# Patient Record
Sex: MALE | Race: WHITE | Employment: OTHER | URBAN - METROPOLITAN AREA
[De-identification: names, ages, dates, MRNs, and addresses within clinical notes are randomized per-mention and may not be internally consistent; named-entity substitution may affect disease eponyms.]

---

## 2019-04-15 ENCOUNTER — APPOINTMENT (OUTPATIENT)
Dept: CT IMAGING | Age: 69
End: 2019-04-15
Attending: PHYSICIAN ASSISTANT
Payer: MEDICARE

## 2019-04-15 ENCOUNTER — HOSPITAL ENCOUNTER (EMERGENCY)
Age: 69
Discharge: HOME | End: 2019-04-15
Attending: STUDENT IN AN ORGANIZED HEALTH CARE EDUCATION/TRAINING PROGRAM
Payer: MEDICARE

## 2019-04-15 VITALS
DIASTOLIC BLOOD PRESSURE: 74 MMHG | RESPIRATION RATE: 16 BRPM | HEART RATE: 85 BPM | OXYGEN SATURATION: 99 % | BODY MASS INDEX: 20.73 KG/M2 | HEIGHT: 69 IN | WEIGHT: 140 LBS | TEMPERATURE: 98.1 F | SYSTOLIC BLOOD PRESSURE: 126 MMHG

## 2019-04-15 DIAGNOSIS — E11.65 UNCONTROLLED TYPE 2 DIABETES MELLITUS WITH HYPERGLYCEMIA (HCC): ICD-10-CM

## 2019-04-15 DIAGNOSIS — R10.9 LEFT FLANK PAIN: Primary | ICD-10-CM

## 2019-04-15 LAB
ALBUMIN SERPL-MCNC: 4 G/DL (ref 3.2–4.6)
ALBUMIN/GLOB SERPL: 0.9 {RATIO}
ALP SERPL-CCNC: 182 U/L (ref 50–136)
ALT SERPL-CCNC: 38 U/L (ref 12–65)
ANION GAP SERPL CALC-SCNC: 5 MMOL/L
APPEARANCE UR: CLEAR
AST SERPL-CCNC: 15 U/L (ref 15–37)
BASOPHILS # BLD: 0 K/UL (ref 0–0.2)
BASOPHILS NFR BLD: 1 % (ref 0–2)
BILIRUB SERPL-MCNC: 0.6 MG/DL (ref 0.2–1.1)
BILIRUB UR QL: NEGATIVE
BUN SERPL-MCNC: 15 MG/DL (ref 8–23)
CALCIUM SERPL-MCNC: 10 MG/DL (ref 8.3–10.4)
CHLORIDE SERPL-SCNC: 98 MMOL/L (ref 98–107)
CO2 SERPL-SCNC: 28 MMOL/L (ref 21–32)
COLOR UR: YELLOW
CREAT SERPL-MCNC: 0.75 MG/DL (ref 0.8–1.5)
DIFFERENTIAL METHOD BLD: NORMAL
EOSINOPHIL # BLD: 0.1 K/UL (ref 0–0.8)
EOSINOPHIL NFR BLD: 1 % (ref 0.5–7.8)
ERYTHROCYTE [DISTWIDTH] IN BLOOD BY AUTOMATED COUNT: 12.8 % (ref 11.9–14.6)
GLOBULIN SER CALC-MCNC: 4.3 G/DL (ref 2.3–3.5)
GLUCOSE BLD STRIP.AUTO-MCNC: 241 MG/DL (ref 65–100)
GLUCOSE SERPL-MCNC: 464 MG/DL (ref 65–100)
GLUCOSE UR STRIP.AUTO-MCNC: >1000 MG/DL
HCT VFR BLD AUTO: 46.3 % (ref 41.1–50.3)
HGB BLD-MCNC: 16 G/DL (ref 13.6–17.2)
HGB UR QL STRIP: NEGATIVE
IMM GRANULOCYTES # BLD AUTO: 0.1 K/UL (ref 0–0.5)
IMM GRANULOCYTES NFR BLD AUTO: 1 % (ref 0–5)
KETONES UR QL STRIP.AUTO: NEGATIVE MG/DL
LACTATE BLD-SCNC: 1.53 MMOL/L (ref 0.5–1.9)
LEUKOCYTE ESTERASE UR QL STRIP.AUTO: NEGATIVE
LIPASE SERPL-CCNC: 318 U/L (ref 73–393)
LYMPHOCYTES # BLD: 1.7 K/UL (ref 0.5–4.6)
LYMPHOCYTES NFR BLD: 29 % (ref 13–44)
MCH RBC QN AUTO: 31.6 PG (ref 26.1–32.9)
MCHC RBC AUTO-ENTMCNC: 34.6 G/DL (ref 31.4–35)
MCV RBC AUTO: 91.3 FL (ref 79.6–97.8)
MONOCYTES # BLD: 0.5 K/UL (ref 0.1–1.3)
MONOCYTES NFR BLD: 9 % (ref 4–12)
NEUTS SEG # BLD: 3.5 K/UL (ref 1.7–8.2)
NEUTS SEG NFR BLD: 60 % (ref 43–78)
NITRITE UR QL STRIP.AUTO: NEGATIVE
NRBC # BLD: 0 K/UL (ref 0–0.2)
PH UR STRIP: 5 [PH] (ref 5–9)
PLATELET # BLD AUTO: 273 K/UL (ref 150–450)
PMV BLD AUTO: 9.7 FL (ref 9.4–12.3)
POTASSIUM SERPL-SCNC: 3.9 MMOL/L (ref 3.5–5.1)
PROT SERPL-MCNC: 8.3 G/DL
PROT UR STRIP-MCNC: NEGATIVE MG/DL
RBC # BLD AUTO: 5.07 M/UL (ref 4.23–5.6)
SODIUM SERPL-SCNC: 131 MMOL/L (ref 136–145)
SP GR UR REFRACTOMETRY: 1.04 (ref 1–1.02)
UROBILINOGEN UR QL STRIP.AUTO: 0.2 EU/DL (ref 0.2–1)
WBC # BLD AUTO: 5.8 K/UL (ref 4.3–11.1)

## 2019-04-15 PROCEDURE — 96375 TX/PRO/DX INJ NEW DRUG ADDON: CPT | Performed by: PHYSICIAN ASSISTANT

## 2019-04-15 PROCEDURE — 85025 COMPLETE CBC W/AUTO DIFF WBC: CPT

## 2019-04-15 PROCEDURE — 74011636637 HC RX REV CODE- 636/637: Performed by: PHYSICIAN ASSISTANT

## 2019-04-15 PROCEDURE — 74176 CT ABD & PELVIS W/O CONTRAST: CPT

## 2019-04-15 PROCEDURE — 74011250636 HC RX REV CODE- 250/636: Performed by: PHYSICIAN ASSISTANT

## 2019-04-15 PROCEDURE — 99284 EMERGENCY DEPT VISIT MOD MDM: CPT | Performed by: PHYSICIAN ASSISTANT

## 2019-04-15 PROCEDURE — 81003 URINALYSIS AUTO W/O SCOPE: CPT

## 2019-04-15 PROCEDURE — 74011000258 HC RX REV CODE- 258: Performed by: PHYSICIAN ASSISTANT

## 2019-04-15 PROCEDURE — 83605 ASSAY OF LACTIC ACID: CPT

## 2019-04-15 PROCEDURE — 96361 HYDRATE IV INFUSION ADD-ON: CPT | Performed by: PHYSICIAN ASSISTANT

## 2019-04-15 PROCEDURE — 80053 COMPREHEN METABOLIC PANEL: CPT

## 2019-04-15 PROCEDURE — 82962 GLUCOSE BLOOD TEST: CPT

## 2019-04-15 PROCEDURE — 83690 ASSAY OF LIPASE: CPT

## 2019-04-15 PROCEDURE — 96365 THER/PROPH/DIAG IV INF INIT: CPT | Performed by: PHYSICIAN ASSISTANT

## 2019-04-15 RX ORDER — METFORMIN HYDROCHLORIDE 500 MG/1
TABLET ORAL
Qty: 120 TAB | Refills: 0 | Status: SHIPPED | OUTPATIENT
Start: 2019-04-15 | End: 2020-03-19

## 2019-04-15 RX ORDER — KETOROLAC TROMETHAMINE 30 MG/ML
15 INJECTION, SOLUTION INTRAMUSCULAR; INTRAVENOUS
Status: COMPLETED | OUTPATIENT
Start: 2019-04-15 | End: 2019-04-15

## 2019-04-15 RX ORDER — CEPHALEXIN 500 MG/1
500 CAPSULE ORAL EVERY 6 HOURS
Qty: 28 CAP | Refills: 0 | Status: SHIPPED | OUTPATIENT
Start: 2019-04-15 | End: 2019-04-22

## 2019-04-15 RX ADMIN — INSULIN HUMAN 10 UNITS: 100 INJECTION, SOLUTION PARENTERAL at 15:13

## 2019-04-15 RX ADMIN — SODIUM CHLORIDE 1000 ML: 900 INJECTION, SOLUTION INTRAVENOUS at 15:14

## 2019-04-15 RX ADMIN — CEFTRIAXONE 1 G: 1 INJECTION, POWDER, FOR SOLUTION INTRAMUSCULAR; INTRAVENOUS at 15:14

## 2019-04-15 RX ADMIN — KETOROLAC TROMETHAMINE 15 MG: 30 INJECTION, SOLUTION INTRAMUSCULAR at 13:57

## 2019-04-15 RX ADMIN — SODIUM CHLORIDE 1000 ML: 900 INJECTION, SOLUTION INTRAVENOUS at 13:57

## 2019-04-15 NOTE — PROGRESS NOTES
Referral to Diabetes Self Management program at [facility] faxed at request of Lady Judy Edmondson. Patient notified and advised to call them if he not heard anything by the end of the week.

## 2019-04-15 NOTE — ED TRIAGE NOTES
Pt c/o lower back pain for 3 weeks or so. States it started out as an ache that tylenol usually makes feel better and now there are sharp shooting pains.

## 2019-04-15 NOTE — ED PROVIDER NOTES
Patient presents to the ER complaining of left flank pain for the last 3 weeks. He does not recall straining or injuring back, pain went away with Tylenol for 2 weeks, then returned. Patient has now switched to Aleve, and this med also not working any longer. Pain is worse when he lays on his left side at night, not particularly aggravated with movement, food, urination or defecation. Pain occasionally radiates into L upper quadrant. Patient denies hematuria, nausea, vomiting or unattended weight loss. Patient shares he was laid off 1 year ago and lost his insurance. He was taking metformin for diabetes, but has been on no medication for the last year. Lost 40 pounds over the last year of eating better. History of kidney stones 7-8 years ago, patient states this does not feel the same. Denies CP, SOB, EDGE. States he's being seen today because his friend had a similar pain that he put off investigating, then friend was diagnosed with a renal cancer and a \"tumor that wrapped around his spine\". Past Medical History:  
Diagnosis Date  Diabetes ([address] boderline  Diabetes type 2, uncontrolled ([address]  Other ill-defined conditions(799.89)   
 cardiomyopathy  Other ill-defined conditions(799.89)   
 hypercholerosteremia Past Surgical History:  
Procedure Laterality Date  HX ORTHOPAEDIC    
 rt knee  HX OTHER SURGICAL    
 hernia,  
 
   
Family History:  
Problem Relation Age of Onset  Heart Disease Father  Heart Disease Paternal Grandfather Social History Socioeconomic History  Marital status:  Spouse name: Not on file  Number of children: Not on file  Years of education: Not on file  Highest education level: Not on file Occupational History  Not on file Social Needs  Financial resource strain: Not on file  Food insecurity:  
  Worry: Not on file Inability: Not on file  Transportation needs:  
  Medical: Not on file Non-medical: Not on file Tobacco Use  Smoking status: Former Smoker Packs/day: 1.00 Years: 20.00 Pack years: 20.00 Last attempt to quit: 10/20/1997 Years since quittin.4  Smokeless tobacco: Never Used Substance and Sexual Activity  Alcohol use: Yes Alcohol/week: 0.0 oz  
  Comment: occasional  
 Drug use: No  
 Sexual activity: Never Lifestyle  Physical activity:  
  Days per week: Not on file Minutes per session: Not on file  Stress: Not on file Relationships  Social connections:  
  Talks on phone: Not on file Gets together: Not on file Attends [employer] service: Not on file Active member of club or organization: Not on file Attends meetings of clubs or organizations: Not on file Relationship status: Not on file  Intimate partner violence:  
  Fear of current or ex partner: Not on file Emotionally abused: Not on file Physically abused: Not on file Forced sexual activity: Not on file Other Topics Concern  Not on file Social History Narrative  Not on file ALLERGIES: Patient has no known allergies. Review of Systems Constitutional: Negative for activity change, appetite change, chills, diaphoresis, fatigue, fever and unexpected weight change. Respiratory: Negative for cough and choking. Cardiovascular: Negative for chest pain and palpitations. Gastrointestinal: Negative for abdominal pain. Psychiatric/Behavioral: Negative for confusion, decreased concentration and dysphoric mood. Vitals:  
 04/15/19 1210 BP: 120/76 Pulse: 85 Resp: 16 Temp: 98.4 °F (36.9 °C) SpO2: 100% Weight: 63.5 kg (140 lb) Height: 5' 9\" (1.753 m) Physical Exam  
Constitutional: He appears well-developed and well-nourished. Thin, healthy-appearing white male in no acute distress. Ambulating independently HENT:  
Head: Normocephalic. Eyes: Pupils are equal, round, and reactive to light. Conjunctivae and EOM are normal.  
Neck: Normal range of motion. Pulmonary/Chest: Effort normal and breath sounds normal.  
Abdominal: Soft. Bowel sounds are normal.  
Musculoskeletal: Normal range of motion. He exhibits tenderness. He exhibits no edema. No bony midline tenderness. Positive CVA tenderness on the left side. No rash, ecchymosis or evidence of trauma. Abdomen nontender. Nursing note and vitals reviewed. MDM Number of Diagnoses or Management Options Left flank pain: new and requires workup Uncontrolled type 2 diabetes mellitus with hyperglycemia [facility]): new and requires workup Diagnosis management comments: Concern for kidney stone vs pyelo vs AKD. DKA. Patient has intended weight loss; cancer would be higher on the differential if unintended. Amount and/or Complexity of Data Reviewed Clinical lab tests: ordered and reviewed Risk of Complications, Morbidity, and/or Mortality Presenting problems: moderate Diagnostic procedures: moderate Management options: moderate General comments: Patient with blood sugar of 460 in the ER. Given 2 bags of fluid, 10 IV of insulin and Rocephin. Will be DC'd with Keflex and metformin in the dose that he was taking last year. Discussed case with Socorro Bronson, who set him up with other PCPs that will take his Medicare insurance of Dr. Timur Billings will no longer see him. Also sent referral for diabetes self-management education classes. Patient voices understanding. Return precautions discussed. Patient Progress Patient progress: stable Procedures

## 2019-04-15 NOTE — PROGRESS NOTES
Visited at request of Sigifredo CALDWELL to assist with follow up and PCP. Demographics on face sheet verified. Patient states does not have a PCP. Discussed the importance of getting established with a PCP and provided patient with a list of PCPs. Encouraged patient to let me know if he would like me to make the appointment or is in need of any assistance.

## 2019-04-15 NOTE — ED NOTES
I have reviewed discharge instructions with the patient. The patient verbalized understanding. Patient left ED via Discharge Method: ambulatory to Home with self. Opportunity for questions and clarification provided. Patient given 1 scripts. To continue your aftercare when you leave the hospital, you may receive an automated call from our care team to check in on how you are doing. This is a free service and part of our promise to provide the best care and service to meet your aftercare needs.  If you have questions, or wish to unsubscribe from this service please call [phone]. Thank you for Choosing our [address] [place] [address] Emergency Department.

## 2019-04-15 NOTE — DISCHARGE INSTRUCTIONS
Take Keflex as directed for treatment of your pyelonephritis. Take metformin as directed. You will be contacted by [facility],  who will acute up with self-management classes for your diabetes. Follow-up with Dr. Alondra Rothman as soon as you are able. You need to restart your medication and better control your diabetes. Your blood sugar was over 460 in the ER today. Drink plenty of fluids. Return to the ER if worse or new symptoms.

## 2020-01-03 ENCOUNTER — APPOINTMENT (OUTPATIENT)
Dept: CT IMAGING | Age: 70
DRG: 065 | End: 2020-01-03
Attending: EMERGENCY MEDICINE
Payer: MEDICARE

## 2020-01-03 ENCOUNTER — HOSPITAL ENCOUNTER (INPATIENT)
Age: 70
LOS: 3 days | Discharge: HOME HEALTH | DRG: 065 | End: 2020-01-06
Attending: EMERGENCY MEDICINE | Admitting: INTERNAL MEDICINE
Payer: MEDICARE

## 2020-01-03 ENCOUNTER — APPOINTMENT (OUTPATIENT)
Dept: MRI IMAGING | Age: 70
DRG: 065 | End: 2020-01-03
Attending: INTERNAL MEDICINE
Payer: MEDICARE

## 2020-01-03 ENCOUNTER — APPOINTMENT (OUTPATIENT)
Dept: GENERAL RADIOLOGY | Age: 70
DRG: 065 | End: 2020-01-03
Attending: INTERNAL MEDICINE
Payer: MEDICARE

## 2020-01-03 DIAGNOSIS — J81.1 CHRONIC PULMONARY EDEMA: ICD-10-CM

## 2020-01-03 DIAGNOSIS — I63.9 CEREBROVASCULAR ACCIDENT (CVA), UNSPECIFIED MECHANISM (HCC): ICD-10-CM

## 2020-01-03 DIAGNOSIS — R79.89 ELEVATED BRAIN NATRIURETIC PEPTIDE (BNP) LEVEL: ICD-10-CM

## 2020-01-03 DIAGNOSIS — H53.462 LEFT HOMONYMOUS HEMIANOPSIA DUE TO RECENT CEREBRAL INFARCTION: Primary | ICD-10-CM

## 2020-01-03 DIAGNOSIS — I69.398 LEFT HOMONYMOUS HEMIANOPSIA DUE TO RECENT CEREBRAL INFARCTION: Primary | ICD-10-CM

## 2020-01-03 PROBLEM — R77.8 TROPONIN I ABOVE REFERENCE RANGE: Status: ACTIVE | Noted: 2020-01-03

## 2020-01-03 LAB
ANION GAP SERPL CALC-SCNC: 6 MMOL/L (ref 7–16)
APTT PPP: 29 SEC (ref 24.7–39.8)
ATRIAL RATE: 87 BPM
ATRIAL RATE: 90 BPM
BASOPHILS # BLD: 0 K/UL (ref 0–0.2)
BASOPHILS NFR BLD: 1 % (ref 0–2)
BNP SERPL-MCNC: 2897 PG/ML (ref 5–125)
BUN SERPL-MCNC: 14 MG/DL (ref 8–23)
CALCIUM SERPL-MCNC: 9 MG/DL (ref 8.3–10.4)
CALCULATED P AXIS, ECG09: 64 DEGREES
CALCULATED P AXIS, ECG09: 69 DEGREES
CALCULATED R AXIS, ECG10: 91 DEGREES
CALCULATED R AXIS, ECG10: 93 DEGREES
CALCULATED T AXIS, ECG11: -108 DEGREES
CALCULATED T AXIS, ECG11: -84 DEGREES
CHLORIDE SERPL-SCNC: 106 MMOL/L (ref 98–107)
CO2 SERPL-SCNC: 29 MMOL/L (ref 21–32)
CREAT SERPL-MCNC: 0.67 MG/DL (ref 0.8–1.5)
DIAGNOSIS, 93000: NORMAL
DIAGNOSIS, 93000: NORMAL
DIFFERENTIAL METHOD BLD: ABNORMAL
EOSINOPHIL # BLD: 0.1 K/UL (ref 0–0.8)
EOSINOPHIL NFR BLD: 1 % (ref 0.5–7.8)
ERYTHROCYTE [DISTWIDTH] IN BLOOD BY AUTOMATED COUNT: 14.2 % (ref 11.9–14.6)
GLUCOSE BLD STRIP.AUTO-MCNC: 145 MG/DL (ref 65–100)
GLUCOSE BLD STRIP.AUTO-MCNC: 151 MG/DL (ref 65–100)
GLUCOSE SERPL-MCNC: 122 MG/DL (ref 65–100)
HCT VFR BLD AUTO: 34 % (ref 41.1–50.3)
HGB BLD-MCNC: 11 G/DL (ref 13.6–17.2)
IMM GRANULOCYTES # BLD AUTO: 0.2 K/UL (ref 0–0.5)
IMM GRANULOCYTES NFR BLD AUTO: 3 % (ref 0–5)
INR PPP: 1.1
LYMPHOCYTES # BLD: 1.2 K/UL (ref 0.5–4.6)
LYMPHOCYTES NFR BLD: 21 % (ref 13–44)
MCH RBC QN AUTO: 31.2 PG (ref 26.1–32.9)
MCHC RBC AUTO-ENTMCNC: 32.4 G/DL (ref 31.4–35)
MCV RBC AUTO: 96.3 FL (ref 79.6–97.8)
MONOCYTES # BLD: 0.5 K/UL (ref 0.1–1.3)
MONOCYTES NFR BLD: 9 % (ref 4–12)
NEUTS SEG # BLD: 3.7 K/UL (ref 1.7–8.2)
NEUTS SEG NFR BLD: 66 % (ref 43–78)
NRBC # BLD: 0 K/UL (ref 0–0.2)
P-R INTERVAL, ECG05: 246 MS
P-R INTERVAL, ECG05: 270 MS
PLATELET # BLD AUTO: 268 K/UL (ref 150–450)
PMV BLD AUTO: 8.7 FL (ref 9.4–12.3)
POTASSIUM SERPL-SCNC: 3.8 MMOL/L (ref 3.5–5.1)
PROTHROMBIN TIME: 14.1 SEC (ref 11.7–14.5)
Q-T INTERVAL, ECG07: 394 MS
Q-T INTERVAL, ECG07: 394 MS
QRS DURATION, ECG06: 70 MS
QRS DURATION, ECG06: 72 MS
QTC CALCULATION (BEZET), ECG08: 474 MS
QTC CALCULATION (BEZET), ECG08: 481 MS
RBC # BLD AUTO: 3.53 M/UL (ref 4.23–5.6)
SODIUM SERPL-SCNC: 141 MMOL/L (ref 136–145)
TROPONIN I SERPL-MCNC: 0.74 NG/ML (ref 0.02–0.05)
TROPONIN I SERPL-MCNC: 0.79 NG/ML (ref 0.02–0.05)
TROPONIN I SERPL-MCNC: 0.82 NG/ML (ref 0.02–0.05)
VENTRICULAR RATE, ECG03: 87 BPM
VENTRICULAR RATE, ECG03: 90 BPM
WBC # BLD AUTO: 5.6 K/UL (ref 4.3–11.1)

## 2020-01-03 PROCEDURE — 74011250636 HC RX REV CODE- 250/636: Performed by: INTERNAL MEDICINE

## 2020-01-03 PROCEDURE — 86580 TB INTRADERMAL TEST: CPT | Performed by: INTERNAL MEDICINE

## 2020-01-03 PROCEDURE — 93005 ELECTROCARDIOGRAM TRACING: CPT | Performed by: EMERGENCY MEDICINE

## 2020-01-03 PROCEDURE — 99285 EMERGENCY DEPT VISIT HI MDM: CPT | Performed by: EMERGENCY MEDICINE

## 2020-01-03 PROCEDURE — 70551 MRI BRAIN STEM W/O DYE: CPT

## 2020-01-03 PROCEDURE — 80048 BASIC METABOLIC PNL TOTAL CA: CPT

## 2020-01-03 PROCEDURE — 74011636320 HC RX REV CODE- 636/320: Performed by: EMERGENCY MEDICINE

## 2020-01-03 PROCEDURE — 82962 GLUCOSE BLOOD TEST: CPT

## 2020-01-03 PROCEDURE — 85610 PROTHROMBIN TIME: CPT

## 2020-01-03 PROCEDURE — 74011250637 HC RX REV CODE- 250/637: Performed by: INTERNAL MEDICINE

## 2020-01-03 PROCEDURE — 36415 COLL VENOUS BLD VENIPUNCTURE: CPT

## 2020-01-03 PROCEDURE — 84484 ASSAY OF TROPONIN QUANT: CPT

## 2020-01-03 PROCEDURE — 70450 CT HEAD/BRAIN W/O DYE: CPT

## 2020-01-03 PROCEDURE — 65270000029 HC RM PRIVATE

## 2020-01-03 PROCEDURE — 74011250637 HC RX REV CODE- 250/637: Performed by: EMERGENCY MEDICINE

## 2020-01-03 PROCEDURE — 0042T CT PERF W CBF: CPT

## 2020-01-03 PROCEDURE — 92610 EVALUATE SWALLOWING FUNCTION: CPT

## 2020-01-03 PROCEDURE — 83880 ASSAY OF NATRIURETIC PEPTIDE: CPT

## 2020-01-03 PROCEDURE — 71045 X-RAY EXAM CHEST 1 VIEW: CPT

## 2020-01-03 PROCEDURE — 85730 THROMBOPLASTIN TIME PARTIAL: CPT

## 2020-01-03 PROCEDURE — 74011000302 HC RX REV CODE- 302: Performed by: INTERNAL MEDICINE

## 2020-01-03 PROCEDURE — 74011636637 HC RX REV CODE- 636/637: Performed by: INTERNAL MEDICINE

## 2020-01-03 PROCEDURE — 77030040361 HC SLV COMPR DVT MDII -B

## 2020-01-03 PROCEDURE — 70496 CT ANGIOGRAPHY HEAD: CPT

## 2020-01-03 PROCEDURE — 85025 COMPLETE CBC W/AUTO DIFF WBC: CPT

## 2020-01-03 PROCEDURE — 74011000258 HC RX REV CODE- 258: Performed by: EMERGENCY MEDICINE

## 2020-01-03 RX ORDER — ATORVASTATIN CALCIUM 40 MG/1
80 TABLET, FILM COATED ORAL
Status: DISCONTINUED | OUTPATIENT
Start: 2020-01-03 | End: 2020-01-06 | Stop reason: HOSPADM

## 2020-01-03 RX ORDER — ACETAMINOPHEN 325 MG/1
650 TABLET ORAL
Status: DISCONTINUED | OUTPATIENT
Start: 2020-01-03 | End: 2020-01-06 | Stop reason: HOSPADM

## 2020-01-03 RX ORDER — HYDROCODONE BITARTRATE AND ACETAMINOPHEN 7.5; 325 MG/1; MG/1
1 TABLET ORAL
COMMUNITY
End: 2020-01-06

## 2020-01-03 RX ORDER — HEPARIN SODIUM 5000 [USP'U]/ML
5000 INJECTION, SOLUTION INTRAVENOUS; SUBCUTANEOUS EVERY 12 HOURS
Status: DISCONTINUED | OUTPATIENT
Start: 2020-01-03 | End: 2020-01-06 | Stop reason: HOSPADM

## 2020-01-03 RX ORDER — INSULIN LISPRO 100 [IU]/ML
INJECTION, SOLUTION INTRAVENOUS; SUBCUTANEOUS
Status: DISCONTINUED | OUTPATIENT
Start: 2020-01-03 | End: 2020-01-06 | Stop reason: HOSPADM

## 2020-01-03 RX ORDER — LABETALOL HYDROCHLORIDE 5 MG/ML
5 INJECTION, SOLUTION INTRAVENOUS
Status: DISCONTINUED | OUTPATIENT
Start: 2020-01-03 | End: 2020-01-06 | Stop reason: HOSPADM

## 2020-01-03 RX ORDER — ASPIRIN 81 MG/1
81 TABLET ORAL DAILY
Status: DISCONTINUED | OUTPATIENT
Start: 2020-01-04 | End: 2020-01-06 | Stop reason: HOSPADM

## 2020-01-03 RX ORDER — SODIUM CHLORIDE 0.9 % (FLUSH) 0.9 %
5-40 SYRINGE (ML) INJECTION EVERY 8 HOURS
Status: DISCONTINUED | OUTPATIENT
Start: 2020-01-03 | End: 2020-01-06 | Stop reason: HOSPADM

## 2020-01-03 RX ORDER — SODIUM CHLORIDE 0.9 % (FLUSH) 0.9 %
5-40 SYRINGE (ML) INJECTION AS NEEDED
Status: DISCONTINUED | OUTPATIENT
Start: 2020-01-03 | End: 2020-01-06 | Stop reason: HOSPADM

## 2020-01-03 RX ORDER — SODIUM CHLORIDE 0.9 % (FLUSH) 0.9 %
10 SYRINGE (ML) INJECTION
Status: COMPLETED | OUTPATIENT
Start: 2020-01-03 | End: 2020-01-03

## 2020-01-03 RX ORDER — ONDANSETRON 2 MG/ML
4 INJECTION INTRAMUSCULAR; INTRAVENOUS
Status: DISCONTINUED | OUTPATIENT
Start: 2020-01-03 | End: 2020-01-06 | Stop reason: HOSPADM

## 2020-01-03 RX ORDER — GUAIFENESIN 100 MG/5ML
324 LIQUID (ML) ORAL
Status: COMPLETED | OUTPATIENT
Start: 2020-01-03 | End: 2020-01-03

## 2020-01-03 RX ORDER — AMOXICILLIN 250 MG
1 CAPSULE ORAL DAILY
Status: DISCONTINUED | OUTPATIENT
Start: 2020-01-04 | End: 2020-01-06 | Stop reason: HOSPADM

## 2020-01-03 RX ADMIN — ACETAMINOPHEN 650 MG: 325 TABLET, FILM COATED ORAL at 22:59

## 2020-01-03 RX ADMIN — SODIUM CHLORIDE 100 ML: 900 INJECTION, SOLUTION INTRAVENOUS at 11:12

## 2020-01-03 RX ADMIN — Medication 10 ML: at 16:53

## 2020-01-03 RX ADMIN — TUBERCULIN PURIFIED PROTEIN DERIVATIVE 5 UNITS: 5 INJECTION INTRADERMAL at 16:55

## 2020-01-03 RX ADMIN — HEPARIN SODIUM 5000 UNITS: 5000 INJECTION INTRAVENOUS; SUBCUTANEOUS at 16:53

## 2020-01-03 RX ADMIN — INSULIN LISPRO 2 UNITS: 100 INJECTION, SOLUTION INTRAVENOUS; SUBCUTANEOUS at 22:55

## 2020-01-03 RX ADMIN — Medication 10 ML: at 11:12

## 2020-01-03 RX ADMIN — ASPIRIN 81 MG 324 MG: 81 TABLET ORAL at 10:27

## 2020-01-03 RX ADMIN — ATORVASTATIN CALCIUM 80 MG: 40 TABLET, FILM COATED ORAL at 22:55

## 2020-01-03 RX ADMIN — IOPAMIDOL 100 ML: 755 INJECTION, SOLUTION INTRAVENOUS at 11:12

## 2020-01-03 RX ADMIN — Medication 10 ML: at 22:00

## 2020-01-03 NOTE — H&P
History and Physical    Patient: Dilip Covarrubias MRN: [id number]  SSN: [id number]    YOB: 1950  Age: 81 y.o. Sex: male      Subjective: \"I have blurry vision\"       Dilip Covarrubias is a 81 y.o. male who has a past medical history of type 2 diabetes on metformin, and Januvia; prior TIA more than 10 years ago with no deficits, who came after noticing blurry vision more pronounced over his left eye, with inability to perceive peripheral vision. The patient stated went to bed around 9:00 last night and his vision was normal.  He has been complaining of dizziness and lightheadedness since December 31. The patient denies falls, head trauma or any other neurologic abnormalities. He denied chest pain, shortness of breath, palpitations. Upon arrival to the ER he was able to speak in full sentences, and he seemed in no distress. Laboratories included a normal CBC, unremarkable chemistry; troponin of 0.82. EKG showed normal sinus rhythm with normal QTC and some unspecific ST depressions. Elevated pro-bnp. CXR: pulmonary edema. Brain CT scan revealed abnormal edema in the right PCA territory suggesting a potentially recent infarction. No signs of acute bleeding, hydrocephalus, or herniation appreciated. ER MD consulted to Dr. Sonja Vu burning and intervention neurology and he underwent perfusion CT scan and CT angiogram of head and neck. He had subtle narrowing in the distal branches of the right PCA near the known right occipital lobe infarct. A perfusion CT scan showed a small core infarct with surrounding penumbra within the right occipitoparietal region. No intervention reach afterwords, with no intervention planned. Patient was given 324 mg of aspirin in the ER and hospitalist was contacted for admission in view of CVA.     Past medical history as above  Social history former smoker  Family history his father had a history of heart disease    Past Medical History:   Diagnosis Date    [facility]A [facility]cerebral vascular [facility]) [facility] 1/3/2020    Diabetes ([facility]     boderline    Diabetes type 2, uncontrolled ([facility]     Other ill-defined conditions(799.89)     cardiomyopathy    Other ill-defined conditions(799.89)     hypercholerosteremia     Past Surgical History:   Procedure Laterality Date    HX ORTHOPAEDIC      rt knee    HX OTHER SURGICAL      hernia,      Family History   Problem Relation Age of Onset    Heart Disease Father     Heart Disease Paternal Grandfather      Social History     Tobacco Use    Smoking status: Former Smoker     Packs/day: 1.00     Years: 20.00     Pack years: 20.00     Last attempt to quit: 10/20/1997     Years since quittin.2    Smokeless tobacco: Never Used   Substance Use Topics    Alcohol use: Yes     Alcohol/week: 0.0 standard drinks     Comment: occasional      Prior to Admission medications    Medication Sig Start Date End Date Taking? Authorizing Provider   HYDROcodone-acetaminophen (NORCO) 7.5-325 mg per tablet Take 1 Tab by mouth every six (6) hours as needed for Pain. Yes Provider, Historical   metFORMIN (GLUCOPHAGE) 500 mg tablet Take 2 tablets by mouth every morning then Take 2 tablets by mouth every night 4/15/19   JAVI Grissom        No Known Allergies    Review of Systems:  A comprehensive review of systems was negative except for that written in the History of Present Illness. Objective:     Vitals:    20 1130 20 1200 20 1331 20 1426   BP: 108/65 104/70 95/55 97/61   Pulse: 94 93 92 97   Resp: 26  18 18   Temp:   97.6 °F (36.4 °C) 96.6 °F (35.9 °C)   SpO2: 95% 92% 93% 92%   Weight:       Height:            Physical Exam:  GENERAL: alert, cooperative, no distress, appears stated age  EYE: negative  LYMPHATIC: Cervical, supraclavicular, and axillary nodes normal.   THROAT & NECK: normal and no erythema or exudates noted.    LUNG: mild rales, no wheezing, on room air  HEART: regular rate and rhythm, S1, S2 normal, no murmur, click, rub or gallop  ABDOMEN: soft, non-tender. Bowel sounds normal. No masses,  no organomegaly  EXTREMITIES:  extremities normal, atraumatic, no cyanosis or edema  SKIN: Normal.  NEUROLOGIC: oriented x 3, no EOM palsies, left homonomous hemianopsia present. Strength is preserved in all 4 limbs. He had a decreased sensation over anterior thighs bilaterally. PSYCHIATRIC: non focal    Assessment:     Active Hospital Problems    Diagnosis Date Noted    [facility]A [facility]cere[facility] vascular [facility]) [facility] 01/03/2020    Troponin I above reference range 01/03/2020    Elevated brain natriuretic peptide (BNP) level 01/03/2020    Pulmonary edema 01/03/2020    Diabetes type 2, uncontrolled ([facility] 03/01/2016     Mr. Cy Smith is a 72-year-old male with a past medical history of diabetes and TIA, presented with blurry vision over his left eye since 5:00 this morning. He has been diagnosed with a presumptive CVA over the right PCA territory, with clinical findings of left homonymous hemianopsia. The patient will be admitted under telemetry monitoring. Aspirin and statins have been initiated and neurology will be contacted in the morning. He has also unknown thrombotic troponin elevation possibly related to demand in view of CVA. He denies acute chest pain. His length of stay is established to be more than 2 midnights. .    Plan:     -Acute CVA, over the right PCA territory with left homonymous hemianopsia:  Admit as an inpatient  Remote telemetry  Neurochecks serially  Permissive hypertension  Continue aspirin and statins  Diabetes control. Goals while inpatient glucose between 140180s  Brain MRI  Echocardiogram  PT and OT. Speech therapy   Place PPD  Consult neurology   Physiatry medicine consult  Check hemoglobin A1c and lipids    -Non-thrombotic troponin elevation:  The patient has no active chest pain.   Keep under remote telemetry  Monitor troponins every 6 hours  Check echo    -Elevated proBNP with signs of pulmonary edema and cardiomegaly on chest x-ray:  Clinically he does not seem volume overloaded. She does have mild rales. The patient is on room air  No need for Lasix at this moment.   Await echocardiogram    -Type 2 diabetes:  Start Humalog insulin sliding scale  Check hemoglobin A1c    DVT prophylaxis: Heparin subcutaneous    CODE STATUS full code    Estimated length of stay is more than 2 midnights  Risk high  Estimated discharge planning Home versus short-term rehab  Goals of care discussed with the patient and his brother-in-law    Signed By: Rajan Sahni MD     January 3, 2020

## 2020-01-03 NOTE — PROGRESS NOTES
Called floor and requested RN completion of MRI consent so that ordered study can be done as soon as possible.

## 2020-01-03 NOTE — PROGRESS NOTES
SPEECH LANGUAGE PATHOLOGY: DYSPHAGIA- Initial Assessment    NAME/AGE/GENDER: Mj Gomez is a 81 y.o. male  DATE: 1/3/2020  PRIMARY DIAGNOSIS: CVA (cerebral vascular accident) ([facility] [I63.9]      ICD-10: Treatment Diagnosis: R13.12 Dysphagia, Oropharyngeal Phase    INTERDISCIPLINARY COLLABORATION: Registered Nurse  PRECAUTIONS/ALLERGIES: Patient has no known allergies. SUBJECTIVE   Patient alert upright in bed for assessment. Denies any difficulty swallow since since esophageal dilation 7/2019. Brother in-law at bedside. History of Present Injury/Illness: Mr. Bernadette Sawyer  has a past medical history of CVA (cerebral vascular accident) ([address] (1/3/2020), Diabetes ([address] Diabetes type 2, uncontrolled ([address] Other ill-defined conditions(799.89), and Other ill-defined conditions(799.89). He also has no past medical history of Arthritis, Asthma, Autoimmune disease ([address] CAD (coronary artery disease), Cancer ([address] Chronic kidney disease, COPD, Dementia, Gastrointestinal disorder, Heart failure ([address] Hypertension, Infectious disease, Liver disease, Psychiatric disorder, PUD (peptic ulcer disease), Seizures ([address] Sleep disorder, or Thromboembolus ([address] . He also  has a past surgical history that includes hx orthopaedic and hx other surgical.      Problem List:  (Impairments causing functional limitations):  1. Oropharyngeal dysphagia- No symptoms identified     Previous Dysphagia: YES Per chart review patient with history of esophageal dysphagia. Seen by GI with [facility]. EGD with dilation completed 7/2019.     Diet Prior to Evaluation: Regular/thin       Orientation:   Person  Place  Time  Situation    Pain: Pain Scale 1: Numeric (0 - 10)  Pain Intensity 1: 0       Cognitive-Linguistic Screen:   Speech Production:   o WNL   Expressive Language:  o WNL  o No deficits with word finding in coversation   Receptive Language:  o WNL  o Follows 2 step commands   Cognition:   o WNL  o Appears intact. o Patient may benefit from cognitive linguistic assessment if clinically indicated. MRI pending. OBJECTIVE   Oral Motor:   · Labial: No impairment  · Dentition: Intact and Natural  · Oral Hygiene: Adequate  · Lingual: No impairment     Swallow assessment:   Patient presented with thin by cup/straw/serial sips, mechanical soft, and solid consistency items from lunch tray. Patient exhibited no overt s/sx airway compromise with solid or liquids. Oral prep time and oral clearance WNL with all consistencies. Laryngeal elevation present upon palpation. Patient denies globus sensation. ASSESSMENT   Patient presents with normal oropharyngeal swallow function. Recommend continue prescribed diet: regular consistency/thin liquids. Medications whole with liquid wash. No dysphagia goal identified at this time as oropharyngeal swallow function judged to be WNL. Patient may benefit from cognitive linguistic assessment if clinically indicated. MRI pending. Tool Used: Dysphagia Outcome and Severity Scale (SOFIYA)    Score Comments   Normal Diet  [x] 7 With no strategies or extra time needed   Functional Swallow  [] 6 May have mild oral or pharyngeal delay   Mild Dysphagia  [] 5 Which may require one diet consistency restricted    Mild-Moderate Dysphagia  [] 4 With 1-2 diet consistencies restricted   Moderate Dysphagia  [] 3 With 2 or more diet consistencies restricted   Moderate-Severe Dysphagia  [] 2 With partial PO strategies (trials with ST only)   Severe Dysphagia  [] 1 With inability to tolerate any PO safely      Score:  Initial: 7 Most Recent:  (Date 01/03/20 )   Interpretation of Tool: The Dysphagia Outcome and Severity Scale (SOFIYA) is a simple, easy-to-use, 7-point scale developed to systematically rate the functional severity of dysphagia based on objective assessment and make recommendations for diet level, independence level, and type of nutrition.      Current Medications:   No current facility-administered medications on file prior to encounter. Current Outpatient Medications on File Prior to Encounter   Medication Sig Dispense Refill    metFORMIN (GLUCOPHAGE) 500 mg tablet Take 2 tablets by mouth every morning then Take 2 tablets by mouth every night 120 Tab 0         PLAN    FREQUENCY/DURATION: Speech therapy to follow up for assessment of cognitive-linguistic function.     - Recommendations for next treatment session: No additional speech therapy indicated at this time. REHABILITATION POTENTIAL FOR STATED GOALS: Excellent     COMPLIANCE WITH PROGRAM/EXERCISES: Will assess as treatment progresses    CONTINUATION OF SKILLED SERVICES/MEDICAL NECESSITY:   No dysphagia goals identified as swallow function is within normal limits. RECOMMENDATIONS   DIET:    PO:  Regular   Liquids:  regular thin    MEDICATIONS: With liquid     ASPIRATION PRECAUTIONS  · Slow rate of intake  · Small bites/sips  · Upright at 90 degrees during meal     COMPENSATORY STRATEGIES/MODIFICATIONS  · None     EDUCATION:  · Recommendations discussed with Family  · Patient     RECOMMENDATIONS for CONTINUED SPEECH THERAPY:   YES: Anticipate need for ongoing speech therapy during this hospitalization.        SAFETY:  After treatment position/precautions:  · Upright in bed  · Family at bedside  · Call light within reach    Total Treatment Duration:   Time In: 6431  Time Out: [address] [facility], [address] [facility]

## 2020-01-03 NOTE — PROGRESS NOTES
Location of Assessment: ER RM2    Socioevironmental:  SW met with patient who states that he lives with his wife Elder Infante (legal name Adelaida Chavis) [phone]. Patient has 16and 15year old daughters, the youngest of which is in remission for lymphoma. Patient states that he's retired. Patient currently on O2, states that he does not require it at his baseline. Ambulation/ADLs:  Patient states that he does not use assistive devices to ambulate, does not require ADL assistance, and admits to a fall on the 31st when he \"became dizzy and nauseous. \" Patient states that he's walked since coming into the ER and denies any feelings of weakness or unbalance but he's only moved from his bed to the wheelchair. Primary Care:  Patient sees Dr. Eden Bhatti for primary care and is current. Needs:   Patient anticipated to admit either to [facility] or [facility]. No needs voiced by patient and none identified by SW at this time. Case management will continue to follow until discharge to help accommodate any that should arise.      Haleigh Martínez, [address] [facility]    [address] [facility]  [email or web address]

## 2020-01-03 NOTE — ED PROVIDER NOTES
29-year-old gentleman felt fine when he went to bed. He woke with what he calls blurred and dark vision about 5 AM.  It has persisted. He denies any double vision. Denies any headache or vertigo. No focal numbness or weakness. No trouble with speaking or swallowing. Feels the vision is worse on the left side. History of diabetes for 10 years. Also history of TIA 10 years ago. Denies any history of stroke or heart issues. States wears corrective lenses but no other ophthalmologic problems including glaucoma or diabetic retinopathy or cataracts. The history is provided by the patient. Vision Change    This is a new problem. The current episode started 6 to 12 hours ago. The problem occurs constantly. The problem has not changed since onset. Both eyes are affected. The patient is experiencing no pain. Associated symptoms include blurred vision and decreased vision. Pertinent negatives include no numbness, no double vision, no foreign body sensation, no photophobia, no eye redness, no nausea, no vomiting, no weakness, no fever, no pain, no head injury and no dizziness. He has tried nothing for the symptoms.         Past Medical History:   Diagnosis Date    Diabetes ([address]     boderline    Diabetes type 2, uncontrolled ([address]     Other ill-defined conditions(799.89)     cardiomyopathy    Other ill-defined conditions(799.89)     hypercholerosteremia       Past Surgical History:   Procedure Laterality Date    HX ORTHOPAEDIC      rt knee    HX OTHER SURGICAL      hernia,         Family History:   Problem Relation Age of Onset    Heart Disease Father     Heart Disease Paternal Grandfather        Social History     Socioeconomic History    Marital status:      Spouse name: Not on file    Number of children: Not on file    Years of education: Not on file    Highest education level: Not on file   Occupational History    Not on file   Social Needs    Financial resource strain: Not on file   [employer] insecurity:     Worry: Not on file     Inability: Not on file    Transportation needs:     Medical: Not on file     Non-medical: Not on file   Tobacco Use    Smoking status: Former Smoker     Packs/day: 1.00     Years: 20.00     Pack years: 20.00     Last attempt to quit: 10/20/1997     Years since quittin.2    Smokeless tobacco: Never Used   Substance and Sexual Activity    Alcohol use: Yes     Alcohol/week: 0.0 standard drinks     Comment: occasional    Drug use: No    Sexual activity: Never   Lifestyle    Physical activity:     Days per week: Not on file     Minutes per session: Not on file    Stress: Not on file   Relationships    Social connections:     Talks on phone: Not on file     Gets together: Not on file     Attends [employer] service: Not on file     Active member of club or organization: Not on file     Attends meetings of clubs or organizations: Not on file     Relationship status: Not on file    Intimate partner violence:     Fear of current or ex partner: Not on file     Emotionally abused: Not on file     Physically abused: Not on file     Forced sexual activity: Not on file   Other Topics Concern    Not on file   Social History Narrative    Not on file         ALLERGIES: Patient has no known allergies. Review of Systems   Constitutional: Negative for chills and fever. Eyes: Positive for blurred vision and visual disturbance. Negative for double vision, photophobia, pain and redness. Respiratory: Negative for cough and shortness of breath. Cardiovascular: Negative for chest pain and palpitations. Gastrointestinal: Negative for abdominal pain, diarrhea, nausea and vomiting. Endocrine: Negative for polydipsia and polyuria. Musculoskeletal: Negative for arthralgias, back pain and neck pain. Skin: Negative for rash and wound. Neurological: Negative for dizziness, syncope, weakness, numbness and headaches. Hematological: Negative for adenopathy.        Vitals: 01/03/20 0836 01/03/20 0839   BP: (!) 83/47 99/65   Pulse: 96    Resp: 16    Temp: 97.6 °F (36.4 °C)    SpO2: 94%    Weight: 65.8 kg (145 lb)    Height: 5' 8\" (1.727 m)             Physical Exam  Vitals signs and nursing note reviewed. Constitutional:       General: He is not in acute distress. Appearance: He is well-developed. HENT:      Head: Normocephalic and atraumatic. Right Ear: External ear normal.      Left Ear: External ear normal.      Mouth/Throat:      Pharynx: No oropharyngeal exudate. Eyes:      Conjunctiva/sclera: Conjunctivae normal.      Pupils: Pupils are equal, round, and reactive to light. Neck:      Musculoskeletal: Normal range of motion and neck supple. Cardiovascular:      Rate and Rhythm: Normal rate and regular rhythm. Heart sounds: No murmur. Pulmonary:      Effort: No respiratory distress. Breath sounds: Normal breath sounds. Skin:     General: Skin is warm and dry. Neurological:      Mental Status: He is alert and oriented to person, place, and time. GCS: GCS eye subscore is 4. GCS verbal subscore is 5. GCS motor subscore is 6. Motor: No pronator drift. Coordination: Finger-Nose-Finger Test normal.      Gait: Gait normal.      Deep Tendon Reflexes:      Reflex Scores:       Bicep reflexes are 2+ on the right side and 2+ on the left side. Patellar reflexes are 2+ on the right side and 2+ on the left side. Comments: Nl speech  Normal gait. Patient appears to have left visual field cut with both eyes. (Homonymous hemianopsia. Psychiatric:         Speech: Speech normal.          MDM  Number of Diagnoses or Management Options  Diagnosis management comments: NIH 1-2 at most.  Not TPA candidate. Concern for CNS lesion. Head CT.        Amount and/or Complexity of Data Reviewed  Clinical lab tests: ordered and reviewed  Tests in the radiology section of CPT®: ordered and reviewed  Tests in the medicine section of CPT®: ordered and reviewed  Independent visualization of images, tracings, or specimens: yes    Risk of Complications, Morbidity, and/or Mortality  Presenting problems: moderate  Diagnostic procedures: minimal  Management options: low    Patient Progress  Patient progress: stable         Procedures      Results Include:    Recent Results (from the past 24 hour(s))   EKG, 12 LEAD, INITIAL    Collection Time: 01/03/20  9:11 AM   Result Value Ref Range    Ventricular Rate 90 BPM    Atrial Rate 90 BPM    P-R Interval 246 ms    QRS Duration 70 ms    Q-T Interval 394 ms    QTC Calculation (Bezet) 481 ms    Calculated P Axis 64 degrees    Calculated R Axis 93 degrees    Calculated T Axis -108 degrees    Diagnosis       Sinus rhythm with 1st degree A-V block  Rightward axis  Low voltage QRS  Nonspecific ST and T wave abnormality  Prolonged QT  Abnormal ECG  When compared with ECG of 27-AUG-2009 16:08,  Significant changes have occurred  Confirmed by JORDEN MEYERS (), [place] ([id number]) on 1/3/2020 10:25:56 AM     CBC WITH AUTOMATED DIFF    Collection Time: 01/03/20  9:17 AM   Result Value Ref Range    WBC 5.6 4.3 - 11.1 K/uL    RBC 3.53 (L) 4.23 - 5.6 M/uL    HGB 11.0 (L) 13.6 - 17.2 g/dL    HCT 34.0 (L) 41.1 - 50.3 %    MCV 96.3 79.6 - 97.8 FL    MCH 31.2 26.1 - 32.9 PG    MCHC 32.4 31.4 - 35.0 g/dL    RDW 14.2 11.9 - 14.6 %    PLATELET 719 124 - 215 K/uL    MPV 8.7 (L) 9.4 - 12.3 FL    ABSOLUTE NRBC 0.00 0.0 - 0.2 K/uL    DF AUTOMATED      NEUTROPHILS 66 43 - 78 %    LYMPHOCYTES 21 13 - 44 %    MONOCYTES 9 4.0 - 12.0 %    EOSINOPHILS 1 0.5 - 7.8 %    BASOPHILS 1 0.0 - 2.0 %    IMMATURE GRANULOCYTES 3 0.0 - 5.0 %    ABS. NEUTROPHILS 3.7 1.7 - 8.2 K/UL    ABS. LYMPHOCYTES 1.2 0.5 - 4.6 K/UL    ABS. MONOCYTES 0.5 0.1 - 1.3 K/UL    ABS. EOSINOPHILS 0.1 0.0 - 0.8 K/UL    ABS. BASOPHILS 0.0 0.0 - 0.2 K/UL    ABS. IMM.  GRANS. 0.2 0.0 - 0.5 K/UL   METABOLIC PANEL, BASIC    Collection Time: 01/03/20  9:17 AM   Result Value Ref Range    Sodium 141 136 - 145 mmol/L    Potassium 3.8 3.5 - 5.1 mmol/L    Chloride 106 98 - 107 mmol/L    CO2 29 21 - 32 mmol/L    Anion gap 6 (L) 7 - 16 mmol/L    Glucose 122 (H) 65 - 100 mg/dL    BUN 14 8 - 23 MG/DL    Creatinine 0.67 (L) 0.8 - 1.5 MG/DL    GFR est AA >60 >60 ml/min/1.73m2    GFR est non-AA >60 >60 ml/min/1.73m2    Calcium 9.0 8.3 - 10.4 MG/DL   TROPONIN I    Collection Time: 01/03/20  9:17 AM   Result Value Ref Range    Troponin-I, Qt. 0.82 (HH) 0.02 - 0.05 NG/ML   PROTHROMBIN TIME + INR    Collection Time: 01/03/20  9:17 AM   Result Value Ref Range    Prothrombin time 14.1 11.7 - 14.5 sec    INR 1.1     PTT    Collection Time: 01/03/20  9:17 AM   Result Value Ref Range    aPTT 29.0 24.7 - 39.8 SEC   EKG, 12 LEAD, INITIAL    Collection Time: 01/03/20  9:19 AM   Result Value Ref Range    Ventricular Rate 87 BPM    Atrial Rate 87 BPM    P-R Interval 270 ms    QRS Duration 72 ms    Q-T Interval 394 ms    QTC Calculation (Bezet) 474 ms    Calculated P Axis 69 degrees    Calculated R Axis 91 degrees    Calculated T Axis -84 degrees    Diagnosis       Sinus rhythm with 1st degree A-V block  Rightward axis  Low voltage QRS  Nonspecific ST and T wave abnormality  Prolonged QT  Abnormal ECG  When compared with ECG of 03-JAN-2020 09:11,  No significant change was found  Confirmed by JORDEN MEYERS (), [place] ([id number]) on 1/3/2020 10:26:06 AM       Ct Head Wo Cont    Result Date: 1/3/2020  CT HEAD WITHOUT CONTRAST, 1/3/2020 History: Blurred vision beginning this morning. Comparison: CT head without contrast 8/27/2009 Technique:   5 mm axial scans from the skull base to the vertex. All CT scans performed at this facility use one or all of the following: Automated exposure control, adjustment of the mA and/or kVp according to patient's size, iterative reconstruction. Findings:  No evidence of intracranial hemorrhage is seen. No abnormal extra-axial fluid collections are seen.   Mild cortical involutional changes are seen which are not clearly abnormal given the patient's age. No evidence for acute hydrocephalus is seen. No evidence of midline shift or herniation is seen. Abnormal edema is seen in the right parafalcine occipital and occipitoparietal lobes best appreciated on axial image 19 whose appearance is consistent with a potentially recent right PCA infarction. Evaluation with bone windows shows no acute osseous changes. The visualized sinuses, middle ears, and mastoid air cells are well aerated. IMPRESSION:  1. Abnormal edema in the right PCA territory suggesting a potentially recent infarction at this level. No acute findings are otherwise seen such as acute hemorrhage, hydrocephalus, or herniation     Patient has no chest pain. However troponin elevated. Slight ST depression on EKG as well. Patient states no cardiac history. Had negative nuclear medicine stress test about 10 years ago. There is concern for some silent ischemia and possibly embolic phenomenon. Will discuss with hospitalist.       10:50 AM  NIH 2. Discussed with neurology and interventional.  They would like CBF and CTA.

## 2020-01-03 NOTE — ED TRIAGE NOTES
Patient advises that he woke up this morning around 0500 this morning and advises vision to be blurry, left eye is worse than right. Patient advises that everything also seems darker. Patient advises no further complaints of pain, states he actually feels better than he normally does except for vision. Patient advises going to bed at 2100 without any complaints at that time.

## 2020-01-03 NOTE — PROGRESS NOTES
TRANSFER - IN REPORT:    Verbal report received from Franc RN (name) on [place]  being received from ED (unit) for routine progression of care      Report consisted of patients Situation, Background, Assessment and   Recommendations(SBAR). Information from the following report(s) SBAR, Kardex, ED Summary, Intake/Output, MAR and Recent Results was reviewed with the receiving nurse. Opportunity for questions and clarification was provided. Assessment completed upon patients arrival to unit and care assumed.

## 2020-01-03 NOTE — ED NOTES
TRANSFER - OUT REPORT:    Verbal report given to [address] on Marguerite Almeida  being transferred to  for routine progression of care       Report consisted of patients Situation, Background, Assessment and   Recommendations(SBAR). Information from the following report(s) SBAR and MAR was reveiwed with the receiving nurse. Opportunity for questions and clarification was provided.       Ischemic Stroke without Activase/TIA    BP Parameters: Less Than 220/120 for 24 hours, then consult MD for parameters    Controlled With: None    Dysphagia Screen Completed: Yes: Pass  Dysphagia Screening  Vocal Quality/Secretions: Normal  History of Dysphagia: No  O2 Saturation: Normal  Alertness: Normal  Pre-Swallow Assessment Score: 0  Purees: No difficulty noted  Water by Cup: No difficulty noted  Water by Straw: No difficulty noted     NIH Stroke Scale Complete: Yes: 2    Frequency of Vital Signs: Every 30 minutes for 6 hours    Frequency of Neuro Checks: Every hour for 16 hours

## 2020-01-03 NOTE — PROGRESS NOTES
Pt arrives to floor from ED in stable condition. Admission assessment complete. Pt alert and oriented x4. See flow sheets for full assessment. NIH assessment also complete, see flow sheets. Total NIH score 2. Pt denies pain and nausea. Pt and visitor oriented to room, nurse call lights, and dietary services. All needs met at this time. Safety measures in place, call light within reach, visitor at bedside. Will continue to monitor. Bilateral SCDs and telemetry in place per MD order.

## 2020-01-04 LAB
ANION GAP SERPL CALC-SCNC: 5 MMOL/L (ref 7–16)
BASOPHILS # BLD: 0 K/UL (ref 0–0.2)
BASOPHILS NFR BLD: 0 % (ref 0–2)
BUN SERPL-MCNC: 10 MG/DL (ref 8–23)
CALCIUM SERPL-MCNC: 8.6 MG/DL (ref 8.3–10.4)
CHLORIDE SERPL-SCNC: 106 MMOL/L (ref 98–107)
CHOLEST SERPL-MCNC: 155 MG/DL
CO2 SERPL-SCNC: 26 MMOL/L (ref 21–32)
CREAT SERPL-MCNC: 0.59 MG/DL (ref 0.8–1.5)
DIFFERENTIAL METHOD BLD: ABNORMAL
EOSINOPHIL # BLD: 0.1 K/UL (ref 0–0.8)
EOSINOPHIL NFR BLD: 1 % (ref 0.5–7.8)
ERYTHROCYTE [DISTWIDTH] IN BLOOD BY AUTOMATED COUNT: 14.2 % (ref 11.9–14.6)
EST. AVERAGE GLUCOSE BLD GHB EST-MCNC: 134 MG/DL
GLUCOSE BLD STRIP.AUTO-MCNC: 118 MG/DL (ref 65–100)
GLUCOSE BLD STRIP.AUTO-MCNC: 118 MG/DL (ref 65–100)
GLUCOSE BLD STRIP.AUTO-MCNC: 119 MG/DL (ref 65–100)
GLUCOSE BLD STRIP.AUTO-MCNC: 125 MG/DL (ref 65–100)
GLUCOSE SERPL-MCNC: 119 MG/DL (ref 65–100)
HBA1C MFR BLD: 6.3 %
HCT VFR BLD AUTO: 32.5 % (ref 41.1–50.3)
HDLC SERPL-MCNC: 26 MG/DL (ref 40–60)
HDLC SERPL: 6 {RATIO}
HGB BLD-MCNC: 10.6 G/DL (ref 13.6–17.2)
IMM GRANULOCYTES # BLD AUTO: 0.1 K/UL (ref 0–0.5)
IMM GRANULOCYTES NFR BLD AUTO: 2 % (ref 0–5)
LDLC SERPL CALC-MCNC: 100 MG/DL
LIPID PROFILE,FLP: ABNORMAL
LYMPHOCYTES # BLD: 1 K/UL (ref 0.5–4.6)
LYMPHOCYTES NFR BLD: 14 % (ref 13–44)
MCH RBC QN AUTO: 31.2 PG (ref 26.1–32.9)
MCHC RBC AUTO-ENTMCNC: 32.6 G/DL (ref 31.4–35)
MCV RBC AUTO: 95.6 FL (ref 79.6–97.8)
MM INDURATION POC: 0 MM (ref 0–5)
MONOCYTES # BLD: 0.5 K/UL (ref 0.1–1.3)
MONOCYTES NFR BLD: 7 % (ref 4–12)
NEUTS SEG # BLD: 5.4 K/UL (ref 1.7–8.2)
NEUTS SEG NFR BLD: 76 % (ref 43–78)
NRBC # BLD: 0 K/UL (ref 0–0.2)
PLATELET # BLD AUTO: 269 K/UL (ref 150–450)
PMV BLD AUTO: 9 FL (ref 9.4–12.3)
POTASSIUM SERPL-SCNC: 3.9 MMOL/L (ref 3.5–5.1)
PPD POC: NEGATIVE NEGATIVE
RBC # BLD AUTO: 3.4 M/UL (ref 4.23–5.6)
SODIUM SERPL-SCNC: 137 MMOL/L (ref 136–145)
TRIGL SERPL-MCNC: 145 MG/DL (ref 35–150)
TROPONIN I SERPL-MCNC: 0.66 NG/ML (ref 0.02–0.05)
TROPONIN I SERPL-MCNC: 0.66 NG/ML (ref 0.02–0.05)
VLDLC SERPL CALC-MCNC: 29 MG/DL (ref 6–23)
WBC # BLD AUTO: 7.2 K/UL (ref 4.3–11.1)

## 2020-01-04 PROCEDURE — 85025 COMPLETE CBC W/AUTO DIFF WBC: CPT

## 2020-01-04 PROCEDURE — 82962 GLUCOSE BLOOD TEST: CPT

## 2020-01-04 PROCEDURE — 97162 PT EVAL MOD COMPLEX 30 MIN: CPT

## 2020-01-04 PROCEDURE — 83036 HEMOGLOBIN GLYCOSYLATED A1C: CPT

## 2020-01-04 PROCEDURE — 74011250637 HC RX REV CODE- 250/637: Performed by: FAMILY MEDICINE

## 2020-01-04 PROCEDURE — 80061 LIPID PANEL: CPT

## 2020-01-04 PROCEDURE — 80048 BASIC METABOLIC PNL TOTAL CA: CPT

## 2020-01-04 PROCEDURE — 74011000250 HC RX REV CODE- 250: Performed by: INTERNAL MEDICINE

## 2020-01-04 PROCEDURE — 97110 THERAPEUTIC EXERCISES: CPT

## 2020-01-04 PROCEDURE — 65270000029 HC RM PRIVATE

## 2020-01-04 PROCEDURE — 74011250636 HC RX REV CODE- 250/636: Performed by: INTERNAL MEDICINE

## 2020-01-04 PROCEDURE — 84484 ASSAY OF TROPONIN QUANT: CPT

## 2020-01-04 PROCEDURE — C8929 TTE W OR WO FOL WCON,DOPPLER: HCPCS

## 2020-01-04 PROCEDURE — 36415 COLL VENOUS BLD VENIPUNCTURE: CPT

## 2020-01-04 PROCEDURE — 74011250637 HC RX REV CODE- 250/637: Performed by: INTERNAL MEDICINE

## 2020-01-04 RX ORDER — TEMAZEPAM 15 MG/1
15 CAPSULE ORAL
Status: DISCONTINUED | OUTPATIENT
Start: 2020-01-04 | End: 2020-01-06 | Stop reason: HOSPADM

## 2020-01-04 RX ORDER — CLOPIDOGREL BISULFATE 75 MG/1
75 TABLET ORAL DAILY
Status: DISCONTINUED | OUTPATIENT
Start: 2020-01-04 | End: 2020-01-06 | Stop reason: HOSPADM

## 2020-01-04 RX ORDER — LISINOPRIL 5 MG/1
5 TABLET ORAL DAILY
Status: DISCONTINUED | OUTPATIENT
Start: 2020-01-05 | End: 2020-01-06 | Stop reason: HOSPADM

## 2020-01-04 RX ORDER — CARVEDILOL 6.25 MG/1
3.12 TABLET ORAL 2 TIMES DAILY WITH MEALS
Status: DISCONTINUED | OUTPATIENT
Start: 2020-01-05 | End: 2020-01-06 | Stop reason: HOSPADM

## 2020-01-04 RX ADMIN — ATORVASTATIN CALCIUM 80 MG: 40 TABLET, FILM COATED ORAL at 22:15

## 2020-01-04 RX ADMIN — Medication 10 ML: at 17:16

## 2020-01-04 RX ADMIN — CLOPIDOGREL BISULFATE 75 MG: 75 TABLET, FILM COATED ORAL at 17:08

## 2020-01-04 RX ADMIN — TEMAZEPAM 15 MG: 15 CAPSULE ORAL at 22:57

## 2020-01-04 RX ADMIN — Medication 10 ML: at 06:00

## 2020-01-04 RX ADMIN — HEPARIN SODIUM 5000 UNITS: 5000 INJECTION INTRAVENOUS; SUBCUTANEOUS at 05:11

## 2020-01-04 RX ADMIN — HEPARIN SODIUM 5000 UNITS: 5000 INJECTION INTRAVENOUS; SUBCUTANEOUS at 17:09

## 2020-01-04 RX ADMIN — ACETAMINOPHEN 650 MG: 325 TABLET, FILM COATED ORAL at 12:56

## 2020-01-04 RX ADMIN — ACETAMINOPHEN 650 MG: 325 TABLET, FILM COATED ORAL at 05:11

## 2020-01-04 RX ADMIN — PERFLUTREN 1 ML: 6.52 INJECTION, SUSPENSION INTRAVENOUS at 14:00

## 2020-01-04 RX ADMIN — ASPIRIN 81 MG: 81 TABLET ORAL at 08:11

## 2020-01-04 RX ADMIN — Medication 10 ML: at 22:00

## 2020-01-04 RX ADMIN — SENNOSIDES AND DOCUSATE SODIUM 1 TABLET: 8.6; 5 TABLET ORAL at 08:11

## 2020-01-04 NOTE — PROGRESS NOTES
Nutrition  Reason for assessment: Referral received from nursing admission Malnutrition Screening Tool   Recently Lost Weight Without Trying: Yes  If Yes, How Much Weight Loss: >33 lbs  Eating Poorly Due to Decreased Appetite: Yes    Assessment:   Diet: DIET DIABETIC CONSISTENT CARB Regular  DIET NUTRITIONAL SUPPLEMENTS All Meals; Glucerna Shake    Food/Nutrition Patient History:   Pt is 70yo male admitted with CVA. He has history of DM type 2, and pulmonary edema. Pt was in room with sister present. Pt reports that over the last 1 and a bit he has lost about 40lbs. He states that it started around the time when he retired and just was dealing with all the changes. He says he has never been a big eater and felt he was eating about the same as before. He reports though over the last year he does have nausea and vomiting on and off, which does affect his appetite. He reports that the lowest weight he got to was 130 and he has now been gaining for a few months. Pt still wanting to gain more and asked what is best. Explained importance of supplements and protein. Pt willing to try Glucerna but unsure if he will like it. Labs:  Glucose 118-145mg/dL  Insulin sliding scale    Anthropometrics:  Height: 5' 8\" (172.7 cm), Weight: 65.8 kg (145 lb), Weight Source: Patient stated, Body mass index is 22.05 kg/m². BMI class of Normal weight. WT / BMI 1/3/2020 4/15/2019 12/18/2017   WEIGHT 145 lb 140 lb 172 lb   BODY MASS INDEX 22.05 kg/m2 20.67 kg/m2 25.4 kg/m2   Unable to verify weight sources. Pt did loss about 30 lbs in 2 years. And has gained 5lbs since April. No significant weight loss. Macronutrient needs: MSJ (weight gain) (using 65.8kg)  EER: 4679-9872 kcal/day (25-30 kcals/kg CBW )  EPR: 66-99 g/day (1-1.5 g/kg CBW)     Intake/Comparative Standards: No recorded intake at this time, unable to observe a meal. Based on pt report likely not meeting energy and calorie needs.     Nutrition Diagnosis:  Inadequate oral intake related to decreased ability to consume sufficient energy as evidenced by nausea, vomiting, change in appetite and weight loss 30lbs in 2 years. Nutrition Intervention:  Meals and Snacks: Continue with current diet. Commercial Beverages and Supplements: Continue with Glucerna TID  Discharge Nutrition Plan: Encouraged pt to continue supplement at least 1 a day post discharge for 30 days. Goal: - PO nutrition intake will meet >75% of patient estimated nutritional needs within the next 7 days.      Miguel Bazan, MS, RD, LD

## 2020-01-04 NOTE — CONSULTS
Consult    Patient: Julia Daniels MRN: [id number]  SSN: [id number]    YOB: 1950  Age: 81 y.o. Sex: male        Assessment:     Acute Ischemic stroke, RT PCA  Probably thrombotic secondary to intracranial atherosclerosis. Bilateral pleural effusions of undetermined etiology          Hospital Problems  Date Reviewed: 3/1/2016          Codes Class Noted POA    * (Principal) CVA (cerebral vascular accident) ([facility] ICD-10-CM: I63.9  ICD-9-CM: 434.91  1/3/2020 Unknown        Troponin I above reference range ICD-10-CM: R79.89  ICD-9-CM: 790.6  1/3/2020 Yes        Elevated brain natriuretic peptide (BNP) level ICD-10-CM: R79.89  ICD-9-CM: 790.99  1/3/2020 Yes        Pulmonary edema ICD-10-CM: J81.1  ICD-9-CM: 421  1/3/2020 Yes        Diabetes type 2, uncontrolled ([facility] ICD-10-CM: E11.65  ICD-9-CM: 250.02  3/1/2016 Yes              Plan:     · Start ASA 81 mg daily plus Plavix 75 mg/day for 21 days then aspirin 81 mg/day  · Initiate high intensity statin   · Neurochecks Q4H  · MRI of brain  · CTA of head and neck-done  · Bedside swallow test   · Labs: A1c, FLP, TSH, Cardiac enzymes, BMP, CBC  · Telemetry and echocardiogram with bubble study  · Consider 30-day cardiac event monitor versus loop recorder post discharge  · PT/OT/ST  · DVT prophylaxis   · BP management - normotensive, with long-term goal <140/90  · Smoking cessation if applicable   · Diabetes education if applicable   · Depression Screening prior to discharge    Further evaluation of bilateral pleural effusions per hospitalist team  Subjective: Julia Daniels is a 81 y.o. male who is being seen for evaluation of stroke. The patient states that he awoke yesterday and noticed that his vision was decreased to the left   He has a history of TIA 13 years ago. He has some intermittent chest tightness. The patient stays that he was not on ASA or a statin prior to this episode.      His blood sugar and blood pressure typically are well controlled per his history. Past Medical History:   Diagnosis Date    CVA (cerebral vascular accident) ([facility] 1/3/2020    Diabetes ([facility]     boderline    Diabetes type 2, uncontrolled ([facility]     Other ill-defined conditions(799.89)     cardiomyopathy    Other ill-defined conditions(799.89)     hypercholerosteremia     Past Surgical History:   Procedure Laterality Date    HX ORTHOPAEDIC      rt knee    HX OTHER SURGICAL      hernia,      Family History   Problem Relation Age of Onset    Heart Disease Father     Heart Disease Paternal Grandfather      Social History     Tobacco Use    Smoking status: Former Smoker     Packs/day: 1.00     Years: 20.00     Pack years: 20.00     Last attempt to quit: 10/20/1997     Years since quittin.2    Smokeless tobacco: Never Used   Substance Use Topics    Alcohol use: Yes     Alcohol/week: 0.0 standard drinks     Comment: occasional      No current facility-administered medications on file prior to encounter. Current Outpatient Medications on File Prior to Encounter   Medication Sig Dispense Refill    HYDROcodone-acetaminophen (NORCO) 7.5-325 mg per tablet Take 1 Tab by mouth every six (6) hours as needed for Pain.       metFORMIN (GLUCOPHAGE) 500 mg tablet Take 2 tablets by mouth every morning then Take 2 tablets by mouth every night 120 Tab 0       Current Facility-Administered Medications   Medication Dose Route Frequency Provider Last Rate Last Dose    acetaminophen (TYLENOL) tablet 650 mg  650 mg Oral Q6H PRN Krystal Sacks, MD   650 mg at 20 0511    ondansetron (ZOFRAN) injection 4 mg  4 mg IntraVENous Q6H PRN Krystal Sacks, MD        heparin (porcine) injection 5,000 Units  5,000 Units SubCUTAneous Q12H Krystal Sacks, MD   5,000 Units at 20 0755    tuberculin injection 5 Units  5 Units IntraDERMal ONCE Krystal Sacks, MD   5 Units at 20 1655    aspirin delayed-release tablet 81 mg  81 mg Oral DAILY Tina Retana Jd Alcaraz MD   81 mg at 01/04/20 6996    atorvastatin (LIPITOR) tablet 80 mg  80 mg Oral QHS Cecy Leigh MD   80 mg at 01/03/20 2255    senna-docusate (PERICOLACE) 8.6-50 mg per tablet 1 Tab  1 Tab Oral DAILY Cecy Leigh MD   1 Tab at 01/04/20 7382    sodium chloride (NS) flush 5-40 mL  5-40 mL IntraVENous Q8H Cecy Leigh MD   10 mL at 01/04/20 0600    sodium chloride (NS) flush 5-40 mL  5-40 mL IntraVENous PRN Cecy Leigh MD        labetalol (NORMODYNE;TRANDATE) injection 5 mg  5 mg IntraVENous Q10MIN PRN Cecy Leigh MD        insulin lispro (HUMALOG) injection   SubCUTAneous AC&HS Cecy Leigh MD   Stopped at 01/04/20 0730      Lab Results   Component Value Date/Time    Hemoglobin A1c 6.3 01/04/2020 06:10 AM       No Known Allergies    Review of Systems:    12 point review of systems is otherwise negative  Objective:     Vitals:    01/03/20 2300 01/04/20 0305 01/04/20 0715 01/04/20 0826   BP: 108/60 99/65 90/55 91/64   Pulse: 95 90 85 89   Resp: 20 18 18 18   Temp: 98.3 °F (36.8 °C) 98.1 °F (36.7 °C) 97.4 °F (36.3 °C) 96.4 °F (35.8 °C)   SpO2: 94% 92% 91% 92%   Weight:       Height:            Physical Exam:      General: well nourished, appears stated age    Eyes: no proptosis or exophthalmos; conjunctivae clear, sclerae non-icteric    Chest: clear to auscultation    Cardiac: normal S1 S2; no murmurs gallop or rubs    Neurological:    MSE: alert, oriented times 3; fluent speech; no paraphasic errors; follows commands without difficulty    CN 2: Right homonymous hemianopia is got bilateral pleural effusions l; no afferent pupillary defect; VA not checked; fundoscopic exam ... CN 3,4,6: Pupils symmetrical in size, reactive to light directly and consensually; no ptosis; full versions and ductions  CN 5: facial sensation intact to light touch and pin prick. Corneal reflex . ..   CN 7: Symmetrical facial tone and movements  CN 8 responds to spoken voice  CN 9,10; palate symmetrical gag intact  CN 11: head turn and shoulder shrug intact  CN 12: tongue midline without atrophy or fasiculations    Motor:  Power 5/5 UE and LE proximal to distal  Fine motor movements symmetrical  Tone normal  Atrophy: absent  Gait: symmetrical arm swing, rises on heels and toes    Cerebellar:  Finger to nose; heel to shin intact  Tandem intact    Sensory  Romberg negative  Intact to primary modalities in all 4 extremities    Reflexes    Symmetrical and normally active at 2+ in UE and LE  Plantar response flexor bilaterally     Recent Results (from the past 24 hour(s))   TROPONIN I    Collection Time: 01/03/20 12:42 PM   Result Value Ref Range    Troponin-I, Qt. 0.79 (HH) 0.02 - 0.05 NG/ML   GLUCOSE, POC    Collection Time: 01/03/20  4:20 PM   Result Value Ref Range    Glucose (POC) 145 (H) 65 - 100 mg/dL   TROPONIN I    Collection Time: 01/03/20  7:47 PM   Result Value Ref Range    Troponin-I, Qt. 0.74 (HH) 0.02 - 0.05 NG/ML   GLUCOSE, POC    Collection Time: 01/03/20  9:34 PM   Result Value Ref Range    Glucose (POC) 151 (H) 65 - 100 mg/dL   TROPONIN I    Collection Time: 01/04/20 12:30 AM   Result Value Ref Range    Troponin-I, Qt. 0.66 (HH) 0.02 - 0.05 NG/ML   TROPONIN I    Collection Time: 01/04/20  6:10 AM   Result Value Ref Range    Troponin-I, Qt. 0.66 (HH) 0.02 - 6.68 NG/ML   METABOLIC PANEL, BASIC    Collection Time: 01/04/20  6:10 AM   Result Value Ref Range    Sodium 137 136 - 145 mmol/L    Potassium 3.9 3.5 - 5.1 mmol/L    Chloride 106 98 - 107 mmol/L    CO2 26 21 - 32 mmol/L    Anion gap 5 (L) 7 - 16 mmol/L    Glucose 119 (H) 65 - 100 mg/dL    BUN 10 8 - 23 MG/DL    Creatinine 0.59 (L) 0.8 - 1.5 MG/DL    GFR est AA >60 >60 ml/min/1.73m2    GFR est non-AA >60 >60 ml/min/1.73m2    Calcium 8.6 8.3 - 10.4 MG/DL   CBC WITH AUTOMATED DIFF    Collection Time: 01/04/20  6:10 AM   Result Value Ref Range    WBC 7.2 4.3 - 11.1 K/uL    RBC 3.40 (L) 4.23 - 5.6 M/uL    HGB 10.6 (L) 13.6 - 17.2 g/dL    HCT 32.5 (L) 41.1 - 50.3 %    MCV 95.6 79.6 - 97.8 FL    MCH 31.2 26.1 - 32.9 PG    MCHC 32.6 31.4 - 35.0 g/dL    RDW 14.2 11.9 - 14.6 %    PLATELET 930 695 - 200 K/uL    MPV 9.0 (L) 9.4 - 12.3 FL    ABSOLUTE NRBC 0.00 0.0 - 0.2 K/uL    DF AUTOMATED      NEUTROPHILS 76 43 - 78 %    LYMPHOCYTES 14 13 - 44 %    MONOCYTES 7 4.0 - 12.0 %    EOSINOPHILS 1 0.5 - 7.8 %    BASOPHILS 0 0.0 - 2.0 %    IMMATURE GRANULOCYTES 2 0.0 - 5.0 %    ABS. NEUTROPHILS 5.4 1.7 - 8.2 K/UL    ABS. LYMPHOCYTES 1.0 0.5 - 4.6 K/UL    ABS. MONOCYTES 0.5 0.1 - 1.3 K/UL    ABS. EOSINOPHILS 0.1 0.0 - 0.8 K/UL    ABS. BASOPHILS 0.0 0.0 - 0.2 K/UL    ABS. IMM. GRANS. 0.1 0.0 - 0.5 K/UL   LIPID PANEL    Collection Time: 01/04/20  6:10 AM   Result Value Ref Range    LIPID PROFILE          Cholesterol, total 155 MG/DL    Triglyceride 145 35 - 150 MG/DL    HDL Cholesterol 26 (L) 40 - 60 MG/DL    LDL, calculated 100 (H) <100 MG/DL    VLDL, calculated 29 (H) 6.0 - 23.0 MG/DL    CHOL/HDL Ratio 6.0 <200     HEMOGLOBIN A1C WITH EAG    Collection Time: 01/04/20  6:10 AM   Result Value Ref Range    Hemoglobin A1c 6.3 %    Est. average glucose 134 mg/dL   GLUCOSE, POC    Collection Time: 01/04/20  7:49 AM   Result Value Ref Range    Glucose (POC) 118 (H) 65 - 100 mg/dL       Lab Results   Component Value Date/Time    Cholesterol, total 155 01/04/2020 06:10 AM    HDL Cholesterol 26 (L) 01/04/2020 06:10 AM    LDL, calculated 100 (H) 01/04/2020 06:10 AM    VLDL, calculated 29 (H) 01/04/2020 06:10 AM    Triglyceride 145 01/04/2020 06:10 AM    CHOL/HDL Ratio 6.0 01/04/2020 06:10 AM        Lab Results   Component Value Date/Time    Hemoglobin A1c 6.3 01/04/2020 06:10 AM        CT Results (most recent):  Results from Hospital Encounter encounter on 01/03/20   CTA HEAD NECK W WO CONT    Narrative Title:  CT arteriogram of the neck and head. Indication: Cerebrovascular accident (CVA). .    Technique: Axial images of the neck and head were obtained after the uneventful   administration of intravenous iodinated contrast media. Contrast was used to  best identify the arterial structures. Images were reviewed on a separate, free  standing, three-dimensional workstation as per the referring physicians request.       All stenosis percentages derived by comparing the narrowest segment with the  distal Internal Carotid Artery luminal diameter, as described in the [place]  American Symptomatic Carotid Endarterectomy Trial (NASCET) criteria. All CT scans at this facility are performed using dose reduction/dose modulation  techniques, as appropriate the performed exam, including the following:   Automated Exposure Control; Adjustment of the mA and/or kV according to patient  size (this includes techniques or standardized protocols for targeted exams  where dose is matched to indication/reason for exam); and Use of Iterative  Reconstruction Technique. Comparison: None. Findings:     Lungs: Moderate to large bilateral pleural effusions. . Interstitial pulmonary  edema. Bones:  No osseous destruction. .    Paranasal sinuses:  Paranasal sinuses are clear. .    Brain:  No acute intracranial abnormality. No mass lesion or hydrocephalus. .    Soft tissues:  Within normal limits. .      Dural venous sinuses:  Patent. Aortic arch:  Mild aneurysmal enlargement of the ascending aorta 4.1 cm. .    Right brachiocephalic artery:  No significant stenosis or occlusion. .  . Right subclavian artery:  No significant stenosis or occlusion. .  . Left subclavian artery:  No significant stenosis or occlusion. .  . Right common carotid artery:  No significant stenosis or occlusion. .  . Right external carotid artery:  No significant stenosis or occlusion. .  . Right internal carotid artery:  No significant stenosis or occlusion. .  . Left common carotid artery: No significant stenosis or occlusion. .  . Left external carotid artery:  No significant stenosis or occlusion. .  .     Left internal carotid artery:  No significant stenosis or occlusion. .      Right vertebral artery:  No significant stenosis or occlusion. .. Right vertebral  artery terminates in a right PICA with suggestion of mild/moderate stenosis of  the V4 segment. Left vertebral artery:  No significant stenosis or occlusion. . Vertebral artery  is dominant. Basilar artery:  No significant stenosis, occlusion, or aneurysm. .      Right middle cerebral artery:  No significant stenosis, occlusion, or aneurysm. Right anterior cerebral artery:  No significant stenosis, occlusion, or  aneurysm. Tony Nakayama Anterior communicating artery: No significant stenosis, occlusion, or aneurysm. Tony Nakayama Left middle cerebral artery:  No significant stenosis, occlusion, or aneurysm. Tony Nakayama Left anterior cerebral artery:  No significant stenosis, occlusion, or  aneurysm. .      Right posterior communicating artery: No significant stenosis, occlusion, or  aneurysm. [facility] Left [facility] communicating artery:  No significant stenosis, occlusion, or  aneurysm. .      Right posterior cerebral artery:  No significant stenosis, occlusion, or  aneurysm. . The proximal and mid right PCA are patent. There is subtle narrowing  in the distal branches near the known right occipital lobe infarct. Left posterior cerebral artery:  No significant stenosis, occlusion, or  aneurysm. . Fetal type left PCA. Impression Impression:  1. The proximal and mid right PCA are patent. There is subtle narrowing in the  distal branches near the known right occipital lobe infarct. 2.  Right vertebral artery terminates in a right PICA with suggestion of  mild/moderate stenosis of the V4 segment. Left vertebral artery is dominant. 3.  Pulmonary edema with moderate bilateral pleural effusions. Consider  dedicated chest radiographs. 4.  Mild aneurysmal enlargement of the ascending aorta 4.1 cm.        Results for orders placed or performed during the hospital encounter of 01/03/20   EKG, 12 LEAD, INITIAL Result Value Ref Range    Ventricular Rate 90 BPM    Atrial Rate 90 BPM    P-R Interval 246 ms    QRS Duration 70 ms    Q-T Interval 394 ms    QTC Calculation (Bezet) 481 ms    Calculated P Axis 64 degrees    Calculated R Axis 93 degrees    Calculated T Axis -108 degrees    Diagnosis       Sinus rhythm with 1st degree A-V block  Rightward axis  Low voltage QRS  Nonspecific ST and T wave abnormality  Prolonged QT  Abnormal ECG  When compared with ECG of 27-AUG-2009 16:08,  Significant changes have occurred  Confirmed by JORDEN MEYERS (), Ingris Grace ([id number]) on 1/3/2020 10:25:56 AM     Results for orders placed or performed in visit on 02/16/16   AMB POC EKG ROUTINE W/ 12 LEADS, INTER & REP    Impression    nsr nad        MRI Results (most recent):  Results from Hospital Encounter encounter on 01/03/20   MRI BRAIN WO CONT    Narrative History: Blurry vision, particularly involving the left thigh. Dizziness. EXAM: MRI brain without contrast    TECHNIQUE: Multiplanar multisequence imaging is performed    No comparison    FINDINGS: There is an acute-early subacute infarct involving the right occipital  lobe with restricted diffusion and abnormality on the ADC map. There are  punctate foci of T2 signal prolongation in the corona radiata and centrum  semiovale. Gradient echo sequence imaging demonstrates no blooming artifact to  suggest retained intracranial blood products. There is no mass or mass effect. There is no midline shift. There is no extra-axial fluid collection. Normal flow  voids present within the major intracranial vessels. There is mild because of  thickening of the left maxillary sinus. Impression IMPRESSION:  1. Right occipital infarct, acute-subacute. 2. Mucosal thickening of the left maxillary sinus. 3. Chronic appearing white matter change in the supratentorial white matter. US Results (most recent):      Most recent CTA  Results from [place] encounter on 01/03/20   CTA HEAD NECK W WO CONT    Narrative Title:  CT arteriogram of the neck and head. Indication: Cerebrovascular accident (CVA). .    Technique: Axial images of the neck and head were obtained after the uneventful   administration of intravenous iodinated contrast media. Contrast was used to  best identify the arterial structures. Images were reviewed on a separate, free  standing, three-dimensional workstation as per the referring physicians request.       All stenosis percentages derived by comparing the narrowest segment with the  distal Internal Carotid Artery luminal diameter, as described in the [place]  American Symptomatic Carotid Endarterectomy Trial (NASCET) criteria. All CT scans at this facility are performed using dose reduction/dose modulation  techniques, as appropriate the performed exam, including the following:   Automated Exposure Control; Adjustment of the mA and/or kV according to patient  size (this includes techniques or standardized protocols for targeted exams  where dose is matched to indication/reason for exam); and Use of Iterative  Reconstruction Technique. Comparison: None. Findings:     Lungs: Moderate to large bilateral pleural effusions. . Interstitial pulmonary  edema. Bones:  No osseous destruction. .    Paranasal sinuses:  Paranasal sinuses are clear. .    Brain:  No acute intracranial abnormality. No mass lesion or hydrocephalus. .    Soft tissues:  Within normal limits. .      Dural venous sinuses:  Patent. Aortic arch:  Mild aneurysmal enlargement of the ascending aorta 4.1 cm. .    Right brachiocephalic artery:  No significant stenosis or occlusion. .  . Right subclavian artery:  No significant stenosis or occlusion. .  . Left subclavian artery:  No significant stenosis or occlusion. .  . Right common carotid artery:  No significant stenosis or occlusion. .  . Right external carotid artery:  No significant stenosis or occlusion. .  .     Right internal carotid artery:  No significant stenosis or occlusion. .  . Left common carotid artery: No significant stenosis or occlusion. .  . Left external carotid artery:  No significant stenosis or occlusion. .  . Left internal carotid artery:  No significant stenosis or occlusion. .      Right vertebral artery:  No significant stenosis or occlusion. .. Right vertebral  artery terminates in a right PICA with suggestion of mild/moderate stenosis of  the V4 segment. Left vertebral artery:  No significant stenosis or occlusion. . Vertebral artery  is dominant. Basilar artery:  No significant stenosis, occlusion, or aneurysm. .      Right middle cerebral artery:  No significant stenosis, occlusion, or aneurysm. Right anterior cerebral artery:  No significant stenosis, occlusion, or  aneurysm. Nehemiah Keel Anterior communicating artery: No significant stenosis, occlusion, or aneurysm. Nehemiah Keel Left middle cerebral artery:  No significant stenosis, occlusion, or aneurysm. Nehemiah Keel Left anterior cerebral artery:  No significant stenosis, occlusion, or  aneurysm. .      Right posterior communicating artery: No significant stenosis, occlusion, or  aneurysm. Nehemiah Keel Left posterior communicating artery:  No significant stenosis, occlusion, or  aneurysm. .      Right posterior cerebral artery:  No significant stenosis, occlusion, or  aneurysm. . The proximal and mid right PCA are patent. There is subtle narrowing  in the distal branches near the known right occipital lobe infarct. Left posterior cerebral artery:  No significant stenosis, occlusion, or  aneurysm. . Fetal type left PCA. Impression Impression:  1. The proximal and mid right PCA are patent. There is subtle narrowing in the  distal branches near the known right occipital lobe infarct. 2.  Right vertebral artery terminates in a right PICA with suggestion of  mild/moderate stenosis of the V4 segment. Left vertebral artery is dominant. 3.  Pulmonary edema with moderate bilateral pleural effusions. Consider  dedicated chest radiographs. 4.  Mild aneurysmal enlargement of the ascending aorta 4.1 cm. Most recent MRI  Results from [place] encounter on 01/03/20   MRI BRAIN WO CONT    Narrative History: Blurry vision, particularly involving the left thigh. Dizziness. EXAM: MRI brain without contrast    TECHNIQUE: Multiplanar multisequence imaging is performed    No comparison    FINDINGS: There is an acute-early subacute infarct involving the right occipital  lobe with restricted diffusion and abnormality on the ADC map. There are  punctate foci of T2 signal prolongation in the corona radiata and centrum  semiovale. Gradient echo sequence imaging demonstrates no blooming artifact to  suggest retained intracranial blood products. There is no mass or mass effect. There is no midline shift. There is no extra-axial fluid collection. Normal flow  voids present within the major intracranial vessels. There is mild because of  thickening of the left maxillary sinus. Impression IMPRESSION:  1. Right occipital infarct, acute-subacute. 2. Mucosal thickening of the left maxillary sinus. 3. Chronic appearing white matter change in the supratentorial white matter.            Signed By: Leroy Minaya MD     January 4, 2020

## 2020-01-04 NOTE — PROGRESS NOTES
SW reviewed patient's chart and conducted a baseline assessment. Discharge plan at this time is as follows:     Care Management Interventions  PCP Verified by CM:  Yes  Transition of Care Consult (CM Consult): Discharge Planning  Physical Therapy Consult: Yes  Occupational Therapy Consult: Yes  Speech Therapy Consult: Yes  Current Support Network: Lives with Spouse  Confirm Follow Up Transport: Self  Discharge Location  Discharge Placement: Unable to determine at this time    Francisco Ernandez, [address] Work   [facility]  [email or web address]

## 2020-01-04 NOTE — PROGRESS NOTES
Physical Medicine & Rehabilitation Note-consult    Patient: Hillary Negron MRN: [id number]  SSN: [id number]    YOB: 1950  Age: 81 y.o. Sex: male      Admit Date: 1/3/2020  Admitting Physician: Niharika Wright MD      Medical Decision Making/Plan/Recommend:   Case reviewed. This is a patient who is admitted for acute CVA. PT/OT and ST to assess and treat as appropriate. I will follow for possible [facility] admission. Thank you for the opportunity to participate in the care of this patient.        Signed By: Ignacio Baron MD     January 4, 2020

## 2020-01-04 NOTE — PROGRESS NOTES
Problem: Mobility Impaired (Adult and Pediatric)  Goal: *Acute Goals and Plan of Care (Insert Text)  Description  DISCHARGE GOALS :  (1.)Mr. Jayson Dee will move from supine to sit and sit to supine  with MODIFIED INDEPENDENCE. (2.)Mr. Jayson Dee will transfer from bed to chair and chair to bed with SUPERVISION using the least restrictive device . (3.)Mr. Jayson Dee will ambulate with SUPERVISION for 300 feet with the least restrictive device. 4). Pt performing HEP with written guidelines 4 x a day & do incentive spirometer hourly . ________________________________________________________________________________________________   Outcome: Progressing Towards Goal     PHYSICAL THERAPY: Initial Assessment and PM 1/4/2020  INPATIENT: PT Visit Days : 1  [place] / [place] / Product Type: Medicare /       NAME/AGE/GENDER: Gianna Baxter is a 81 y.o. male   PRIMARY DIAGNOSIS: CVA (cerebral vascular accident) ([facility] [I63.9] CVA (cerebral vascular accident) ([facility] CVA (cerebral vascular accident) ([facility]        ICD-10: Treatment Diagnosis:    Generalized Muscle Weakness (M62.81)  Other lack of cordination (R27.8)  Other abnormalities of gait and mobility (R26.89)   Precaution/Allergies:  Patient has no known allergies. ASSESSMENT:     Mr. Jayson Dee presents present with left visual field impairment that has resulted in a mildly staggered gait with intermittent sway. This pt is also very deconditioned with currently needing 02 support to keep sats >89%. This pt needs PT follow up to address gait deficits & to increase activity level with HEP. PT reviewed HEP & how to use incentive spirometer but will follow up with pt 1/4/20.       This section established at most recent assessment   PROBLEM LIST (Impairments causing functional limitations):  Decreased Strength  Decreased ADL/Functional Activities  Decreased Transfer Abilities  Decreased Ambulation Ability/Technique  Decreased Balance  Decreased Activity Tolerance  Increased Shortness of Breath  Decreased Flexibility/Joint Mobility  Decreased [place] with Home Exercise Program   INTERVENTIONS PLANNED: (Benefits and precautions of physical therapy have been discussed with the patient.)  Balance Exercise  Bed Mobility  Family Education  Gait Training  Home Exercise Program (HEP)  Neuromuscular Re-education/Strengthening  Therapeutic Activites  Therapeutic Exercise/Strengthening  Transfer Training     TREATMENT PLAN: Frequency/Duration: daily for duration of hospital stay  Rehabilitation Potential For Stated Goals: Good     REHAB RECOMMENDATIONS (at time of discharge pending progress):    Placement: It is my opinion, based on this patient's performance to date, that Mr. Orly Unger may benefit from participating in 1-2 additional therapy sessions in order to continue to assess for rehab potential and then make recommendation for disposition at discharge. Equipment: To be detrmined               HISTORY:   History of Present Injury/Illness (Reason for Referral): Julia Daniels is a 81 y.o. male who has a past medical history of type 2 diabetes on metformin, and Januvia; prior TIA more than 10 years ago with no deficits, who came after noticing blurry vision more pronounced over his left eye, with inability to perceive peripheral vision. The patient stated went to bed around 9:00 last night and his vision was normal.  He has been complaining of dizziness and lightheadedness since December 31. The patient denies falls, head trauma or any other neurologic abnormalities. He denied chest pain, shortness of breath, palpitations. Upon arrival to the ER he was able to speak in full sentences, and he seemed in no distress. Laboratories included a normal CBC, unremarkable chemistry; troponin of 0.82. EKG showed normal sinus rhythm with normal QTC and some unspecific ST depressions. Elevated pro-bnp. CXR: pulmonary edema.      Brain CT scan revealed abnormal edema in the right PCA territory suggesting a potentially recent infarction. No signs of acute bleeding, hydrocephalus, or herniation appreciated. ER MD consulted to Dr. Gabrielle Banks burning and intervention neurology and he underwent perfusion CT scan and CT angiogram of head and neck. He had subtle narrowing in the distal branches of the right PCA near the known right occipital lobe infarct. A perfusion CT scan showed a small core infarct with surrounding penumbra within the right occipitoparietal region. No intervention reach afterwords, with no intervention planned. Past Medical History/Comorbidities:   Mr. Rayne Jewell  has a past medical history of CVA (cerebral vascular accident) ([address] (1/3/2020), Diabetes ([address] Diabetes type 2, uncontrolled ([address] Other ill-defined conditions(799.89), and Other ill-defined conditions(799.89). He also has no past medical history of Arthritis, Asthma, Autoimmune disease ([address] CAD (coronary artery disease), Cancer ([address] Chronic kidney disease, COPD, Dementia, Gastrointestinal disorder, Heart failure ([address] Hypertension, Infectious disease, Liver disease, Psychiatric disorder, PUD (peptic ulcer disease), Seizures ([address] Sleep disorder, or Thromboembolus ([address] Mr. Rayne Jewell  has a past surgical history that includes hx orthopaedic and hx other surgical.  Social History/Living Environment:   Home Environment: Private residence  # Steps to Enter: 0  One/Two Story Residence: One story  Living Alone: Yes  Support Systems: Family member(s)  Patient Expects to be Discharged to[de-identified] Private residence  Current DME Used/Available at Home: None  Prior Level of Function/Work/Activity:  Pt was independent without an assistive device prior to this admission. Personal Factors:           Other factors that influence how disability is experienced by the patient:  current & PMH   Number of Personal Factors/Comorbidities that affect the Plan of Care: 3+: HIGH COMPLEXITY   EXAMINATION:   Most Recent Physical Functioning:   Gross Assessment:  AROM: Generally decreased, functional(both UE & LE's)  Strength: Generally decreased, functional  Coordination: Generally decreased, functional                    Balance:  Sitting: Intact; Without support  Standing: Impaired; With support(walker) Bed Mobility:  Supine to Sit: Stand-by assistance  Sit to Supine: Stand-by assistance  Scooting: Stand-by assistance     Transfers:  Sit to Stand: Stand-by assistance  Stand to Sit: Stand-by assistance  Bed to Chair: Stand-by assistance  Gait:     Speed/Clara: Fluctuations  Gait Abnormalities: (trunk sway & slightly staggered)  Distance (ft): 200 Feet (ft)  Ambulation - Level of Assistance: Stand-by assistance   Functional Mobility:         Gait/Ambulation:  sba        Transfers:  sba        Bed Mobility:  sba   Body Structures Involved:  Nerves  Muscles Body Functions Affected:  Neuromusculoskeletal  Movement Related Activities and Participation Affected:  General Tasks and Demands  Mobility   Number of elements that affect the Plan of Care: 4+: HIGH COMPLEXITY   CLINICAL PRESENTATION:   Presentation: Evolving clinical presentation with changing clinical characteristics: MODERATE COMPLEXITY   CLINICAL DECISION MAKIN[address] [place] [address]  How much difficulty does the patient currently have. .. Unable A Lot A Little None   1. Turning over in bed (including adjusting bedclothes, sheets and blankets)? [] 1   [] 2   [x] 3   [] 4   2. Sitting down on and standing up from a chair with arms ( e.g., wheelchair, bedside commode, etc.)   [] 1   [] 2   [x] 3   [] 4   3. Moving from lying on back to sitting on the side of the bed? [] 1   [] 2   [x] 3   [] 4   How much help from another person does the patient currently need. .. Total A Lot A Little None   4. Moving to and from a bed to a chair (including a wheelchair)? [] 1   [] 2   [x] 3   [] 4   5.   Need to walk in hospital room?   [] 1   [] 2   [x] 3   [] 4   6. Climbing 3-5 steps with a railing? [x] 1   [] 2   [] 3   [] 4   © 2007, Trustees of [address] [place] [address] [address] [address], under license to [employer]. All rights reserved      Score:  Initial: 16 Most Recent: X (Date: -- )    Interpretation of Tool:  Represents activities that are increasingly more difficult (i.e. Bed mobility, Transfers, Gait). Medical Necessity:     Patient is expected to demonstrate progress in   strength, balance, and coordination   to   decrease assistance required with bed mobility, transfers & gait   . Reason for Services/Other Comments:  Patient continues to require skilled intervention due to   Pt unsafe with functional mobility   . Use of outcome tool(s) and clinical judgement create a POC that gives a: Questionable prediction of patient's progress: MODERATE COMPLEXITY            TREATMENT:   (In addition to Assessment/Re-Assessment sessions the following treatments were rendered)   Pre-treatment Symptoms/Complaints:  persistent cough  Pain: Initial: numeric scale  Pain Intensity 1: 0  Pain Location 1: (NA)  Post Session:  0/10     Therapeutic Exercise: (14 Minutes):  Exercises : reviewed HEP to UE & LE's to be done 4 x a day with written guidelines provided also reviewed incentive spirometer to be done hourly to improve mobility and dynamic movement of arm - bilateral and leg - bilateral to improve functional endurance . Required minimal visual and verbal cues to promote proper body alignment and promote proper body mechanics. Assessment/ 13 min    Braces/Orthotics/Lines/Etc:   O2 Device: Room air  Treatment/Session Assessment:    Response to Treatment:  tolerated fair, easily SOB  Interdisciplinary Collaboration:   Registered Nurse  After treatment position/precautions:   Supine in bed  Bed/Chair-wheels locked  Bed in low position  Caregiver at bedside  Call light within reach  RN notified  Family at bedside   Compliance with Program/Exercises:  Will assess as treatment progresses  Recommendations/Intent for next treatment session: \"Next visit will focus on reduction in assistance provided\".   Total Treatment Duration:  PT Patient Time In/Time Out  Time In: 1187  Time Out: [address], PT

## 2020-01-04 NOTE — PROGRESS NOTES
Progress Note    Patient: Dilip Covarrubias MRN: [id number]  SSN: [id number]    YOB: 1950  Age: 81 y.o. Sex: male      Admit Date: 1/3/2020    LOS: 1 day     Subjective:   He was found in no distress. He has not had worsening of vision or new deficits. Tele neurology done. He was started on plavix on top of aspirin. Objective:     Vitals:    01/03/20 1331 01/03/20 1426 01/03/20 2300 01/04/20 0305   BP: 95/55 97/61 108/60 99/65   Pulse: 92 97 95 90   Resp: 18 18 20 18   Temp: 97.6 °F (36.4 °C) 96.6 °F (35.9 °C) 98.3 °F (36.8 °C) 98.1 °F (36.7 °C)   SpO2: 93% 92% 94% 92%   Weight:       Height:            Intake and Output:  Current Shift: No intake/output data recorded. Last three shifts: No intake/output data recorded. Physical Exam:     GENERAL: alert, cooperative, no distress, appears stated age  EYE: negative  LYMPHATIC: Cervical, supraclavicular, and axillary nodes normal.   THROAT & NECK: normal and no erythema or exudates noted. LUNG: mild rales, no wheezing, on room air  HEART: regular rate and rhythm, S1, S2 normal, no murmur, click, rub or gallop  ABDOMEN: soft, non-tender. Bowel sounds normal. No masses,  no organomegaly  EXTREMITIES:  extremities normal, atraumatic, no cyanosis or edema  SKIN: Normal.  NEUROLOGIC: oriented x 3, no EOM palsies, left homonomous hemianopsia present. Strength is preserved in all 4 limbs. He had a decreased sensation over anterior thighs bilaterally. PSYCHIATRIC: non focal    Lab/Data Review: All lab results for the last 24 hours reviewed.      Assessment:     Principal Problem:    CVA (cerebral vascular accident) ([facility] [address] (1/3/2020)    Active Problems:    Diabetes type 2, uncontrolled ([facility] (3/1/2016)      Troponin I above reference range (1/3/2020)      Elevated brain natriuretic peptide (BNP) level (1/3/2020)      Pulmonary edema (1/3/2020)      Plan:   -Acute CVA, over the right PCA territory with left homonymous hemianopsia:  Brain MRI revealed a right occipital CVA   Keep on telemetry  Neurochecks serially  Continue aspirin and start plavix. On statins   Diabetes control. Goals while inpatient glucose between 140180s  PT and OT. Speech therapy   neurology F/U  Physiatry medicine consulted     -Non-thrombotic troponin elevation: possible due to problem 1  Asymptomatic   Keep under remote telemetry  Monitor troponins every 6 hours     -Chronic systolic HF: no signs of exacerbation clinically   Elevated proBNP with signs of pulmonary edema and cardiomegaly on chest x-ray:  EF 30-35%.  Prior echo in 2009 EF was normal   No need for Lasix   start coreg, lisinopril if blood pressure allows   Cardiology consult tomorrow      -Type 2 diabetes:  Humalog insulin sliding scale  Check hemoglobin A1c     DVT prophylaxis: Heparin subcutaneous     CODE STATUS full code    Disposition: home versus STR.      Signed By: Albania Kitchen MD     January 4, 2020

## 2020-01-05 PROBLEM — I50.22 SYSTOLIC CHF, CHRONIC (HCC): Status: ACTIVE | Noted: 2020-01-05

## 2020-01-05 PROBLEM — I95.9 HYPOTENSION: Status: ACTIVE | Noted: 2020-01-05

## 2020-01-05 LAB
GLUCOSE BLD STRIP.AUTO-MCNC: 120 MG/DL (ref 65–100)
GLUCOSE BLD STRIP.AUTO-MCNC: 138 MG/DL (ref 65–100)
GLUCOSE BLD STRIP.AUTO-MCNC: 178 MG/DL (ref 65–100)
GLUCOSE BLD STRIP.AUTO-MCNC: 99 MG/DL (ref 65–100)
MM INDURATION POC: 0 MM (ref 0–5)
MM INDURATION POC: 0 MM (ref 0–5)
PPD POC: NEGATIVE NEGATIVE
PPD POC: NEGATIVE NEGATIVE

## 2020-01-05 PROCEDURE — 97166 OT EVAL MOD COMPLEX 45 MIN: CPT

## 2020-01-05 PROCEDURE — 97116 GAIT TRAINING THERAPY: CPT

## 2020-01-05 PROCEDURE — 97112 NEUROMUSCULAR REEDUCATION: CPT

## 2020-01-05 PROCEDURE — 65270000029 HC RM PRIVATE

## 2020-01-05 PROCEDURE — 74011250636 HC RX REV CODE- 250/636: Performed by: INTERNAL MEDICINE

## 2020-01-05 PROCEDURE — 97530 THERAPEUTIC ACTIVITIES: CPT

## 2020-01-05 PROCEDURE — 99222 1ST HOSP IP/OBS MODERATE 55: CPT | Performed by: PHYSICAL MEDICINE & REHABILITATION

## 2020-01-05 PROCEDURE — 82962 GLUCOSE BLOOD TEST: CPT

## 2020-01-05 PROCEDURE — 97165 OT EVAL LOW COMPLEX 30 MIN: CPT

## 2020-01-05 PROCEDURE — 74011636637 HC RX REV CODE- 636/637: Performed by: INTERNAL MEDICINE

## 2020-01-05 PROCEDURE — 74011250637 HC RX REV CODE- 250/637: Performed by: INTERNAL MEDICINE

## 2020-01-05 RX ORDER — MIDODRINE HYDROCHLORIDE 5 MG/1
2.5 TABLET ORAL ONCE
Status: COMPLETED | OUTPATIENT
Start: 2020-01-05 | End: 2020-01-05

## 2020-01-05 RX ORDER — MIDODRINE HYDROCHLORIDE 5 MG/1
2.5 TABLET ORAL 2 TIMES DAILY WITH MEALS
Status: DISCONTINUED | OUTPATIENT
Start: 2020-01-05 | End: 2020-01-05

## 2020-01-05 RX ADMIN — SENNOSIDES AND DOCUSATE SODIUM 1 TABLET: 8.6; 5 TABLET ORAL at 09:42

## 2020-01-05 RX ADMIN — Medication 10 ML: at 23:46

## 2020-01-05 RX ADMIN — Medication 10 ML: at 06:00

## 2020-01-05 RX ADMIN — ASPIRIN 81 MG: 81 TABLET ORAL at 09:43

## 2020-01-05 RX ADMIN — INSULIN LISPRO 2 UNITS: 100 INJECTION, SOLUTION INTRAVENOUS; SUBCUTANEOUS at 22:17

## 2020-01-05 RX ADMIN — HEPARIN SODIUM 5000 UNITS: 5000 INJECTION INTRAVENOUS; SUBCUTANEOUS at 05:15

## 2020-01-05 RX ADMIN — Medication 10 ML: at 17:33

## 2020-01-05 RX ADMIN — CLOPIDOGREL BISULFATE 75 MG: 75 TABLET, FILM COATED ORAL at 09:43

## 2020-01-05 RX ADMIN — HEPARIN SODIUM 5000 UNITS: 5000 INJECTION INTRAVENOUS; SUBCUTANEOUS at 17:31

## 2020-01-05 RX ADMIN — MIDODRINE HYDROCHLORIDE 2.5 MG: 5 TABLET ORAL at 17:30

## 2020-01-05 RX ADMIN — CARVEDILOL 3.12 MG: 6.25 TABLET, FILM COATED ORAL at 09:42

## 2020-01-05 RX ADMIN — ATORVASTATIN CALCIUM 80 MG: 40 TABLET, FILM COATED ORAL at 22:17

## 2020-01-05 RX ADMIN — ACETAMINOPHEN 650 MG: 325 TABLET, FILM COATED ORAL at 11:30

## 2020-01-05 NOTE — PROGRESS NOTES
Progress Note    Patient: Julia Daniels MRN: [id number]  SSN: [id number]    YOB: 1950  Age: 81 y.o. Sex: male      Admit Date: 1/3/2020    LOS: 2 days     Subjective:   He was found lying on his bed in no distress. His brother was present and all questions were answered. He denied acute complains. His visual deficit has not worsened. I explained ECHO results. Episodes of hypotension noted. He is asymptomatic and stated his BP is usually this low. Objective:     Vitals:    01/04/20 2320 01/05/20 0310 01/05/20 0837 01/05/20 1128   BP: (!) 89/61 99/65 (!) 89/60 (!) 84/55   Pulse: 97 80 88 85   Resp: 20 20 18 18   Temp: 96.5 °F (35.8 °C) 96.7 °F (35.9 °C) 98.4 °F (36.9 °C) 97.1 °F (36.2 °C)   SpO2: 90% 90% 92% 90%   Weight:       Height:            Intake and Output:  Current Shift: No intake/output data recorded. Last three shifts: 01/03 1901 - 01/05 0700  In: 200 [P.O.:200]  Out: -     Physical Exam:     GENERAL: alert, cooperative, no distress, appears stated age  EYE: negative  LYMPHATIC: Cervical, supraclavicular, and axillary nodes normal.   THROAT & NECK: normal and no erythema or exudates noted. LUNG: mild rales, no wheezing, on room air  HEART: regular rate and rhythm, S1, S2 normal, no murmur, click, rub or gallop  ABDOMEN: soft, non-tender. Bowel sounds normal. No masses,  no organomegaly  EXTREMITIES:  extremities normal, atraumatic, no cyanosis or edema  SKIN: Normal.  NEUROLOGIC: oriented x 3, no EOM palsies, left homonomous hemianopsia present. Strength is preserved in all 4 limbs. He had a decreased sensation over anterior thighs bilaterally. PSYCHIATRIC: non focal    Lab/Data Review: All lab results for the last 24 hours reviewed.      Assessment:     Patient Active Problem List    Diagnosis Date Noted    Systolic CHF, chronic ([facility] [address] 01/05/2020    Hypotension 01/05/2020    CVA (cerebral vascular accident) ([address] 01/03/2020    Troponin I above reference range 01/03/2020    Elevated brain natriuretic peptide (BNP) level 01/03/2020    Pulmonary edema 01/03/2020    Diabetes type 2, uncontrolled ([address] 03/01/2016     Plan:   -Acute CVA, over the right PCA territory with left homonymous hemianopsia:  Brain MRI revealed a right occipital CVA   Keep on telemetry  Neurochecks serially  Continue aspirin and plavix. Lipitor   Diabetes control. Goals while inpatient glucose between 140180s  PT and OT: suggested home health   Physiatry medicine suggested outpatient management  Neurology f/u      -Non-thrombotic troponin elevation: possible due to problem 1  -Chronic systolic HF: no signs of exacerbation clinically   Elevated proBNP with signs of pulmonary edema and cardiomegaly on chest x-ray:  EF 30-35%. Prior echo in 2009 EF was normal   No need for Lasix   Started on coreg and lisinopril, but held today in view of hypotension.  Will give 1 dose of midodrine   Cardiology consulted     -Type 2 diabetes:  Humalog insulin sliding scale     DVT prophylaxis: Heparin subcutaneous     CODE STATUS full code      Disposition: home once medically stable      Signed By: Christina Valdez MD     January 5, 2020

## 2020-01-05 NOTE — PROGRESS NOTES
Pt alert, oriented X4. Respirations even, unlabored, O2 sat 91% on O2 2L NC. HR regular. Abdomen distended. Bowel sounds active.  Denies any pain or needs

## 2020-01-05 NOTE — CONSULTS
Physical Medicine & Rehabilitation Note-consult    Patient: Dilip Covarrubias MRN: [id number]  SSN: [id number]    YOB: 1950  Age: 81 y.o. Sex: male      Admit Date: 1/3/2020  Admitting Physician: Leon Jennings MD    Medical Decision Making/Plan/Recommend: Functional deficit, mobility, gait impairment. Continue acute PT, OT. Continue gait training, transfer training, adaptive techniques. Functionally safe for home discharge with HH/ out pt therapy. Recommended patient not to drive. Patient needs out pt neuro- ophthalmology evaluation. Patient will require a  evaluation in time. Pt directed to follow up with me as out pt to arrange this. Thank you for the opportunity to participate in the care of this patient. Chief Complaint : Gait dysfunction secondary to below. Admit Diagnosis: CVA (cerebral vascular accident) ([address] [I63.9]  right occipital CVA  Left homonymous hemianopsia  Diabetes type 2, uncontrolled ([address] (3/1/2016)  Troponin I above reference range (1/3/2020)  Elevated brain natriuretic peptide (BNP) level (1/3/2020)  Pulmonary edema (1/3/2020)  Acute Rehab Dx: Left visual field deficit  Mobility and ambulation deficits  Self Care/ADL deficits    Medical Dx:  Past Medical History:   Diagnosis Date    CVA (cerebral vascular accident) ([address] 1/3/2020    Diabetes ([address]     boderline    Diabetes type 2, uncontrolled ([address]     Other ill-defined conditions(799.89)     cardiomyopathy    Other ill-defined conditions(799.89)     hypercholerosteremia     Subjective:     Date of Evaluation:  January 5, 2020    HPI: Dilip Covarrubias is a 81 y.o. male patient at [address] [place] [address] who was admitted on 1/3/2020  by Leon Jennings MD with below mentioned medical history ,is being seen for Physical Medicine and Rehabilitation consult. Dilip Covarrubias presented with acute onset inabliity to see objects to the left.  Patient had no noticeable weakness, numbness or speech difficulty. MRI showed acute right occipital infarct. Patient was admitted with diagnosis of acute CVA and started on medical management for acute stroke and secondary prevention. Patient also found to have pulmonary edema,   Acute PT, OT and ST evaluating and treating patient. Patient on exam shows left visual field deficit. He is able to negotiate mobility, transfers and ambulation at SBA level. Lavonne Villanueva is seen and examined today. Medical Records reviewed. At baseline, patient appears to lead a sedentary lifestyle. He is normally independent with all activities. Current Level of Function:   bed mobility - SBA, transfers - SBA, decreased balance , ambulation 200' with RW and SBA. Prior Level of Function/Work/Activity:  Pt was independent without an assistive device prior to this admission. Family History   Problem Relation Age of Onset    Heart Disease Father     Heart Disease Paternal Grandfather       Social History     Tobacco Use    Smoking status: Former Smoker     Packs/day: 1.00     Years: 20.00     Pack years: 20.00     Last attempt to quit: 10/20/1997     Years since quittin.2    Smokeless tobacco: Never Used   Substance Use Topics    Alcohol use: Yes     Alcohol/week: 0.0 standard drinks     Comment: occasional     Past Surgical History:   Procedure Laterality Date    HX ORTHOPAEDIC      rt knee    HX OTHER SURGICAL      hernia,      Prior to Admission medications    Medication Sig Start Date End Date Taking? Authorizing Provider   HYDROcodone-acetaminophen (NORCO) 7.5-325 mg per tablet Take 1 Tab by mouth every six (6) hours as needed for Pain.    Yes Provider, Historical   metFORMIN (GLUCOPHAGE) 500 mg tablet Take 2 tablets by mouth every morning then Take 2 tablets by mouth every night 4/15/19   JAVI Young     No Known Allergies     Review of Systems: Denies chest pain, shortness of breath, cough, headache, visual problems, abdominal pain, dysurea, calf pain. Pertinent positives are as noted in the medical records and unremarkable otherwise. Objective:     Vitals:  Blood pressure (!) 89/60, pulse 88, temperature 98.4 °F (36.9 °C), resp. rate 18, height 5' 8\" (1.727 m), weight 145 lb (65.8 kg), SpO2 92 %. Temp (24hrs), Av.8 °F (36.6 °C), Min:96.5 °F (35.8 °C), Max:98.6 °F (37 °C)    BMI (calculated): 22.1 (20 0836)   Intake and Output:   1901 -  0700  In: 200 [P.O.:200]  Out: -     Physical Exam:   General: Alert and age appropriately oriented. No acute cardio respiratory distress. HEENT: Normocephalic,no scleral icterus  Oral mucosa moist without cyanosis, No bruit, No JVD. Lungs: Clear to auscultation anteriorly   Heart: Regular rate and rhythm, S1, S2   No  murmurs, clicks, rub or gallops   Abdomen: Soft, non-tender, nondistended. Bowel sounds present. No organomegaly. Genitourinary: Benign . Neuromuscular:      EOMI  Left homonymous hemianopsia by confrontation  Follows simple commands consistently. Able to identify, recall repeat. Mood appropriate. Insight, judgement intact. LUE     Shoulder abduction  5 /5              Elbow flexion:   5/5               Wrist extension:   5/5              Finger flexion;   5/5  RUE    Shoulder abduction:  5/5                Elbow flexion:   5/5    Wrist extension: 5 /5   Finger flexion:  5 /5   ADMQ:   /5  LLE     Hip flexion:  5-/5              Knee extension:   5/5    Ankle dorsiflexion:5   /5   Ankle plantarflexion:  5 /5        RLE     Hip flexion:  5- /5   Knee extension:  5 /5    Ankle dorsiflexion:  5 /5   Ankle plantarflexion:  5 /5  Sensory - intact  No cerebellar signs. Skin/extremity: No rashes, no erythema. Calf non tender BLE.                                                                                         Labs/Studies:  Recent Results (from the past 72 hour(s))   EKG, 12 LEAD, INITIAL    Collection Time: 20  9:11 AM   Result Value Ref Range Ventricular Rate 90 BPM    Atrial Rate 90 BPM    P-R Interval 246 ms    QRS Duration 70 ms    Q-T Interval 394 ms    QTC Calculation (Bezet) 481 ms    Calculated P Axis 64 degrees    Calculated R Axis 93 degrees    Calculated T Axis -108 degrees    Diagnosis       Sinus rhythm with 1st degree A-V block  Rightward axis  Low voltage QRS  Nonspecific ST and T wave abnormality  Prolonged QT  Abnormal ECG  When compared with ECG of 27-AUG-2009 16:08,  Significant changes have occurred  Confirmed by JORDEN MEYERS (), [place] ([id number]) on 1/3/2020 10:25:56 AM     CBC WITH AUTOMATED DIFF    Collection Time: 01/03/20  9:17 AM   Result Value Ref Range    WBC 5.6 4.3 - 11.1 K/uL    RBC 3.53 (L) 4.23 - 5.6 M/uL    HGB 11.0 (L) 13.6 - 17.2 g/dL    HCT 34.0 (L) 41.1 - 50.3 %    MCV 96.3 79.6 - 97.8 FL    MCH 31.2 26.1 - 32.9 PG    MCHC 32.4 31.4 - 35.0 g/dL    RDW 14.2 11.9 - 14.6 %    PLATELET 260 517 - 253 K/uL    MPV 8.7 (L) 9.4 - 12.3 FL    ABSOLUTE NRBC 0.00 0.0 - 0.2 K/uL    DF AUTOMATED      NEUTROPHILS 66 43 - 78 %    LYMPHOCYTES 21 13 - 44 %    MONOCYTES 9 4.0 - 12.0 %    EOSINOPHILS 1 0.5 - 7.8 %    BASOPHILS 1 0.0 - 2.0 %    IMMATURE GRANULOCYTES 3 0.0 - 5.0 %    ABS. NEUTROPHILS 3.7 1.7 - 8.2 K/UL    ABS. LYMPHOCYTES 1.2 0.5 - 4.6 K/UL    ABS. MONOCYTES 0.5 0.1 - 1.3 K/UL    ABS. EOSINOPHILS 0.1 0.0 - 0.8 K/UL    ABS. BASOPHILS 0.0 0.0 - 0.2 K/UL    ABS. IMM.  GRANS. 0.2 0.0 - 0.5 K/UL   METABOLIC PANEL, BASIC    Collection Time: 01/03/20  9:17 AM   Result Value Ref Range    Sodium 141 136 - 145 mmol/L    Potassium 3.8 3.5 - 5.1 mmol/L    Chloride 106 98 - 107 mmol/L    CO2 29 21 - 32 mmol/L    Anion gap 6 (L) 7 - 16 mmol/L    Glucose 122 (H) 65 - 100 mg/dL    BUN 14 8 - 23 MG/DL    Creatinine 0.67 (L) 0.8 - 1.5 MG/DL    GFR est AA >60 >60 ml/min/1.73m2    GFR est non-AA >60 >60 ml/min/1.73m2    Calcium 9.0 8.3 - 10.4 MG/DL   TROPONIN I    Collection Time: 01/03/20  9:17 AM   Result Value Ref Range    Troponin-I, Qt. 0.82 (HH) 0.02 - 0.05 NG/ML   PROTHROMBIN TIME + INR    Collection Time: 01/03/20  9:17 AM   Result Value Ref Range    Prothrombin time 14.1 11.7 - 14.5 sec    INR 1.1     PTT    Collection Time: 01/03/20  9:17 AM   Result Value Ref Range    aPTT 29.0 24.7 - 39.8 SEC   NT-PRO BNP    Collection Time: 01/03/20  9:17 AM   Result Value Ref Range    NT pro-BNP 2,897 (H) 5 - 125 PG/ML   EKG, 12 LEAD, INITIAL    Collection Time: 01/03/20  9:19 AM   Result Value Ref Range    Ventricular Rate 87 BPM    Atrial Rate 87 BPM    P-R Interval 270 ms    QRS Duration 72 ms    Q-T Interval 394 ms    QTC Calculation (Bezet) 474 ms    Calculated P Axis 69 degrees    Calculated R Axis 91 degrees    Calculated T Axis -84 degrees    Diagnosis       Sinus rhythm with 1st degree A-V block  Rightward axis  Low voltage QRS  Nonspecific ST and T wave abnormality  Prolonged QT  Abnormal ECG  When compared with ECG of 03-JAN-2020 09:11,  No significant change was found  Confirmed by JORDEN MEYERS (), Mak Aragon ([id number]) on 1/3/2020 10:26:06 AM     TROPONIN I    Collection Time: 01/03/20 12:42 PM   Result Value Ref Range    Troponin-I, Qt. 0.79 (HH) 0.02 - 0.05 NG/ML   PLEASE READ & DOCUMENT PPD TEST IN 72 HRS    Collection Time: 01/03/20  1:35 PM   Result Value Ref Range    PPD Negative Negative    mm Induration 0 0 - 5 mm   GLUCOSE, POC    Collection Time: 01/03/20  4:20 PM   Result Value Ref Range    Glucose (POC) 145 (H) 65 - 100 mg/dL   TROPONIN I    Collection Time: 01/03/20  7:47 PM   Result Value Ref Range    Troponin-I, Qt. 0.74 (HH) 0.02 - 0.05 NG/ML   GLUCOSE, POC    Collection Time: 01/03/20  9:34 PM   Result Value Ref Range    Glucose (POC) 151 (H) 65 - 100 mg/dL   TROPONIN I    Collection Time: 01/04/20 12:30 AM   Result Value Ref Range    Troponin-I, Qt. 0.66 (HH) 0.02 - 0.05 NG/ML   TROPONIN I    Collection Time: 01/04/20  6:10 AM   Result Value Ref Range    Troponin-I, Qt. 0.66 (HH) 0.02 - 8.28 NG/ML   METABOLIC PANEL, BASIC    Collection Time: 01/04/20  6:10 AM   Result Value Ref Range    Sodium 137 136 - 145 mmol/L    Potassium 3.9 3.5 - 5.1 mmol/L    Chloride 106 98 - 107 mmol/L    CO2 26 21 - 32 mmol/L    Anion gap 5 (L) 7 - 16 mmol/L    Glucose 119 (H) 65 - 100 mg/dL    BUN 10 8 - 23 MG/DL    Creatinine 0.59 (L) 0.8 - 1.5 MG/DL    GFR est AA >60 >60 ml/min/1.73m2    GFR est non-AA >60 >60 ml/min/1.73m2    Calcium 8.6 8.3 - 10.4 MG/DL   CBC WITH AUTOMATED DIFF    Collection Time: 01/04/20  6:10 AM   Result Value Ref Range    WBC 7.2 4.3 - 11.1 K/uL    RBC 3.40 (L) 4.23 - 5.6 M/uL    HGB 10.6 (L) 13.6 - 17.2 g/dL    HCT 32.5 (L) 41.1 - 50.3 %    MCV 95.6 79.6 - 97.8 FL    MCH 31.2 26.1 - 32.9 PG    MCHC 32.6 31.4 - 35.0 g/dL    RDW 14.2 11.9 - 14.6 %    PLATELET 865 732 - 039 K/uL    MPV 9.0 (L) 9.4 - 12.3 FL    ABSOLUTE NRBC 0.00 0.0 - 0.2 K/uL    DF AUTOMATED      NEUTROPHILS 76 43 - 78 %    LYMPHOCYTES 14 13 - 44 %    MONOCYTES 7 4.0 - 12.0 %    EOSINOPHILS 1 0.5 - 7.8 %    BASOPHILS 0 0.0 - 2.0 %    IMMATURE GRANULOCYTES 2 0.0 - 5.0 %    ABS. NEUTROPHILS 5.4 1.7 - 8.2 K/UL    ABS. LYMPHOCYTES 1.0 0.5 - 4.6 K/UL    ABS. MONOCYTES 0.5 0.1 - 1.3 K/UL    ABS. EOSINOPHILS 0.1 0.0 - 0.8 K/UL    ABS. BASOPHILS 0.0 0.0 - 0.2 K/UL    ABS. IMM.  GRANS. 0.1 0.0 - 0.5 K/UL   LIPID PANEL    Collection Time: 01/04/20  6:10 AM   Result Value Ref Range    LIPID PROFILE          Cholesterol, total 155 MG/DL    Triglyceride 145 35 - 150 MG/DL    HDL Cholesterol 26 (L) 40 - 60 MG/DL    LDL, calculated 100 (H) <100 MG/DL    VLDL, calculated 29 (H) 6.0 - 23.0 MG/DL    CHOL/HDL Ratio 6.0 <200     HEMOGLOBIN A1C WITH EAG    Collection Time: 01/04/20  6:10 AM   Result Value Ref Range    Hemoglobin A1c 6.3 %    Est. average glucose 134 mg/dL   GLUCOSE, POC    Collection Time: 01/04/20  7:49 AM   Result Value Ref Range    Glucose (POC) 118 (H) 65 - 100 mg/dL   GLUCOSE, POC    Collection Time: 01/04/20 11:49 AM   Result Value Ref Range    Glucose (POC) 118 (H) 65 - 100 mg/dL   GLUCOSE, POC    Collection Time: 01/04/20  4:15 PM   Result Value Ref Range    Glucose (POC) 119 (H) 65 - 100 mg/dL   GLUCOSE, POC    Collection Time: 01/04/20  9:23 PM   Result Value Ref Range    Glucose (POC) 125 (H) 65 - 100 mg/dL   GLUCOSE, POC    Collection Time: 01/05/20  7:51 AM   Result Value Ref Range    Glucose (POC) 120 (H) 65 - 100 mg/dL       Functional Assessment:  Reviewed participation and progress in therapies  Gross Assessment  AROM: Generally decreased, functional(both UE & LE's) (01/04/20 1500)  Strength: Generally decreased, functional (01/04/20 1500)  Coordination: Generally decreased, functional (01/04/20 1500)   Bed Mobility  Supine to Sit: Stand-by assistance (01/04/20 1500)  Sit to Supine: Stand-by assistance (01/04/20 1500)  Scooting: Stand-by assistance (01/04/20 1500)   Balance  Sitting: Intact; Without support (01/04/20 1500)  Standing: Impaired; With support(walker) (01/04/20 1500)               Bed/Mat Mobility  Supine to Sit: Stand-by assistance (01/04/20 1500)  Sit to Supine: Stand-by assistance (01/04/20 1500)  Sit to Stand: Stand-by assistance (01/04/20 1500)  Stand to Sit: Stand-by assistance (01/04/20 1500)  Bed to Chair: Stand-by assistance (01/04/20 1500)  Scooting: Stand-by assistance (01/04/20 1500)     Ambulation:  Gait  Speed/Clara: Fluctuations (01/04/20 1500)  Gait Abnormalities: (trunk sway & slightly staggered) (01/04/20 1500)  Ambulation - Level of Assistance: Stand-by assistance (01/04/20 1500)  Distance (ft): 200 Feet (ft) (01/04/20 1500)    Impression/Plan:     Principal Problem:    CVA (cerebral vascular accident) ([facility] (1/3/2020)    Active Problems:    Diabetes type 2, uncontrolled ([facility] (3/1/2016)      Troponin I above reference range (1/3/2020)      Elevated brain natriuretic peptide (BNP) level (1/3/2020)      Pulmonary edema (1/3/2020)        Current Facility-Administered Medications   Medication Dose Route Frequency Provider Last Rate Last Dose    clopidogrel (PLAVIX) tablet 75 mg  75 mg Oral DAILY Joanne Fountain MD   75 mg at 01/05/20 1836    carvedilol (COREG) tablet 3.125 mg  3.125 mg Oral BID WITH MEALS Joanne Fountain MD   3.125 mg at 01/05/20 0069    lisinopril (PRINIVIL, ZESTRIL) tablet 5 mg  5 mg Oral DAILY Joanne Fountain MD   Stopped at 01/05/20 0900    temazepam (RESTORIL) capsule 15 mg  15 mg Oral QHS PRN Jose Lianez MD   15 mg at 01/04/20 2257    acetaminophen (TYLENOL) tablet 650 mg  650 mg Oral Q6H PRN Joanne Fountain MD   650 mg at 01/04/20 1256    ondansetron (ZOFRAN) injection 4 mg  4 mg IntraVENous Q6H PRN Joanne Fountain MD        heparin (porcine) injection 5,000 Units  5,000 Units SubCUTAneous Q12H Joanne Fountain MD   5,000 Units at 01/05/20 0515    aspirin delayed-release tablet 81 mg  81 mg Oral DAILY Joanne Fountain MD   81 mg at 01/05/20 6392    atorvastatin (LIPITOR) tablet 80 mg  80 mg Oral QHS Joanne Fountain MD   80 mg at 01/04/20 2215    senna-docusate (PERICOLACE) 8.6-50 mg per tablet 1 Tab  1 Tab Oral DAILY Joanne Fountain MD   1 Tab at 01/05/20 7367    sodium chloride (NS) flush 5-40 mL  5-40 mL IntraVENous Q8H Joanne Fountain MD   10 mL at 01/05/20 0600    sodium chloride (NS) flush 5-40 mL  5-40 mL IntraVENous PRN Joanne Fountain MD        labetalol (NORMODYNE;TRANDATE) injection 5 mg  5 mg IntraVENous Q10MIN PRN Joanne Fountain MD        insulin lispro (HUMALOG) injection   SubCUTAneous AC&HS Joanne Fountain MD   Stopped at 01/04/20 0730             Signed By: Kellie Charlton MD     January 5, 2020

## 2020-01-05 NOTE — PROGRESS NOTES
Shift assessment complete. Respirations present. Even and unlabored. No s/s of distress. Zero c/o pain at this time. Call light within reach. Encouraged patient to call for assistance. Patient verbalizes understanding. See Doc Flowsheet for assessment details. Patient resting in bed. Bed alarm in progress. 02/2l via nasal cannula due to Sat 88-89%. Left side vision blurred. NIH done. Very pleasant. Abdomen soft. Ambulates to bathroom at intervals.   Saline well intact via left antecubital.

## 2020-01-05 NOTE — PROGRESS NOTES
SW spoke with therapy who is recommending outpatient PT/OT. Orders provided to patient with directory of local providers.      Jolene Jones-Lens    [address] [facility]  [email or web address]

## 2020-01-05 NOTE — PROGRESS NOTES
Problem: Self Care Deficits Care Plan (Adult)  Goal: *Acute Goals and Plan of Care (Insert Text)  Description  Patient will complete bathing with independence to increase self care independence. Patient will complete simple meal preparation with independence to increase IADL independence. Patient will complete all functional transfers with independence using adaptive equipment as needed. Patient will incorporate visual scanning to the left during transfers and correctly identify trip hazards around room during ADL's. Timeframe: 7 visits        OCCUPATIONAL THERAPY: Initial Assessment and Daily Note 1/5/2020  INPATIENT: OT Visit Days: 1  [place] / [place] / Product Type: Medicare /      NAME/AGE/GENDER: Varsha Hdz is a 81 y.o. male   PRIMARY DIAGNOSIS:  CVA (cerebral vascular accident) ([facility] [address] [I63.9] CVA (cerebral vascular accident) ([address] CVA (cerebral vascular accident) ([facility] [address]        ICD-10: Treatment Diagnosis:    Other lack of cordination (R27.8)  Other abnormalities of gait and mobility (R26.89)   Precautions/Allergies:     Patient has no known allergies. ASSESSMENT:     Mr. Lety Menard presents with CVA and now Left hemianopsia and decreased balance. Reviewed safe scanning techniques using compensatory techniques for reading and visual scanning when up on feet. 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 Patient at supervision for ADL's due to vision change, normally independent. Patient with high stress level recently with going through a divorce from his wife and his 13year old daughter going through Lymphoma treatment. He also lives with his 16year old daughter. Recommend outpatient OT for vision compensatory techniques.      This section established at most recent assessment   PROBLEM LIST (Impairments causing functional limitations):  Decreased Balance  Decreased Pacing Skills  Vision    INTERVENTIONS PLANNED: (Benefits and precautions of occupational therapy have been discussed with the patient.)  Activities of daily living training  Neuromuscular re-eduation  Therapeutic activity  Therapeutic exercise     TREATMENT PLAN: Frequency/Duration: Follow patient 3x a week to address above goals. Rehabilitation Potential For Stated Goals: Excellent     REHAB RECOMMENDATIONS (at time of discharge pending progress):    Placement: It is my opinion, based on this patient's performance to date, that Mr. Cy Smith may benefit from [address] after discharge due to the functional deficits listed above that are likely to improve with skilled rehabilitation because because his/her necessity for specific compensatory visual therapeutic intervention. Equipment:   None at this time              OCCUPATIONAL PROFILE AND HISTORY:   History of Present Injury/Illness (Reason for Referral):  See H&P  Past Medical History/Comorbidities:   Mr. Cy Smith  has a past medical history of CVA (cerebral vascular accident) ([address] (1/3/2020), Diabetes ([address] Diabetes type 2, uncontrolled ([address] Other ill-defined conditions(799.89), and Other ill-defined conditions(799.89). He also has no past medical history of Arthritis, Asthma, Autoimmune disease ([address] CAD (coronary artery disease), Cancer ([address] Chronic kidney disease, COPD, Dementia, Gastrointestinal disorder, Heart failure ([address] Hypertension, Infectious disease, Liver disease, Psychiatric disorder, PUD (peptic ulcer disease), Seizures ([address] Sleep disorder, or Thromboembolus ([address]   Mr. Cy Smith  has a past surgical history that includes hx orthopaedic and hx other surgical.  Social History/Living Environment:   Home Environment: Private residence  # Steps to Enter: 0  One/Two Story Residence: One story  Living Alone: No  Support Systems: Child(millie), Family member(s)(13and 16year old daughters)  Patient Expects to be Discharged to[de-identified] Private residence  Current DME Used/Available at Home: None  Prior Level of Function/Work/Activity:  Independent prior. Number of Personal Factors/Comorbidities that affect the Plan of Care: Brief history (0):  LOW COMPLEXITY   ASSESSMENT OF OCCUPATIONAL PERFORMANCE[de-identified]   Activities of Daily Living:   Basic ADLs (From Assessment) Complex ADLs (From Assessment)   Feeding: Independent  Oral Facial Hygiene/Grooming: Independent  Bathing: Supervision  Upper Body Dressing: Supervision  Lower Body Dressing: Supervision  Toileting: Supervision     Grooming/Bathing/Dressing Activities of Daily Living                       Functional Transfers  Bathroom Mobility: Supervision/set up     Bed/Mat Mobility  Supine to Sit: Independent  Sit to Stand: Supervision  Stand to Sit: Supervision  Bed to Chair: Supervision  Scooting: Independent     Most Recent Physical Functioning:   Gross Assessment:                  Posture:     Balance:  Sitting: Intact  Standing: Impaired Bed Mobility:  Supine to Sit: Independent  Scooting: Independent  Wheelchair Mobility:     Transfers:  Sit to Stand: Supervision  Stand to Sit: Supervision  Bed to Chair: Supervision            Patient Vitals for the past 6 hrs:   BP BP Patient Position SpO2 Pulse   20 0837 (!) 89/60 At rest 92 % 88       Mental Status  Neurologic State: Alert  Orientation Level: Oriented X4  Cognition: Appropriate decision making  Perception: Appears intact  Perseveration: No perseveration noted  Safety/Judgement: Awareness of environment, Fall prevention                          Physical Skills Involved:  Balance  Vision Cognitive Skills Affected (resulting in the inability to perform in a timely and safe manner):  Perception Psychosocial Skills Affected:  Emotional Regulation   Number of elements that affect the Plan of Care: 1-3:  LOW COMPLEXITY   CLINICAL DECISION MAKIN [facility] [address] [id number] AM-PAC 6 Clicks   Daily Activity Inpatient Short Form  How much help from another person does the patient currently need. ..  Total A Lot A Little None 1.  Putting on and taking off regular lower body clothing? [] 1   [] 2   [] 3   [x] 4   2. Bathing (including washing, rinsing, drying)? [] 1   [] 2   [] 3   [x] 4   3. Toileting, which includes using toilet, bedpan or urinal?   [] 1   [] 2   [] 3   [x] 4   4. Putting on and taking off regular upper body clothing? [] 1   [] 2   [] 3   [x] 4   5. Taking care of personal grooming such as brushing teeth? [] 1   [] 2   [] 3   [x] 4   6. Eating meals? [] 1   [] 2   [] 3   [x] 4   © 2007, Trustees of [address] [place] [address] [address] [address], under license to [employer]. All rights reserved      Score:  Initial: 24 Most Recent: X (Date: -- )    Interpretation of Tool:  Represents activities that are increasingly more difficult (i.e. Bed mobility, Transfers, Gait). Medical Necessity:     Skilled intervention continues to be required due to Vision and balance deficits. Reason for Services/Other Comments:  Patient continues to require skilled intervention due to   Diagnosis above   . Use of outcome tool(s) and clinical judgement create a POC that gives a: MODERATE COMPLEXITY         TREATMENT:   (In addition to Assessment/Re-Assessment sessions the following treatments were rendered)     Pre-treatment Symptoms/Complaints:    Pain: Initial:   Pain Intensity 1: 0  Post Session:  0     Neuromuscular Re-education: ( 15):  Exercise/activities per grid below to improve balance and Vision . Required minimal visual and verbal cues to promote motor control of bilateral, upper extremity(s), lower extremity(s). Initial evaluation 10 minutes.      Braces/Orthotics/Lines/Etc:   O2 Device: Room air  Treatment/Session Assessment:    Response to Treatment:  Good  Interdisciplinary Collaboration:   Physical Therapist  Occupational Therapist  Registered Nurse    After treatment position/precautions:   Call light within reach  Family at bedside   Compliance with Program/Exercises: Compliant all of the time, Will assess as treatment progresses. Recommendations/Intent for next treatment session: \"Next visit will focus on advancements to more challenging activities and reduction in assistance provided\".   Total Treatment Duration:  OT Patient Time In/Time Out  Time In: 0920  Time Out: 115 [facility], OT

## 2020-01-05 NOTE — PROGRESS NOTES
Problem: Falls - Risk of  Goal: *Absence of Falls  Description  Document Allison Brar Fall Risk and appropriate interventions in the flowsheet.   Outcome: Progressing Towards Goal  Note: Fall Risk Interventions:  Mobility Interventions: Patient to call before getting OOB         Medication Interventions: Patient to call before getting OOB, Teach patient to arise slowly    Elimination Interventions: Call light in reach              Problem: TIA/CVA Stroke: Day 2 Until Discharge  Goal: Activity/Safety  Outcome: Progressing Towards Goal  Goal: Nutrition/Diet  Outcome: Progressing Towards Goal  Goal: Discharge Planning  Outcome: Progressing Towards Goal  Goal: Medications  Outcome: Progressing Towards Goal  Goal: Respiratory  Outcome: Progressing Towards Goal  Goal: Treatments/Interventions/Procedures  Outcome: Progressing Towards Goal  Goal: Psychosocial  Outcome: Progressing Towards Goal  Goal: *Verbalizes anxiety and depression are reduced or absent  Outcome: Progressing Towards Goal  Goal: *Absence of aspiration  Outcome: Progressing Towards Goal  Goal: *Absence of deep venous thrombosis signs and symptoms(Stroke Metric)  Outcome: Progressing Towards Goal  Goal: *Optimal pain control at patient's stated goal  Outcome: Progressing Towards Goal  Goal: *Tolerating diet  Outcome: Progressing Towards Goal  Goal: *Ability to perform ADLs and demonstrates progressive mobility and function  Outcome: Progressing Towards Goal  Goal: *Stroke education continued(Stroke Metric)  Outcome: Progressing Towards Goal     Problem: Ischemic Stroke: Discharge Outcomes  Goal: *Verbalizes anxiety and depression are reduced or absent  Outcome: Progressing Towards Goal  Goal: *Verbalize understanding of risk factor modification(Stroke Metric)  Outcome: Progressing Towards Goal  Goal: *Hemodynamically stable  Outcome: Progressing Towards Goal  Goal: *Absence of aspiration pneumonia  Outcome: Progressing Towards Goal  Goal: *Aware of needed dietary changes  Outcome: Progressing Towards Goal  Goal: *Verbalize understanding of prescribed medications including anti-coagulants, anti-lipid, and/or anti-platelets(Stroke Metric)  Outcome: Progressing Towards Goal  Goal: *Tolerating diet  Outcome: Progressing Towards Goal  Goal: *Aware of follow-up diagnostics related to anticoagulants  Outcome: Progressing Towards Goal  Goal: *Ability to perform ADLs and demonstrates progressive mobility and function  Outcome: Progressing Towards Goal  Goal: *Absence of DVT(Stroke Metric)  Outcome: Progressing Towards Goal  Goal: *Absence of aspiration  Outcome: Progressing Towards Goal  Goal: *Optimal pain control at patient's stated goal  Outcome: Progressing Towards Goal  Goal: *Home safety concerns addressed  Outcome: Progressing Towards Goal  Goal: *Describes available resources and support systems  Outcome: Progressing Towards Goal  Goal: *Verbalizes understanding of activation of EMS(911) for stroke symptoms(Stroke Metric)  Outcome: Progressing Towards Goal  Goal: *Understands and describes signs and symptoms to report to providers(Stroke Metric)  Outcome: Progressing Towards Goal

## 2020-01-05 NOTE — PROGRESS NOTES
Shift assessment complete. Respirations present. Even and unlabored. No s/s of distress. Zero c/o pain at this time. Call light within reach. Encouraged patient to call for assistance. Patient verbalizes understanding. See Doc Flowsheet for assessment details. Patient resting in bed. Patient ambulating in hallway. 02/2l via nasal cannula. Left sided vision still blurred . Abdomen soft. Using incentive spirometer at intervals.

## 2020-01-05 NOTE — PROGRESS NOTES
Problem: Mobility Impaired (Adult and Pediatric)  Goal: *Acute Goals and Plan of Care (Insert Text)  Description  DISCHARGE GOALS :  (1.)Mr. Vince Lane will move from supine to sit and sit to supine  with MODIFIED INDEPENDENCE. Met 1/5  (2.)Mr. Vince Lane will transfer from bed to chair and chair to bed with SUPERVISION using the least restrictive device . Met 1/5  (3.)Mr. Vince Lane will ambulate with SUPERVISION for 300 feet with the least restrictive device. Met 1/5  4). Pt performing HEP with written guidelines 4 x a day & do incentive spirometer hourly . ( instructions given, compliance questionable )     ________________________________________________________________________________________________   Outcome: Progressing Towards Goal     PHYSICAL THERAPY: Daily Note, Discharge and PM 1/5/2020  INPATIENT: PT Visit Days : 2  Payor: Izabella Maki / [place] / Product Type: Medicare /       NAME/AGE/GENDER: Sharda Reed is a 81 y.o. male   PRIMARY DIAGNOSIS: CVA (cerebral vascular accident) ([facility] [address] [I63.9] CVA (cerebral vascular accident) ([facility] [address] CVA (cerebral vascular accident) ([facility] [address]       ICD-10: Treatment Diagnosis:    · Generalized Muscle Weakness (M62.81)  · Other lack of cordination (R27.8)  · Other abnormalities of gait and mobility (R26.89)   Precaution/Allergies:  Patient has no known allergies. ASSESSMENT:     Mr. Vince Lane showed better gait quality with shoes on, functioning at a supervision level. Pt is a candidate for out pt PT, higher level balance training & conditioning. Will DC acute PT at this time. This section established at most recent assessment   PROBLEM LIST (Impairments causing functional limitations):  1. Decreased Strength  2. Decreased ADL/Functional Activities  3. Decreased Transfer Abilities  4. Decreased Ambulation Ability/Technique  5. Decreased Balance  6. Decreased Activity Tolerance  7. Increased Shortness of Breath  8.  Decreased Flexibility/Joint Mobility  9. Decreased [place] with Home Exercise Program   INTERVENTIONS PLANNED: (Benefits and precautions of physical therapy have been discussed with the patient.)  1. Balance Exercise  2. Bed Mobility  3. Family Education  4. Gait Training  5. Home Exercise Program (HEP)  6. Neuromuscular Re-education/Strengthening  7. Therapeutic Activites  8. Therapeutic Exercise/Strengthening  9. Transfer Training     TREATMENT PLAN: Frequency/Duration: daily for duration of hospital stay  Rehabilitation Potential For Stated Goals: Good     REHAB RECOMMENDATIONS (at time of discharge pending progress):    Placement: It is my opinion, based on this patient's performance to date, that Mr. Bernadette Sawyer may benefit from participating in 1-2 additional therapy sessions in order to continue to assess for rehab potential and then make recommendation for disposition at discharge. Equipment:    To be detrmined               HISTORY:   History of Present Injury/Illness (Reason for Referral): Mj Gomez is a 81 y.o. male who has a past medical history of type 2 diabetes on metformin, and Januvia; prior TIA more than 10 years ago with no deficits, who came after noticing blurry vision more pronounced over his left eye, with inability to perceive peripheral vision. The patient stated went to bed around 9:00 last night and his vision was normal.  He has been complaining of dizziness and lightheadedness since December 31. The patient denies falls, head trauma or any other neurologic abnormalities. He denied chest pain, shortness of breath, palpitations. Upon arrival to the ER he was able to speak in full sentences, and he seemed in no distress. Laboratories included a normal CBC, unremarkable chemistry; troponin of 0.82. EKG showed normal sinus rhythm with normal QTC and some unspecific ST depressions. Elevated pro-bnp. CXR: pulmonary edema.      Brain CT scan revealed abnormal edema in the right PCA territory suggesting a potentially recent infarction. No signs of acute bleeding, hydrocephalus, or herniation appreciated. ER MD consulted to Dr. Sebastian Foss burning and intervention neurology and he underwent perfusion CT scan and CT angiogram of head and neck. He had subtle narrowing in the distal branches of the right PCA near the known right occipital lobe infarct. A perfusion CT scan showed a small core infarct with surrounding penumbra within the right occipitoparietal region. No intervention reach afterwords, with no intervention planned. Past Medical History/Comorbidities:   Mr. Isaura Ge  has a past medical history of CVA (cerebral vascular accident) ([address] (1/3/2020), Diabetes ([address] Diabetes type 2, uncontrolled ([address] Other ill-defined conditions(799.89), and Other ill-defined conditions(799.89). He also has no past medical history of Arthritis, Asthma, Autoimmune disease ([address] CAD (coronary artery disease), Cancer ([address] Chronic kidney disease, COPD, Dementia, Gastrointestinal disorder, Heart failure ([address] Hypertension, Infectious disease, Liver disease, Psychiatric disorder, PUD (peptic ulcer disease), Seizures ([address] Sleep disorder, or Thromboembolus ([address] Mr. Isaura Ge  has a past surgical history that includes hx orthopaedic and hx other surgical.  Social History/Living Environment:   Home Environment: Private residence  # Steps to Enter: 0  One/Two Story Residence: One story  Living Alone: No  Support Systems: Child(millie), Family member(s)(13and 16year old daughters)  Patient Expects to be Discharged to[de-identified] Private residence  Current DME Used/Available at Home: None  Prior Level of Function/Work/Activity:  Pt was independent without an assistive device prior to this admission. Personal Factors:           Other factors that influence how disability is experienced by the patient:  current & PMH   Number of Personal Factors/Comorbidities that affect the Plan of Care: 3+: HIGH COMPLEXITY   EXAMINATION:   Most Recent Physical Functioning:   Gross Assessment:  AROM: Generally decreased, functional(both UE & LE's)  Strength: Generally decreased, functional  Coordination: Generally decreased, functional                    Balance:  Sitting: Intact; Without support  Standing: Intact; Without support Bed Mobility:  Supine to Sit: Independent  Sit to Supine: (NT)  Scooting: Supervision     Transfers:  Sit to Stand: Supervision  Stand to Sit: Supervision  Bed to Chair: Supervision  Duration: 26 Minutes(extra time to work through activity noted)  Gait:     Speed/Clara: Fluctuations  Gait Abnormalities: Decreased step clearance(slight sway)  Distance (ft): 200 Feet (ft)(100 ft)  Ambulation - Level of Assistance: Supervision   Functional Mobility:         Gait/Ambulation:  sup        Transfers:  sup        Bed Mobility:  sup   Body Structures Involved:  1. Nerves  2. Muscles Body Functions Affected:  1. Neuromusculoskeletal  2. Movement Related Activities and Participation Affected:  1. General Tasks and Demands  2. Mobility   Number of elements that affect the Plan of Care: 4+: HIGH COMPLEXITY   CLINICAL PRESENTATION:   Presentation: Evolving clinical presentation with changing clinical characteristics: MODERATE COMPLEXITY   CLINICAL DECISION MAKIN [facility] Inpatient Short Form  How much difficulty does the patient currently have. .. Unable A Lot A Little None   1. Turning over in bed (including adjusting bedclothes, sheets and blankets)? [] 1   [] 2   [x] 3   [] 4   2. Sitting down on and standing up from a chair with arms ( e.g., wheelchair, bedside commode, etc.)   [] 1   [] 2   [x] 3   [] 4   3. Moving from lying on back to sitting on the side of the bed? [] 1   [] 2   [x] 3   [] 4   How much help from another person does the patient currently need. .. Total A Lot A Little None   4. Moving to and from a bed to a chair (including a wheelchair)? [] 1   [] 2   [x] 3   [] 4   5.   Need to walk in hospital room? [] 1   [] 2   [x] 3   [] 4   6. Climbing 3-5 steps with a railing? [x] 1   [] 2   [] 3   [] 4   © 2007, Trustees of [address] [place] [address] [address] [address], under license to [employer]. All rights reserved      Score:  Initial: 16 Most Recent: X (Date: -- )    Interpretation of Tool:  Represents activities that are increasingly more difficult (i.e. Bed mobility, Transfers, Gait). Medical Necessity:     · Patient is expected to demonstrate progress in   · strength, balance, and coordination  ·  to   · decrease assistance required with bed mobility, transfers & gait   · .  Reason for Services/Other Comments:  · Patient continues to require skilled intervention due to   · Pt unsafe with functional mobility   · . Use of outcome tool(s) and clinical judgement create a POC that gives a: Questionable prediction of patient's progress: MODERATE COMPLEXITY            TREATMENT:   (In addition to Assessment/Re-Assessment sessions the following treatments were rendered)   Pre-treatment Symptoms/Complaints:  \" I want to go to rehab \"  Pain: Initial: numeric scale  Pain Intensity 1: 0  Pain Location 1: (NA)  Post Session:  0/10     Therapeutic Activity: (  26 Minutes(extra time to work through activity noted) ):  Therapeutic activities including bed mobility, transfers, standing balance activity, along with box stepping, side & back stepping progressive gait with focus on quality & stability to improve mobility, strength, balance, coordination and dynamic movement of leg - bilateral and core to improve functional stability.       Braces/Orthotics/Lines/Etc:   · O2 Device: Room air  Treatment/Session Assessment:    · Response to Treatment:  Less SOB today, 02 sats stable >90% on RA with activity  · Interdisciplinary Collaboration:   o Registered Nurse  o   · After treatment position/precautions:   o Supine in bed  o Bed/Chair-wheels locked  o Bed in low position  o Call light within reach  o Critical access hospital[facility] notified  o Family at bedside   · Compliance with Program/Exercises: Compliant most of the time  · Recommendations/ DC acute PT & refer to out pt PT for higher level balance training & conditioning.   Total Treatment Duration:  PT Patient Time In/Time Out  Time In: 1448  Time Out: [address] [facility],

## 2020-01-06 VITALS
HEART RATE: 96 BPM | DIASTOLIC BLOOD PRESSURE: 61 MMHG | WEIGHT: 149.7 LBS | OXYGEN SATURATION: 99 % | BODY MASS INDEX: 22.69 KG/M2 | RESPIRATION RATE: 18 BRPM | HEIGHT: 68 IN | SYSTOLIC BLOOD PRESSURE: 102 MMHG | TEMPERATURE: 98.8 F

## 2020-01-06 LAB
ANION GAP SERPL CALC-SCNC: 7 MMOL/L (ref 7–16)
BASOPHILS # BLD: 0 K/UL (ref 0–0.2)
BASOPHILS NFR BLD: 1 % (ref 0–2)
BUN SERPL-MCNC: 8 MG/DL (ref 8–23)
CALCIUM SERPL-MCNC: 9 MG/DL (ref 8.3–10.4)
CHLORIDE SERPL-SCNC: 106 MMOL/L (ref 98–107)
CO2 SERPL-SCNC: 26 MMOL/L (ref 21–32)
CREAT SERPL-MCNC: 0.67 MG/DL (ref 0.8–1.5)
DIFFERENTIAL METHOD BLD: ABNORMAL
EOSINOPHIL # BLD: 0.2 K/UL (ref 0–0.8)
EOSINOPHIL NFR BLD: 4 % (ref 0.5–7.8)
ERYTHROCYTE [DISTWIDTH] IN BLOOD BY AUTOMATED COUNT: 14.6 % (ref 11.9–14.6)
GLUCOSE BLD STRIP.AUTO-MCNC: 101 MG/DL (ref 65–100)
GLUCOSE BLD STRIP.AUTO-MCNC: 110 MG/DL (ref 65–100)
GLUCOSE BLD STRIP.AUTO-MCNC: 110 MG/DL (ref 65–100)
GLUCOSE SERPL-MCNC: 117 MG/DL (ref 65–100)
HCT VFR BLD AUTO: 35.8 % (ref 41.1–50.3)
HGB BLD-MCNC: 11.6 G/DL (ref 13.6–17.2)
IMM GRANULOCYTES # BLD AUTO: 0.1 K/UL (ref 0–0.5)
IMM GRANULOCYTES NFR BLD AUTO: 2 % (ref 0–5)
LYMPHOCYTES # BLD: 1.2 K/UL (ref 0.5–4.6)
LYMPHOCYTES NFR BLD: 21 % (ref 13–44)
MAGNESIUM SERPL-MCNC: 2.2 MG/DL (ref 1.8–2.4)
MCH RBC QN AUTO: 31.2 PG (ref 26.1–32.9)
MCHC RBC AUTO-ENTMCNC: 32.4 G/DL (ref 31.4–35)
MCV RBC AUTO: 96.2 FL (ref 79.6–97.8)
MONOCYTES # BLD: 0.7 K/UL (ref 0.1–1.3)
MONOCYTES NFR BLD: 13 % (ref 4–12)
NEUTS SEG # BLD: 3.4 K/UL (ref 1.7–8.2)
NEUTS SEG NFR BLD: 61 % (ref 43–78)
NRBC # BLD: 0 K/UL (ref 0–0.2)
PLATELET # BLD AUTO: 296 K/UL (ref 150–450)
PMV BLD AUTO: 8.8 FL (ref 9.4–12.3)
POTASSIUM SERPL-SCNC: 3.8 MMOL/L (ref 3.5–5.1)
RBC # BLD AUTO: 3.72 M/UL (ref 4.23–5.6)
SODIUM SERPL-SCNC: 139 MMOL/L (ref 136–145)
WBC # BLD AUTO: 5.5 K/UL (ref 4.3–11.1)

## 2020-01-06 PROCEDURE — 80048 BASIC METABOLIC PNL TOTAL CA: CPT

## 2020-01-06 PROCEDURE — 36415 COLL VENOUS BLD VENIPUNCTURE: CPT

## 2020-01-06 PROCEDURE — 92523 SPEECH SOUND LANG COMPREHEN: CPT

## 2020-01-06 PROCEDURE — 85025 COMPLETE CBC W/AUTO DIFF WBC: CPT

## 2020-01-06 PROCEDURE — 83735 ASSAY OF MAGNESIUM: CPT

## 2020-01-06 PROCEDURE — 74011250636 HC RX REV CODE- 250/636: Performed by: INTERNAL MEDICINE

## 2020-01-06 PROCEDURE — 82962 GLUCOSE BLOOD TEST: CPT

## 2020-01-06 PROCEDURE — 74011250637 HC RX REV CODE- 250/637: Performed by: INTERNAL MEDICINE

## 2020-01-06 RX ORDER — ASPIRIN 81 MG/1
81 TABLET ORAL DAILY
Qty: 30 TAB | Refills: 1 | Status: SHIPPED | OUTPATIENT
Start: 2020-01-07 | End: 2020-02-07

## 2020-01-06 RX ORDER — CLOPIDOGREL BISULFATE 75 MG/1
75 TABLET ORAL DAILY
Qty: 17 TAB | Refills: 0 | Status: SHIPPED | OUTPATIENT
Start: 2020-01-07 | End: 2020-01-24

## 2020-01-06 RX ORDER — ATORVASTATIN CALCIUM 80 MG/1
80 TABLET, FILM COATED ORAL
Qty: 30 TAB | Refills: 1 | Status: SHIPPED | OUTPATIENT
Start: 2020-01-06 | End: 2020-02-05

## 2020-01-06 RX ADMIN — ASPIRIN 81 MG: 81 TABLET ORAL at 08:15

## 2020-01-06 RX ADMIN — HEPARIN SODIUM 5000 UNITS: 5000 INJECTION INTRAVENOUS; SUBCUTANEOUS at 06:00

## 2020-01-06 RX ADMIN — CLOPIDOGREL BISULFATE 75 MG: 75 TABLET, FILM COATED ORAL at 08:15

## 2020-01-06 RX ADMIN — HEPARIN SODIUM 5000 UNITS: 5000 INJECTION INTRAVENOUS; SUBCUTANEOUS at 17:28

## 2020-01-06 RX ADMIN — ACETAMINOPHEN 650 MG: 325 TABLET, FILM COATED ORAL at 03:15

## 2020-01-06 RX ADMIN — SENNOSIDES AND DOCUSATE SODIUM 1 TABLET: 8.6; 5 TABLET ORAL at 08:15

## 2020-01-06 RX ADMIN — Medication 10 ML: at 06:00

## 2020-01-06 NOTE — PROGRESS NOTES
Problem: TIA/CVA Stroke: Day 2 Until Discharge  Goal: Off Pathway (Use only if patient is Off Pathway)  Outcome: Progressing Towards Goal  Goal: Activity/Safety  Outcome: Progressing Towards Goal  Goal: Diagnostic Test/Procedures  Outcome: Progressing Towards Goal  Goal: Nutrition/Diet  Outcome: Progressing Towards Goal  Goal: Medications  Outcome: Progressing Towards Goal  Goal: Respiratory  Outcome: Progressing Towards Goal  Goal: Treatments/Interventions/Procedures  Outcome: Progressing Towards Goal  Goal: Psychosocial  Outcome: Progressing Towards Goal  Goal: *Verbalizes anxiety and depression are reduced or absent  Outcome: Progressing Towards Goal  Goal: *Absence of aspiration  Outcome: Progressing Towards Goal  Goal: *Absence of deep venous thrombosis signs and symptoms(Stroke Metric)  Outcome: Progressing Towards Goal  Goal: *Optimal pain control at patient's stated goal  Outcome: Progressing Towards Goal  Goal: *Ability to perform ADLs and demonstrates progressive mobility and function  Outcome: Progressing Towards Goal  Goal: *Stroke education continued(Stroke Metric)  Outcome: Progressing Towards Goal

## 2020-01-06 NOTE — CONSULTS
[address] Cardiology Consultation        Date of  Admission: 1/3/2020  8:39 AM     Primary Care Physician: Dr. Porfirio Shannon  Primary Cardiologist: none  Referring Physician: Dr. Mark Quiñones Physician: Dr. Gayle Mcbride    CC/Reason for consult: new decreased EF    HPI:  Julia Daniels is a 81 y.o. WM with h/o DM II and TIA 2009 who presented to [address] [place] [address] ED on 1/3 with blurred vision, peripheral vision changes and dizziness/lightheadedness since 12/31. CT brain showed R PCA territory edema concerning for recent infarct. Pt was admitted by the hospitalist for CVA work up. Pt also had elevated troponin 0.82-0.79-0.74-0.66-0.66, CXR showing pulmonary edema and elevated Pro BNP of 2,897. Echo was done showing EF 30-35%, moderate diffuse hypokinesis with worsening hypokinesis in the inferolateral wall, grade 3 diastolic dysfunction, LA markedly dilated, Atrial septum: no right-to-left shunt, with provocative maneuvers to increase right atrial pressure, RA mildly to moderately dilated and moderate to severe MR with multiple regurgitant jets and mild TR. MRI showed right occipital infarct and Pt is on ASA and Plavix. [address] Cardiology was consulted for new low EF 30-35%. Pt reports no h/o CAD, MI or arrhythmia. He reports several episodes of pain across his shoulders and the top of his chest when he laid down at night but he thought this was related to back injury last year and known DDD. He denies CP with exertion, denies SOB, palpitations, LE swelling, orthopnea, PND or syncope. ECG showed NSR with 1st degree AVB and IL ST-T wave non specific changes. He has been started on Coreg and ACE-I, however BP has limited him taking them. He has h/o diabetes, smoking (quit 20 years ago) and dyslipidemia (has taken Lipitor on and off).  Pt reports he had the flu about 3-4 weeks ago.     Past Medical History:   Diagnosis Date    CVA (cerebral vascular accident) ([facility] 1/3/2020    Diabetes ([facility]     boderline    [facility], [facility]     Other ill-defined conditions(799.89)     cardiomyopathy    Other ill-defined conditions(799.89)     hypercholerosteremia    Systolic CHF, chronic ([facility] 2020      Past Surgical History:   Procedure Laterality Date    HX ORTHOPAEDIC      rt knee    HX OTHER SURGICAL      hernia,       No Known Allergies   Social History     Socioeconomic History    Marital status:      Spouse name: Not on file    Number of children: Not on file    Years of education: Not on file    Highest education level: Not on file   Occupational History    Not on file   Social Needs    Financial resource strain: Not on file    Food insecurity:     Worry: Not on file     Inability: Not on file    Transportation needs:     Medical: Not on file     Non-medical: Not on file   Tobacco Use    Smoking status: Former Smoker     Packs/day: 1.00     Years: 20.00     Pack years: 20.00     Last attempt to quit: 10/20/1997     Years since quittin.2    Smokeless tobacco: Never Used   Substance and Sexual Activity    Alcohol use:  Yes     Alcohol/week: 0.0 standard drinks     Comment: occasional    Drug use: No    Sexual activity: Never   Lifestyle    Physical activity:     Days per week: Not on file     Minutes per session: Not on file    Stress: Not on file   Relationships    Social connections:     Talks on phone: Not on file     Gets together: Not on file     Attends Druze service: Not on file     Active member of club or organization: Not on file     Attends meetings of clubs or organizations: Not on file     Relationship status: Not on file    Intimate partner violence:     Fear of current or ex partner: Not on file     Emotionally abused: Not on file     Physically abused: Not on file     Forced sexual activity: Not on file   Other Topics Concern    Not on file   Social History Narrative    Not on file     Family History   Problem Relation Age of Onset    Heart Disease Father     Heart Disease Paternal Grandfather         Current Facility-Administered Medications   Medication Dose Route Frequency    clopidogrel (PLAVIX) tablet 75 mg  75 mg Oral DAILY    [Held by provider] carvedilol (COREG) tablet 3.125 mg  3.125 mg Oral BID WITH MEALS    [Held by provider] lisinopril (PRINIVIL, ZESTRIL) tablet 5 mg  5 mg Oral DAILY    temazepam (RESTORIL) capsule 15 mg  15 mg Oral QHS PRN    acetaminophen (TYLENOL) tablet 650 mg  650 mg Oral Q6H PRN    ondansetron (ZOFRAN) injection 4 mg  4 mg IntraVENous Q6H PRN    heparin (porcine) injection 5,000 Units  5,000 Units SubCUTAneous Q12H    aspirin delayed-release tablet 81 mg  81 mg Oral DAILY    atorvastatin (LIPITOR) tablet 80 mg  80 mg Oral QHS    senna-docusate (PERICOLACE) 8.6-50 mg per tablet 1 Tab  1 Tab Oral DAILY    sodium chloride (NS) flush 5-40 mL  5-40 mL IntraVENous Q8H    sodium chloride (NS) flush 5-40 mL  5-40 mL IntraVENous PRN    labetalol (NORMODYNE;TRANDATE) injection 5 mg  5 mg IntraVENous Q10MIN PRN    insulin lispro (HUMALOG) injection   SubCUTAneous AC&HS       Review of symptoms:  General: no recent weight loss/gain, +weakness, +fatigue, no fever or chills   Skin: no rashes, lumps, or other skin changes   HEENT: no headache, +dizziness,+ lightheadedness, +vision changes, hearing changes, tinnitus, vertigo, sinus pressure/pain, bleeding gums, sore throat, or hoarseness   Neck: no swollen glands, goiter, pain or stiffness   Respiratory: no cough, sputum, hemoptysis, dyspnea, wheezing   Cardiovascular: no chest pain or discomfort, palpitations, dyspnea, orthopnea, paroxysmal nocturnal dyspnea, peripheral edema   Gastrointestinal: no trouble swallowing, heartburn, change of appetite, nausea, change in bowel habits, pain with defecation, rectal bleeding or black/tarry stools, hemorrhoids, constipation, diarrhea, abdominal pain, jaundice, liver or gallbladder problems   Urinary: no frequency, urgency , hematuria, burning/pain with urination, recent flank pain, polyuria, nocturia, or difficulty urinating   Peripheral Vascular: no claudication, leg cramps, prior DVTs, swelling of calves, legs, or feet, color change, or swelling with redness or tenderness   Musculoskeletal: no muscle or joint pain/stiffness, joint swelling, erythema of joints, or back pain   Psychiatric: no depression, mental disorders, or excessive stress   Neurological: +history of TAA, +dizziness, no sensory or motor loss, seizures, syncope, tremors, numbness, tingling, no changes in mood, attention, or speech, no changes in orientation, memory, insight, or judgment. no headache, vertigo. Hematologic: no anemia, easy bruising or bleeding   Endocrine: +diabetes, thyroid problems, heat or cold intolerance, excessive sweating, polyuria, polydipsia        Subjective:   Physical Exam    Visit Vitals  BP (!) 82/59 (BP 1 Location: Left arm, BP Patient Position: At rest)   Pulse 79   Temp 96.9 °F (36.1 °C)   Resp 16   Ht 5' 8\" (1.727 m)   Wt 67.9 kg (149 lb 11.2 oz)   SpO2 93%   BMI 22.43 kg/m²     General Appearance:  Well developed, well nourished, alert and oriented x 3, and individual in no acute distress. Ears/Nose/Mouth/Throat:   Hearing grossly normal.         Neck: Supple. Chest:   Lungs clear to auscultation bilaterally. Cardiovascular:  Regular rate and rhythm, S1, S2 normal, no murmur. Abdomen:   Soft, non-tender, bowel sounds are active. Extremities: No edema bilaterally. Skin: Warm and dry.              Labs:   Recent Results (from the past 24 hour(s))   GLUCOSE, POC    Collection Time: 01/05/20 11:43 AM   Result Value Ref Range    Glucose (POC) 99 65 - 100 mg/dL   GLUCOSE, POC    Collection Time: 01/05/20  4:36 PM   Result Value Ref Range    Glucose (POC) 138 (H) 65 - 100 mg/dL   PLEASE READ & DOCUMENT PPD TEST IN 48 HRS    Collection Time: 01/05/20  4:55 PM   Result Value Ref Range    PPD Negative Negative    mm Induration 0 0 - 5 mm   GLUCOSE, POC Collection Time: 01/05/20  9:04 PM   Result Value Ref Range    Glucose (POC) 178 (H) 65 - 137 mg/dL   METABOLIC PANEL, BASIC    Collection Time: 01/06/20  6:13 AM   Result Value Ref Range    Sodium 139 136 - 145 mmol/L    Potassium 3.8 3.5 - 5.1 mmol/L    Chloride 106 98 - 107 mmol/L    CO2 26 21 - 32 mmol/L    Anion gap 7 7 - 16 mmol/L    Glucose 117 (H) 65 - 100 mg/dL    BUN 8 8 - 23 MG/DL    Creatinine 0.67 (L) 0.8 - 1.5 MG/DL    GFR est AA >60 >60 ml/min/1.73m2    GFR est non-AA >60 >60 ml/min/1.73m2    Calcium 9.0 8.3 - 10.4 MG/DL   MAGNESIUM    Collection Time: 01/06/20  6:13 AM   Result Value Ref Range    Magnesium 2.2 1.8 - 2.4 mg/dL   CBC WITH AUTOMATED DIFF    Collection Time: 01/06/20  6:13 AM   Result Value Ref Range    WBC 5.5 4.3 - 11.1 K/uL    RBC 3.72 (L) 4.23 - 5.6 M/uL    HGB 11.6 (L) 13.6 - 17.2 g/dL    HCT 35.8 (L) 41.1 - 50.3 %    MCV 96.2 79.6 - 97.8 FL    MCH 31.2 26.1 - 32.9 PG    MCHC 32.4 31.4 - 35.0 g/dL    RDW 14.6 11.9 - 14.6 %    PLATELET 026 846 - 789 K/uL    MPV 8.8 (L) 9.4 - 12.3 FL    ABSOLUTE NRBC 0.00 0.0 - 0.2 K/uL    DF AUTOMATED      NEUTROPHILS 61 43 - 78 %    LYMPHOCYTES 21 13 - 44 %    MONOCYTES 13 (H) 4.0 - 12.0 %    EOSINOPHILS 4 0.5 - 7.8 %    BASOPHILS 1 0.0 - 2.0 %    IMMATURE GRANULOCYTES 2 0.0 - 5.0 %    ABS. NEUTROPHILS 3.4 1.7 - 8.2 K/UL    ABS. LYMPHOCYTES 1.2 0.5 - 4.6 K/UL    ABS. MONOCYTES 0.7 0.1 - 1.3 K/UL    ABS. EOSINOPHILS 0.2 0.0 - 0.8 K/UL    ABS. BASOPHILS 0.0 0.0 - 0.2 K/UL    ABS. IMM. GRANS. 0.1 0.0 - 0.5 K/UL   GLUCOSE, POC    Collection Time: 01/06/20  7:32 AM   Result Value Ref Range    Glucose (POC) 110 (H) 65 - 100 mg/dL       Pt has been seen and examined by Dr. Gardenia Leal. He agrees with the following assessment and plan. Assessment/Plan:       Diagnosis    New finding of decreased EF 30-35% w/o HF symptoms- diffuse hypokynesis with worsening HK of inferolateral wall- will need ischemic w/u likely as an out patient due to recent CVA.  Agree with ASA, Plavix, statin, low dose Coreg and ACE-I if BP can tolerate. Will need TC 7-14 appt scheduled on Thursday 1/16 at 12:15 PM and f/u echo in 3 months, OK to go home once life vest placed from cardiac standpoint    Hypotension- limiting HFrEF treatment: Coreg and ACE-I    CVA (cerebral vascular accident)- on ASA, Plavix per primary team and neurology    Troponin I elevated- in setting of CVA, no CP- uncertain significance, as above    Diabetes type 2, uncontrolled ([address]- meds per primary team       Thank you for consulting [facility] and allowing us to participate in the care of this patient. We will continue to follow along with you.     Kassidy Hopkins PA-C

## 2020-01-06 NOTE — PROGRESS NOTES
SPEECH LANGUAGE PATHOLOGY: SPEECH-LANGUAGE/COGNITION: Initial Assessment and Discharge    NAME/AGE/GENDER: Eboni Wright is a 81 y.o. male  DATE: 1/6/2020  PRIMARY DIAGNOSIS: CVA (cerebral vascular accident) ([facility] [address] [I63.9]      ICD-10: Treatment Diagnosis: R41.841 Cognitive-Communication Deficit    INTERDISCIPLINARY COLLABORATION: Registered Nurse  PRECAUTIONS/ALLERGIES: Patient has no known allergies. SUBJECTIVE   Patient alert seated at table for assessment. Denies any changes in cognitive linguistic function. Reports only changes in vision. Patient is currently retired. He is independent with all ADLs. Reports he know he will not be able to drive until vision improves     History of Present Injury/Illness: Mr. Alexandra Hartley  has a past medical history of CVA (cerebral vascular accident) ([facility] [address] (1/3/2020), Diabetes ([facility] [address] Diabetes type 2, uncontrolled ([address] Other ill-defined conditions(799.89), Other ill-defined [id number](878.51), and Systolic CHF, chronic ([facility] [address] (1/5/2020). He also has no past medical history of Arthritis, Asthma, Autoimmune disease ([address] CAD (coronary artery disease), Cancer ([address] Chronic kidney disease, COPD, Dementia, Gastrointestinal disorder, Hypertension, Infectious disease, Liver disease, Psychiatric disorder, PUD (peptic ulcer disease), Seizures ([address] Sleep disorder, or Thromboembolus ([address] . He also  has a past surgical history that includes hx orthopaedic and hx other surgical.     Previous Speech Therapy: NONE REPORTED    Problem List:  (Impairments causing functional limitations):  1. Cognitive linguistic impairment no deficits identified. Orientation:   Person  Place  Time  Situation     Pain: Pain Scale 1: Numeric (0 - 10)  Pain Intensity 1: 0         OBJECTIVE   Patient was administered the SLUMS and portions of the Cognistat. Patient yielded as core of 28 out of 30 on the SLUMS, which falls WNL.  Results are as follows:    Orientation: 3/3 accurate  Short term memory: recalled 3/5 unrelated objects 3 minutes post training  Mental manipulation: 3/3 accurate  Divergent naming: 3/3 accurate- named 16 items in concrete category in 1 minute  Divided Attention: 2/2 accurate  Clock drawing/executive function: 4/ accurate  Auditory comprehension: 2/2 accurate  Paragraph recall:  accurate    Reasoning for math calculations: / accurate  Verbal reasonin/ accurate       ASSESSMENT   Patient presents with normal cognitive linguistic function. Language, memory, attention, executive function skills, and reasoning intact. No cognitive linguistic goals identified at this time. No further speech therapy clinically indiciated at this time. Educated patient on recommendation for patient to notifiy PCP at dischrage if he notices any difficulty completing high level cognitive tasks upon discharge such as difficulty managing finances and medications. Tool Used: MODIFIED CINDI SCALE (mRS)   Score   No Symptoms  [] 0   No significant disability despite symptoms; able to carry out all usual duties and activities  [] 1   Slight disability; unable to carry out all previous activities but able to look after own affairs without assistance. [] 2   Moderate disability; requiring some help but able to walk without assistance  [] 3   Moderately severe disability; unable to walk without assistance and unable to attend to own bodily needs without assistance  [] 4   Severe disability; bedridden, incontinent, and requiring constant nursing care and attention  [] 5      Score:  Initial: 1    Interpretation of Tool: The Modified Cindi Scale is a 7-point scaled used to quantify level of disability as it relates to a patient's functional abilities. Current Medications:   No current facility-administered medications on file prior to encounter.       Current Outpatient Medications on File Prior to Encounter   Medication Sig Dispense Refill    HYDROcodone-acetaminophen (NORCO) 7.5-325 mg per tablet Take 1 Tab by mouth every six (6) hours as needed for Pain.  metFORMIN (GLUCOPHAGE) 500 mg tablet Take 2 tablets by mouth every morning then Take 2 tablets by mouth every night 120 Tab 0       PLAN    FREQUENCY/DURATION: No further speech therapy indicated at this time as cognitive linguistic function is WNL. - Recommendations for next treatment session: No additional speech therapy indicated at this time. REHABILITATION POTENTIAL FOR STATED GOALS: Excellent         RECOMMENDATIONS   STRATEGIES: N/A     EDUCATION:  · Recommendations discussed with Patient     RECOMMENDATIONS for CONTINUED SPEECH THERAPY: No further speech therapy indicated at this time. Compliance with Program/Exercises: Compliant with assessment. Continuation of Skilled Services/Medical Necessity:   No further speech therapy clinically indicated. SAFETY:  After treatment position/precautions:  · Up in chair   · Patient currently ambulates in room without assistance. Discharged from PT over weekend.     Total Treatment Duration:     Time In: 1238  Time Out: Shaquille Drummond 87, CCC-SLP

## 2020-01-06 NOTE — PROGRESS NOTES
Care Management Interventions  PCP Verified by CM: Yes  Transition of Care Consult (CM Consult): Home Health, Discharge [address] [address]: No  Reason Outside [place]: Out of service area  Physical Therapy Consult: Yes  Occupational Therapy Consult: Yes  Speech Therapy Consult: Yes  Current Support Network: Relative's Home  Confirm Follow Up Transport: Family  The Patient and/or Patient Representative was Provided with a Choice of Provider and Agrees with the Discharge Plan?: Yes  Freedom of Choice List was Provided with Basic Dialogue that Supports the Patient's Individualized Plan of Care/Goals, Treatment Preferences and Shares the Quality Data Associated with the Providers?: Yes  Discharge Location  Discharge Placement: Home with home health      Per MD pt stable for d/c. Pt will be going to his sister's home [facility]   [address]  [address] ). SW faxed clinical & orders to [facility], spoke with Lynette Mitchel ( [phone] ) & requested start of care for Wed.

## 2020-01-06 NOTE — DISCHARGE INSTRUCTIONS
DISCHARGE SUMMARY from Nurse    PATIENT INSTRUCTIONS:    After general anesthesia or intravenous sedation, for 24 hours or while taking prescription Narcotics:  · Limit your activities  · Do not drive and operate hazardous machinery  · Do not make important personal or business decisions  · Do  not drink alcoholic beverages  · If you have not urinated within 8 hours after discharge, please contact your surgeon on call. Report the following to your surgeon:  · Excessive pain, swelling, redness or odor of or around the surgical area  · Temperature over 100.5  · Nausea and vomiting lasting longer than 4 hours or if unable to take medications  · Any signs of decreased circulation or nerve impairment to extremity: change in color, persistent  numbness, tingling, coldness or increase pain  · Any questions    What to do at Home:  Recommended activity: Activity as tolerated, cardiac/diabetic diet, follow up with PCP in 1 week, follow up with cardiology and neurology as scheduled. If you experience any of the following symptoms: s/s of stroke or TIA call 911, chest pain or shortness of breath call 911, other concerns, please follow up with PCP, cardiology or neurology. *  Please give a list of your current medications to your Primary Care Provider. *  Please update this list whenever your medications are discontinued, doses are      changed, or new medications (including over-the-counter products) are added. *  Please carry medication information at all times in case of emergency situations. These are general instructions for a healthy lifestyle:    No smoking/ No tobacco products/ Avoid exposure to second hand smoke  Surgeon General's Warning:  Quitting smoking now greatly reduces serious risk to your health.     Obesity, smoking, and sedentary lifestyle greatly increases your risk for illness    A healthy diet, regular physical exercise & weight monitoring are important for maintaining a healthy lifestyle    You may be retaining fluid if you have a history of heart failure or if you experience any of the following symptoms:  Weight gain of 3 pounds or more overnight or 5 pounds in a week, increased swelling in our hands or feet or shortness of breath while lying flat in bed. Please call your doctor as soon as you notice any of these symptoms; do not wait until your next office visit. The discharge information has been reviewed with the patient. The patient verbalized understanding. Discharge medications reviewed with the patient and appropriate educational materials and side effects teaching were provided. ___________________________________________________________________________________________________________________________________     Stroke: Care Instructions  Your Care Instructions    You have had a stroke. This means that the blood flow to a part of your brain was blocked for some time, which damages the nerve cells in that part of the brain. The part of your body controlled by that part of your brain may not function properly now. The brain is an amazing organ that can heal itself to some degree. The stroke you had damaged part of your brain. But other parts of your brain may take over in some way for the damaged areas. You have already started this process. Your doctor will talk with you about what you can do to prevent another stroke. High blood pressure, high cholesterol, and diabetes are all risk factors for stroke. If you have any of these conditions, work with your doctor to make sure they are under control. Other risk factors for stroke include being overweight, smoking, and not getting regular exercise. Going home may be hard for you and your family. The more you can try to do for yourself, the better. Remember to take each day one at a time. Follow-up care is a key part of your treatment and safety.  Be sure to make and go to all appointments, and call your doctor if you are having problems. It's also a good idea to know your test results and keep a list of the medicines you take. How can you care for yourself at home?    · Enter a stroke rehabilitation (rehab) program, if your doctor recommends it. Physical, speech, and occupational therapies can help you manage bathing, dressing, eating, and other basics of daily living.     · Do not drive until your doctor says it is okay.     · It is normal to feel sad or depressed after a stroke. If these feelings last, talk to your doctor.     · If you are having problems with urine leakage, go to the bathroom at regular times, including when you first wake up and at bedtime. Also, limit fluids after dinner.     · If you are constipated, drink plenty of fluids, enough so that your urine is light yellow or clear like water. If you have kidney, heart, or liver disease and have to limit fluids, talk with your doctor before you increase the amount of fluids you drink. Set up a regular time for using the toilet. If you continue to have constipation, your doctor may suggest using a bulking agent, such as Metamucil, or a stool softener, laxative, or enema. Medicines    · Take your medicines exactly as prescribed. Call your doctor if you think you are having a problem with your medicine. You may be taking several medicines. ACE (angiotensin-converting enzyme) inhibitors, angiotensin II receptor blockers (ARBs), beta-blockers, diuretics (water pills), and calcium channel blockers control your blood pressure. Statins help lower cholesterol. Your doctor may also prescribe medicines for depression, pain, sleep problems, anxiety, or agitation.     · If your doctor has given you a blood thinner to prevent another stroke, be sure you get instructions about how to take your medicine safely.  Blood thinners can cause serious bleeding problems.     · Do not take any over-the-counter medicines or herbal products without talking to your doctor first.     · If you take birth control pills or hormone therapy, talk to your doctor about whether they are right for you.    For family members and caregivers    · Make the home safe. Set up a room so that your loved one does not have to climb stairs. Be sure the bathroom is on the same floor. Move throw rugs and furniture that could cause falls. Make sure that the lighting is good. Put grab bars and seats in tubs and showers.     · Find out what your loved one can do and what he or she needs help with. Try not to do things for your loved one that your loved one can do on his or her own. Help him or her learn and practice new skills.     · Visit and talk with your loved one often. Try doing activities together that you both enjoy, such as playing cards or board games. Keep in touch with your loved one's friends as much as you can. Encourage them to visit.     · Take care of yourself. Do not try to do everything yourself. Ask other family members to help. Eat well, get enough rest, and take time to do things that you enjoy. Keep up with your own doctor visits, and make sure to take your medicines regularly. Get out of the house as much as you can. Join a local support group. Find out if you qualify for home health care visits to help with rehab or for adult day care. When should you call for help? Call 911 anytime you think you may need emergency care. For example, call if:    · You have signs of another stroke. These may include:  ? Sudden numbness, tingling, weakness, or loss of movement in your face, arm, or leg, especially on only one side of your body. ? Sudden vision changes. ? Sudden trouble speaking. ? Sudden confusion or trouble understanding simple statements. ? Sudden problems with walking or balance. ? A sudden, severe headache that is different from past headaches.   Call 911 even if these symptoms go away in a few minutes.    Call your doctor now or seek immediate medical care if:    · You have new symptoms that may be related to your stroke, such as falls or trouble swallowing.    Watch closely for changes in your health, and be sure to contact your doctor if you have any problems. Where can you learn more? Go to http://zulma-lissette.info/. Enter F253 in the search box to learn more about \"Stroke: Care Instructions. \"  Current as of: September 26, 2018  Content Version: 12.2  © 7248-1959 [employer]. Care instructions adapted under license by [employer] (which disclaims liability or warranty for this information). If you have questions about a medical condition or this instruction, always ask your healthcare professional. [address] any warranty or liability for your use of this information. Heart Failure: Care Instructions  Your Care Instructions    Heart failure occurs when your heart does not pump as much blood as the body needs. Failure does not mean that the heart has stopped pumping but rather that it is not pumping as well as it should. Over time, this causes fluid buildup in your lungs and other parts of your body. Fluid buildup can cause shortness of breath, fatigue, swollen ankles, and other problems. By taking medicines regularly, reducing sodium (salt) in your diet, checking your weight every day, and making lifestyle changes, you can feel better and live longer. Follow-up care is a key part of your treatment and safety. Be sure to make and go to all appointments, and call your doctor if you are having problems. It's also a good idea to know your test results and keep a list of the medicines you take. How can you care for yourself at home? Medicines    · Be safe with medicines. Take your medicines exactly as prescribed.  Call your doctor if you think you are having a problem with your medicine.     · Do not take any vitamins, over-the-counter medicine, or herbal products without talking to your doctor first. Martha Dale not take ibuprofen (Advil or Motrin) and naproxen (Aleve) without talking to your doctor first. They could make your heart failure worse.     · You may take some of the following medicine. ? Angiotensin-converting enzyme inhibitors (ACEIs) or angiotensin II receptor blockers (ARBs) reduce the heart's workload, lower blood pressure, and reduce swelling. Taking an ACEI or ARB may lower your chance of needing to be hospitalized. ? Beta-blockers can slow heart rate, decrease blood pressure, and improve your condition. Taking a beta-blocker may lower your chance of needing to be hospitalized. ? Diuretics, also called water pills, reduce swelling.    You will get more details on the specific medicines your doctor prescribes. Diet    · Your doctor may suggest that you limit sodium. Your doctor can tell you how much sodium is right for you. An example is less than 3,000 mg a day. This includes all the salt you eat in cooking or in packaged foods. People get most of their sodium from processed foods. Fast food and restaurant meals also tend to be very high in sodium.     · Ask your doctor how much liquid you can drink each day. You may have to limit liquids.    Weight    · Weigh yourself without clothing at the same time each day. Record your weight. Call your doctor if you have a sudden weight gain, such as more than 2 to 3 pounds in a day or 5 pounds in a week. (Your doctor may suggest a different range of weight gain.) A sudden weight gain may mean that your heart failure is getting worse.    Activity level    · Start light exercise (if your doctor says it is okay). Even if you can only do a small amount, exercise will help you get stronger, have more energy, and manage your weight and your stress. Walking is an easy way to get exercise. Start out by walking a little more than you did before. Bit by bit, increase the amount you walk.     · When you exercise, watch for signs that your heart is working too hard.  You are pushing yourself too hard if you cannot talk while you are exercising. If you become short of breath or dizzy or have chest pain, stop, sit down, and rest.     · If you feel \"wiped out\" the day after you exercise, walk slower or for a shorter distance until you can work up to a better pace.     · Get enough rest at night. Sleeping with 1 or 2 pillows under your upper body and head may help you breathe easier.    Lifestyle changes    · Do not smoke. Smoking can make a heart condition worse. If you need help quitting, talk to your doctor about stop-smoking programs and medicines. These can increase your chances of quitting for good. Quitting smoking may be the most important step you can take to protect your heart.     · Limit alcohol to 2 drinks a day for men and 1 drink a day for women. Too much alcohol can cause health problems.     · Avoid getting sick from colds and the flu. Get a pneumococcal vaccine shot. If you have had one before, ask your doctor whether you need another dose. Get a flu shot each year. If you must be around people with colds or the flu, wash your hands often. When should you call for help? Call 911 if you have symptoms of sudden heart failure such as:    · You have severe trouble breathing.     · You cough up pink, foamy mucus.     · You have a new irregular or rapid heartbeat.    Call your doctor now or seek immediate medical care if:    · You have new or increased shortness of breath.     · You are dizzy or lightheaded, or you feel like you may faint.     · You have sudden weight gain, such as more than 2 to 3 pounds in a day or 5 pounds in a week. (Your doctor may suggest a different range of weight gain.)     · You have increased swelling in your legs, ankles, or feet.     · You are suddenly so tired or weak that you cannot do your usual activities.    Watch closely for changes in your health, and be sure to contact your doctor if you develop new symptoms. Where can you learn more?   Go to http://zulma-lissette.info/. Enter K872 in the search box to learn more about \"Heart Failure: Care Instructions. \"  Current as of: April 9, 2019  Content Version: 12.2  © 7402-1824 [employer]. Care instructions adapted under license by [employer] (which disclaims liability or warranty for this information). If you have questions about a medical condition or this instruction, always ask your healthcare professional. [place] [address] any warranty or liability for your use of this information.

## 2020-01-06 NOTE — PROGRESS NOTES
Problem: Falls - Risk of  Goal: *Absence of Falls  Description  Document Josh Barbosa Fall Risk and appropriate interventions in the flowsheet.   Outcome: Progressing Towards Goal  Note: Fall Risk Interventions:  Mobility Interventions: Patient to call before getting OOB         Medication Interventions: Patient to call before getting OOB    Elimination Interventions: Call light in reach

## 2020-01-06 NOTE — PROGRESS NOTES
OT Note:   Attempted to see patient for OT treatment. MD in room with patient. Will try again as schedule permits.    Dedra White OTR/L

## 2020-01-06 NOTE — DISCHARGE SUMMARY
Discharge Summary     Patient: Julia Daniels MRN: [id number]  SSN: [id number]    YOB: 1950  Age: 81 y.o. Sex: male       Admit Date: 1/3/2020    Discharge Date: 1/6/2020      Admission Diagnoses: CVA (cerebral vascular accident) [facility]) [I63.9]    Discharge Diagnoses:   Problem List as of 1/6/2020 Date Reviewed: 3/1/2016          Codes Class Noted - Resolved    Systolic CHF, chronic ([facility] ICD-10-CM: I50.22  ICD-9-CM: 428.22, 428.0  1/5/2020 - Present        Hypotension ICD-10-CM: I95.9  ICD-9-CM: 458.9  1/5/2020 - Present        * (Principal) CVA (cerebral vascular accident) ([facility] ICD-10-CM: I63.9  ICD-9-CM: 434.91  1/3/2020 - Present        Troponin I above reference range ICD-10-CM: R79.89  ICD-9-CM: 790.6  1/3/2020 - Present        Elevated brain natriuretic peptide (BNP) level ICD-10-CM: R79.89  ICD-9-CM: 790.99  1/3/2020 - Present        Pulmonary edema ICD-10-CM: J81.1  ICD-9-CM: 861  1/3/2020 - Present        Diabetes type 2, uncontrolled ([facility] ICD-10-CM: E11.65  ICD-9-CM: 250.02  3/1/2016 - Present               Discharge Condition: [place] Course:   Mr. Julia Daniels is a 81 y.o. male who has a past medical history of type 2 diabetes on metformin, and Januvia; prior TIA more than 10 years ago with no deficits, who came after noticing blurry vision more pronounced over his left eye, with inability to perceive peripheral vision, On physical exam homonomous hemianopsia was present. A Brain CT scan revealed abnormal edema in the right PCA territory suggesting a potentially recent infarction. No signs of acute bleeding, hydrocephalus, or herniation appreciated. ER MD consulted to Dr. Isabel Stanley and intervention neurology and he underwent perfusion CT scan and CT angiogram of head and neck. He had subtle narrowing in the distal branches of the right PCA near the known right occipital lobe infarct.   A perfusion CT scan showed a small core infarct with surrounding penumbra within the right occipitoparietal region. No intervention planned. A brain MRI showed a right occipital CVA, acute-subacute. Tele-neurology consulted, he was started on aspirin, plavix and statins. PT/OT consulted. Elevated troponin were present, but he had no chest pain and ekg with non specific st changes. An echo revealed a new EF 30-35%. The patient was initiated on coreg and lisinopril but his blood pressure did not allow to continue them. Cardiology evaluated him, A life vest ordered. Beta blocker and ACEI to be restarted once his blood pressure improves as outpatient. The patient is stable enough to be discharged and continue outpatient management.  has set Located within [facility]. Physical Exam:   GENERAL: alert, cooperative, no distress, appears stated age  EYE: negative  LYMPHATIC: Cervical, supraclavicular, and axillary nodes normal.   THROAT & NECK: normal and no erythema or exudates noted.   LUNG: mild rales, no wheezing, on room air  HEART: regular rate and rhythm, S1, S2 normal, no murmur, click, rub or gallop  ABDOMEN: soft, non-tender. Bowel sounds normal. No masses,  no organomegaly  EXTREMITIES:  extremities normal, atraumatic, no cyanosis or edema  SKIN: Normal.  NEUROLOGIC: oriented x 3, no EOM palsies, left homonomous hemianopsia present. Strength is preserved in all 4 limbs. He had a decreased sensation over anterior thighs bilaterally.   PSYCHIATRIC: non focal     Consults: Cardiology and Neurology    Significant Diagnostic Studies: see note     Disposition: home    Discharge Medications:   Current Discharge Medication List      START taking these medications    Details   aspirin delayed-release 81 mg tablet Take 1 Tab by mouth daily for 30 days. Qty: 30 Tab, Refills: 1      atorvastatin (LIPITOR) 80 mg tablet Take 1 Tab by mouth nightly for 30 days. Qty: 30 Tab, Refills: 1      clopidogrel (PLAVIX) 75 mg tab Take 1 Tab by mouth daily for 17 days.   Qty: 17 Tab, Refills: 0         CONTINUE these medications which have NOT CHANGED    Details   metFORMIN (GLUCOPHAGE) 500 mg tablet Take 2 tablets by mouth every morning then Take 2 tablets by mouth every night  Qty: 120 Tab, Refills: 0         STOP taking these medications       HYDROcodone-acetaminophen (NORCO) 7.5-325 mg per tablet Comments:   Reason for Stopping:               Activity: Activity as tolerated  Diet: Cardiac Diet  Wound Care: None needed    Follow-up Appointments   Procedures    FOLLOW UP VISIT Appointment in: One Week pcp     pcp     Standing Status:   Standing     Number of Occurrences:   1     Order Specific Question:   Appointment in     Answer: One Week    FOLLOW UP VISIT Appointment in: One Month Neurology     Neurology     Standing Status:   Standing     Number of Occurrences:   1     Standing Expiration Date:   1/7/2020     Order Specific Question:   Appointment in     Answer:   One Month    FOLLOW UP VISIT Appointment in: Other [facility] Card[facility] Thursday 1/16 at 12:15 PM     Cardiology Thursday 1/16 at 12:15 PM     Standing Status:   Standing     Number of Occurrences:   1     Standing Expiration Date:   1/7/2020     Order Specific Question:   Appointment in     Answer:    Other (Specify)       Signed By: Bess Lundberg MD     January 6, 2020

## 2020-01-06 NOTE — PROGRESS NOTES
Assessment complete via flow sheet. Pt A&Ox3, sitting up in recliner, waiting for breakfast.  Pt c/o of blurry vision with floaters, no other neuro deficits. Respirations even and unlabored. S1 S2 auscultated. Pt on room air. Pt reports pain level of 0 out of 10. Bowel sounds active, abdomen soft. Denies other needs. Bed in lowest position, call bell in reach. Instructed to call for assistance. Pt verbalized understanding. Plan of care reviewed with patient.

## 2020-01-06 NOTE — PROGRESS NOTES
SONYA spoke with Sushil Blake who hopes to have Life Vest at the hospital by 5:30 tonight. Pt & his sister aware of approx delivery time. SONYA provided Sushil Blake phone # to reach nurse in the event any issues arise. Per MD pt will need Life Vest for home. SONYA faxed all clinical & signed MD order to [facility]. SONYA spoke with pt's sister Bakari Lamb and provided update. Pt is going through a divorce so unlikely he will have transport for outpt therapy. SONYA provided choice list of HH. Will await decision on [facility] choice. SONYA spoke with Sushil Blake with [facility] who will be on the lookout for referral and process.

## 2020-02-07 PROBLEM — Z86.73 HISTORY OF STROKE: Status: ACTIVE | Noted: 2020-02-07

## 2020-02-24 ENCOUNTER — HOSPITAL ENCOUNTER (OUTPATIENT)
Dept: LAB | Age: 70
Discharge: HOME | End: 2020-02-24
Payer: MEDICARE

## 2020-02-24 DIAGNOSIS — Z86.73 HISTORY OF STROKE: ICD-10-CM

## 2020-02-24 DIAGNOSIS — I50.22 SYSTOLIC CHF, CHRONIC (HCC): ICD-10-CM

## 2020-02-24 LAB
ANION GAP SERPL CALC-SCNC: 7 MMOL/L (ref 7–16)
BASOPHILS # BLD: 0 K/UL (ref 0–0.2)
BASOPHILS NFR BLD: 0 % (ref 0–2)
BUN SERPL-MCNC: 20 MG/DL (ref 8–23)
CALCIUM SERPL-MCNC: 9.4 MG/DL (ref 8.3–10.4)
CHLORIDE SERPL-SCNC: 106 MMOL/L (ref 98–107)
CO2 SERPL-SCNC: 26 MMOL/L (ref 21–32)
CREAT SERPL-MCNC: 0.6 MG/DL (ref 0.8–1.5)
DIFFERENTIAL METHOD BLD: ABNORMAL
EOSINOPHIL # BLD: 0.1 K/UL (ref 0–0.8)
EOSINOPHIL NFR BLD: 2 % (ref 0.5–7.8)
ERYTHROCYTE [DISTWIDTH] IN BLOOD BY AUTOMATED COUNT: 13.4 % (ref 11.9–14.6)
GLUCOSE SERPL-MCNC: 141 MG/DL (ref 65–100)
HCT VFR BLD AUTO: 40 % (ref 41.1–50.3)
HGB BLD-MCNC: 13.3 G/DL (ref 13.6–17.2)
IMM GRANULOCYTES # BLD AUTO: 0 K/UL (ref 0–0.5)
IMM GRANULOCYTES NFR BLD AUTO: 1 % (ref 0–5)
INR PPP: 1.1
LYMPHOCYTES # BLD: 1.4 K/UL (ref 0.5–4.6)
LYMPHOCYTES NFR BLD: 24 % (ref 13–44)
MAGNESIUM SERPL-MCNC: 1.7 MG/DL (ref 1.8–2.4)
MCH RBC QN AUTO: 31.5 PG (ref 26.1–32.9)
MCHC RBC AUTO-ENTMCNC: 33.3 G/DL (ref 31.4–35)
MCV RBC AUTO: 94.8 FL (ref 79.6–97.8)
MONOCYTES # BLD: 0.8 K/UL (ref 0.1–1.3)
MONOCYTES NFR BLD: 13 % (ref 4–12)
NEUTS SEG # BLD: 3.5 K/UL (ref 1.7–8.2)
NEUTS SEG NFR BLD: 60 % (ref 43–78)
NRBC # BLD: 0 K/UL (ref 0–0.2)
PLATELET # BLD AUTO: 175 K/UL (ref 150–450)
PMV BLD AUTO: 9.6 FL (ref 9.4–12.3)
POTASSIUM SERPL-SCNC: 3.8 MMOL/L (ref 3.5–5.1)
PROTHROMBIN TIME: 14.4 SEC (ref 12–14.7)
RBC # BLD AUTO: 4.22 M/UL (ref 4.23–5.6)
SODIUM SERPL-SCNC: 139 MMOL/L (ref 136–145)
TSH SERPL DL<=0.005 MIU/L-ACNC: 2.25 UIU/ML (ref 0.36–3.74)
WBC # BLD AUTO: 5.8 K/UL (ref 4.3–11.1)

## 2020-02-24 PROCEDURE — 80048 BASIC METABOLIC PNL TOTAL CA: CPT

## 2020-02-24 PROCEDURE — 85025 COMPLETE CBC W/AUTO DIFF WBC: CPT

## 2020-02-24 PROCEDURE — 84443 ASSAY THYROID STIM HORMONE: CPT

## 2020-02-24 PROCEDURE — 36415 COLL VENOUS BLD VENIPUNCTURE: CPT

## 2020-02-24 PROCEDURE — 83735 ASSAY OF MAGNESIUM: CPT

## 2020-02-24 PROCEDURE — 85610 PROTHROMBIN TIME: CPT

## 2020-02-25 RX ORDER — TERAZOSIN 10 MG/1
10 CAPSULE ORAL
COMMUNITY
End: 2020-03-19

## 2020-02-25 RX ORDER — ATORVASTATIN CALCIUM 80 MG/1
80 TABLET, FILM COATED ORAL
Status: ON HOLD | COMMUNITY
End: 2020-03-18 | Stop reason: SDUPTHER

## 2020-02-25 RX ORDER — CLOPIDOGREL BISULFATE 75 MG/1
75 TABLET ORAL DAILY
COMMUNITY
End: 2020-03-20 | Stop reason: SDUPTHER

## 2020-02-25 RX ORDER — ASPIRIN 81 MG/1
81 TABLET ORAL DAILY
COMMUNITY

## 2020-02-25 NOTE — PROGRESS NOTES
Patient pre-assessment complete for [facility] with Dr Yin Saldana scheduled for 20 at 12:30pm, arrival time 10:30am. Patient verified using . Patient instructed to bring all home medications in labeled bottles on the day of procedure. NPO status reinforced. Patient informed to take a full dose aspirin 325mg  or 81 mg x 4 & plavix 75 mg  on the day of procedure. Patient instructed to HOLD metformin (last dose 20) & Philis Dux in am . Instructed they can take all other medications excluding vitamins & supplements. Patient verbalizes understanding of all instructions & denies any questions at this time.

## 2020-02-26 ENCOUNTER — APPOINTMENT (OUTPATIENT)
Dept: CARDIAC CATH/INVASIVE PROCEDURES | Age: 70
End: 2020-02-26
Payer: MEDICARE

## 2020-02-26 ENCOUNTER — HOSPITAL ENCOUNTER (OUTPATIENT)
Dept: CARDIAC CATH/INVASIVE PROCEDURES | Age: 70
Discharge: HOME | End: 2020-02-26
Attending: INTERNAL MEDICINE | Admitting: INTERNAL MEDICINE
Payer: MEDICARE

## 2020-02-26 VITALS
HEIGHT: 69 IN | RESPIRATION RATE: 16 BRPM | OXYGEN SATURATION: 95 % | DIASTOLIC BLOOD PRESSURE: 50 MMHG | BODY MASS INDEX: 21.48 KG/M2 | WEIGHT: 145 LBS | SYSTOLIC BLOOD PRESSURE: 87 MMHG | HEART RATE: 72 BPM

## 2020-02-26 LAB
ACT BLD: 224 SECS (ref 70–128)
ATRIAL RATE: 70 BPM
CALCULATED P AXIS, ECG09: 19 DEGREES
CALCULATED R AXIS, ECG10: 56 DEGREES
CALCULATED T AXIS, ECG11: 168 DEGREES
DIAGNOSIS, 93000: NORMAL
P-R INTERVAL, ECG05: 170 MS
Q-T INTERVAL, ECG07: 428 MS
QRS DURATION, ECG06: 96 MS
QTC CALCULATION (BEZET), ECG08: 462 MS
VENTRICULAR RATE, ECG03: 70 BPM

## 2020-02-26 PROCEDURE — 74011250636 HC RX REV CODE- 250/636: Performed by: INTERNAL MEDICINE

## 2020-02-26 PROCEDURE — C1894 INTRO/SHEATH, NON-LASER: HCPCS

## 2020-02-26 PROCEDURE — 74011636320 HC RX REV CODE- 636/320: Performed by: INTERNAL MEDICINE

## 2020-02-26 PROCEDURE — 77030015766

## 2020-02-26 PROCEDURE — 93571 IV DOP VEL&/PRESS C FLO 1ST: CPT

## 2020-02-26 PROCEDURE — 93312 ECHO TRANSESOPHAGEAL: CPT

## 2020-02-26 PROCEDURE — 77030016699 HC CATH ANGI DX INFN1 CARD -A

## 2020-02-26 PROCEDURE — 93005 ELECTROCARDIOGRAM TRACING: CPT | Performed by: INTERNAL MEDICINE

## 2020-02-26 PROCEDURE — 85347 COAGULATION TIME ACTIVATED: CPT

## 2020-02-26 PROCEDURE — 77030029997 HC DEV COM RDL R BND TELE -B

## 2020-02-26 PROCEDURE — C1769 GUIDE WIRE: HCPCS

## 2020-02-26 PROCEDURE — 93458 L HRT ARTERY/VENTRICLE ANGIO: CPT

## 2020-02-26 PROCEDURE — 74011000250 HC RX REV CODE- 250: Performed by: INTERNAL MEDICINE

## 2020-02-26 PROCEDURE — 99153 MOD SED SAME PHYS/QHP EA: CPT

## 2020-02-26 PROCEDURE — C1887 CATHETER, GUIDING: HCPCS

## 2020-02-26 PROCEDURE — 99152 MOD SED SAME PHYS/QHP 5/>YRS: CPT

## 2020-02-26 RX ORDER — SODIUM CHLORIDE 9 MG/ML
75 INJECTION, SOLUTION INTRAVENOUS CONTINUOUS
Status: DISCONTINUED | OUTPATIENT
Start: 2020-02-26 | End: 2020-02-26 | Stop reason: HOSPADM

## 2020-02-26 RX ORDER — HEPARIN SODIUM 10000 [USP'U]/ML
6000 INJECTION, SOLUTION INTRAVENOUS; SUBCUTANEOUS ONCE
Status: COMPLETED | OUTPATIENT
Start: 2020-02-26 | End: 2020-02-26

## 2020-02-26 RX ORDER — LIDOCAINE HYDROCHLORIDE 10 MG/ML
3-20 INJECTION, SOLUTION EPIDURAL; INFILTRATION; INTRACAUDAL; PERINEURAL ONCE
Status: COMPLETED | OUTPATIENT
Start: 2020-02-26 | End: 2020-02-26

## 2020-02-26 RX ORDER — GUAIFENESIN 100 MG/5ML
324 LIQUID (ML) ORAL
Status: DISCONTINUED | OUTPATIENT
Start: 2020-02-26 | End: 2020-02-26 | Stop reason: HOSPADM

## 2020-02-26 RX ORDER — FENTANYL CITRATE 50 UG/ML
25-100 INJECTION, SOLUTION INTRAMUSCULAR; INTRAVENOUS
Status: DISCONTINUED | OUTPATIENT
Start: 2020-02-26 | End: 2020-02-26 | Stop reason: HOSPADM

## 2020-02-26 RX ORDER — MIDAZOLAM HYDROCHLORIDE 1 MG/ML
.5-5 INJECTION, SOLUTION INTRAMUSCULAR; INTRAVENOUS
Status: DISCONTINUED | OUTPATIENT
Start: 2020-02-26 | End: 2020-02-26 | Stop reason: HOSPADM

## 2020-02-26 RX ORDER — HEPARIN SODIUM 200 [USP'U]/100ML
2 INJECTION, SOLUTION INTRAVENOUS CONTINUOUS
Status: DISCONTINUED | OUTPATIENT
Start: 2020-02-26 | End: 2020-02-26 | Stop reason: HOSPADM

## 2020-02-26 RX ADMIN — LIDOCAINE HYDROCHLORIDE 3 ML: 10 INJECTION, SOLUTION EPIDURAL; INFILTRATION; INTRACAUDAL; PERINEURAL at 13:03

## 2020-02-26 RX ADMIN — VERAPAMIL HYDROCHLORIDE 2 ML: 5 INJECTION INTRAVENOUS at 13:03

## 2020-02-26 RX ADMIN — MIDAZOLAM 2 MG: 1 INJECTION INTRAMUSCULAR; INTRAVENOUS at 12:14

## 2020-02-26 RX ADMIN — IOPAMIDOL 145 ML: 755 INJECTION, SOLUTION INTRAVENOUS at 13:26

## 2020-02-26 RX ADMIN — HEPARIN SODIUM 2 UNITS/HR: 200 INJECTION, SOLUTION INTRAVENOUS at 12:51

## 2020-02-26 RX ADMIN — MIDAZOLAM 2 MG: 1 INJECTION INTRAMUSCULAR; INTRAVENOUS at 12:12

## 2020-02-26 RX ADMIN — SODIUM CHLORIDE 75 ML/HR: 900 INJECTION, SOLUTION INTRAVENOUS at 11:15

## 2020-02-26 RX ADMIN — FENTANYL CITRATE 50 MCG: 50 INJECTION, SOLUTION INTRAMUSCULAR; INTRAVENOUS at 12:12

## 2020-02-26 RX ADMIN — HEPARIN SODIUM 6000 UNITS: 10000 INJECTION INTRAVENOUS; SUBCUTANEOUS at 13:14

## 2020-02-26 NOTE — PROCEDURES
Brief Cardiac Procedure Note    Patient: Melisa Judge MRN: [id number]  SSN: [id number]    YOB: 1950  Age: 78 y.o. Sex: male      Date of Procedure: 2020     Pre-procedure Diagnosis: Congestive Heart Failure    Post-procedure Diagnosis: Multi-vessel CAD. Procedure: Left Heart Catheterization    Brief Description of Procedure: See above    Performed By: Demetrius El MD     Assistants: None    Anesthesia: Moderate Sedation    Estimated Blood Loss: Less than 10 mL      Specimens: None    Implants: None    Findings: Multivessel CAD. IFR LAD 0.50    Complications: None    Recommendations: CTS evaluation for CABG evaluation.   Graft Targets:   LAD, OM, LPL and PDA    Signed By: Demetrius El MD     2020

## 2020-02-26 NOTE — DISCHARGE INSTRUCTIONS
HEART CATHETERIZATION/ANGIOGRAPHY DISCHARGE INSTRUCTIONS    1. Check puncture site frequently for swelling or bleeding. If there is any bleeding, lie down and apply pressure over the area with a clean towel or washcloth. Call 911. Notify your doctor for any redness, swelling, drainage, or oozing from the puncture site. Notify your doctor for any fever or chills. 2. If the extremity becomes cold, numb, or painful call [facility] at [phone].  3. Activity should be limited for the next 48 hours. Climb stairs as little as possible and avoid any stooping, bending, or strenuous activity for 48 hours. No heavy lifting (anything over 10 pounds) for 3 days. 4. You may resume your usual diet. Drink more fluids than usual.  5. Have a responsible person drive you home and stay with you for at least 24 hours after your heart catheterization/angiography. Do not drive for the next 24 hours. 6. You may remove bandage from your Right wrist in 24 hours. You may shower in 24 hours. No tub baths, hot tubs, or swimming for 1 week. Do not place any lotions, creams, powders, or ointments over puncture site for 1 week. You may place a clean band-aid over the puncture site each day for 5 days. Change daily. 7. Please continue your medications as prescribed by your physician. HOLD YOUR METFORMIN FOR 48 HOURS AFTER YOUR PROCEDURE. I have read the above instructions and have had the opportunity to ask questions.       Patient: ________________________   Date: 2/26/2020    Witness: _______________________   Date: 2/26/2020

## 2020-02-26 NOTE — PROCEDURES
Brief Cardiac Procedure Note    Patient: Bruce Caro MRN: [id number]  SSN: [id number]    YOB: 1950  Age: 78 y.o. Sex: male      Date of Procedure: 2/26/2020     Pre-procedure Diagnosis: Valvular Heart Disease    Post-procedure Diagnosis: Mild to moderate MR. Procedure: Transesophageal Echocardiogram    Brief Description of Procedure: As above    Performed By: Vernell Rebolledo MD     Assistants: None    Anesthesia: Moderate Sedation    Estimated Blood Loss: Less than 10 mL      Specimens: None    Implants: None    Findings: Mild/moderate MR. EF 35%. Small PFO    Complications: None    Recommendations: Continue medical therapy.     Signed By: Vernell Rebolledo MD     February 26, 2020

## 2020-02-26 NOTE — CONSULTS
Jason Anderson. MD Mary Adair. MD Brandyn Jeffers Rekha Cohen         2/26/2020 1950    REFERRING PHYSICIAN:  Dr. Pieter Matias:  The patient is a 78 y.o. male who was seen in the office by Dr. Maris Munoz after recent hospitalization. He presented to the ER in January with blurred vision, dizziness and lightheadedness. Head CT showed abnormal edema in the right PCA territory suggesting a potentially recent infarction at this level. Brain MRI showed an acute to subacute right occipital infarct and chronic appearing white matter change in the supratentorial white matter. Echo showed a reduced LV EF at 25-30% with moderate to severe MR. He underwent a nuclear stress test that showed a large area of rula-infarct ischemia in the inferolateral wall. He had noted some EDGE and has strong FH of CAD. He underwent SANDEEP and LHC today. SANDEEP showed mild to moderate MR with small PFO. LHC showed severe multivessel disease. He denies any recent chest pain or discomfort. He had a prior TIA in 2009 but states symptoms resolved quickly then. He still has residual vision loss from recent CVA.      Risk factors include history of tobacco abuse, HTN, dyslipidemia    ** No history of prior cardiac surgery, prior vascular surgery, anesthetic complication, lung disease, DVT or PE, GI bleeding       Past Medical History:   Diagnosis Date    CVA (cerebral vascular accident) ([address] 1/3/2020    Diabetes ([address]     boderline    Diabetes type 2, uncontrolled ([address]     Other ill-defined conditions(799.89)     cardiomyopathy    Other ill-defined conditions(799.89)     hypercholerosteremia    Systolic CHF, chronic ([address] 1/5/2020       Past Surgical History:   Procedure Laterality Date    HX ORTHOPAEDIC      rt knee    HX OTHER SURGICAL      hernia,       Family History   Problem Relation Age of Onset    Heart Disease Father     Heart Disease Paternal Grandfather        Social History Socioeconomic History    Marital status:      Spouse name: Not on file    Number of children: Not on file    Years of education: Not on file    Highest education level: Not on file   Occupational History    Not on file   Social Needs    Financial resource strain: Not on file    Food insecurity:     Worry: Not on file     Inability: Not on file    Transportation needs:     Medical: Not on file     Non-medical: Not on file   Tobacco Use    Smoking status: Former Smoker     Packs/day: 1.00     Years: 20.00     Pack years: 20.00     Last attempt to quit: 10/20/1997     Years since quittin.3    Smokeless tobacco: Never Used   Substance and Sexual Activity    Alcohol use: Yes     Alcohol/week: 0.0 standard drinks     Comment: rare    Drug use: No    Sexual activity: Never   Lifestyle    Physical activity:     Days per week: Not on file     Minutes per session: Not on file    Stress: Not on file   Relationships    Social connections:     Talks on phone: Not on file     Gets together: Not on file     Attends [employer] service: Not on file     Active member of club or organization: Not on file     Attends meetings of clubs or organizations: Not on file     Relationship status: Not on file    Intimate partner violence:     Fear of current or ex partner: Not on file     Emotionally abused: Not on file     Physically abused: Not on file     Forced sexual activity: Not on file   Other Topics Concern    Not on file   Social History Narrative    Not on file       No Known Allergies    No current facility-administered medications on file prior to encounter. Current Outpatient Medications on File Prior to Encounter   Medication Sig Dispense Refill    aspirin delayed-release 81 mg tablet Take 81 mg by mouth daily.  atorvastatin (LIPITOR) 80 mg tablet Take 80 mg by mouth daily.  terazosin (HYTRIN) 10 mg capsule Take 10 mg by mouth nightly.       SITagliptin (JANUVIA) 100 mg tablet Take 100 mg by mouth daily.  clopidogreL (PLAVIX) 75 mg tab Take 75 mg by mouth daily.  citalopram (CELEXA) 20 mg tablet Take  by mouth daily.  carvediloL (COREG) 6.25 mg tablet Take 1 Tab by mouth two (2) times daily (with meals). 60 Tab 11    metFORMIN (GLUCOPHAGE) 500 mg tablet Take 2 tablets by mouth every morning then Take 2 tablets by mouth every night 120 Tab 0    icosapent ethyL (VASCEPA) 1 gram capsule Take 2 Caps by mouth two (2) times daily (with meals). 120 Cap 5       REVIEW OF SYSTEMS:  Review of Systems   Constitution: Negative for chills, fever, malaise/fatigue, weight gain and weight loss. HENT: Negative for ear pain, hearing loss, nosebleeds, sore throat and tinnitus. Eyes: Positive for vision loss in left eye ( peripheral ). Negative for blurred vision and vision loss in right eye. Cardiovascular: Positive for dyspnea on exertion. Negative for chest pain, leg swelling, near-syncope, orthopnea, palpitations, paroxysmal nocturnal dyspnea and syncope. Respiratory: Negative for cough, hemoptysis, shortness of breath, sputum production and wheezing. Endocrine: Negative for cold intolerance, heat intolerance and polydipsia. Hematologic/Lymphatic: Does not bruise/bleed easily. Skin: Negative for color change and rash. Musculoskeletal: Negative for back pain, joint pain, joint swelling and myalgias. Gastrointestinal: Negative for abdominal pain, constipation, diarrhea, dysphagia, heartburn, hematemesis, melena, nausea and vomiting. Genitourinary: Negative for dysuria, frequency, hematuria and urgency. Neurological: Negative for difficulty with concentration, dizziness, headaches, light-headedness, numbness, paresthesias, seizures, vertigo and weakness. Psychiatric/Behavioral: Negative for altered mental status and depression.        Physical Exam  Vitals:    02/26/20 1430 02/26/20 1445 02/26/20 1500 02/26/20 1517   BP: (!) 89/57 (!) 88/54 100/58 (!) 89/49   Pulse: 76 74 74 72   Resp: 20 16 16 16   SpO2: 96% 98% 97% 97%   Weight:       Height:           Physical Exam:  General: Well Developed, Well Nourished, No Acute Distress  HEENT: Normocephalic, pupils equal and round, no scleral icterus  Neck: supple, no JVD  Chest wall: No deformity  Heart: S1S2 with RRR   Lungs: Clear throughout auscultation bilaterally without adventitious sounds  Vascular: Pulses are full and equal. There is no venous stasis disease. Abd: soft, nontender, nondistended, with good bowel sounds, no pulsatile masses  Ext: warm, no edema, calves supple/nontender, pulses 2+ bilaterally  Skin: warm and dry, no rashes, cyanosis, jaundice, ecchymoses or evidence of skin breakdown  Psychiatric: Normal mood and affect  Neurologic: Alert and oriented X 3, no focal deficit noted    Labs:   Recent Labs     02/24/20  1101      K 3.8   MG 1.7*   BUN 20   CREA 0.60*   *   WBC 5.8   HGB 13.3*   HCT 40.0*      INR 1.1       Lab Results   Component Value Date/Time    Cholesterol, total 155 01/04/2020 06:10 AM    HDL Cholesterol 26 (L) 01/04/2020 06:10 AM    LDL, calculated 100 (H) 01/04/2020 06:10 AM    VLDL, calculated 29 (H) 01/04/2020 06:10 AM    Triglyceride 145 01/04/2020 06:10 AM    CHOL/HDL Ratio 6.0 01/04/2020 06:10 AM       Assessment:     Active Problems:    * CAD  Mitral regurgitation  PFO  History of CVA  DM  History of tobacco abuse  Systolic CHF  Hypertension      Plan:     Pj Campos is to see preoperative teaching film that thoroughly discusses procedure, risks, and possible complications. Risks, benefits, and alternatives were discussed to include, but not limited to:     1. Bleeding  2. Arrhythmia   3. Infection including mediastinitis  4. Myocardial infarction  5. Need for reoperation  6. Renal failure  7. Respiratory failure  8. Stroke  9. Potential death      Pt is on Plavix.      Dr. Wander Lazar will personally view the cardiac catheterization films, echocardiogram, and labs.    Pt wishes to proceed with CABG/possible MVR and PFO closure.      [place] Arley, AMELIA

## 2020-02-26 NOTE — PROCEDURES
Brief Cardiac Procedure Note    Patient: Romona Romberg Arlie Pott MRN: [id number]  SSN: [id number]    YOB: 1950  Age: 78 y.o. Sex: male      Date of Procedure: 2/26/2020     Pre-procedure Diagnosis: Congestive Heart Failure    Post-procedure Diagnosis: Multi-vessel CAD. Procedure: Left Heart Catheterization    Brief Description of Procedure: See above    Performed By: Tristian Rodriguez MD     Assistants: None    Anesthesia: Moderate Sedation    Estimated Blood Loss: Less than 10 mL      Specimens: None    Implants: None    Findings: Multivessel CAD. IFR LAD 6.06    Complications: None    Recommendations: CTS evaluation for CABG evaluation.   Graft Targets:   LAD, OM, LPL and PDA    Signed By: Tristian Rodriguez MD     February 26, 2020

## 2020-02-26 NOTE — PROGRESS NOTES
TRANSFER - OUT REPORT:    [facility]  Diagnostic surgical consult-iFR LCX positive  No additional sedation given post SANDEEP  Heparin 8,000 units  Pt is a/o denies complaints  Band 8 ml  No bleeding/hematoma    Verbal report given to lara(name) on Jae Greent Bryon  being transferred to cpru(unit) for routine progression of care       Report consisted of patients Situation, Background, Assessment and   Recommendations(SBAR). Information from the following report(s) SBAR and Procedure Summary was reviewed with the receiving nurse. Lines:   Peripheral IV 02/26/20 Right Antecubital (Active)       Peripheral IV 02/26/20 Left Wrist (Active)        Opportunity for questions and clarification was provided.

## 2020-02-26 NOTE — PROGRESS NOTES
Patient received to [address] [place] [address] room # 11  Ambulatory from [facility]. Patient scheduled for SANDEEP/LHC today with Dr Nuria Lorenz. Procedure reviewed & questions answered, voiced good understanding consent obtained & placed on chart. All medications and medical history reviewed. Will prep patient per orders. Patient & family updated on plan of care. The patient has a fraility score of 3-MANAGING WELL, based on ability to perform ADLs independently. Pt took ASA 81mg x 4 at 0530.

## 2020-02-26 NOTE — PROCEDURES
[facility]  CARDIAC CATH    Name:  Inessa Meadows  MR#:  [id number]  :  1950  ACCOUNT #:  [de-identified]  DATE OF SERVICE:  2020      PREOPERATIVE DIAGNOSIS:  Exertional dyspnea and LV dysfunction concerning for ischemic cardiomyopathy and coronary artery disease. Cardiac catheterization arranged by Dr. Noemi Eason. POSTOPERATIVE DIAGNOSIS:  Multivessel coronary artery disease. PROCEDURES PERFORMED:  1. Left heart catheterization, selective coronary arteriography, and left ventriculogram via the right radial approach. 2.  IFR of LAD. ANESTHESIA:  The patient was not given any anesthesia prior to catheterization. He had a transseptal echocardiogram and was given 4 mg of Versed and 50 mcg of fentanyl. He was appropriately sedated from this. He was monitored in the catheterization lab from 1300 to 1330 due to his anesthesia that was administered prior. COMPLICATIONS:  None. FINDINGS:  As below. SPECIMENS REMOVED:  None. ESTIMATED BLOOD LOSS:  Less than 5 mL. IMPLANTS:  None. SURGEON:  Sanchez     ASSISTANT:  None    TECHNICAL FACTORS:  After informed consent was obtained, the patient was brought to the cardiac catheterization lab. The right radial artery was prepped and draped in the sterile fashion. Utilizing modified Seldinger technique and micropuncture needle, the right radial artery was entered. A 6-German Terumo slender sheath was placed without difficulty. A radial cocktail consisting of 2000 units of heparin, 2 mg verapamil and 200 mcg nitroglycerin was administered. A 5-German JL-3.5 catheter was used to select and engage the ostium of the left main coronary artery and a 5-German JR-4 catheter was used to select and engage the ostium of the right coronary artery. Selective injection verification performed. A pigtail catheter was used to cross the aortic valve and the left ventricle.   Hemodynamic measurements and left ventriculogram were obtained. Left ventricular aortic pressure gradient was obtained by pullback technique. After the diagnostic images were obtained, the patient was given intravenous heparin for ACT greater than 250. An XB3.0 guide was used to select and engage the ostium of the left main coronary artery. A Verrata wire was placed in the ostium of the LAD. The patient was given 150 mcg of intracoronary nitroglycerin. At this point, the Verrata wire was placed in the distal LAD and iFR was measured at 0.82. Pullback confirmed that the mid LAD stenosis was hemodynamically significant. Based on the LAD stenosis and multivessel disease in the circumflex and RCA, it was felt best the patient would be best treated with coronary artery bypass grafting. The guide was removed and the sheath was removed with a pneumatic band placed with good hemostasis. No complications were encountered. CONTRAST:  Isovue 145. HEMODYNAMICS:  1. Aortic pressure was 92/61 with a mean of 72.  2.  Left ventricular end-diastolic pressure is 27.  3.  There was no significant gradient across the aortic valve. ANGIOGRAPHIC RESULTS:  1. Left main coronary artery:  Medium caliber vessel. 20% distal stenosis. 2.  LAD:  It is a medium-caliber vessel. Contains diffuse 60% mid stenosis. 20% proximal stenosis with calcification. Distal vessel is patent. 3.  First diagonal artery:  Small-caliber vessel. Patent. 4.  Left circumflex:  Medium-caliber vessel. Very tortuous, coursed in the proximal mid transition. High-grade 80% stenosis at this segment. It then gives rise to first obtuse marginal which is patent with a 50% stenosis. It does have an inferior branch which is patent with 30% stenosis. 5.  Left posterolateral branch 1: It is a medium-caliber vessel. Widely patent. 6.  Right coronary artery: It is a medium-caliber dominant vessel. Contains 30% proximal, 30% mid and 70-80% distal stenosis.   7.  Right PDA:  It is a medium-caliber vessel. Patent. 8.  Right posterolateral branch:  Medium-caliber vessel, patent. 9.  Left ventricular performed in ENRIQUEZ projection shows moderately reduced LV systolic function. EF appears to be 35%. There is global hypokinesis with more severe hypokinesis in the posterior basal and inferior wall. 2+ mitral regurgitation. 10.  IFR of LAD was 0.82. CONCLUSIONS:  1. Severe multivessel coronary artery disease. 2.  Moderately reduced LV systolic function with segmental wall motion abnormality concerning for ischemic cardiomyopathy. 3.  2+ mitral regurgitation. 4.  Successful iFR of the LAD with iFR of 0.82 indicating a hemodynamically significant stenosis. PLAN:  CT Surgery evaluation for coronary artery bypass grafting and possible mitral valve repair/PFO closure. Mathieu Parmar MD      [id number]  D:  02/26/2020 16:52  T:  02/26/2020 18:27  JOB #:  [id number]  CC:   Sol Delcid MD       [facility] Cardiology

## 2020-02-26 NOTE — PROGRESS NOTES
TRANSFER - IN REPORT:    Verbal report received from [facility]) on Milford Cullens WM Vinetta Landau  being received from cath lab(unit) for routine progression of care      Report consisted of patients Situation, Background, Assessment and   Recommendations(SBAR). Information from the following report(s) Procedure Summary was reviewed with the receiving nurse. Opportunity for questions and clarification was provided. Assessment completed upon patients arrival to unit and care assumed.

## 2020-02-26 NOTE — PROGRESS NOTES
Bhanu with Dr Soraya Kenney  ASA II Mallampati II  Versed 4mg fentanyl 50mcg  Pt tolerated procedure well

## 2020-03-03 ENCOUNTER — HOSPITAL ENCOUNTER (OUTPATIENT)
Dept: ULTRASOUND IMAGING | Age: 70
Discharge: HOME | DRG: 228 | End: 2020-03-03
Attending: PHYSICIAN ASSISTANT
Payer: MEDICARE

## 2020-03-03 ENCOUNTER — HOSPITAL ENCOUNTER (OUTPATIENT)
Dept: GENERAL RADIOLOGY | Age: 70
Discharge: HOME | DRG: 228 | End: 2020-03-03
Attending: PHYSICIAN ASSISTANT
Payer: MEDICARE

## 2020-03-03 ENCOUNTER — HOSPITAL ENCOUNTER (OUTPATIENT)
Dept: SURGERY | Age: 70
Discharge: HOME | DRG: 228 | End: 2020-03-03
Payer: MEDICARE

## 2020-03-03 ENCOUNTER — ANESTHESIA EVENT (OUTPATIENT)
Dept: SURGERY | Age: 70
DRG: 228 | End: 2020-03-03
Payer: MEDICARE

## 2020-03-03 VITALS
SYSTOLIC BLOOD PRESSURE: 93 MMHG | WEIGHT: 157.13 LBS | OXYGEN SATURATION: 95 % | HEART RATE: 81 BPM | DIASTOLIC BLOOD PRESSURE: 59 MMHG | RESPIRATION RATE: 18 BRPM | BODY MASS INDEX: 23.27 KG/M2 | TEMPERATURE: 98.7 F | HEIGHT: 69 IN

## 2020-03-03 LAB
ALBUMIN SERPL-MCNC: 3.7 G/DL (ref 3.2–4.6)
ALBUMIN/GLOB SERPL: 1 {RATIO} (ref 1.2–3.5)
ALP SERPL-CCNC: 129 U/L (ref 50–136)
ALT SERPL-CCNC: 28 U/L (ref 12–65)
APPEARANCE UR: ABNORMAL
APTT PPP: 29.9 SEC (ref 24.3–35.4)
AST SERPL-CCNC: 17 U/L (ref 15–37)
BACTERIA SPEC CULT: ABNORMAL
BILIRUB DIRECT SERPL-MCNC: 0.1 MG/DL
BILIRUB SERPL-MCNC: 0.4 MG/DL (ref 0.2–1.1)
BILIRUB UR QL: NEGATIVE
COLOR UR: YELLOW
EST. AVERAGE GLUCOSE BLD GHB EST-MCNC: 148 MG/DL
GLOBULIN SER CALC-MCNC: 3.6 G/DL (ref 2.3–3.5)
GLUCOSE BLD STRIP.AUTO-MCNC: 170 MG/DL (ref 65–100)
GLUCOSE UR STRIP.AUTO-MCNC: NEGATIVE MG/DL
HBA1C MFR BLD: 6.8 % (ref 4.8–6)
HGB UR QL STRIP: NEGATIVE
INR PPP: 1
KETONES UR QL STRIP.AUTO: NEGATIVE MG/DL
LEUKOCYTE ESTERASE UR QL STRIP.AUTO: NEGATIVE
NITRITE UR QL STRIP.AUTO: NEGATIVE
PH UR STRIP: 6 [PH] (ref 5–9)
PROT SERPL-MCNC: 7.3 G/DL (ref 6.3–8.2)
PROT UR STRIP-MCNC: NEGATIVE MG/DL
PROTHROMBIN TIME: 13.9 SEC (ref 12–14.7)
SERVICE CMNT-IMP: ABNORMAL
SP GR UR REFRACTOMETRY: 1.03 (ref 1–1.02)
UROBILINOGEN UR QL STRIP.AUTO: 1 EU/DL (ref 0.2–1)

## 2020-03-03 PROCEDURE — 94010 BREATHING CAPACITY TEST: CPT

## 2020-03-03 PROCEDURE — 77030027138 HC INCENT SPIROMETER -A

## 2020-03-03 PROCEDURE — 87641 MR-STAPH DNA AMP PROBE: CPT

## 2020-03-03 PROCEDURE — 86900 BLOOD TYPING SEROLOGIC ABO: CPT

## 2020-03-03 PROCEDURE — 85610 PROTHROMBIN TIME: CPT

## 2020-03-03 PROCEDURE — 85730 THROMBOPLASTIN TIME PARTIAL: CPT

## 2020-03-03 PROCEDURE — 80076 HEPATIC FUNCTION PANEL: CPT

## 2020-03-03 PROCEDURE — 82962 GLUCOSE BLOOD TEST: CPT

## 2020-03-03 PROCEDURE — 83036 HEMOGLOBIN GLYCOSYLATED A1C: CPT

## 2020-03-03 PROCEDURE — 87086 URINE CULTURE/COLONY COUNT: CPT

## 2020-03-03 PROCEDURE — 93880 EXTRACRANIAL BILAT STUDY: CPT

## 2020-03-03 PROCEDURE — 71046 X-RAY EXAM CHEST 2 VIEWS: CPT

## 2020-03-03 PROCEDURE — 81003 URINALYSIS AUTO W/O SCOPE: CPT

## 2020-03-03 PROCEDURE — 86920 COMPATIBILITY TEST SPIN: CPT

## 2020-03-03 RX ORDER — ACETAMINOPHEN 500 MG
500 TABLET ORAL
COMMUNITY
End: 2020-03-19

## 2020-03-03 NOTE — ANESTHESIA PREPROCEDURE EVALUATION
Relevant Problems   No relevant active problems       Anesthetic History               Review of Systems / Medical History  Patient summary reviewed and pertinent labs reviewed    Pulmonary        Sleep apnea: No treatment           Neuro/Psych       CVA (1/20 - Residual lack of left eye peripheral vision and BLE weakness)       Cardiovascular      Valvular problems/murmurs: mitral insufficiency        CAD    Exercise tolerance: >4 METS  Comments: Echo: EF 30%, PFO, Mod MR    Cath: Stenosis >70% LAD, Cx, RCA    Denies change in functional status   GI/Hepatic/Renal  Within defined limits              Endo/Other    Diabetes: well controlled, type 2    Arthritis     Other Findings   Comments: Esophageal Dilation in the past           Physical Exam    Airway  Mallampati: II  TM Distance: 4 - 6 cm  Neck ROM: normal range of motion   Mouth opening: Normal     Cardiovascular    Rhythm: regular  Rate: normal    Murmur: Grade 2, Mitral area     Dental  No notable dental hx       Pulmonary  Breath sounds clear to auscultation               Abdominal         Other Findings            Anesthetic Plan    ASA: 4  Anesthesia type: general    Monitoring Plan: Arterial line, SANDEEP, BIS, CVP and [place]      Induction: Intravenous  Anesthetic plan and risks discussed with: Patient and Family

## 2020-03-03 NOTE — PROCEDURES
[facility]  PROCEDURE NOTE    Name:  Nilda Moore  MR#:  [id number]  :  1950  ACCOUNT #:  [de-identified]  DATE OF SERVICE:  2020    PROCEDURE PERFORMED:  Baseline spirometry. FINDINGS:  Spirometry and flow volume loops are normal.    IMPRESSION:  Normal baseline spirometry.       MD AUNDREA Garduno/[id number]/  D:  2020 12:03  T:  2020 15:29  JOB #:  [id number]

## 2020-03-03 NOTE — PERIOP NOTES
Recent Results (from the past 12 hour(s))   HEMOGLOBIN A1C WITH EAG    Collection Time: 03/03/20  8:02 AM   Result Value Ref Range    Hemoglobin A1c 6.8 (H) 4.8 - 6.0 %    Est. average glucose 148 mg/dL   PROTHROMBIN TIME + INR    Collection Time: 03/03/20  8:02 AM   Result Value Ref Range    Prothrombin time 13.9 12.0 - 14.7 sec    INR 1.0     PTT    Collection Time: 03/03/20  8:02 AM   Result Value Ref Range    aPTT 29.9 24.3 - 35.4 SEC   HEPATIC FUNCTION PANEL    Collection Time: 03/03/20  8:02 AM   Result Value Ref Range    Protein, total 7.3 6.3 - 8.2 g/dL    Albumin 3.7 3.2 - 4.6 g/dL    Globulin 3.6 (H) 2.3 - 3.5 g/dL    A-G Ratio 1.0 (L) 1.2 - 3.5      Bilirubin, total 0.4 0.2 - 1.1 MG/DL    Bilirubin, direct 0.1 <0.4 MG/DL    Alk. phosphatase 129 50 - 136 U/L    AST (SGOT) 17 15 - 37 U/L    ALT (SGPT) 28 12 - 65 U/L   URINALYSIS W/ RFLX MICROSCOPIC    Collection Time: 03/03/20  8:03 AM   Result Value Ref Range    Color YELLOW      Appearance CLOUDY      Specific gravity 1.026 (H) 1.001 - 1.023      pH (UA) 6.0 5.0 - 9.0      Protein NEGATIVE  NEG mg/dL    Glucose NEGATIVE  mg/dL    Ketone NEGATIVE  NEG mg/dL    Bilirubin NEGATIVE  NEG      Blood NEGATIVE  NEG      Urobilinogen 1.0 0.2 - 1.0 EU/dL    Nitrites NEGATIVE  NEG      Leukocyte Esterase NEGATIVE  NEG     MSSA/MRSA SC BY PCR, NASAL SWAB    Collection Time: 03/03/20  8:03 AM   Result Value Ref Range    Special Requests: NO SPECIAL REQUESTS      Culture result: (A)       MRSA target DNA not detected, SA target DNA detected. A MRSA negative, SA positive test result does not preclude MRSA nasal colonization.    TYPE + CROSSMATCH    Collection Time: 03/03/20  8:08 AM   Result Value Ref Range    Crossmatch Expiration 03/06/2020     ABO/Rh(D) Janet Giles NEGATIVE     Antibody screen NEG    GLUCOSE, POC    Collection Time: 03/03/20  8:26 AM   Result Value Ref Range    Glucose (POC) 170 (H) 65 - 100 mg/dL     Study Result     TITLE:  Carotid and Vertebral Ultrasound Examination.     INDICATION:  Coronary artery disease.     TECHNIQUE: Grayscale, Color Doppler, and Spectral Doppler Ultrasound  interrogation performed. Peak Systolic Velocity, ICA-to-CCA ratio, and  End-Diastolic Velocity are recorded. The estimate of stenosis is inferred from  velocity measurements cross referenced to published correlations as defined by  the Society of Radiologists in [address] [place] [address] Drive Radiology 2003;  229; 340-346.       COMPARISON: None.     RIGHT NECK:  The peak systolic velocity in the Common Carotid Artery = 36  cm/sec; Internal Carotid Artery = 59 cm/sec. Ratio = 0.8.       No significant plaque. Antegrade flow in the vertebral artery.     LEFT  NECK:  The peak systolic velocity in the Common Carotid Artery = 69  cm/sec; Internal Carotid Artery = 70 cm/sec. Ratio = 1.0.       No significant plaque. Antegrade flow in the vertebral artery.     IMPRESSION  IMPRESSION:    ANA PAULA:  No significant stenosis.     LICA:  No significant stenosis. Study Result     PA and lateral chest radiographs     History:  pre-op CABG/MVR, 78 years Male     Comparison: Chest radiograph January 03, 2020     Findings:  Normal cardiomediastinal silhouette. Hyperinflation suggestive of  COPD. Improved lung volumes. Interval resolution of previous bilateral pleural  effusions and bibasilar airspace opacities. Mild diffuse interstitial  prominence also appears improved since prior. Minimal dependent subsegmental  atelectasis bilateral lung bases. No evidence of pneumothorax, pleural  effusion, or air space opacity. Visualized soft tissue and osseous structures  otherwise unremarkable.       IMPRESSION  Impression:  No acute pathology identified. Notified [facility] of patient's MRSA results.

## 2020-03-03 NOTE — PERIOP NOTES
PLEASE CONTINUE TAKING ALL PRESCRIPTION MEDICATIONS UP TO THE DAY OF SURGERY UNLESS OTHERWISE DIRECTED BELOW. DISCONTINUE all vitamins and supplements 7 days prior to surgery. DISCONTINUE Non-Steriodal Anti-Inflammatory (NSAIDS) such as Advil and Aleve 5 days prior to surgery. Home Medications to take  the day of surgery    aspirin 81 mg  acetaminophen   Carvedilol  citalopram        Home Medications   to Hold   Hold metformin and januvia on the morning of surgery   Per MD instructions patient began holding plavix on 2/27/2020     Comments    Acetaminophen 1000mg in the morning and at bedtime   OR Acetaminophen 650mg in the morning, afternoon and bedtime     Take Amiodarone 600mg after 4pm the evening before surgery          Please do not bring home medications with you on the day of surgery unless otherwise directed by your nurse. If you are instructed to bring home medications, please give them to your nurse as they will be administered by the nursing staff. If you have any questions, please call [facility] [phone] or [facility] [phone].

## 2020-03-05 ENCOUNTER — HOSPITAL ENCOUNTER (INPATIENT)
Age: 70
LOS: 8 days | Discharge: NURSING, REHAB OR LONG-TERM CARE | DRG: 228 | End: 2020-03-13
Attending: THORACIC SURGERY (CARDIOTHORACIC VASCULAR SURGERY) | Admitting: THORACIC SURGERY (CARDIOTHORACIC VASCULAR SURGERY)
Payer: MEDICARE

## 2020-03-05 ENCOUNTER — APPOINTMENT (OUTPATIENT)
Dept: GENERAL RADIOLOGY | Age: 70
DRG: 228 | End: 2020-03-05
Attending: PHYSICIAN ASSISTANT
Payer: MEDICARE

## 2020-03-05 ENCOUNTER — ANESTHESIA (OUTPATIENT)
Dept: SURGERY | Age: 70
DRG: 228 | End: 2020-03-05
Payer: MEDICARE

## 2020-03-05 DIAGNOSIS — J98.11 ATELECTASIS: ICD-10-CM

## 2020-03-05 DIAGNOSIS — I25.10 CORONARY ARTERY DISEASE INVOLVING NATIVE HEART WITHOUT ANGINA PECTORIS, UNSPECIFIED VESSEL OR LESION TYPE: ICD-10-CM

## 2020-03-05 DIAGNOSIS — R09.02 HYPOXIA: ICD-10-CM

## 2020-03-05 DIAGNOSIS — J95.821 RESPIRATORY FAILURE, POST-OPERATIVE (HCC): ICD-10-CM

## 2020-03-05 DIAGNOSIS — I34.0 NONRHEUMATIC MITRAL VALVE REGURGITATION: ICD-10-CM

## 2020-03-05 DIAGNOSIS — Q21.12 PATENT FORAMEN OVALE: ICD-10-CM

## 2020-03-05 DIAGNOSIS — I50.22 SYSTOLIC CHF, CHRONIC (HCC): Chronic | ICD-10-CM

## 2020-03-05 DIAGNOSIS — J90 PLEURAL EFFUSION, RIGHT: ICD-10-CM

## 2020-03-05 DIAGNOSIS — J81.0 ACUTE PULMONARY EDEMA (HCC): ICD-10-CM

## 2020-03-05 DIAGNOSIS — E11.69 TYPE 2 DIABETES MELLITUS WITH OTHER SPECIFIED COMPLICATION, UNSPECIFIED WHETHER LONG TERM INSULIN USE (HCC): Chronic | ICD-10-CM

## 2020-03-05 DIAGNOSIS — Z95.1 S/P CABG X 3: ICD-10-CM

## 2020-03-05 DIAGNOSIS — I49.01 VENTRICULAR FIBRILLATION (HCC): ICD-10-CM

## 2020-03-05 PROBLEM — J81.1 PULMONARY EDEMA: Status: RESOLVED | Noted: 2020-01-03 | Resolved: 2020-03-05

## 2020-03-05 PROBLEM — R79.89 ELEVATED BRAIN NATRIURETIC PEPTIDE (BNP) LEVEL: Status: RESOLVED | Noted: 2020-01-03 | Resolved: 2020-03-05

## 2020-03-05 PROBLEM — I95.9 HYPOTENSION: Status: RESOLVED | Noted: 2020-01-05 | Resolved: 2020-03-05

## 2020-03-05 PROBLEM — E11.9 TYPE II DIABETES MELLITUS (HCC): Chronic | Status: ACTIVE | Noted: 2020-03-05

## 2020-03-05 PROBLEM — I63.9 CVA (CEREBRAL VASCULAR ACCIDENT) (HCC): Chronic | Status: ACTIVE | Noted: 2020-01-03

## 2020-03-05 PROBLEM — I25.110 ATHEROSCLEROSIS OF CORONARY ARTERY OF NATIVE HEART WITH UNSTABLE ANGINA PECTORIS (HCC): Status: ACTIVE | Noted: 2020-03-05

## 2020-03-05 PROBLEM — Z86.73 HISTORY OF STROKE: Chronic | Status: ACTIVE | Noted: 2020-02-07

## 2020-03-05 LAB
ABO + RH BLD: NORMAL
ANION GAP SERPL CALC-SCNC: 7 MMOL/L (ref 7–16)
APTT PPP: 35.2 SEC (ref 24.3–35.4)
ARTERIAL PATENCY WRIST A: NO
BACTERIA SPEC CULT: NORMAL
BASE DEFICIT BLD-SCNC: 1 MMOL/L
BASE DEFICIT BLD-SCNC: 2 MMOL/L
BASE DEFICIT BLD-SCNC: 4 MMOL/L
BASE DEFICIT BLD-SCNC: 4 MMOL/L
BASE DEFICIT BLD-SCNC: 5 MMOL/L
BDY SITE: ABNORMAL
BLD PROD TYP BPU: NORMAL
BLOOD BANK CMNT PATIENT-IMP: NORMAL
BLOOD GROUP ANTIBODIES SERPL: NORMAL
BPU ID: NORMAL
BUN SERPL-MCNC: 13 MG/DL (ref 8–23)
CA-I BLD-MCNC: 1.16 MMOL/L (ref 1.12–1.32)
CA-I BLD-MCNC: 1.17 MMOL/L (ref 1.12–1.32)
CA-I BLD-MCNC: 1.17 MMOL/L (ref 1.12–1.32)
CA-I BLD-MCNC: 1.19 MMOL/L (ref 1.12–1.32)
CA-I BLD-MCNC: 1.22 MMOL/L (ref 1.12–1.32)
CA-I BLD-MCNC: 1.24 MMOL/L (ref 1.12–1.32)
CA-I BLD-MCNC: 1.46 MMOL/L (ref 1.12–1.32)
CA-I BLD-MCNC: 1.5 MMOL/L (ref 1.12–1.32)
CALCIUM SERPL-MCNC: 8.7 MG/DL (ref 8.3–10.4)
CHLORIDE SERPL-SCNC: 115 MMOL/L (ref 98–107)
CO2 SERPL-SCNC: 23 MMOL/L (ref 21–32)
COLLECT TIME,HTIME: 1408
CREAT SERPL-MCNC: 0.59 MG/DL (ref 0.8–1.5)
CROSSMATCH RESULT,%XM: NORMAL
ERYTHROCYTE [DISTWIDTH] IN BLOOD BY AUTOMATED COUNT: 13.2 % (ref 11.9–14.6)
FIBRINOGEN PPP-MCNC: 220 MG/DL (ref 190–501)
GAS FLOW.O2 O2 DELIVERY SYS: ABNORMAL L/MIN
GAS FLOW.O2 SETTING OXYMISER: 16 BPM
GLUCOSE BLD STRIP.AUTO-MCNC: 112 MG/DL (ref 65–100)
GLUCOSE BLD STRIP.AUTO-MCNC: 113 MG/DL (ref 65–100)
GLUCOSE BLD STRIP.AUTO-MCNC: 114 MG/DL (ref 65–100)
GLUCOSE BLD STRIP.AUTO-MCNC: 115 MG/DL (ref 65–100)
GLUCOSE BLD STRIP.AUTO-MCNC: 121 MG/DL (ref 65–100)
GLUCOSE BLD STRIP.AUTO-MCNC: 123 MG/DL (ref 65–100)
GLUCOSE BLD STRIP.AUTO-MCNC: 138 MG/DL (ref 65–100)
GLUCOSE BLD STRIP.AUTO-MCNC: 140 MG/DL (ref 65–100)
GLUCOSE BLD STRIP.AUTO-MCNC: 144 MG/DL (ref 65–100)
GLUCOSE BLD STRIP.AUTO-MCNC: 147 MG/DL (ref 65–100)
GLUCOSE BLD STRIP.AUTO-MCNC: 155 MG/DL (ref 65–100)
GLUCOSE BLD STRIP.AUTO-MCNC: 167 MG/DL (ref 65–100)
GLUCOSE BLD STRIP.AUTO-MCNC: 182 MG/DL (ref 65–100)
GLUCOSE BLD STRIP.AUTO-MCNC: 183 MG/DL (ref 65–100)
GLUCOSE BLD STRIP.AUTO-MCNC: 89 MG/DL (ref 65–100)
GLUCOSE BLD STRIP.AUTO-MCNC: 89 MG/DL (ref 65–100)
GLUCOSE SERPL-MCNC: 110 MG/DL (ref 65–100)
HCO3 BLD-SCNC: 20.5 MMOL/L (ref 22–26)
HCO3 BLD-SCNC: 21.9 MMOL/L (ref 22–26)
HCO3 BLD-SCNC: 22.1 MMOL/L (ref 22–26)
HCO3 BLD-SCNC: 22.3 MMOL/L (ref 22–26)
HCO3 BLD-SCNC: 23.1 MMOL/L (ref 22–26)
HCO3 BLD-SCNC: 23.6 MMOL/L (ref 22–26)
HCO3 BLD-SCNC: 23.9 MMOL/L (ref 22–26)
HCO3 BLD-SCNC: 24 MMOL/L (ref 22–26)
HCT VFR BLD AUTO: 29.6 % (ref 41.1–50.3)
HCT VFR BLD AUTO: 30.2 % (ref 41.1–50.3)
HCT VFR BLD AUTO: 32.1 % (ref 41.1–50.3)
HGB BLD-MCNC: 10.2 G/DL (ref 13.6–17.2)
HGB BLD-MCNC: 10.7 G/DL (ref 13.6–17.2)
HGB BLD-MCNC: 9.9 G/DL (ref 13.6–17.2)
INR PPP: 1.4
MAGNESIUM SERPL-MCNC: 2.1 MG/DL (ref 1.8–2.4)
MAGNESIUM SERPL-MCNC: 2.3 MG/DL (ref 1.8–2.4)
MCH RBC QN AUTO: 32.2 PG (ref 26.1–32.9)
MCHC RBC AUTO-ENTMCNC: 33.3 G/DL (ref 31.4–35)
MCV RBC AUTO: 96.7 FL (ref 79.6–97.8)
NRBC # BLD: 0 K/UL (ref 0–0.2)
O2/TOTAL GAS SETTING VFR VENT: 60 %
PCO2 BLD: 35.9 MMHG (ref 35–45)
PCO2 BLD: 39.6 MMHG (ref 35–45)
PCO2 BLD: 41.9 MMHG (ref 35–45)
PCO2 BLD: 42.2 MMHG (ref 35–45)
PCO2 BLD: 42.9 MMHG (ref 35–45)
PCO2 BLD: 44.2 MMHG (ref 35–45)
PCO2 BLD: 44.4 MMHG (ref 35–45)
PCO2 BLD: 45 MMHG (ref 35–45)
PEEP RESPIRATORY: 8 CMH2O
PH BLD: 7.3 [PH] (ref 7.35–7.45)
PH BLD: 7.32 [PH] (ref 7.35–7.45)
PH BLD: 7.32 [PH] (ref 7.35–7.45)
PH BLD: 7.33 [PH] (ref 7.35–7.45)
PH BLD: 7.34 [PH] (ref 7.35–7.45)
PH BLD: 7.35 [PH] (ref 7.35–7.45)
PH BLD: 7.37 [PH] (ref 7.35–7.45)
PH BLD: 7.4 [PH] (ref 7.35–7.45)
PLATELET # BLD AUTO: 127 K/UL (ref 150–450)
PMV BLD AUTO: 9.9 FL (ref 9.4–12.3)
PO2 BLD: 120 MMHG (ref 75–100)
PO2 BLD: 250 MMHG (ref 75–100)
PO2 BLD: 255 MMHG (ref 75–100)
PO2 BLD: 257 MMHG (ref 75–100)
PO2 BLD: 284 MMHG (ref 75–100)
PO2 BLD: 312 MMHG (ref 75–100)
PO2 BLD: 325 MMHG (ref 75–100)
PO2 BLD: 341 MMHG (ref 75–100)
POTASSIUM BLD-SCNC: 3.5 MMOL/L (ref 3.5–5.1)
POTASSIUM BLD-SCNC: 3.7 MMOL/L (ref 3.5–5.1)
POTASSIUM BLD-SCNC: 3.7 MMOL/L (ref 3.5–5.1)
POTASSIUM BLD-SCNC: 3.8 MMOL/L (ref 3.5–5.1)
POTASSIUM BLD-SCNC: 4.5 MMOL/L (ref 3.5–5.1)
POTASSIUM BLD-SCNC: 4.5 MMOL/L (ref 3.5–5.1)
POTASSIUM BLD-SCNC: 4.7 MMOL/L (ref 3.5–5.1)
POTASSIUM BLD-SCNC: 5 MMOL/L (ref 3.5–5.1)
POTASSIUM SERPL-SCNC: 3.4 MMOL/L (ref 3.5–5.1)
POTASSIUM SERPL-SCNC: 3.6 MMOL/L (ref 3.5–5.1)
PRESSURE SUPPORT SETTING VENT: 16 CMH2O
PROTHROMBIN TIME: 17.3 SEC (ref 12–14.7)
RBC # BLD AUTO: 3.32 M/UL (ref 4.23–5.6)
SAO2 % BLD: 100 % (ref 95–98)
SAO2 % BLD: 98 % (ref 95–98)
SERVICE CMNT-IMP: ABNORMAL
SERVICE CMNT-IMP: ABNORMAL
SERVICE CMNT-IMP: NORMAL
SODIUM BLD-SCNC: 138 MMOL/L (ref 136–145)
SODIUM BLD-SCNC: 138 MMOL/L (ref 136–145)
SODIUM BLD-SCNC: 139 MMOL/L (ref 136–145)
SODIUM BLD-SCNC: 140 MMOL/L (ref 136–145)
SODIUM BLD-SCNC: 141 MMOL/L (ref 136–145)
SODIUM BLD-SCNC: 142 MMOL/L (ref 136–145)
SODIUM SERPL-SCNC: 145 MMOL/L (ref 136–145)
SPECIMEN EXP DATE BLD: NORMAL
SPECIMEN TYPE: ABNORMAL
STATUS OF UNIT,%ST: NORMAL
UNIT DIVISION, %UDIV: 0
VENTILATION MODE VENT: ABNORMAL
VT SETTING VENT: 500 ML
WBC # BLD AUTO: 23.2 K/UL (ref 4.3–11.1)

## 2020-03-05 PROCEDURE — 02L70CK OCCLUSION OF LEFT ATRIAL APPENDAGE WITH EXTRALUMINAL DEVICE, OPEN APPROACH: ICD-10-PCS | Performed by: THORACIC SURGERY (CARDIOTHORACIC VASCULAR SURGERY)

## 2020-03-05 PROCEDURE — 77030011235 HC DRN PERCRD SUMP MEDT -B: Performed by: THORACIC SURGERY (CARDIOTHORACIC VASCULAR SURGERY)

## 2020-03-05 PROCEDURE — 82947 ASSAY GLUCOSE BLOOD QUANT: CPT

## 2020-03-05 PROCEDURE — 74011000258 HC RX REV CODE- 258

## 2020-03-05 PROCEDURE — 74011250636 HC RX REV CODE- 250/636: Performed by: THORACIC SURGERY (CARDIOTHORACIC VASCULAR SURGERY)

## 2020-03-05 PROCEDURE — 77030013794 HC KT TRNSDUC BLD EDWD -B: Performed by: NURSE ANESTHETIST, CERTIFIED REGISTERED

## 2020-03-05 PROCEDURE — 77030010938 HC CLP LIG TELE -A: Performed by: THORACIC SURGERY (CARDIOTHORACIC VASCULAR SURGERY)

## 2020-03-05 PROCEDURE — 77030009995: Performed by: THORACIC SURGERY (CARDIOTHORACIC VASCULAR SURGERY)

## 2020-03-05 PROCEDURE — 77030025827 HC BG BLD DNR AUTLG MEDT -A: Performed by: THORACIC SURGERY (CARDIOTHORACIC VASCULAR SURGERY)

## 2020-03-05 PROCEDURE — 74011636637 HC RX REV CODE- 636/637: Performed by: NURSE ANESTHETIST, CERTIFIED REGISTERED

## 2020-03-05 PROCEDURE — 77030002986 HC SUT PROL J&J -A: Performed by: THORACIC SURGERY (CARDIOTHORACIC VASCULAR SURGERY)

## 2020-03-05 PROCEDURE — 3E080GC INTRODUCTION OF OTHER THERAPEUTIC SUBSTANCE INTO HEART, OPEN APPROACH: ICD-10-PCS | Performed by: THORACIC SURGERY (CARDIOTHORACIC VASCULAR SURGERY)

## 2020-03-05 PROCEDURE — 77030013797 HC KT TRNSDUC PRSSR EDWD -A: Performed by: THORACIC SURGERY (CARDIOTHORACIC VASCULAR SURGERY)

## 2020-03-05 PROCEDURE — 021109W BYPASS CORONARY ARTERY, TWO ARTERIES FROM AORTA WITH AUTOLOGOUS VENOUS TISSUE, OPEN APPROACH: ICD-10-PCS | Performed by: THORACIC SURGERY (CARDIOTHORACIC VASCULAR SURGERY)

## 2020-03-05 PROCEDURE — 77030003010 HC SUT SURG STL J&J -B: Performed by: THORACIC SURGERY (CARDIOTHORACIC VASCULAR SURGERY)

## 2020-03-05 PROCEDURE — 74011250637 HC RX REV CODE- 250/637: Performed by: ANESTHESIOLOGY

## 2020-03-05 PROCEDURE — 84132 ASSAY OF SERUM POTASSIUM: CPT

## 2020-03-05 PROCEDURE — 77030003037 HC SUT WRE STRNOTMY AEMC -B: Performed by: THORACIC SURGERY (CARDIOTHORACIC VASCULAR SURGERY)

## 2020-03-05 PROCEDURE — C1729 CATH, DRAINAGE: HCPCS | Performed by: THORACIC SURGERY (CARDIOTHORACIC VASCULAR SURGERY)

## 2020-03-05 PROCEDURE — 77030018547 HC SUT ETHBND1 J&J -B: Performed by: THORACIC SURGERY (CARDIOTHORACIC VASCULAR SURGERY)

## 2020-03-05 PROCEDURE — 77030013861 HC PNCH AORT CLNCUT QUES -B: Performed by: THORACIC SURGERY (CARDIOTHORACIC VASCULAR SURGERY)

## 2020-03-05 PROCEDURE — 77030002996 HC SUT SLK J&J -A: Performed by: THORACIC SURGERY (CARDIOTHORACIC VASCULAR SURGERY)

## 2020-03-05 PROCEDURE — 02Q50ZZ REPAIR ATRIAL SEPTUM, OPEN APPROACH: ICD-10-PCS | Performed by: THORACIC SURGERY (CARDIOTHORACIC VASCULAR SURGERY)

## 2020-03-05 PROCEDURE — 77030020407 HC IV BLD WRMR ST 3M -A: Performed by: NURSE ANESTHETIST, CERTIFIED REGISTERED

## 2020-03-05 PROCEDURE — 74011250636 HC RX REV CODE- 250/636

## 2020-03-05 PROCEDURE — 77030010813: Performed by: THORACIC SURGERY (CARDIOTHORACIC VASCULAR SURGERY)

## 2020-03-05 PROCEDURE — 76010000155 HC AUTO TRANSFUSION/CELL SAVER: Performed by: THORACIC SURGERY (CARDIOTHORACIC VASCULAR SURGERY)

## 2020-03-05 PROCEDURE — 5A2204Z RESTORATION OF CARDIAC RHYTHM, SINGLE: ICD-10-PCS | Performed by: THORACIC SURGERY (CARDIOTHORACIC VASCULAR SURGERY)

## 2020-03-05 PROCEDURE — 85610 PROTHROMBIN TIME: CPT

## 2020-03-05 PROCEDURE — 77030018729 HC ELECTRD DEFIB PAD CARD -B: Performed by: THORACIC SURGERY (CARDIOTHORACIC VASCULAR SURGERY)

## 2020-03-05 PROCEDURE — 94002 VENT MGMT INPAT INIT DAY: CPT

## 2020-03-05 PROCEDURE — 77030002970 HC SUT PLEDG TELE -A: Performed by: THORACIC SURGERY (CARDIOTHORACIC VASCULAR SURGERY)

## 2020-03-05 PROCEDURE — 77030018836 HC SOL IRR NACL ICUM -A: Performed by: THORACIC SURGERY (CARDIOTHORACIC VASCULAR SURGERY)

## 2020-03-05 PROCEDURE — 74011000258 HC RX REV CODE- 258: Performed by: THORACIC SURGERY (CARDIOTHORACIC VASCULAR SURGERY)

## 2020-03-05 PROCEDURE — 77030037088 HC TUBE ENDOTRACH ORAL NSL COVD-A: Performed by: NURSE ANESTHETIST, CERTIFIED REGISTERED

## 2020-03-05 PROCEDURE — 74011000302 HC RX REV CODE- 302: Performed by: PHYSICIAN ASSISTANT

## 2020-03-05 PROCEDURE — 74011000258 HC RX REV CODE- 258: Performed by: NURSE ANESTHETIST, CERTIFIED REGISTERED

## 2020-03-05 PROCEDURE — 77030016564 HC BLD STRNL SAW4 CNMD -B: Performed by: THORACIC SURGERY (CARDIOTHORACIC VASCULAR SURGERY)

## 2020-03-05 PROCEDURE — C1751 CATH, INF, PER/CENT/MIDLINE: HCPCS | Performed by: NURSE ANESTHETIST, CERTIFIED REGISTERED

## 2020-03-05 PROCEDURE — 80048 BASIC METABOLIC PNL TOTAL CA: CPT

## 2020-03-05 PROCEDURE — 77030041244 HC CBL PACE EXT TEMP REMG -B: Performed by: THORACIC SURGERY (CARDIOTHORACIC VASCULAR SURGERY)

## 2020-03-05 PROCEDURE — P9047 ALBUMIN (HUMAN), 25%, 50ML: HCPCS

## 2020-03-05 PROCEDURE — 77030014007 HC SPNG HEMSTAT J&J -B: Performed by: THORACIC SURGERY (CARDIOTHORACIC VASCULAR SURGERY)

## 2020-03-05 PROCEDURE — 02100Z9 BYPASS CORONARY ARTERY, ONE ARTERY FROM LEFT INTERNAL MAMMARY, OPEN APPROACH: ICD-10-PCS | Performed by: THORACIC SURGERY (CARDIOTHORACIC VASCULAR SURGERY)

## 2020-03-05 PROCEDURE — 77030013292 HC BOWL MX PRSM J&J -A: Performed by: NURSE ANESTHETIST, CERTIFIED REGISTERED

## 2020-03-05 PROCEDURE — 76010000219 HC CV SURG 6 TO 6.5 HR INTENSV-TIER 1: Performed by: THORACIC SURGERY (CARDIOTHORACIC VASCULAR SURGERY)

## 2020-03-05 PROCEDURE — 74011250636 HC RX REV CODE- 250/636: Performed by: PHYSICIAN ASSISTANT

## 2020-03-05 PROCEDURE — 93005 ELECTROCARDIOGRAM TRACING: CPT | Performed by: THORACIC SURGERY (CARDIOTHORACIC VASCULAR SURGERY)

## 2020-03-05 PROCEDURE — 36600 WITHDRAWAL OF ARTERIAL BLOOD: CPT

## 2020-03-05 PROCEDURE — 77030018548 HC SUT ETHBND2 J&J -B: Performed by: THORACIC SURGERY (CARDIOTHORACIC VASCULAR SURGERY)

## 2020-03-05 PROCEDURE — 99223 1ST HOSP IP/OBS HIGH 75: CPT | Performed by: INTERNAL MEDICINE

## 2020-03-05 PROCEDURE — C1769 GUIDE WIRE: HCPCS | Performed by: THORACIC SURGERY (CARDIOTHORACIC VASCULAR SURGERY)

## 2020-03-05 PROCEDURE — 77030019908 HC STETH ESOPH SIMS -A: Performed by: NURSE ANESTHETIST, CERTIFIED REGISTERED

## 2020-03-05 PROCEDURE — 74011250637 HC RX REV CODE- 250/637: Performed by: THORACIC SURGERY (CARDIOTHORACIC VASCULAR SURGERY)

## 2020-03-05 PROCEDURE — 85730 THROMBOPLASTIN TIME PARTIAL: CPT

## 2020-03-05 PROCEDURE — P9045 ALBUMIN (HUMAN), 5%, 250 ML: HCPCS

## 2020-03-05 PROCEDURE — 77030012890

## 2020-03-05 PROCEDURE — 74011000250 HC RX REV CODE- 250

## 2020-03-05 PROCEDURE — 77030026882 HC RNG ANUPLST MTRL PHY EDWD -I1: Performed by: THORACIC SURGERY (CARDIOTHORACIC VASCULAR SURGERY)

## 2020-03-05 PROCEDURE — 77030002966 HC SUT PDS J&J -A: Performed by: THORACIC SURGERY (CARDIOTHORACIC VASCULAR SURGERY)

## 2020-03-05 PROCEDURE — 77030005537 HC CATH URETH BARD -A: Performed by: THORACIC SURGERY (CARDIOTHORACIC VASCULAR SURGERY)

## 2020-03-05 PROCEDURE — 77030027138 HC INCENT SPIROMETER -A

## 2020-03-05 PROCEDURE — 77030016687: Performed by: THORACIC SURGERY (CARDIOTHORACIC VASCULAR SURGERY)

## 2020-03-05 PROCEDURE — 77030002987 HC SUT PROL J&J -B: Performed by: THORACIC SURGERY (CARDIOTHORACIC VASCULAR SURGERY)

## 2020-03-05 PROCEDURE — 77030039425 HC BLD LARYNG TRULITE DISP TELE -A: Performed by: NURSE ANESTHETIST, CERTIFIED REGISTERED

## 2020-03-05 PROCEDURE — 76060000043 HC ANESTHESIA 6 TO 6.5 HR: Performed by: THORACIC SURGERY (CARDIOTHORACIC VASCULAR SURGERY)

## 2020-03-05 PROCEDURE — 74011250636 HC RX REV CODE- 250/636: Performed by: NURSE ANESTHETIST, CERTIFIED REGISTERED

## 2020-03-05 PROCEDURE — 77030025646 HC AUTOTRNSFUS KT TERU -C: Performed by: THORACIC SURGERY (CARDIOTHORACIC VASCULAR SURGERY)

## 2020-03-05 PROCEDURE — 77030012390 HC DRN CHST BTL GTNG -B: Performed by: THORACIC SURGERY (CARDIOTHORACIC VASCULAR SURGERY)

## 2020-03-05 PROCEDURE — 85018 HEMOGLOBIN: CPT

## 2020-03-05 PROCEDURE — 77030002520 HC INSRT CLMP LATIS STLTH AMR -B: Performed by: THORACIC SURGERY (CARDIOTHORACIC VASCULAR SURGERY)

## 2020-03-05 PROCEDURE — 74011250637 HC RX REV CODE- 250/637: Performed by: PHYSICIAN ASSISTANT

## 2020-03-05 PROCEDURE — 85384 FIBRINOGEN ACTIVITY: CPT

## 2020-03-05 PROCEDURE — 77030008477 HC STYL SATN SLP COVD -A: Performed by: NURSE ANESTHETIST, CERTIFIED REGISTERED

## 2020-03-05 PROCEDURE — 77030018793 HC ORG SUT GAB FRAT TELE -B: Performed by: THORACIC SURGERY (CARDIOTHORACIC VASCULAR SURGERY)

## 2020-03-05 PROCEDURE — 82962 GLUCOSE BLOOD TEST: CPT

## 2020-03-05 PROCEDURE — 77030034927 HC PK PROC CPB INSPIRE PERF LIVA -F: Performed by: THORACIC SURGERY (CARDIOTHORACIC VASCULAR SURGERY)

## 2020-03-05 PROCEDURE — 77030006994: Performed by: THORACIC SURGERY (CARDIOTHORACIC VASCULAR SURGERY)

## 2020-03-05 PROCEDURE — 77030020751 HC FLTR TBNG TRNSFUS HAEM -A: Performed by: THORACIC SURGERY (CARDIOTHORACIC VASCULAR SURGERY)

## 2020-03-05 PROCEDURE — 82803 BLOOD GASES ANY COMBINATION: CPT

## 2020-03-05 PROCEDURE — 77030009355 HC CRDPLG DEL SET QUES -C: Performed by: THORACIC SURGERY (CARDIOTHORACIC VASCULAR SURGERY)

## 2020-03-05 PROCEDURE — 77030005518 HC CATH URETH FOL 2W BARD -B: Performed by: THORACIC SURGERY (CARDIOTHORACIC VASCULAR SURGERY)

## 2020-03-05 PROCEDURE — 77030033069 HC RLD QLC SGL COR-KNOT LSIS -B: Performed by: THORACIC SURGERY (CARDIOTHORACIC VASCULAR SURGERY)

## 2020-03-05 PROCEDURE — 77030005401 HC CATH RAD ARRO -A: Performed by: NURSE ANESTHETIST, CERTIFIED REGISTERED

## 2020-03-05 PROCEDURE — 77030031139 HC SUT VCRL2 J&J -A: Performed by: THORACIC SURGERY (CARDIOTHORACIC VASCULAR SURGERY)

## 2020-03-05 PROCEDURE — 74011000250 HC RX REV CODE- 250: Performed by: NURSE ANESTHETIST, CERTIFIED REGISTERED

## 2020-03-05 PROCEDURE — 77030003422 HC NDL ASPIR NOVO -A: Performed by: THORACIC SURGERY (CARDIOTHORACIC VASCULAR SURGERY)

## 2020-03-05 PROCEDURE — 77030040922 HC BLNKT HYPOTHRM STRY -A: Performed by: NURSE ANESTHETIST, CERTIFIED REGISTERED

## 2020-03-05 PROCEDURE — 77030006689 HC BLD OPHTH BVR BD -A: Performed by: THORACIC SURGERY (CARDIOTHORACIC VASCULAR SURGERY)

## 2020-03-05 PROCEDURE — 86580 TB INTRADERMAL TEST: CPT | Performed by: PHYSICIAN ASSISTANT

## 2020-03-05 PROCEDURE — 77030029004 HC CLP ATRI LAA EXCL ATRC -G1: Performed by: THORACIC SURGERY (CARDIOTHORACIC VASCULAR SURGERY)

## 2020-03-05 PROCEDURE — P9045 ALBUMIN (HUMAN), 5%, 250 ML: HCPCS | Performed by: THORACIC SURGERY (CARDIOTHORACIC VASCULAR SURGERY)

## 2020-03-05 PROCEDURE — 5A1221Z PERFORMANCE OF CARDIAC OUTPUT, CONTINUOUS: ICD-10-PCS | Performed by: THORACIC SURGERY (CARDIOTHORACIC VASCULAR SURGERY)

## 2020-03-05 PROCEDURE — 77030021177: Performed by: THORACIC SURGERY (CARDIOTHORACIC VASCULAR SURGERY)

## 2020-03-05 PROCEDURE — 74011000250 HC RX REV CODE- 250: Performed by: PHYSICIAN ASSISTANT

## 2020-03-05 PROCEDURE — 65610000006 HC RM INTENSIVE CARE

## 2020-03-05 PROCEDURE — 06BQ0ZZ EXCISION OF LEFT SAPHENOUS VEIN, OPEN APPROACH: ICD-10-PCS | Performed by: THORACIC SURGERY (CARDIOTHORACIC VASCULAR SURGERY)

## 2020-03-05 PROCEDURE — 77030003034 HC SUT WRE CRD J&J -B: Performed by: THORACIC SURGERY (CARDIOTHORACIC VASCULAR SURGERY)

## 2020-03-05 PROCEDURE — 5A1223Z PERFORMANCE OF CARDIAC PACING, CONTINUOUS: ICD-10-PCS | Performed by: THORACIC SURGERY (CARDIOTHORACIC VASCULAR SURGERY)

## 2020-03-05 PROCEDURE — 71045 X-RAY EXAM CHEST 1 VIEW: CPT

## 2020-03-05 PROCEDURE — 77030008771 HC TU NG SALEM SUMP -A: Performed by: NURSE ANESTHETIST, CERTIFIED REGISTERED

## 2020-03-05 PROCEDURE — 77030018571 HC SUT PROL1 J&J -B: Performed by: THORACIC SURGERY (CARDIOTHORACIC VASCULAR SURGERY)

## 2020-03-05 PROCEDURE — 83735 ASSAY OF MAGNESIUM: CPT

## 2020-03-05 PROCEDURE — 77030034936 HC DEV MIN COR-KNOT KT LSIS -F: Performed by: THORACIC SURGERY (CARDIOTHORACIC VASCULAR SURGERY)

## 2020-03-05 PROCEDURE — 86900 BLOOD TYPING SEROLOGIC ABO: CPT

## 2020-03-05 PROCEDURE — 85027 COMPLETE CBC AUTOMATED: CPT

## 2020-03-05 PROCEDURE — 77030034888 HC SUT PROL 2 J&J -B: Performed by: THORACIC SURGERY (CARDIOTHORACIC VASCULAR SURGERY)

## 2020-03-05 PROCEDURE — 86923 COMPATIBILITY TEST ELECTRIC: CPT

## 2020-03-05 PROCEDURE — 77030010823: Performed by: THORACIC SURGERY (CARDIOTHORACIC VASCULAR SURGERY)

## 2020-03-05 PROCEDURE — 74011000258 HC RX REV CODE- 258: Performed by: PHYSICIAN ASSISTANT

## 2020-03-05 PROCEDURE — 77030020751 HC FLTR TBNG TRNSFUS HAEM -A: Performed by: NURSE ANESTHETIST, CERTIFIED REGISTERED

## 2020-03-05 DEVICE — DEVICE OCCL CLP L40MM STD HD ARTC PLUNG GRP STIFF SHFT FOR: Type: IMPLANTABLE DEVICE | Site: HEART | Status: FUNCTIONAL

## 2020-03-05 DEVICE — CARPENTIER-EDWARDS PHYSIO II ANNULOPLASTY RING
Type: IMPLANTABLE DEVICE | Site: MITRAL VALVE | Status: FUNCTIONAL
Brand: CARPENTIER-EDWARDS PHYSIO II ANNULOPLASTY RING

## 2020-03-05 RX ORDER — CHLORHEXIDINE GLUCONATE 1.2 MG/ML
10 RINSE ORAL 2 TIMES DAILY
Status: DISCONTINUED | OUTPATIENT
Start: 2020-03-05 | End: 2020-03-06

## 2020-03-05 RX ORDER — EPHEDRINE SULFATE/0.9% NACL/PF 50 MG/5 ML
SYRINGE (ML) INTRAVENOUS AS NEEDED
Status: DISCONTINUED | OUTPATIENT
Start: 2020-03-05 | End: 2020-03-05 | Stop reason: HOSPADM

## 2020-03-05 RX ORDER — OXYCODONE AND ACETAMINOPHEN 10; 325 MG/1; MG/1
1 TABLET ORAL
Status: DISCONTINUED | OUTPATIENT
Start: 2020-03-05 | End: 2020-03-06 | Stop reason: SDUPTHER

## 2020-03-05 RX ORDER — PROTAMINE SULFATE 10 MG/ML
INJECTION, SOLUTION INTRAVENOUS AS NEEDED
Status: DISCONTINUED | OUTPATIENT
Start: 2020-03-05 | End: 2020-03-05 | Stop reason: HOSPADM

## 2020-03-05 RX ORDER — GUAIFENESIN 100 MG/5ML
81 LIQUID (ML) ORAL DAILY
Status: DISCONTINUED | OUTPATIENT
Start: 2020-03-06 | End: 2020-03-09

## 2020-03-05 RX ORDER — DOBUTAMINE HYDROCHLORIDE 200 MG/100ML
2-10 INJECTION INTRAVENOUS
Status: DISCONTINUED | OUTPATIENT
Start: 2020-03-05 | End: 2020-03-09

## 2020-03-05 RX ORDER — PROPOFOL 10 MG/ML
5-50 VIAL (ML) INTRAVENOUS
Status: DISCONTINUED | OUTPATIENT
Start: 2020-03-05 | End: 2020-03-08

## 2020-03-05 RX ORDER — FAMOTIDINE 20 MG/1
20 TABLET, FILM COATED ORAL ONCE
Status: COMPLETED | OUTPATIENT
Start: 2020-03-05 | End: 2020-03-05

## 2020-03-05 RX ORDER — SODIUM CHLORIDE 9 MG/ML
25 INJECTION, SOLUTION INTRAVENOUS CONTINUOUS
Status: DISCONTINUED | OUTPATIENT
Start: 2020-03-05 | End: 2020-03-09

## 2020-03-05 RX ORDER — NITROGLYCERIN 20 MG/100ML
INJECTION INTRAVENOUS
Status: DISCONTINUED | OUTPATIENT
Start: 2020-03-05 | End: 2020-03-05 | Stop reason: HOSPADM

## 2020-03-05 RX ORDER — ALBUMIN HUMAN 50 G/1000ML
25 SOLUTION INTRAVENOUS
Status: COMPLETED | OUTPATIENT
Start: 2020-03-05 | End: 2020-03-06

## 2020-03-05 RX ORDER — SODIUM CHLORIDE, SODIUM LACTATE, POTASSIUM CHLORIDE, CALCIUM CHLORIDE 600; 310; 30; 20 MG/100ML; MG/100ML; MG/100ML; MG/100ML
100 INJECTION, SOLUTION INTRAVENOUS CONTINUOUS
Status: DISCONTINUED | OUTPATIENT
Start: 2020-03-05 | End: 2020-03-05 | Stop reason: HOSPADM

## 2020-03-05 RX ORDER — AMIODARONE HYDROCHLORIDE 200 MG/1
600 TABLET ORAL ONCE
Status: COMPLETED | OUTPATIENT
Start: 2020-03-05 | End: 2020-03-05

## 2020-03-05 RX ORDER — SUFENTANIL CITRATE 50 UG/ML
INJECTION EPIDURAL; INTRAVENOUS AS NEEDED
Status: DISCONTINUED | OUTPATIENT
Start: 2020-03-05 | End: 2020-03-05 | Stop reason: HOSPADM

## 2020-03-05 RX ORDER — AMIODARONE HYDROCHLORIDE 200 MG/1
600 TABLET ORAL ONCE
COMMUNITY
End: 2020-03-13

## 2020-03-05 RX ORDER — DEXTROSE 50 % IN WATER (D50W) INTRAVENOUS SYRINGE
25 AS NEEDED
Status: DISCONTINUED | OUTPATIENT
Start: 2020-03-05 | End: 2020-03-09

## 2020-03-05 RX ORDER — MIDAZOLAM HYDROCHLORIDE 1 MG/ML
2 INJECTION, SOLUTION INTRAMUSCULAR; INTRAVENOUS ONCE
Status: DISCONTINUED | OUTPATIENT
Start: 2020-03-05 | End: 2020-03-05 | Stop reason: HOSPADM

## 2020-03-05 RX ORDER — DEXTROSE, SODIUM CHLORIDE, AND POTASSIUM CHLORIDE 5; .45; .15 G/100ML; G/100ML; G/100ML
25 INJECTION INTRAVENOUS CONTINUOUS
Status: DISCONTINUED | OUTPATIENT
Start: 2020-03-05 | End: 2020-03-09

## 2020-03-05 RX ORDER — ATORVASTATIN CALCIUM 80 MG/1
80 TABLET, FILM COATED ORAL
Status: DISCONTINUED | OUTPATIENT
Start: 2020-03-05 | End: 2020-03-09

## 2020-03-05 RX ORDER — SODIUM CHLORIDE 9 MG/ML
INJECTION, SOLUTION INTRAVENOUS
Status: DISCONTINUED | OUTPATIENT
Start: 2020-03-05 | End: 2020-03-05 | Stop reason: HOSPADM

## 2020-03-05 RX ORDER — VECURONIUM BROMIDE FOR INJECTION 1 MG/ML
INJECTION, POWDER, LYOPHILIZED, FOR SOLUTION INTRAVENOUS AS NEEDED
Status: DISCONTINUED | OUTPATIENT
Start: 2020-03-05 | End: 2020-03-05 | Stop reason: HOSPADM

## 2020-03-05 RX ORDER — ONDANSETRON 2 MG/ML
4-8 INJECTION INTRAMUSCULAR; INTRAVENOUS
Status: DISCONTINUED | OUTPATIENT
Start: 2020-03-05 | End: 2020-03-09

## 2020-03-05 RX ORDER — CEFAZOLIN SODIUM/WATER 2 G/20 ML
2 SYRINGE (ML) INTRAVENOUS EVERY 8 HOURS
Status: COMPLETED | OUTPATIENT
Start: 2020-03-05 | End: 2020-03-06

## 2020-03-05 RX ORDER — OXYCODONE AND ACETAMINOPHEN 5; 325 MG/1; MG/1
1 TABLET ORAL
Status: DISCONTINUED | OUTPATIENT
Start: 2020-03-05 | End: 2020-03-05 | Stop reason: SDUPTHER

## 2020-03-05 RX ORDER — ALBUMIN HUMAN 50 G/1000ML
SOLUTION INTRAVENOUS
Status: COMPLETED
Start: 2020-03-05 | End: 2020-03-05

## 2020-03-05 RX ORDER — HEPARIN SODIUM 1000 [USP'U]/ML
INJECTION, SOLUTION INTRAVENOUS; SUBCUTANEOUS AS NEEDED
Status: DISCONTINUED | OUTPATIENT
Start: 2020-03-05 | End: 2020-03-05 | Stop reason: HOSPADM

## 2020-03-05 RX ORDER — FAMOTIDINE 20 MG/1
20 TABLET, FILM COATED ORAL ONCE
Status: DISCONTINUED | OUTPATIENT
Start: 2020-03-05 | End: 2020-03-05 | Stop reason: HOSPADM

## 2020-03-05 RX ORDER — ETOMIDATE 2 MG/ML
INJECTION INTRAVENOUS AS NEEDED
Status: DISCONTINUED | OUTPATIENT
Start: 2020-03-05 | End: 2020-03-05 | Stop reason: HOSPADM

## 2020-03-05 RX ORDER — MIDAZOLAM HYDROCHLORIDE 1 MG/ML
INJECTION, SOLUTION INTRAMUSCULAR; INTRAVENOUS AS NEEDED
Status: DISCONTINUED | OUTPATIENT
Start: 2020-03-05 | End: 2020-03-05 | Stop reason: HOSPADM

## 2020-03-05 RX ORDER — METOPROLOL TARTRATE 25 MG/1
25 TABLET, FILM COATED ORAL 2 TIMES DAILY
Status: DISCONTINUED | OUTPATIENT
Start: 2020-03-06 | End: 2020-03-09

## 2020-03-05 RX ORDER — MIDAZOLAM HYDROCHLORIDE 1 MG/ML
1 INJECTION, SOLUTION INTRAMUSCULAR; INTRAVENOUS
Status: DISCONTINUED | OUTPATIENT
Start: 2020-03-05 | End: 2020-03-09

## 2020-03-05 RX ORDER — PHENYLEPHRINE HCL IN 0.9% NACL 30MG/250ML
10-100 PLASTIC BAG, INJECTION (ML) INTRAVENOUS
Status: DISCONTINUED | OUTPATIENT
Start: 2020-03-05 | End: 2020-03-09

## 2020-03-05 RX ORDER — FENTANYL CITRATE 50 UG/ML
100 INJECTION, SOLUTION INTRAMUSCULAR; INTRAVENOUS ONCE
Status: DISCONTINUED | OUTPATIENT
Start: 2020-03-05 | End: 2020-03-05 | Stop reason: HOSPADM

## 2020-03-05 RX ORDER — MAGNESIUM SULFATE 1 G/100ML
1 INJECTION INTRAVENOUS AS NEEDED
Status: DISCONTINUED | OUTPATIENT
Start: 2020-03-05 | End: 2020-03-09

## 2020-03-05 RX ORDER — POTASSIUM CHLORIDE 14.9 MG/ML
10 INJECTION INTRAVENOUS AS NEEDED
Status: DISPENSED | OUTPATIENT
Start: 2020-03-05 | End: 2020-03-06

## 2020-03-05 RX ORDER — SODIUM CHLORIDE 0.9 % (FLUSH) 0.9 %
5-40 SYRINGE (ML) INJECTION AS NEEDED
Status: DISCONTINUED | OUTPATIENT
Start: 2020-03-05 | End: 2020-03-09

## 2020-03-05 RX ORDER — LIDOCAINE HYDROCHLORIDE 20 MG/ML
INJECTION, SOLUTION EPIDURAL; INFILTRATION; INTRACAUDAL; PERINEURAL AS NEEDED
Status: DISCONTINUED | OUTPATIENT
Start: 2020-03-05 | End: 2020-03-05 | Stop reason: HOSPADM

## 2020-03-05 RX ORDER — MORPHINE SULFATE 10 MG/ML
3-5 INJECTION, SOLUTION INTRAMUSCULAR; INTRAVENOUS
Status: DISCONTINUED | OUTPATIENT
Start: 2020-03-05 | End: 2020-03-09

## 2020-03-05 RX ORDER — LIDOCAINE HYDROCHLORIDE 10 MG/ML
0.5 INJECTION INFILTRATION; PERINEURAL ONCE
Status: DISCONTINUED | OUTPATIENT
Start: 2020-03-05 | End: 2020-03-05 | Stop reason: HOSPADM

## 2020-03-05 RX ORDER — SODIUM CHLORIDE, SODIUM LACTATE, POTASSIUM CHLORIDE, CALCIUM CHLORIDE 600; 310; 30; 20 MG/100ML; MG/100ML; MG/100ML; MG/100ML
INJECTION, SOLUTION INTRAVENOUS
Status: DISCONTINUED | OUTPATIENT
Start: 2020-03-05 | End: 2020-03-05 | Stop reason: HOSPADM

## 2020-03-05 RX ORDER — SODIUM CHLORIDE, SODIUM LACTATE, POTASSIUM CHLORIDE, CALCIUM CHLORIDE 600; 310; 30; 20 MG/100ML; MG/100ML; MG/100ML; MG/100ML
75 INJECTION, SOLUTION INTRAVENOUS CONTINUOUS
Status: DISCONTINUED | OUTPATIENT
Start: 2020-03-05 | End: 2020-03-05 | Stop reason: HOSPADM

## 2020-03-05 RX ORDER — NITROGLYCERIN 20 MG/100ML
10-100 INJECTION INTRAVENOUS
Status: DISCONTINUED | OUTPATIENT
Start: 2020-03-05 | End: 2020-03-08

## 2020-03-05 RX ORDER — SODIUM CHLORIDE 0.9 % (FLUSH) 0.9 %
5-40 SYRINGE (ML) INJECTION EVERY 8 HOURS
Status: DISCONTINUED | OUTPATIENT
Start: 2020-03-05 | End: 2020-03-09

## 2020-03-05 RX ORDER — ACETAMINOPHEN 325 MG/1
650 TABLET ORAL
Status: DISCONTINUED | OUTPATIENT
Start: 2020-03-05 | End: 2020-03-09

## 2020-03-05 RX ORDER — CEFAZOLIN SODIUM/WATER 2 G/20 ML
2 SYRINGE (ML) INTRAVENOUS ONCE
Status: COMPLETED | OUTPATIENT
Start: 2020-03-05 | End: 2020-03-05

## 2020-03-05 RX ORDER — AMIODARONE HYDROCHLORIDE 200 MG/1
200 TABLET ORAL 2 TIMES DAILY
Status: DISCONTINUED | OUTPATIENT
Start: 2020-03-05 | End: 2020-03-06

## 2020-03-05 RX ORDER — LIDOCAINE HYDROCHLORIDE 10 MG/ML
0.1 INJECTION INFILTRATION; PERINEURAL AS NEEDED
Status: DISCONTINUED | OUTPATIENT
Start: 2020-03-05 | End: 2020-03-05 | Stop reason: HOSPADM

## 2020-03-05 RX ORDER — ROCURONIUM BROMIDE 10 MG/ML
INJECTION, SOLUTION INTRAVENOUS AS NEEDED
Status: DISCONTINUED | OUTPATIENT
Start: 2020-03-05 | End: 2020-03-05 | Stop reason: HOSPADM

## 2020-03-05 RX ORDER — NALOXONE HYDROCHLORIDE 0.4 MG/ML
0.4 INJECTION, SOLUTION INTRAMUSCULAR; INTRAVENOUS; SUBCUTANEOUS AS NEEDED
Status: DISCONTINUED | OUTPATIENT
Start: 2020-03-05 | End: 2020-03-09

## 2020-03-05 RX ADMIN — SODIUM CHLORIDE: 900 INJECTION, SOLUTION INTRAVENOUS at 10:46

## 2020-03-05 RX ADMIN — SUFENTANIL CITRATE 50 MCG: 50 INJECTION, SOLUTION EPIDURAL; INTRAVENOUS at 09:27

## 2020-03-05 RX ADMIN — PHENYLEPHRINE HYDROCHLORIDE 120 MCG: 10 INJECTION INTRAVENOUS at 08:49

## 2020-03-05 RX ADMIN — Medication 10 ML: at 21:41

## 2020-03-05 RX ADMIN — VECURONIUM BROMIDE 2 MG: 1 INJECTION, POWDER, LYOPHILIZED, FOR SOLUTION INTRAVENOUS at 10:49

## 2020-03-05 RX ADMIN — MIDAZOLAM 3 MG: 1 INJECTION INTRAMUSCULAR; INTRAVENOUS at 11:57

## 2020-03-05 RX ADMIN — ONDANSETRON 4 MG: 2 INJECTION INTRAMUSCULAR; INTRAVENOUS at 16:13

## 2020-03-05 RX ADMIN — VECURONIUM BROMIDE 2 MG: 1 INJECTION, POWDER, LYOPHILIZED, FOR SOLUTION INTRAVENOUS at 09:45

## 2020-03-05 RX ADMIN — PHENYLEPHRINE HYDROCHLORIDE 360 MCG: 10 INJECTION INTRAVENOUS at 10:43

## 2020-03-05 RX ADMIN — NITROGLYCERIN 10 MCG/MIN: 200 INJECTION, SOLUTION INTRAVENOUS at 08:27

## 2020-03-05 RX ADMIN — AMIODARONE HYDROCHLORIDE 600 MG: 200 TABLET ORAL at 06:11

## 2020-03-05 RX ADMIN — Medication 40 ML: at 15:00

## 2020-03-05 RX ADMIN — PHENYLEPHRINE HYDROCHLORIDE 120 MCG: 10 INJECTION INTRAVENOUS at 09:37

## 2020-03-05 RX ADMIN — PROTAMINE SULFATE 180 MG: 10 INJECTION, SOLUTION INTRAVENOUS at 13:03

## 2020-03-05 RX ADMIN — FAMOTIDINE 20 MG: 20 TABLET, FILM COATED ORAL at 06:12

## 2020-03-05 RX ADMIN — SODIUM CHLORIDE 1 G/HR: 900 INJECTION, SOLUTION INTRAVENOUS at 09:00

## 2020-03-05 RX ADMIN — POTASSIUM CHLORIDE 10 MEQ: 200 INJECTION, SOLUTION INTRAVENOUS at 22:25

## 2020-03-05 RX ADMIN — Medication 1 AMPULE: at 20:14

## 2020-03-05 RX ADMIN — DEXTROSE MONOHYDRATE 0.03 MCG/KG/MIN: 5 INJECTION, SOLUTION INTRAVENOUS at 13:08

## 2020-03-05 RX ADMIN — SODIUM CHLORIDE, SODIUM LACTATE, POTASSIUM CHLORIDE, AND CALCIUM CHLORIDE: 600; 310; 30; 20 INJECTION, SOLUTION INTRAVENOUS at 07:54

## 2020-03-05 RX ADMIN — ALBUMIN (HUMAN) 25 G: 12.5 INJECTION, SOLUTION INTRAVENOUS at 22:12

## 2020-03-05 RX ADMIN — ALBUMIN HUMAN 25 G: 50 SOLUTION INTRAVENOUS at 14:58

## 2020-03-05 RX ADMIN — EPINEPHRINE 0.02 MCG/KG/MIN: 1 INJECTION PARENTERAL at 12:48

## 2020-03-05 RX ADMIN — Medication 2 G: at 08:45

## 2020-03-05 RX ADMIN — CHLORHEXIDINE GLUCONATE 10 ML: 1.2 RINSE ORAL at 16:28

## 2020-03-05 RX ADMIN — DEXTROSE MONOHYDRATE 8 MCG: 5 INJECTION, SOLUTION INTRAVENOUS at 13:10

## 2020-03-05 RX ADMIN — ATORVASTATIN CALCIUM 80 MG: 80 TABLET, FILM COATED ORAL at 20:14

## 2020-03-05 RX ADMIN — DEXTROSE MONOHYDRATE 0.04 MCG/KG/MIN: 5 INJECTION, SOLUTION INTRAVENOUS at 15:00

## 2020-03-05 RX ADMIN — PHENYLEPHRINE HYDROCHLORIDE 120 MCG: 10 INJECTION INTRAVENOUS at 13:07

## 2020-03-05 RX ADMIN — AMIODARONE HYDROCHLORIDE 200 MG: 200 TABLET ORAL at 20:14

## 2020-03-05 RX ADMIN — PHENYLEPHRINE HYDROCHLORIDE 120 MCG: 10 INJECTION INTRAVENOUS at 10:37

## 2020-03-05 RX ADMIN — PHENYLEPHRINE HYDROCHLORIDE 240 MCG: 10 INJECTION INTRAVENOUS at 10:07

## 2020-03-05 RX ADMIN — PHENYLEPHRINE HYDROCHLORIDE 240 MCG: 10 INJECTION INTRAVENOUS at 10:08

## 2020-03-05 RX ADMIN — DEXTROSE MONOHYDRATE, SODIUM CHLORIDE, AND POTASSIUM CHLORIDE 25 ML/HR: 50; 4.5; 1.49 INJECTION, SOLUTION INTRAVENOUS at 16:28

## 2020-03-05 RX ADMIN — Medication 3 AMPULE: at 06:10

## 2020-03-05 RX ADMIN — POTASSIUM CHLORIDE 10 MEQ: 200 INJECTION, SOLUTION INTRAVENOUS at 20:58

## 2020-03-05 RX ADMIN — SUFENTANIL CITRATE 50 MCG: 50 INJECTION, SOLUTION EPIDURAL; INTRAVENOUS at 08:06

## 2020-03-05 RX ADMIN — Medication 10 MG: at 10:07

## 2020-03-05 RX ADMIN — TUBERCULIN PURIFIED PROTEIN DERIVATIVE 5 UNITS: 5 INJECTION, SOLUTION INTRADERMAL at 20:14

## 2020-03-05 RX ADMIN — PHENYLEPHRINE HYDROCHLORIDE 60 MCG: 10 INJECTION INTRAVENOUS at 08:23

## 2020-03-05 RX ADMIN — SODIUM CHLORIDE 2 UNITS: 900 INJECTION, SOLUTION INTRAVENOUS at 12:08

## 2020-03-05 RX ADMIN — Medication 2 G: at 12:00

## 2020-03-05 RX ADMIN — OXYCODONE HYDROCHLORIDE AND ACETAMINOPHEN 1 TABLET: 5; 325 TABLET ORAL at 19:01

## 2020-03-05 RX ADMIN — VECURONIUM BROMIDE 5 MG: 1 INJECTION, POWDER, LYOPHILIZED, FOR SOLUTION INTRAVENOUS at 09:22

## 2020-03-05 RX ADMIN — POTASSIUM CHLORIDE 10 MEQ: 200 INJECTION, SOLUTION INTRAVENOUS at 20:23

## 2020-03-05 RX ADMIN — OXYCODONE HYDROCHLORIDE AND ACETAMINOPHEN 1 TABLET: 10; 325 TABLET ORAL at 23:16

## 2020-03-05 RX ADMIN — Medication 5 MG: at 09:39

## 2020-03-05 RX ADMIN — DEXTROSE MONOHYDRATE 10 G: 5 INJECTION, SOLUTION INTRAVENOUS at 08:27

## 2020-03-05 RX ADMIN — SODIUM CHLORIDE 2 UNITS/HR: 900 INJECTION, SOLUTION INTRAVENOUS at 11:03

## 2020-03-05 RX ADMIN — DEXTROSE MONOHYDRATE 8 MCG: 5 INJECTION, SOLUTION INTRAVENOUS at 13:09

## 2020-03-05 RX ADMIN — PHENYLEPHRINE HYDROCHLORIDE 120 MCG: 10 INJECTION INTRAVENOUS at 10:06

## 2020-03-05 RX ADMIN — PHENYLEPHRINE HYDROCHLORIDE 120 MCG: 10 INJECTION INTRAVENOUS at 13:08

## 2020-03-05 RX ADMIN — SODIUM CHLORIDE: 900 INJECTION, SOLUTION INTRAVENOUS at 08:25

## 2020-03-05 RX ADMIN — ROCURONIUM BROMIDE 30 MG: 10 INJECTION, SOLUTION INTRAVENOUS at 11:57

## 2020-03-05 RX ADMIN — AMIODARONE HYDROCHLORIDE 1 MG/MIN: 50 INJECTION, SOLUTION INTRAVENOUS at 15:22

## 2020-03-05 RX ADMIN — PHENYLEPHRINE HYDROCHLORIDE 360 MCG: 10 INJECTION INTRAVENOUS at 10:44

## 2020-03-05 RX ADMIN — Medication 10 MG: at 10:08

## 2020-03-05 RX ADMIN — PHENYLEPHRINE HYDROCHLORIDE 120 MCG: 10 INJECTION INTRAVENOUS at 10:31

## 2020-03-05 RX ADMIN — PHENYLEPHRINE HYDROCHLORIDE 120 MCG: 10 INJECTION INTRAVENOUS at 09:41

## 2020-03-05 RX ADMIN — MIDAZOLAM 2 MG: 1 INJECTION INTRAMUSCULAR; INTRAVENOUS at 07:54

## 2020-03-05 RX ADMIN — SODIUM CHLORIDE 25 ML/HR: 900 INJECTION, SOLUTION INTRAVENOUS at 15:22

## 2020-03-05 RX ADMIN — MORPHINE SULFATE 3 MG: 10 INJECTION INTRAVENOUS at 17:14

## 2020-03-05 RX ADMIN — PHENYLEPHRINE HYDROCHLORIDE 120 MCG: 10 INJECTION INTRAVENOUS at 08:25

## 2020-03-05 RX ADMIN — LIDOCAINE HYDROCHLORIDE 100 MG: 20 INJECTION, SOLUTION EPIDURAL; INFILTRATION; INTRACAUDAL; PERINEURAL at 08:06

## 2020-03-05 RX ADMIN — ALBUMIN (HUMAN) 25 G: 12.5 INJECTION, SOLUTION INTRAVENOUS at 14:58

## 2020-03-05 RX ADMIN — VECURONIUM BROMIDE 3 MG: 1 INJECTION, POWDER, LYOPHILIZED, FOR SOLUTION INTRAVENOUS at 08:52

## 2020-03-05 RX ADMIN — FAMOTIDINE 20 MG: 10 INJECTION INTRAVENOUS at 20:14

## 2020-03-05 RX ADMIN — DEXTROSE MONOHYDRATE 8 MCG: 5 INJECTION, SOLUTION INTRAVENOUS at 13:14

## 2020-03-05 RX ADMIN — ETOMIDATE 20 MG: 2 INJECTION, SOLUTION INTRAVENOUS at 08:06

## 2020-03-05 RX ADMIN — Medication 2 G: at 20:08

## 2020-03-05 RX ADMIN — HEPARIN SODIUM 23000 UNITS: 1000 INJECTION, SOLUTION INTRAVENOUS; SUBCUTANEOUS at 10:04

## 2020-03-05 NOTE — PROGRESS NOTES
TRANSFER - IN REPORT:    Verbal report received from Katherine Boles CRNA(name) on [facility]  being received from OR(unit) for routine post - op      Report consisted of patients Situation, Background, Assessment and   Recommendations(SBAR). Information from the following report(s) OR Summary, Procedure Summary and Intake/Output was reviewed with the receiving nurse. Per anesthesia on admission patient intubation Normal    Collaborative team agrees of surgeon, anesthesia, RT, and RN agrees to proceed with CV weaning protocol        Opportunity for questions and clarification was provided. Assessment completed upon patients arrival to unit and care assumed.     Primary RN is Dillon ZHENG

## 2020-03-05 NOTE — PROGRESS NOTES
Addendum- V fib arrest while turning, [place] pulled, responded to DC cardioversion x 1, down time < one minute

## 2020-03-05 NOTE — PROGRESS NOTES
Respiratory Mechanics completed and are as follows:  Weaning Parameters  Spontaneous Breathing Trial Complete: Yes  Resp Rate Observed: 21  Ve: 11.7  VT: 608  RSBI: 35  NIF: -30  Olivier Agitation Sedation Scale (RASS): Drowsy  Patient extubated to a 40L/ 40% HFNC. Patient is able to communicate and is negative for stridor. Breath sounds are coarse. No complications with extubation.      Roxana Thao, RT

## 2020-03-05 NOTE — CONSULTS
Cardiovascular ICU Consult Note: 3/5/2020   Shay Combs III  Admission Date: 3/5/2020     The patient's chart is reviewed and the patient is discussed with the staff. Subjective:     Patient is seen at the request of Dr. Caitlin Merchant for respiratory management status post cardiac surgery. Patient had CABG, MVR and closure of PFO. He is currently is sedated in CV-ICU and orally intubated receiving mechanical ventilation. He was hospitalized in January with a stroke (subacute versus acute right occipital infarct per study). He was found to have a low EF (25 to 30%). Nuclear stress test was abnormal and SANDEEP showed MR and a patent PFO. Left heart cath revealed multivessel CAD. He has a 20 pack year history of tobacco use but he quit smoking in 1997. His preop PFTs were normal.  He was diagnosed with ROSENDO in the past after having a sleep study. CPAP was ordered but he could not tolerate due to discomfort so he quit using. He has been living with his sister since January and she has not heard much snoring but she does state that he sleeps most of each day. He appears fatigued with minimal exertion. He is also having trouble with left sided weakness and has actually fallen at home. She feels that he having problems understanding some basic instructions since the stroke as well. Prior to Admission Medications   Prescriptions Last Dose Informant Patient Reported? Taking? SITagliptin (JANUVIA) 100 mg tablet 3/4/2020 at Unknown time  Yes Yes   Sig: Take 100 mg by mouth daily. acetaminophen (TYLENOL) 500 mg tablet 3/5/2020 at 0430  Yes Yes   Sig: Take 500 mg by mouth every six (6) hours as needed for Pain. amiodarone (CORDARONE) 200 mg tablet 3/4/2020 at 1900  Yes Yes   Sig: Take 600 mg by mouth once. aspirin delayed-release 81 mg tablet 3/5/2020 at 0430  Yes Yes   Sig: Take 81 mg by mouth daily.    atorvastatin (LIPITOR) 80 mg tablet 3/4/2020 at Unknown time  Yes Yes   Sig: Take 80 mg by mouth nightly. carvediloL (COREG) 6.25 mg tablet 3/5/2020 at 0430  No Yes   Sig: Take 1 Tab by mouth two (2) times daily (with meals). citalopram (CELEXA) 20 mg tablet 3/4/2020 at Unknown time  Yes Yes   Sig: Take  by mouth daily. clopidogreL (PLAVIX) 75 mg tab 2/23/2020  Yes No   Sig: Take 75 mg by mouth daily. metFORMIN (GLUCOPHAGE) 500 mg tablet 3/4/2020 at Unknown time  No Yes   Sig: Take 2 tablets by mouth every morning then Take 2 tablets by mouth every night   Patient taking differently: 500 mg two (2) times daily (with meals). terazosin (HYTRIN) 10 mg capsule 3/4/2020 at Unknown time  Yes Yes   Sig: Take 10 mg by mouth nightly.       Facility-Administered Medications: None     Review of Systems  A comprehensive review of systems was negative except for: Constitutional: positive for fatigue  Eyes: positive for loss of peripheral vision since the stroke  Ears, nose, mouth, throat, and face: positive for none  Respiratory: positive for negative  Cardiovascular: positive for fatigue  Gastrointestinal: positive for none  Genitourinary: positive for none  Integument/breast: positive for none  Hematologic/lymphatic: positive for none  Musculoskeletal: positive for none  Neurological: positive for gait problems  Behvioral/Psych: positive for sleep disturbance  Endocrine: positive for diabetes - on oral medication  Allergic/Immunologic: positive for none    Past Medical History:   Diagnosis Date    Arthritis     CAD (coronary artery disease)     heart cath 2/24/2020    CVA [facility]cerebral vascular accident) ([facility] 1/3/2020    Diabetes type 2, uncontrolled ([facility]     Type II on metformin last A1C 6.3 1/2020    Hypercholesteremia     Sleep apnea     no cpap    Systolic CHF, chronic ([address] 1/5/2020    echo 2/26/2020 EF 30-35%    TIA (transient ischemic attack) 2009    Troponin I above reference range 1/3/2020     Past Surgical History:   Procedure Laterality Date    HX CYST REMOVAL      HX HERNIA REPAIR      HX KNEE ARTHROSCOPY      rt knee     Social History     Socioeconomic History    Marital status: SINGLE     Spouse name: Not on file    Number of children: Not on file    Years of education: Not on file    Highest education level: Not on file   Occupational History    Occupation: retired    Social Needs    Financial resource strain: Not on file    Food insecurity:     Worry: Not on file     Inability: Not on file    Transportation needs:     Medical: Not on file     Non-medical: Not on file   Tobacco Use    Smoking status: Former Smoker     Packs/day: 1.00     Years: 20.00     Pack years: 20.00     Last attempt to quit: 10/20/1997     Years since quittin.3    Smokeless tobacco: Never Used   Substance and Sexual Activity    Alcohol use: Not Currently     Alcohol/week: 0.0 standard drinks     Comment: rare    Drug use: No    Sexual activity: Never   Lifestyle    Physical activity:     Days per week: Not on file     Minutes per session: Not on file    Stress: Not on file   Relationships    Social connections:     Talks on phone: Not on file     Gets together: Not on file     Attends [employer] service: Not on file     Active member of club or organization: Not on file     Attends meetings of clubs or organizations: Not on file     Relationship status: Not on file    Intimate partner violence:     Fear of current or ex partner: Not on file     Emotionally abused: Not on file     Physically abused: Not on file     Forced sexual activity: Not on file   Other Topics Concern    Not on file   Social History Narrative    Currently living with his sister.  Currently going through a divorce     Family History   Problem Relation Age of Onset    Heart Disease Father     Diabetes Father     Heart Disease Paternal Grandfather     Diabetes Sister     Diabetes Sister      No Known Allergies    Current Facility-Administered Medications   Medication Dose Route Frequency    albumin human 5% (BUMINATE) 5 % solution        alcohol 62% (NOZIN) nasal  1 Ampule  1 Ampule Topical Q12H    0.9% sodium chloride infusion  25 mL/hr IntraVENous CONTINUOUS    dextrose 5% - 0.45% NaCl with KCl 20 mEq/L infusion  25 mL/hr IntraVENous CONTINUOUS    sodium chloride (NS) flush 5-40 mL  5-40 mL IntraVENous Q8H    sodium chloride (NS) flush 5-40 mL  5-40 mL IntraVENous PRN    acetaminophen (TYLENOL) tablet 650 mg  650 mg Oral Q4H PRN    oxyCODONE-acetaminophen (PERCOCET) 5-325 mg per tablet 1 Tab  1 Tab Oral Q4H PRN    morphine 10 mg/ml injection 3-5 mg  3-5 mg IntraVENous Q1H PRN    naloxone (NARCAN) injection 0.4 mg  0.4 mg IntraVENous PRN    ceFAZolin (ANCEF) 2 g/20 mL in sterile water IV syringe  2 g IntraVENous Q8H    DOBUTamine (DOBUTREX) 500 mg/250 mL (2,000 mcg/mL) infusion  2-10 mcg/kg/min IntraVENous TITRATE    EPINEPHrine (ADRENALIN) 4 mg in 0.9% sodium chloride 250 mL infusion  0.01-0.08 mcg/kg/min IntraVENous TITRATE    nitroglycerin (Tridil) 200 mcg/ml infusion   mcg/min IntraVENous TITRATE    NOREPINephrine (LEVOPHED) 8,000 mcg in dextrose 5% 250 mL infusion  0.01-0.2 mcg/kg/min IntraVENous TITRATE    niCARdipine in Saline (CARDENE) 25 MG/250 mL infusion kit  2.5-15 mg/hr IntraVENous TITRATE    amiodarone (CORDARONE) tablet 200 mg  200 mg Oral BID    [START ON 3/6/2020] metoprolol tartrate (LOPRESSOR) tablet 25 mg  25 mg Oral BID    atorvastatin (LIPITOR) tablet 80 mg  80 mg Oral QHS    ondansetron (ZOFRAN) injection 4-8 mg  4-8 mg IntraVENous Q4H PRN    insulin regular (NOVOLIN R, HUMULIN R) 100 Units in 0.9% sodium chloride 100 mL infusion  1 Units/hr IntraVENous TITRATE    dextrose (D50W) injection syrg 12.5 g  25 mL IntraVENous PRN    [START ON 3/6/2020] aspirin chewable tablet 81 mg  81 mg Oral DAILY    magnesium sulfate 1 g/100 ml IVPB (premix or compounded)  1 g IntraVENous PRN    potassium chloride 10 mEq in 50 ml IVPB  10 mEq IntraVENous PRN    midazolam (VERSED) injection 1 mg  1 mg IntraVENous Q1H PRN    propofol (DIPRIVAN) 10 mg/mL infusion  5-50 mcg/kg/min IntraVENous TITRATE    chlorhexidine (PERIDEX) 0.12 % mouthwash 10 mL  10 mL Oral BID    tuberculin injection 5 Units  5 Units IntraDERMal ONCE    famotidine (PF) (PEPCID) 20 mg in 0.9% sodium chloride 10 mL injection  20 mg IntraVENous Q12H    PHENYLephrine (PF)(KATERINA-SYNEPHRINE) 30 mg in 0.9% sodium chloride 250 mL infusion   mcg/min IntraVENous TITRATE    albumin human 5% (BUMINATE) solution 25 g  25 g IntraVENous NOW    amiodarone (CORDARONE) 450 mg in dextrose 5% 250 mL infusion  0.5-1 mg/min IntraVENous TITRATE    aminocaproic acid (AMICAR) 5 g in 0.9% sodium chloride 250 mL infusion  5 g IntraVENous ONCE    aminocaproic acid (AMICAR) 10 g in 0.9% sodium chloride 250 mL infusion  10 g IntraVENous ONCE    heparin 10,000 units in NS 1000 ml irrigation  1,000 mL Irrigation ONCE     Objective:     Vitals:    03/05/20 1409 03/05/20 1419 03/05/20 1420 03/05/20 1421   BP:       Pulse: 82 95 95 79   Resp: 16 16 17 13   Temp:       SpO2: 98% 99% 100% 100%   Weight:       Height:         Intake and Output:   No intake/output data recorded. 03/05 0701 - 03/05 1900  In: 2500 [I.V.:2000]  Out: 1060 [Urine:1050]    Physical Exam:          Constitutional:  Sedated, intubated and mechanically ventilated. HEENT:  Sclera clear, pupils equal, oral mucosa moist and orally intubated  RESPIRATORY: clear   CARDIOVASCULAR:  RRR with no M,G,R;  ABDOMEN:  soft; hypoactive bowel sounds present  EXTREMITIES:  warm with no cyanosis, no lower leg edema. SKIN:  no jaundice or ecchymosis   NEURO:  sedated. Musculoskeletal: cannot assess - sedated    CHEST XRAY:  3/5/20: no infiltrate, tubes in place      LINES:  Franks, swan eula, arterial line.  Oral ETT, oral gastric tube, chest tube(s)    DRIPS:  Epi 0.03, Levo 0.05, Nitro 10    CI:  1.8     Ventilator Settings  Mode FIO2 Rate Tidal Volume Pressure PEEP Peak airway pressure:     Minute ventilation:       ABG: No results for input(s): PH, PCO2, PO2, HCO3 in the last 72 hours.      LAB  Recent Labs     03/03/20  0802   INR 1.0     Recent Labs     03/03/20  0802   ALB 3.7   SGOT 17     Assessment and Plan :  (Medical Decision Making)     Hospital Problems  Date Reviewed: 2/24/2020          Codes Class Noted POA    Patent foramen ovale ICD-10-CM: Q21.1  ICD-9-CM: 745.5  3/5/2020 Yes        Mitral valve regurgitation ICD-10-CM: I34.0  ICD-9-CM: 424.0  3/5/2020 Yes        CAD (coronary artery disease) ICD-10-CM: I25.10  ICD-9-CM: 414.00  3/5/2020 Yes        Mitral valve insufficiency ICD-10-CM: I34.0  ICD-9-CM: 424.0  3/5/2020 Yes        Atherosclerosis of coronary artery of native heart with unstable angina pectoris ([facility] ICD-10-CM: I25.110  ICD-9-CM: 414.01, 411.1  3/5/2020 Yes        Type II diabetes mellitus ([facility] [address] (Chronic) ICD-10-CM: E11.9  ICD-9-CM: 250.00  3/5/2020 Yes        Respiratory failure, post-operative ([facility] ICD-10-CM: G04.090  ICD-9-CM: 518.51  3/5/2020 No        Hypoxia ICD-10-CM: R09.02  ICD-9-CM: 799.02  3/5/2020 No              Plan:          --wean ventilator per protocol  --preop EF 25-30% and Index is 1.8 on epi and levo, need improved hemodynamics before considering extubation  --was unable to tolerate CPAP in past, but diagnosed with ROSENDO and consider extubating to BIPAP initially  --no acute findings on chest xray  --has small air leak currently, monitor  --has hematuria, monitor now off bypass  --currently paced  --IS and mobility post extubation  --nebs per protocol    Jason Collier MD    More than 50% of time documented was spent face-to-face contact with the patient and in the care of the patient on the floor/unit where the patient is located

## 2020-03-05 NOTE — ANESTHESIA PROCEDURE NOTES
Central Line Placement    Start time: 3/5/2020 8:15 AM  End time: 3/5/2020 8:25 AM  Performed by: Esme Mclean MD  Authorized by: Esme Mclean MD     Indications: vascular access, central pressure monitoring and need for vasopressors  Preanesthetic Checklist: patient identified, risks and benefits discussed, anesthesia consent, site marked and patient being monitored    Timeout Time: 08:15       Pre-procedure: All elements of maximal sterile barrier technique followed? Yes    2% Chlorhexidine for cutaneous antisepsis, Hand hygiene performed prior to catheter insertion and Ultrasound guidance    Sterile Ultrasound Technique followed?: No            Procedure:   Prep:  Chlorhexidine  Location: internal jugular  Orientation:  Right  Patient position:  Trendelenburg  Catheter type:  Single lumen  Catheter size:  9 Fr  Catheter length:  16 cm  Number of attempts:  1  Successful placement: Yes      Assessment:   Post-procedure:  Catheter secured and sterile dressing applied  Assessment:  Free fluid flow, blood return through all ports and guidewire removal verified  Insertion:  Uncomplicated  Patient tolerance:  Patient tolerated the procedure well with no immediate complications  PA Catheter Placement     Patient placed in trendelenburg position. right neck was prepared following the Seven Step sterility protocol . 1% lidocaine local over apex of Sternoclasomastoid head groove. #21 gauge finder needle passed into jugular vein, followed by #20 gauge jelco. Wire guide passed. 9fr  Introducer placed and wire guide removed. Introducer secured with nylon. 7.5 FR PA catheter passed into pulmonary artery to 45 cm depth. PAP was 30/20 mmHg.  Static US showed normal right IJ

## 2020-03-05 NOTE — ANESTHESIA PROCEDURE NOTES
SANDEEP  Date/Time: 3/5/2020 8:30 AM  Ordering Provider: Anil Simpson MD    Procedure Details: probe placement, image aquisition & interpretation    Risks and benefits discussed with the patient and plans are to proceed    Procedure Note    Performed by: Naseem Lazcano MD  Authorized by: Naseem Lazcano MD       Indications: assessment of surgical repair  Modalities: 2D, PWD, CF  Probe Type: multiplane  Insertion: atraumatic  Patient Status: intubated and sedated    Echocardiographic and Doppler Measurements   Aorta  Size  Diam(cm)  Dissection PlaqueThick(mm)  Plaque Mobile    Ascending normal        Arch normal        Descending               Valves  Annulus  Stenosis  Area/Grad  Regurg  Leaflet   Morph  Leaflet   Motion    Aortic normal none   normal normal    Mitral dilated none  3+ thickened normal    Tricuspid normal none  1+            Atria  Size  SEC (smoke)  Thrombus  Tumor  Device    Rt Atrium         Lt Atrium          Interatrial Septum Morphology: patent foramen ovale      Ventricle  Cavity Size  Cavity Dimension Hypertrophy  Thrombus  Gloal FXN  EF    RV normal    moderately impaired     LV normal    moderately impaired        Regional Function  (1 = normal, 2 = mildly hypokinetic, 3 = severely hypokinetic, 4 = akinetic, 5 = dyskinetic) LAV - [place]   ME LAV = 0  ME LAV = 90  ME LAV = 130                                   Diastolic function: Decreased global LV function; ef 35%  Pericardium: normal    Post Intervention Follow-up Study  Ventricular Global Function: improved  Ventricular Regional Function: improved     Valve  Function  Regurgitation  Area    Aortic no change      Mitral improved 0     Tricuspid no change      Prosthetic        Complications: None  Comments: PFO closed; atrial appendage clipped  Mitral valve ring  No mitral regurg noted post ring  Mtral gradient 35

## 2020-03-05 NOTE — PROGRESS NOTES
Patient out from operating room and placed on the ventilator on documented settings. Patient is orally intubated with a # 8.0 ET Tube secured at the 24 cm carmen at the lip. Breath sounds are coarse. Trachea is midline. Negative for subcutaneous air, chest excursion is symmetric. Negative for pitting edema. Patient is also Negative for cyanosis. Patient has a Left Radial arterial line. Patient has 2 Chest tubes. All alarms are set and audible. Resuscitation bag is at the head of the bed. Ventilator Settings  Mode FIO2 Rate Tidal Volume Pressure PEEP I:E Ratio   SIMV, Pressure support                       Peak airway pressure:     Minute ventilation:       ABG: No results for input(s): PH, PCO2, PO2, HCO3 in the last 72 hours.       Bryce Thurston, RT

## 2020-03-05 NOTE — H&P (VIEW-ONLY)
Cardiovascular ICU Consult Note: 3/5/2020  Nayana Cook III Admission Date: 3/5/2020 The patient's chart is reviewed and the patient is discussed with the staff. Subjective:  
 
Patient is seen at the request of Dr. Jannette Murphy for respiratory management status post cardiac surgery. Patient had CABG, MVR and closure of PFO. He is currently is sedated in CV-ICU and orally intubated receiving mechanical ventilation. He was hospitalized in January with a stroke (subacute versus acute right occipital infarct per study). He was found to have a low EF (25 to 30%). Nuclear stress test was abnormal and SANDEEP showed MR and a patent PFO. Left heart cath revealed multivessel CAD. He has a 20 pack year history of tobacco use but he quit smoking in 1997. His preop PFTs were normal.  He was diagnosed with ROSENDO in the past after having a sleep study. CPAP was ordered but he could not tolerate due to discomfort so he quit using. He has been living with his sister since January and she has not heard much snoring but she does state that he sleeps most of each day. He appears fatigued with minimal exertion. He is also having trouble with left sided weakness and has actually fallen at home. She feels that he having problems understanding some basic instructions since the stroke as well. Prior to Admission Medications Prescriptions Last Dose Informant Patient Reported? Taking? SITagliptin (JANUVIA) 100 mg tablet 3/4/2020 at Unknown time  Yes Yes Sig: Take 100 mg by mouth daily. acetaminophen (TYLENOL) 500 mg tablet 3/5/2020 at 0430  Yes Yes Sig: Take 500 mg by mouth every six (6) hours as needed for Pain. amiodarone (CORDARONE) 200 mg tablet 3/4/2020 at 1900  Yes Yes Sig: Take 600 mg by mouth once. aspirin delayed-release 81 mg tablet 3/5/2020 at 0430  Yes Yes Sig: Take 81 mg by mouth daily. atorvastatin (LIPITOR) 80 mg tablet 3/4/2020 at Unknown time  Yes Yes Sig: Take 80 mg by mouth nightly. carvediloL (COREG) 6.25 mg tablet 3/5/2020 at 0430  No Yes Sig: Take 1 Tab by mouth two (2) times daily (with meals). citalopram (CELEXA) 20 mg tablet 3/4/2020 at Unknown time  Yes Yes Sig: Take  by mouth daily. clopidogreL (PLAVIX) 75 mg tab 2/23/2020  Yes No  
Sig: Take 75 mg by mouth daily. metFORMIN (GLUCOPHAGE) 500 mg tablet 3/4/2020 at Unknown time  No Yes Sig: Take 2 tablets by mouth every morning then Take 2 tablets by mouth every night Patient taking differently: 500 mg two (2) times daily (with meals). terazosin (HYTRIN) 10 mg capsule 3/4/2020 at Unknown time  Yes Yes Sig: Take 10 mg by mouth nightly. Facility-Administered Medications: None Review of Systems A comprehensive review of systems was negative except for: Constitutional: positive for fatigue Eyes: positive for loss of peripheral vision since the stroke Ears, nose, mouth, throat, and face: positive for none Respiratory: positive for negative Cardiovascular: positive for fatigue Gastrointestinal: positive for none Genitourinary: positive for none Integument/breast: positive for none Hematologic/lymphatic: positive for none Musculoskeletal: positive for none Neurological: positive for gait problems Behvioral/Psych: positive for sleep disturbance Endocrine: positive for diabetes - on oral medication Allergic/Immunologic: positive for none Past Medical History:  
Diagnosis Date  Arthritis  CAD (coronary artery disease)   
 heart cath 2/24/2020  CVA (cerebral vascular accident) ([facility] 1/3/2020  Diabetes type 2, uncontrolled ([facility] Type II on metformin last A1C 6.3 1/2020  Hypercholesteremia  Sleep apnea   
 no cpap  Systolic CHF, chronic ([facility] 1/5/2020  
 echo 2/26/2020 EF 30-35%  TIA (transient ischemic attack) 2009  Troponin I above reference range 1/3/2020 Past Surgical History:  
Procedure Laterality Date  HX CYST REMOVAL    
  HX HERNIA REPAIR    
 HX KNEE ARTHROSCOPY    
 rt knee Social History Socioeconomic History  Marital status: SINGLE Spouse name: Not on file  Number of children: Not on file  Years of education: Not on file  Highest education level: Not on file Occupational History  Occupation: retired  Needs  Financial resource strain: Not on file  Food insecurity:  
  Worry: Not on file Inability: Not on file  Transportation needs:  
  Medical: Not on file Non-medical: Not on file Tobacco Use  Smoking status: Former Smoker Packs/day: 1.00 Years: 20.00 Pack years: 20.00 Last attempt to quit: 10/20/1997 Years since quittin.3  Smokeless tobacco: Never Used Substance and Sexual Activity  Alcohol use: Not Currently Alcohol/week: 0.0 standard drinks Comment: rare  Drug use: No  
 Sexual activity: Never Lifestyle  Physical activity:  
  Days per week: Not on file Minutes per session: Not on file  Stress: Not on file Relationships  Social connections:  
  Talks on phone: Not on file Gets together: Not on file Attends Hoahaoism service: Not on file Active member of club or organization: Not on file Attends meetings of clubs or organizations: Not on file Relationship status: Not on file  Intimate partner violence:  
  Fear of current or ex partner: Not on file Emotionally abused: Not on file Physically abused: Not on file Forced sexual activity: Not on file Other Topics Concern  Not on file Social History Narrative Currently living with his sister. Currently going through a divorce Family History Problem Relation Age of Onset  Heart Disease Father  Diabetes Father  Heart Disease Paternal Grandfather  Diabetes Sister  Diabetes Sister No Known Allergies Current Facility-Administered Medications Medication Dose Route Frequency  albumin human 5% (BUMINATE) 5 % solution  alcohol 62% (NOZIN) nasal  1 Ampule  1 Ampule Topical Q12H  
 0.9% sodium chloride infusion  25 mL/hr IntraVENous CONTINUOUS  
 dextrose 5% - 0.45% NaCl with KCl 20 mEq/L infusion  25 mL/hr IntraVENous CONTINUOUS  
 sodium chloride (NS) flush 5-40 mL  5-40 mL IntraVENous Q8H  
 sodium chloride (NS) flush 5-40 mL  5-40 mL IntraVENous PRN  
 acetaminophen (TYLENOL) tablet 650 mg  650 mg Oral Q4H PRN  
 oxyCODONE-acetaminophen (PERCOCET) 5-325 mg per tablet 1 Tab  1 Tab Oral Q4H PRN  
 morphine 10 mg/ml injection 3-5 mg  3-5 mg IntraVENous Q1H PRN  
 naloxone (NARCAN) injection 0.4 mg  0.4 mg IntraVENous PRN  
 ceFAZolin (ANCEF) 2 g/20 mL in sterile water IV syringe  2 g IntraVENous Q8H  
 DOBUTamine (DOBUTREX) 500 mg/250 mL (2,000 mcg/mL) infusion  2-10 mcg/kg/min IntraVENous TITRATE  EPINEPHrine (ADRENALIN) 4 mg in 0.9% sodium chloride 250 mL infusion  0.01-0.08 mcg/kg/min IntraVENous TITRATE  nitroglycerin (Tridil) 200 mcg/ml infusion   mcg/min IntraVENous TITRATE  
 NOREPINephrine (LEVOPHED) 8,000 mcg in dextrose 5% 250 mL infusion  0.01-0.2 mcg/kg/min IntraVENous TITRATE  niCARdipine in Saline (CARDENE) 25 MG/250 mL infusion kit  2.5-15 mg/hr IntraVENous TITRATE  amiodarone (CORDARONE) tablet 200 mg  200 mg Oral BID  [START ON 3/6/2020] metoprolol tartrate (LOPRESSOR) tablet 25 mg  25 mg Oral BID  atorvastatin (LIPITOR) tablet 80 mg  80 mg Oral QHS  ondansetron (ZOFRAN) injection 4-8 mg  4-8 mg IntraVENous Q4H PRN  
 insulin regular (NOVOLIN R, HUMULIN R) 100 Units in 0.9% sodium chloride 100 mL infusion  1 Units/hr IntraVENous TITRATE  dextrose (D50W) injection syrg 12.5 g  25 mL IntraVENous PRN  
 [START ON 3/6/2020] aspirin chewable tablet 81 mg  81 mg Oral DAILY  magnesium sulfate 1 g/100 ml IVPB (premix or compounded)  1 g IntraVENous PRN  
  potassium chloride 10 mEq in 50 ml IVPB  10 mEq IntraVENous PRN  
 midazolam (VERSED) injection 1 mg  1 mg IntraVENous Q1H PRN  propofol (DIPRIVAN) 10 mg/mL infusion  5-50 mcg/kg/min IntraVENous TITRATE  chlorhexidine (PERIDEX) 0.12 % mouthwash 10 mL  10 mL Oral BID  tuberculin injection 5 Units  5 Units IntraDERMal ONCE  
 famotidine (PF) (PEPCID) 20 mg in 0.9% sodium chloride 10 mL injection  20 mg IntraVENous Q12H  
 PHENYLephrine (PF)(KATERINA-SYNEPHRINE) 30 mg in 0.9% sodium chloride 250 mL infusion   mcg/min IntraVENous TITRATE  albumin human 5% (BUMINATE) solution 25 g  25 g IntraVENous NOW  amiodarone (CORDARONE) 450 mg in dextrose 5% 250 mL infusion  0.5-1 mg/min IntraVENous TITRATE  aminocaproic acid (AMICAR) 5 g in 0.9% sodium chloride 250 mL infusion  5 g IntraVENous ONCE  
 aminocaproic acid (AMICAR) 10 g in 0.9% sodium chloride 250 mL infusion  10 g IntraVENous ONCE  
 heparin 10,000 units in NS 1000 ml irrigation  1,000 mL Irrigation ONCE  
 
Objective:  
 
Vitals:  
 03/05/20 1409 03/05/20 1419 03/05/20 1420 03/05/20 1421 BP:      
Pulse: 82 95 95 79 Resp: 16 16 17 13 Temp:      
SpO2: 98% 99% 100% 100% Weight:      
Height:      
 
Intake and Output:  
No intake/output data recorded. 03/05 0701 - 03/05 1900 In: 2500 [I.V.:2000] Out: 1060 [[id number]] Physical Exam:         
Constitutional:  Sedated, intubated and mechanically ventilated. HEENT:  Sclera clear, pupils equal, oral mucosa moist and orally intubated RESPIRATORY: clear CARDIOVASCULAR:  RRR with no M,G,R; 
ABDOMEN:  soft; hypoactive bowel sounds present EXTREMITIES:  warm with no cyanosis, no lower leg edema. SKIN:  no jaundice or ecchymosis NEURO:  sedated. Musculoskeletal: cannot assess - sedated CHEST XRAY:  3/5/20: no infiltrate, tubes in place LINES:  Franks, swan eula, arterial line. Oral ETT, oral gastric tube, chest tube(s) DRIPS:  Epi 0.03, Levo 0.05, Nitro 10 
 
 CI:  1.8 Ventilator Settings Mode FIO2 Rate Tidal Volume Pressure PEEP Peak airway pressure:    
Minute ventilation: ABG: No results for input(s): PH, PCO2, PO2, HCO3 in the last 72 hours. LAB Recent Labs 03/03/20 
0802 INR 1.0 Recent Labs 03/03/20 
0802 ALB 3.7 SGOT 17 Assessment and Plan :  (Medical Decision Making) Hospital Problems  Date Reviewed: 2/24/2020 Codes Class Noted POA Patent foramen ovale ICD-10-CM: Q21.1 ICD-9-CM: 745.5  3/5/2020 Yes Mitral valve regurgitation ICD-10-CM: I34.0 ICD-9-CM: 424.0  3/5/2020 Yes CAD (coronary artery disease) ICD-10-CM: I25.10 ICD-9-CM: 414.00  3/5/2020 Yes Mitral valve insufficiency ICD-10-CM: I34.0 ICD-9-CM: 424.0  3/5/2020 Yes Atherosclerosis of coronary artery of native heart with unstable angina pectoris ([facility] ICD-10-CM: I25.110 
ICD-9-CM: 414.01, 411.1  3/5/2020 Yes Type II diabetes mellitus (HCC) (Chronic) ICD-10-CM: E11.9 ICD-9-CM: 250.00  3/5/2020 Yes Respiratory failure, post-operative ([facility] ICD-10-CM: D38.654 ICD-9-CM: 518.51  3/5/2020 No  
   
 Hypoxia ICD-10-CM: R09.02 
ICD-9-CM: 799.02  3/5/2020 No  
   
  
 
 
Plan:  
      
--wean ventilator per protocol --preop EF 25-30% and Index is 1.8 on epi and levo, need improved hemodynamics before considering extubation 
--was unable to tolerate CPAP in past, but diagnosed with ROSENDO and consider extubating to BIPAP initially 
--no acute findings on chest xray 
--has small air leak currently, monitor 
--has hematuria, monitor now off bypass 
--currently paced 
--IS and mobility post extubation 
--nebs per protocol Joshua Barboza MD 
 
More than 50% of time documented was spent face-to-face contact with the patient and in the care of the patient on the floor/unit where the patient is located

## 2020-03-05 NOTE — PROGRESS NOTES
A follow up visit was made to the patient. Emotional support, spiritual presence and   prayer were provided for the patient.       RIKY Vogt

## 2020-03-05 NOTE — ANESTHESIA PROCEDURE NOTES
Arterial Line Placement    Start time: 3/5/2020 7:56 AM  End time: 3/5/2020 7:58 AM  Performed by: Sonia Brunner, CRNA  Authorized by: Maricruz Downs MD     Pre-Procedure  Indications:  Blood sampling and arterial pressure monitoring  Preanesthetic Checklist: patient identified, risks and benefits discussed, anesthesia consent, site marked, patient being monitored, timeout performed and patient being monitored    Timeout Time: 07:56        Procedure:   Prep:  ChloraPrep  Seldinger Technique?: Yes    Orientation:  Left  Location:  Radial artery  Catheter size:  20 G  Number of attempts:  1  Cont Cardiac Output Sensor: No      Assessment:   Post-procedure:  Line secured and sterile dressing applied  Patient Tolerance:  Patient tolerated the procedure well with no immediate complications  Comment:   Left arm prepped with ChloraPrep, 0.8ml of 1% lidocaine infiltrated at skin, Seldinger technique, good blood return, good waveform.

## 2020-03-05 NOTE — BRIEF OP NOTE
BRIEF OPERATIVE NOTE    Date of Procedure: 3/5/2020   Preoperative Diagnosis: Atherosclerosis of native coronary artery of native heart with unstable angina pectoris ([facility] [I25.110]  Mitral valve insufficiency, unspecified etiology [I34.0]  Patent foramen ovale [Q21.1]  Postoperative Diagnosis: Atherosclerosis of native coronary artery of native heart with unstable angina pectoris ([facility] [address] [I25.110]  Mitral valve insufficiency, unspecified etiology [I34.0]  Patent foramen ovale [Q21.1]    Procedure(s):  CORONARY ARTERY BYPASS GRAFT WITH MVR/ CLOSURE OF PFO/ (CABG x 3)/ LIMA/ LYSIS OF MYOCARDIAL ADHESIONS/ CLIPPING OF LEFT ATRIAL APPENDAGE  ESOPHAGEAL TRANS ECHOCARDIOGRAM  VEIN HARVEST/ GREATER SAPHENOUS  Surgeon(s) and Role:     * Craig Downs MD - Primary         Surgical Assistant:     Surgical Staff:  Circ-1: Albert Dodd RN  Circ-Relief: Romain Estrella RN  Perfusionist: Kasandra Orr  Scrub Tech-1: Ryan Rcoa  Scrub Tech-2: Kev Horse  Event Time In Time Out   Incision Start 7413    Incision Close 1340      Anesthesia: General   Estimated Blood Loss: minimal  Specimens: * No specimens in log *   Findings: cad,mr,pfo   Complications: none  Implants:   Implant Name Type Inv.  Item Serial No.  Lot No. LRB No. Used Action   CLIP ATRIAL SYS EXCL 40MM STD --  - [id number]  CLIP ATRIAL SYS EXCL 40MM STD --   [employer] [id number] N/A 1 Implanted

## 2020-03-05 NOTE — PROGRESS NOTES
At 1416, pt was turned in order to perform a dual skin assessment upon arriving to unit. Patient was then noted to be in v-fib. Dr. Dickson Hobbs at bedside gave verbal order to remove swan-eula catheter. The patient was on epinephrine iv as standard post op treatment. Stat pads were placed on the patient in order to defibrillate the patient. The patient was defibrillated at 1416 with 120J and converted to NSR; an amiodarone drip was imitated without bolus and ordered to run for 24 hours. The patient is left in cvicu and is stable.

## 2020-03-05 NOTE — PROGRESS NOTES
[facility], primary RN, and charge RN present on 3/5/2020 at 5:47 PM.     Per anesthesia team on admission, patient's intubation was Normal    ABG results as follows:    Lab Results   Component Value Date/Time    pH (POC) 7.322 (L) 03/05/2020 02:12 PM    pCO2 (POC) 39.6 03/05/2020 02:12 PM    pO2 (POC) 120 (H) 03/05/2020 02:12 PM    HCO3 (POC) 20.5 (L) 03/05/2020 02:12 PM    sO2 (POC) 98 03/05/2020 02:12 PM    Base deficit (POC) 5 03/05/2020 02:12 PM         The patient is hemodynamically stable and can demonstrate the following on a consistent basis:  follow commands , elevate head off the pillow, nods appropriately to questions. Patient extubated by (RT name) RT Wes to (Type of O2 Device) Airvo at (Amount of of O2) 40% fiO2/40 L/min without complication.  spo2 98%

## 2020-03-05 NOTE — ANESTHESIA POSTPROCEDURE EVALUATION
Procedure(s):  CORONARY ARTERY BYPASS GRAFT WITH MVR/ CLOSURE OF PFO/ (CABG x 3)/ LIMA/ LYSIS OF MYOCARDIAL ADHESIONS/ CLIPPING OF LEFT ATRIAL APPENDAGE  ESOPHAGEAL TRANS ECHOCARDIOGRAM  VEIN HARVEST/ GREATER SAPHENOUS.    general    Anesthesia Post Evaluation      Multimodal analgesia: multimodal analgesia not used between 6 hours prior to anesthesia start to PACU discharge  Patient location during evaluation: ICU  Patient participation: complete - patient participated  Level of consciousness: awake and alert  Pain management: adequate  Airway patency: patent  Anesthetic complications: no  Cardiovascular status: hemodynamically stable  Respiratory status: acceptable  Hydration status: acceptable        Vitals Value Taken Time   BP     Temp     Pulse 68 3/5/2020  2:59 PM   Resp 14 3/5/2020  2:59 PM   SpO2 100 % 3/5/2020  2:59 PM   Vitals shown include unvalidated device data.

## 2020-03-05 NOTE — PROGRESS NOTES
Dual skin assessment performed. Sacrum visualized, an allevyn was peeled back and replaced. The heels were visualized and no injury was present; a ms and lle incision are present and covered with dressings. No breakthrough drainage is present on dressings.  No further abnormalities noted

## 2020-03-05 NOTE — PROGRESS NOTES
Spiritual Care Visit, initial visit. Visited with patient at bedside. Prayed for patient's healing and health. Visit by Diego Caruso, Staff .  Sulema., Robbie.B., B.A.

## 2020-03-06 ENCOUNTER — APPOINTMENT (OUTPATIENT)
Dept: GENERAL RADIOLOGY | Age: 70
DRG: 228 | End: 2020-03-06
Attending: PHYSICIAN ASSISTANT
Payer: MEDICARE

## 2020-03-06 PROBLEM — J98.11 ATELECTASIS: Status: ACTIVE | Noted: 2020-03-06

## 2020-03-06 PROBLEM — I49.01 VENTRICULAR FIBRILLATION (HCC): Status: ACTIVE | Noted: 2020-03-06

## 2020-03-06 PROBLEM — J81.0 ACUTE PULMONARY EDEMA (HCC): Status: ACTIVE | Noted: 2020-03-06

## 2020-03-06 LAB
ANION GAP SERPL CALC-SCNC: 7 MMOL/L (ref 7–16)
ATRIAL RATE: 69 BPM
BUN SERPL-MCNC: 9 MG/DL (ref 8–23)
CALCIUM SERPL-MCNC: 7.9 MG/DL (ref 8.3–10.4)
CALCULATED P AXIS, ECG09: 31 DEGREES
CALCULATED R AXIS, ECG10: -90 DEGREES
CALCULATED T AXIS, ECG11: 95 DEGREES
CHLORIDE SERPL-SCNC: 114 MMOL/L (ref 98–107)
CO2 SERPL-SCNC: 21 MMOL/L (ref 21–32)
CREAT SERPL-MCNC: 0.52 MG/DL (ref 0.8–1.5)
DIAGNOSIS, 93000: NORMAL
ERYTHROCYTE [DISTWIDTH] IN BLOOD BY AUTOMATED COUNT: 13.5 % (ref 11.9–14.6)
GLUCOSE BLD STRIP.AUTO-MCNC: 100 MG/DL (ref 65–100)
GLUCOSE BLD STRIP.AUTO-MCNC: 104 MG/DL (ref 65–100)
GLUCOSE BLD STRIP.AUTO-MCNC: 109 MG/DL (ref 65–100)
GLUCOSE BLD STRIP.AUTO-MCNC: 110 MG/DL (ref 65–100)
GLUCOSE BLD STRIP.AUTO-MCNC: 114 MG/DL (ref 65–100)
GLUCOSE BLD STRIP.AUTO-MCNC: 179 MG/DL (ref 65–100)
GLUCOSE BLD STRIP.AUTO-MCNC: 184 MG/DL (ref 65–100)
GLUCOSE BLD STRIP.AUTO-MCNC: 74 MG/DL (ref 65–100)
GLUCOSE SERPL-MCNC: 97 MG/DL (ref 65–100)
HCT VFR BLD AUTO: 29.8 % (ref 41.1–50.3)
HGB BLD-MCNC: 9.9 G/DL (ref 13.6–17.2)
MAGNESIUM SERPL-MCNC: 1.8 MG/DL (ref 1.8–2.4)
MAGNESIUM SERPL-MCNC: 2.3 MG/DL (ref 1.8–2.4)
MCH RBC QN AUTO: 31.9 PG (ref 26.1–32.9)
MCHC RBC AUTO-ENTMCNC: 33.2 G/DL (ref 31.4–35)
MCV RBC AUTO: 96.1 FL (ref 79.6–97.8)
MM INDURATION POC: 0 MM (ref 0–5)
NRBC # BLD: 0 K/UL (ref 0–0.2)
P-R INTERVAL, ECG05: 170 MS
PLATELET # BLD AUTO: 136 K/UL (ref 150–450)
PMV BLD AUTO: 9.8 FL (ref 9.4–12.3)
POTASSIUM SERPL-SCNC: 4 MMOL/L (ref 3.5–5.1)
POTASSIUM SERPL-SCNC: 4.2 MMOL/L (ref 3.5–5.1)
PPD POC: NEGATIVE NEGATIVE
Q-T INTERVAL, ECG07: 466 MS
QRS DURATION, ECG06: 92 MS
QTC CALCULATION (BEZET), ECG08: 499 MS
RBC # BLD AUTO: 3.1 M/UL (ref 4.23–5.6)
SODIUM SERPL-SCNC: 142 MMOL/L (ref 136–145)
VENTRICULAR RATE, ECG03: 69 BPM
WBC # BLD AUTO: 20.1 K/UL (ref 4.3–11.1)

## 2020-03-06 PROCEDURE — 65610000006 HC RM INTENSIVE CARE

## 2020-03-06 PROCEDURE — 74011000250 HC RX REV CODE- 250: Performed by: PHYSICIAN ASSISTANT

## 2020-03-06 PROCEDURE — 83735 ASSAY OF MAGNESIUM: CPT

## 2020-03-06 PROCEDURE — 71045 X-RAY EXAM CHEST 1 VIEW: CPT

## 2020-03-06 PROCEDURE — P9045 ALBUMIN (HUMAN), 5%, 250 ML: HCPCS | Performed by: THORACIC SURGERY (CARDIOTHORACIC VASCULAR SURGERY)

## 2020-03-06 PROCEDURE — 74011636637 HC RX REV CODE- 636/637: Performed by: THORACIC SURGERY (CARDIOTHORACIC VASCULAR SURGERY)

## 2020-03-06 PROCEDURE — 82962 GLUCOSE BLOOD TEST: CPT

## 2020-03-06 PROCEDURE — 74011250636 HC RX REV CODE- 250/636: Performed by: PHYSICIAN ASSISTANT

## 2020-03-06 PROCEDURE — 99232 SBSQ HOSP IP/OBS MODERATE 35: CPT | Performed by: INTERNAL MEDICINE

## 2020-03-06 PROCEDURE — 74011250636 HC RX REV CODE- 250/636

## 2020-03-06 PROCEDURE — 85027 COMPLETE CBC AUTOMATED: CPT

## 2020-03-06 PROCEDURE — 74011000258 HC RX REV CODE- 258: Performed by: PHYSICIAN ASSISTANT

## 2020-03-06 PROCEDURE — 80048 BASIC METABOLIC PNL TOTAL CA: CPT

## 2020-03-06 PROCEDURE — 84132 ASSAY OF SERUM POTASSIUM: CPT

## 2020-03-06 PROCEDURE — 74011250637 HC RX REV CODE- 250/637: Performed by: PHYSICIAN ASSISTANT

## 2020-03-06 PROCEDURE — 74011250637 HC RX REV CODE- 250/637: Performed by: THORACIC SURGERY (CARDIOTHORACIC VASCULAR SURGERY)

## 2020-03-06 PROCEDURE — P9045 ALBUMIN (HUMAN), 5%, 250 ML: HCPCS

## 2020-03-06 PROCEDURE — 36600 WITHDRAWAL OF ARTERIAL BLOOD: CPT

## 2020-03-06 PROCEDURE — 74011250636 HC RX REV CODE- 250/636: Performed by: THORACIC SURGERY (CARDIOTHORACIC VASCULAR SURGERY)

## 2020-03-06 RX ORDER — ALBUMIN HUMAN 50 G/1000ML
25 SOLUTION INTRAVENOUS ONCE
Status: ACTIVE | OUTPATIENT
Start: 2020-03-06 | End: 2020-03-07

## 2020-03-06 RX ORDER — FUROSEMIDE 10 MG/ML
20 INJECTION INTRAMUSCULAR; INTRAVENOUS ONCE
Status: COMPLETED | OUTPATIENT
Start: 2020-03-06 | End: 2020-03-06

## 2020-03-06 RX ORDER — TRAMADOL HYDROCHLORIDE 50 MG/1
50 TABLET ORAL
Status: DISCONTINUED | OUTPATIENT
Start: 2020-03-06 | End: 2020-03-13 | Stop reason: HOSPADM

## 2020-03-06 RX ORDER — ALBUMIN HUMAN 50 G/1000ML
SOLUTION INTRAVENOUS
Status: COMPLETED
Start: 2020-03-06 | End: 2020-03-06

## 2020-03-06 RX ORDER — AMIODARONE HYDROCHLORIDE 200 MG/1
200 TABLET ORAL EVERY 12 HOURS
Status: DISCONTINUED | OUTPATIENT
Start: 2020-03-06 | End: 2020-03-09

## 2020-03-06 RX ADMIN — Medication 10 ML: at 05:01

## 2020-03-06 RX ADMIN — INSULIN HUMAN 5 UNITS: 100 INJECTION, SOLUTION PARENTERAL at 21:45

## 2020-03-06 RX ADMIN — Medication 70 MCG/MIN: at 18:16

## 2020-03-06 RX ADMIN — ATORVASTATIN CALCIUM 80 MG: 80 TABLET, FILM COATED ORAL at 20:59

## 2020-03-06 RX ADMIN — AMIODARONE HYDROCHLORIDE 200 MG: 200 TABLET ORAL at 09:06

## 2020-03-06 RX ADMIN — AMIODARONE HYDROCHLORIDE 200 MG: 200 TABLET ORAL at 20:59

## 2020-03-06 RX ADMIN — DEXTROSE MONOHYDRATE 0.07 MCG/KG/MIN: 5 INJECTION, SOLUTION INTRAVENOUS at 18:00

## 2020-03-06 RX ADMIN — OXYCODONE HYDROCHLORIDE AND ACETAMINOPHEN 1 TABLET: 10; 325 TABLET ORAL at 03:17

## 2020-03-06 RX ADMIN — Medication 1 AMPULE: at 09:06

## 2020-03-06 RX ADMIN — Medication 70 MCG/MIN: at 10:25

## 2020-03-06 RX ADMIN — Medication 10 ML: at 15:16

## 2020-03-06 RX ADMIN — OXYCODONE HYDROCHLORIDE AND ACETAMINOPHEN 1 TABLET: 10; 325 TABLET ORAL at 08:07

## 2020-03-06 RX ADMIN — ASPIRIN 81 MG 81 MG: 81 TABLET ORAL at 09:06

## 2020-03-06 RX ADMIN — FAMOTIDINE 20 MG: 10 INJECTION INTRAVENOUS at 20:59

## 2020-03-06 RX ADMIN — FAMOTIDINE 20 MG: 10 INJECTION INTRAVENOUS at 09:06

## 2020-03-06 RX ADMIN — INSULIN HUMAN 5 UNITS: 100 INJECTION, SOLUTION PARENTERAL at 16:44

## 2020-03-06 RX ADMIN — ONDANSETRON 4 MG: 2 INJECTION INTRAMUSCULAR; INTRAVENOUS at 09:27

## 2020-03-06 RX ADMIN — FUROSEMIDE 20 MG: 10 INJECTION, SOLUTION INTRAMUSCULAR; INTRAVENOUS at 09:06

## 2020-03-06 RX ADMIN — Medication 2 G: at 04:13

## 2020-03-06 RX ADMIN — Medication 10 ML: at 21:02

## 2020-03-06 RX ADMIN — ALBUMIN (HUMAN): 12.5 INJECTION, SOLUTION INTRAVENOUS at 16:00

## 2020-03-06 RX ADMIN — ALBUMIN HUMAN: 50 SOLUTION INTRAVENOUS at 16:00

## 2020-03-06 RX ADMIN — Medication 1 AMPULE: at 20:59

## 2020-03-06 RX ADMIN — MAGNESIUM SULFATE 1 G: 1 INJECTION INTRAVENOUS at 02:14

## 2020-03-06 RX ADMIN — TRAMADOL HYDROCHLORIDE 50 MG: 50 TABLET, FILM COATED ORAL at 18:11

## 2020-03-06 NOTE — PROGRESS NOTES
[facility]  Admission Date: 3/5/2020             Daily Progress Note: 3/6/2020    The patient's chart is reviewed and the patient is discussed with the staff. Dirk Romero is a 80-year-old gentleman with a 20-pack-year smoking history (quit 1997), normal PFTs, ROSENDO not on CPAP, diabetes, left-sided weakness, BPH with mitral regurgitation who underwent CABG, MVR and PFO closure, left atrial appendage clipping. Subjective:   V. fib at 230 yesterday requiring defibrillation, amio drip. Extubated early last evening. Oxygen saturation is 100% on 31% FiO2. Leukocytosis of 20k. Renal function stable  Chest is sore this morning.     Remains on vasopressors for hypotension    Current Facility-Administered Medications   Medication Dose Route Frequency    alcohol 62% (NOZIN) nasal  1 Ampule  1 Ampule Topical Q12H    0.9% sodium chloride infusion  25 mL/hr IntraVENous CONTINUOUS    dextrose 5% - 0.45% NaCl with KCl 20 mEq/L infusion  25 mL/hr IntraVENous CONTINUOUS    sodium chloride (NS) flush 5-40 mL  5-40 mL IntraVENous Q8H    sodium chloride (NS) flush 5-40 mL  5-40 mL IntraVENous PRN    acetaminophen (TYLENOL) tablet 650 mg  650 mg Oral Q4H PRN    morphine 10 mg/ml injection 3-5 mg  3-5 mg IntraVENous Q1H PRN    naloxone (NARCAN) injection 0.4 mg  0.4 mg IntraVENous PRN    DOBUTamine (DOBUTREX) 500 mg/250 mL (2,000 mcg/mL) infusion  2-10 mcg/kg/min IntraVENous TITRATE    EPINEPHrine (ADRENALIN) 4 mg in 0.9% sodium chloride 250 mL infusion  0.01-0.08 mcg/kg/min IntraVENous TITRATE    nitroglycerin (Tridil) 200 mcg/ml infusion   mcg/min IntraVENous TITRATE    NOREPINephrine (LEVOPHED) 8,000 mcg in dextrose 5% 250 mL infusion  0.01-0.2 mcg/kg/min IntraVENous TITRATE    niCARdipine in Saline (CARDENE) 25 MG/250 mL infusion kit  2.5-15 mg/hr IntraVENous TITRATE    amiodarone (CORDARONE) tablet 200 mg  200 mg Oral BID    metoprolol tartrate (LOPRESSOR) tablet 25 mg  25 mg Oral BID    atorvastatin (LIPITOR) tablet 80 mg  80 mg Oral QHS    ondansetron (ZOFRAN) injection 4-8 mg  4-8 mg IntraVENous Q4H PRN    insulin regular (NOVOLIN R, HUMULIN R) 100 Units in 0.9% sodium chloride 100 mL infusion  1 Units/hr IntraVENous TITRATE    dextrose (D50W) injection syrg 12.5 g  25 mL IntraVENous PRN    aspirin chewable tablet 81 mg  81 mg Oral DAILY    magnesium sulfate 1 g/100 ml IVPB (premix or compounded)  1 g IntraVENous PRN    potassium chloride 10 mEq in 50 ml IVPB  10 mEq IntraVENous PRN    midazolam (VERSED) injection 1 mg  1 mg IntraVENous Q1H PRN    propofol (DIPRIVAN) 10 mg/mL infusion  5-50 mcg/kg/min IntraVENous TITRATE    chlorhexidine (PERIDEX) 0.12 % mouthwash 10 mL  10 mL Oral BID    tuberculin injection 5 Units  5 Units IntraDERMal ONCE    famotidine (PF) (PEPCID) 20 mg in 0.9% sodium chloride 10 mL injection  20 mg IntraVENous Q12H    PHENYLephrine (PF)(KATERINA-SYNEPHRINE) 30 mg in 0.9% sodium chloride 250 mL infusion   mcg/min IntraVENous TITRATE    amiodarone (CORDARONE) 450 mg in dextrose 5% 250 mL infusion  0.5-1 mg/min IntraVENous TITRATE    oxyCODONE-acetaminophen (PERCOCET 10)  mg per tablet 1 Tab  1 Tab Oral Q4H PRN       Review of Systems  +chest wall pain  Constitutional: negative for fever, chills, sweats  Cardiovascular: negative for chest pain, palpitations, syncope, edema  Gastrointestinal:  negative for dysphagia, reflux, vomiting, diarrhea, abdominal pain, or melena  Neurologic:  negative for focal weakness, numbness, headache    Objective:     Vitals:    03/06/20 0545 03/06/20 0600 03/06/20 0601 03/06/20 0615   BP:   96/55    Pulse: 72 72 73 75   Resp: 22 19 25 20   Temp:       SpO2: 100% 100% 100% 100%   Weight:       Height:             Intake/Output Summary (Last 24 hours) at 3/6/2020 0643  Last data filed at 3/6/2020 0630  Gross per 24 hour   Intake 5829.45 ml   Output 2840 ml   Net 2989.45 ml       Physical Exam: Constitution:  the patient is well developed and in no acute distress  EENMT:  Sclera clear, pupils equal, oral mucosa moist  Respiratory:cTAB  Cardiovascular:  RRR without M,G,R  Gastrointestinal: soft and non-tender; with positive bowel sounds. Musculoskeletal: warm without cyanosis. There is no lower extremity edema. Skin:  no jaundice or rashes  Neurologic: no gross neuro deficits     Psychiatric:  alert and oriented x 3    CXR: This morning S x-ray on right. More pronounced pulmonary edema and left basilar atelectasis      LAB  Recent Labs     03/06/20  0600 03/06/20  0410 03/06/20  0324 03/06/20  0207 03/06/20  0101   GLUCPOC 74 100 109* 104* 110*      Recent Labs     03/06/20  0411 03/05/20  2203 03/05/20  1806 03/05/20  1420 03/03/20  0802   WBC 20.1*  --   --  23.2*  --    HGB 9.9* 10.2* 9.9* 10.7*  --    HCT 29.8* 30.2* 29.6* 32.1*  --    *  --   --  127*  --    INR  --   --   --  1.4 1.0     Recent Labs     03/06/20  0411 03/06/20  0021 03/05/20  1806 03/05/20  1420 03/03/20  0802     --   --  145  --   --    K 4.0 4.2 3.4* 3.6   < >  --    *  --   --  115*  --   --    CO2 21  --   --  23  --   --    GLU 97  --   --  110*  --   --    BUN 9  --   --  13  --   --    CREA 0.52*  --   --  0.59*  --   --    MG 2.3 1.8 2.1 2.3   < >  --    CA 7.9*  --   --  8.7  --   --    ALB  --   --   --   --   --  3.7   TBILI  --   --   --   --   --  0.4   ALT  --   --   --   --   --  28   SGOT  --   --   --   --   --  17    < > = values in this interval not displayed. Recent Labs     03/05/20  1412 03/05/20  1325 03/05/20  1234   PHI 7.322* 7.301* 7.320*   PCO2I 39.6 44.4 42.9   PO2I 120* 284* 257*   HCO3I 20.5* 21.9* 22.1     No results for input(s): LCAD, LAC in the last 72 hours.       Assessment:  (Medical Decision Making)     Hospital Problems  Date Reviewed: 2/24/2020          Codes Class Noted POA    Atelectasis ICD-10-CM: J98.11  ICD-9-CM: 518.0  3/6/2020 Unknown        Acute pulmonary edema ([facility] ICD-10-CM: J81.0  ICD-9-CM: 518.4  3/6/2020 Unknown        Ventricular fibrillation (HCC) ICD-10-CM: I49.01  ICD-9-CM: 427.41  3/6/2020 Unknown        Patent foramen ovale ICD-10-CM: Q21.1  ICD-9-CM: 745.5  3/5/2020 Yes        Mitral valve regurgitation ICD-10-CM: I34.0  ICD-9-CM: 424.0  3/5/2020 Yes        CAD (coronary artery disease) ICD-10-CM: I25.10  ICD-9-CM: 414.00  3/5/2020 Yes        Mitral valve insufficiency ICD-10-CM: I34.0  ICD-9-CM: 424.0  3/5/2020 Yes        Atherosclerosis of coronary artery of native heart with unstable angina pectoris ([facility] ICD-10-CM: I25.110  ICD-9-CM: 414.01, 411.1  3/5/2020 Yes        Type II diabetes mellitus ([facility] (Chronic) ICD-10-CM: E11.9  ICD-9-CM: 250.00  3/5/2020 Yes        Respiratory failure, post-operative ([facility] ICD-10-CM: N14.464  ICD-9-CM: 518.51  3/5/2020 No        Hypoxia ICD-10-CM: R09.02  ICD-9-CM: 799.02  3/5/2020 No              Plan:  (Medical Decision Making)     --Lasix per routine to assist with weaning oxygen, but overall doing well. --Encourage IS  --Amiodarone drip per cardiology  --wean pressors as able. More than 50% of the time documented was spent in face-to-face contact with the patient and in the care of the patient on the floor/unit where the patient is located.     Neville Morrow MD

## 2020-03-06 NOTE — PROGRESS NOTES
Spiritual Care Visit, initial visit. Visited with patient at bedside. Prayed for patient's healing and health. Visit by Al Walker, Staff .  Sulema., Th.B., B.A.

## 2020-03-06 NOTE — PROGRESS NOTES
POD 1 Day Post-Op    Procedure:  Procedure(s):  CORONARY ARTERY BYPASS GRAFT WITH MVR/ CLOSURE OF PFO/ (CABG x 3)/ LIMA/ LYSIS OF MYOCARDIAL ADHESIONS/ CLIPPING OF LEFT ATRIAL APPENDAGE  ESOPHAGEAL TRANS ECHOCARDIOGRAM  VEIN HARVEST/ GREATER SAPHENOUS      Subjective:     Patient has No significant medical complaints      Objective:     Patient Vitals for the past 8 hrs:   BP Temp Pulse Resp SpO2 Weight   20 0701 92/53  71 22 100 %    20 0700   71 21 100 %    20 0645   70 21 100 %    20 0630   73 22 100 % 170 lb 10.2 oz (77.4 kg)   20 0615   75 20 100 %    20 0601 96/55  73 25 100 %    20 0600   72 19 100 %    20 0545   72 22 100 %    20 0530   76 14 100 %    20 0515   72 19 100 %    20 0501 (!) 87/50  72 19 100 %    20 0500   72 23 100 %    20 0445   73 20 100 %    20 0430   72 19 100 %    20 0415   71 21 100 %    20 0401 94/53  71 21 100 %    20 0400   71 17 100 %    20 0345   70 18 100 %    20 0330   71 25 100 %    / 0315   71 22 100 %    20 0301 94/52  76 20 100 %    20 0300  97.8 °F (36.6 °C) 71 17 100 %    20 0245   69 16 100 %    20 0215   69 16 100 %    20 0201 (!) 89/55  68 19 100 %    / 0145   67 (!) 40 100 %    20 0130   66 20 100 %    / 0115   66 22 100 %    // 0100 94/52  65 15 100 %    / 0045   65 16 100 %    // 0030   65 17 100 %    / 0015  97.6 °F (36.4 °C) 64 17 100 %      Temp (24hrs), Av.1 °F (35.6 °C), Min:89.7 °F (32.1 °C), Max:98.6 °F (37 °C)        Hemodynamics  PAP Systolic: 1 PAP  CO (l/min): 5.6 l/min CO  CI (l/min/m2): 3 l/min/m2 CI    No intake/output data recorded.  1901 -  0700  In: 5829.5 [P.O.:240;  I.V.:5089.5]  Out: 2840 [Urine:2175]    CT Drainage              total of all CT's    Heart: regular rate and rhythm, S1, S2 normal, no murmur, click, rub or gallop  Lung:  clear to auscultation bilaterally  Neuro: Grossly non focal  Incisions: Clean, dry, and intact    Labs:  Recent Results (from the past 24 hour(s))   TYPE + CROSSMATCH    Collection Time: 03/05/20  8:45 AM   Result Value Ref Range    Crossmatch Expiration 03/08/2020     ABO/Rh(D) O NEGATIVE     Antibody screen NEG     Unit number [id number]     Blood component type RC LR     Unit division 00     Status of unit ALLOCATED     Crossmatch result Compatible     Unit number [id number]     Blood component type RC LR     Unit division 00     Status of unit ALLOCATED     Crossmatch result Compatible     Unit number [id number]     Blood component type RC LR     Unit division 00     Status of unit ALLOCATED     Crossmatch result Compatible     Unit number [id number]     Blood component type RC LR     Unit division 00     Status of unit ALLOCATED     Crossmatch result Compatible    POC CG8I    Collection Time: 03/05/20  8:50 AM   Result Value Ref Range    pH (POC) 7.367 7.35 - 7.45      pCO2 (POC) 41.9 35 - 45 MMHG    pO2 (POC) 325 (H) 75 - 100 MMHG    HCO3 (POC) 24.0 22 - 26 MMOL/L    sO2 (POC) 100 (H) 95 - 98 %    Base deficit (POC) 1 mmol/L    Sodium,  136 - 145 MMOL/L    Potassium, POC 3.7 3.5 - 5.1 MMOL/L    Glucose,  (H) 65 - 100 MG/DL    Calcium, ionized (POC) 1.24 1.12 - 1.32 mmol/L   POC CG8I    Collection Time: 03/05/20 10:11 AM   Result Value Ref Range    pH (POC) 7.336 (L) 7.35 - 7.45      pCO2 (POC) 44.2 35 - 45 MMHG    pO2 (POC) 312 (H) 75 - 100 MMHG    HCO3 (POC) 23.6 22 - 26 MMOL/L    sO2 (POC) 100 (H) 95 - 98 %    Base deficit (POC) 2 mmol/L    Sodium,  136 - 145 MMOL/L    Potassium, POC 3.8 3.5 - 5.1 MMOL/L    Glucose,  (H) 65 - 100 MG/DL    Calcium, ionized (POC) 1.22 1.12 - 1.32 mmol/L   POC CG8I    Collection Time: 03/05/20 11:02 AM   Result Value Ref Range    pH (POC) 7.333 (L) 7.35 - 7.45 pCO2 (POC) 45.0 35 - 45 MMHG    pO2 (POC) 341 (H) 75 - 100 MMHG    HCO3 (POC) 23.9 22 - 26 MMOL/L    sO2 (POC) 100 (H) 95 - 98 %    Base deficit (POC) 2 mmol/L    Sodium,  136 - 145 MMOL/L    Potassium, POC 4.7 3.5 - 5.1 MMOL/L    Glucose,  (H) 65 - 100 MG/DL    Calcium, ionized (POC) 1.17 1.12 - 1.32 mmol/L   POC CG8I    Collection Time: 03/05/20 11:37 AM   Result Value Ref Range    pH (POC) 7.346 (L) 7.35 - 7.45      pCO2 (POC) 42.2 35 - 45 MMHG    pO2 (POC) 255 (H) 75 - 100 MMHG    HCO3 (POC) 23.1 22 - 26 MMOL/L    sO2 (POC) 100 (H) 95 - 98 %    Base deficit (POC) 2 mmol/L    Sodium,  136 - 145 MMOL/L    Potassium, POC 4.5 3.5 - 5.1 MMOL/L    Glucose,  (H) 65 - 100 MG/DL    Calcium, ionized (POC) 1.17 1.12 - 1.32 mmol/L   POC CG8I    Collection Time: 03/05/20 12:08 PM   Result Value Ref Range    pH (POC) 7.402 7.35 - 7.45      pCO2 (POC) 35.9 35 - 45 MMHG    pO2 (POC) 250 (H) 75 - 100 MMHG    HCO3 (POC) 22.3 22 - 26 MMOL/L    sO2 (POC) 100 (H) 95 - 98 %    Base deficit (POC) 2 mmol/L    Sodium,  136 - 145 MMOL/L    Potassium, POC 5.0 3.5 - 5.1 MMOL/L    Glucose,  (H) 65 - 100 MG/DL    Calcium, ionized (POC) 1.16 1.12 - 1.32 mmol/L   POC CG8I    Collection Time: 03/05/20 12:34 PM   Result Value Ref Range    pH (POC) 7.320 (L) 7.35 - 7.45      pCO2 (POC) 42.9 35 - 45 MMHG    pO2 (POC) 257 (H) 75 - 100 MMHG    HCO3 (POC) 22.1 22 - 26 MMOL/L    sO2 (POC) 100 (H) 95 - 98 %    Base deficit (POC) 4 mmol/L    Sodium,  136 - 145 MMOL/L    Potassium, POC 4.5 3.5 - 5.1 MMOL/L    Glucose,  (H) 65 - 100 MG/DL    Calcium, ionized (POC) 1.19 1.12 - 1.32 mmol/L   POC CG8I    Collection Time: 03/05/20  1:25 PM   Result Value Ref Range    pH (POC) 7.301 (L) 7.35 - 7.45      pCO2 (POC) 44.4 35 - 45 MMHG    pO2 (POC) 284 (H) 75 - 100 MMHG    HCO3 (POC) 21.9 (L) 22 - 26 MMOL/L    sO2 (POC) 100 (H) 95 - 98 %    Base deficit (POC) 4 mmol/L    Sodium,  136 - 145 MMOL/L Potassium, POC 3.7 3.5 - 5.1 MMOL/L    Glucose,  (H) 65 - 100 MG/DL    Calcium, ionized (POC) 1.50 (H) 1.12 - 1.32 mmol/L   POC CG8I    Collection Time: 03/05/20  2:12 PM   Result Value Ref Range    Device: VENT      FIO2 (POC) 60 %    pH (POC) 7.322 (L) 7.35 - 7.45      pCO2 (POC) 39.6 35 - 45 MMHG    pO2 (POC) 120 (H) 75 - 100 MMHG    HCO3 (POC) 20.5 (L) 22 - 26 MMOL/L    sO2 (POC) 98 95 - 98 %    Base deficit (POC) 5 mmol/L    Mode SIMV      Tidal volume 500 ml    Set Rate 16 bpm    PEEP/CPAP (POC) 8 cmH2O    Pressure support 16 cmH2O    Allens test (POC) NO      Site DRAWN FROM ARTERIAL LINE      Specimen type (POC) ARTERIAL      Sodium,  136 - 145 MMOL/L    Potassium, POC 3.5 3.5 - 5.1 MMOL/L    Glucose,  (H) 65 - 100 MG/DL    Calcium, ionized (POC) 1.46 (H) 1.12 - 1.32 mmol/L    Performed by Jett Zyncro     Critical value read back 00:01     COLLECT TIME 1,408     GLUCOSE, POC    Collection Time: 03/05/20  2:12 PM   Result Value Ref Range    Glucose (POC) 113 (H) 65 - 388 mg/dL   METABOLIC PANEL, BASIC    Collection Time: 03/05/20  2:20 PM   Result Value Ref Range    Sodium 145 136 - 145 mmol/L    Potassium 3.6 3.5 - 5.1 mmol/L    Chloride 115 (H) 98 - 107 mmol/L    CO2 23 21 - 32 mmol/L    Anion gap 7 7 - 16 mmol/L    Glucose 110 (H) 65 - 100 mg/dL    BUN 13 8 - 23 MG/DL    Creatinine 0.59 (L) 0.8 - 1.5 MG/DL    GFR est AA >60 >60 ml/min/1.73m2    GFR est non-AA >60 >60 ml/min/1.73m2    Calcium 8.7 8.3 - 10.4 MG/DL   MAGNESIUM    Collection Time: 03/05/20  2:20 PM   Result Value Ref Range    Magnesium 2.3 1.8 - 2.4 mg/dL   CBC W/O DIFF    Collection Time: 03/05/20  2:20 PM   Result Value Ref Range    WBC 23.2 (H) 4.3 - 11.1 K/uL    RBC 3.32 (L) 4.23 - 5.6 M/uL    HGB 10.7 (L) 13.6 - 17.2 g/dL    HCT 32.1 (L) 41.1 - 50.3 %    MCV 96.7 79.6 - 97.8 FL    MCH 32.2 26.1 - 32.9 PG    MCHC 33.3 31.4 - 35.0 g/dL    RDW 13.2 11.9 - 14.6 %    PLATELET 711 (L) 475 - 450 K/uL    MPV 9.9 9.4 - 12.3 FL    ABSOLUTE NRBC 0.00 0.0 - 0.2 K/uL   PTT    Collection Time: 03/05/20  2:20 PM   Result Value Ref Range    aPTT 35.2 24.3 - 35.4 SEC   PROTHROMBIN TIME + INR    Collection Time: 03/05/20  2:20 PM   Result Value Ref Range    Prothrombin time 17.3 (H) 12.0 - 14.7 sec    INR 1.4     FIBRINOGEN    Collection Time: 03/05/20  2:20 PM   Result Value Ref Range    Fibrinogen 220 190 - 501 mg/dL   EKG, 12 LEAD, INITIAL    Collection Time: 03/05/20  2:55 PM   Result Value Ref Range    Ventricular Rate 69 BPM    Atrial Rate 69 BPM    P-R Interval 170 ms    QRS Duration 92 ms    Q-T Interval 466 ms    QTC Calculation (Bezet) 499 ms    Calculated P Axis 31 degrees    Calculated R Axis -90 degrees    Calculated T Axis 95 degrees    Diagnosis       Normal sinus rhythm  Right superior axis deviation  Incomplete right bundle branch block  Anteroseptal infarct , age undetermined  ST & T wave abnormality, consider lateral ischemia  Abnormal ECG  When compared with ECG of 26-FEB-2020 11:32,  Incomplete right bundle branch block is now Present  Anteroseptal infarct is now Present  Confirmed by TWIN MEYERS (), Maddy Hester ([id number]) on 3/6/2020 6:25:52 AM     GLUCOSE, POC    Collection Time: 03/05/20  3:05 PM   Result Value Ref Range    Glucose (POC) 89 65 - 100 mg/dL   GLUCOSE, POC    Collection Time: 03/05/20  4:18 PM   Result Value Ref Range    Glucose (POC) 89 65 - 100 mg/dL   MAGNESIUM    Collection Time: 03/05/20  6:06 PM   Result Value Ref Range    Magnesium 2.1 1.8 - 2.4 mg/dL   HGB & HCT    Collection Time: 03/05/20  6:06 PM   Result Value Ref Range    HGB 9.9 (L) 13.6 - 17.2 g/dL    HCT 29.6 (L) 41.1 - 50.3 %   POTASSIUM    Collection Time: 03/05/20  6:06 PM   Result Value Ref Range    Potassium 3.4 (L) 3.5 - 5.1 mmol/L   GLUCOSE, POC    Collection Time: 03/05/20  6:10 PM   Result Value Ref Range    Glucose (POC) 114 (H) 65 - 100 mg/dL   GLUCOSE, POC    Collection Time: 03/05/20  8:07 PM   Result Value Ref Range    Glucose (POC) 147 (H) 65 - 100 mg/dL   GLUCOSE, POC    Collection Time: 03/05/20  9:24 PM   Result Value Ref Range    Glucose (POC) 144 (H) 65 - 100 mg/dL   HGB & HCT    Collection Time: 03/05/20 10:03 PM   Result Value Ref Range    HGB 10.2 (L) 13.6 - 17.2 g/dL    HCT 30.2 (L) 41.1 - 50.3 %   GLUCOSE, POC    Collection Time: 03/05/20 11:25 PM   Result Value Ref Range    Glucose (POC) 123 (H) 65 - 100 mg/dL   GLUCOSE, POC    Collection Time: 03/06/20 12:18 AM   Result Value Ref Range    Glucose (POC) 114 (H) 65 - 100 mg/dL   POTASSIUM    Collection Time: 03/06/20 12:21 AM   Result Value Ref Range    Potassium 4.2 3.5 - 5.1 mmol/L   MAGNESIUM    Collection Time: 03/06/20 12:21 AM   Result Value Ref Range    Magnesium 1.8 1.8 - 2.4 mg/dL   GLUCOSE, POC    Collection Time: 03/06/20  1:01 AM   Result Value Ref Range    Glucose (POC) 110 (H) 65 - 100 mg/dL   GLUCOSE, POC    Collection Time: 03/06/20  2:07 AM   Result Value Ref Range    Glucose (POC) 104 (H) 65 - 100 mg/dL   GLUCOSE, POC    Collection Time: 03/06/20  3:24 AM   Result Value Ref Range    Glucose (POC) 109 (H) 65 - 100 mg/dL   GLUCOSE, POC    Collection Time: 03/06/20  4:10 AM   Result Value Ref Range    Glucose (POC) 100 65 - 770 mg/dL   METABOLIC PANEL, BASIC    Collection Time: 03/06/20  4:11 AM   Result Value Ref Range    Sodium 142 136 - 145 mmol/L    Potassium 4.0 3.5 - 5.1 mmol/L    Chloride 114 (H) 98 - 107 mmol/L    CO2 21 21 - 32 mmol/L    Anion gap 7 7 - 16 mmol/L    Glucose 97 65 - 100 mg/dL    BUN 9 8 - 23 MG/DL    Creatinine 0.52 (L) 0.8 - 1.5 MG/DL    GFR est AA >60 >60 ml/min/1.73m2    GFR est non-AA >60 >60 ml/min/1.73m2    Calcium 7.9 (L) 8.3 - 10.4 MG/DL   MAGNESIUM    Collection Time: 03/06/20  4:11 AM   Result Value Ref Range    Magnesium 2.3 1.8 - 2.4 mg/dL   CBC W/O DIFF    Collection Time: 03/06/20  4:11 AM   Result Value Ref Range    WBC 20.1 (H) 4.3 - 11.1 K/uL    RBC 3.10 (L) 4.23 - 5.6 M/uL    HGB 9.9 (L) 13.6 - 17.2 g/dL    HCT 29.8 (L) 41.1 - 50.3 %    MCV 96.1 79.6 - 97.8 FL    MCH 31.9 26.1 - 32.9 PG    MCHC 33.2 31.4 - 35.0 g/dL    RDW 13.5 11.9 - 14.6 %    PLATELET 180 (L) 639 - 450 K/uL    MPV 9.8 9.4 - 12.3 FL    ABSOLUTE NRBC 0.00 0.0 - 0.2 K/uL   GLUCOSE, POC    Collection Time: 03/06/20  6:00 AM   Result Value Ref Range    Glucose (POC) 74 65 - 100 mg/dL       Assessment:     Active Problems:    Patent foramen ovale (3/5/2020)      Mitral valve regurgitation (3/5/2020)      CAD (coronary artery disease) (3/5/2020)      Mitral valve insufficiency (3/5/2020)      Atherosclerosis of coronary artery of native heart with unstable angina pectoris ([address] (3/5/2020)      Type II diabetes mellitus ([address] (3/5/2020)      Respiratory failure, post-operative ([address] (3/5/2020)      Hypoxia (3/5/2020)      Atelectasis (3/6/2020)      Acute pulmonary edema ([address] (3/6/2020)      Ventricular fibrillation ([address] (3/6/2020)        Plan/Recommendations/Medical Decision Making:     Wean gtts off, OOB, IV Lasix to offload    See orders

## 2020-03-07 ENCOUNTER — APPOINTMENT (OUTPATIENT)
Dept: GENERAL RADIOLOGY | Age: 70
DRG: 228 | End: 2020-03-07
Attending: THORACIC SURGERY (CARDIOTHORACIC VASCULAR SURGERY)
Payer: MEDICARE

## 2020-03-07 LAB
ANION GAP SERPL CALC-SCNC: 5 MMOL/L (ref 7–16)
APPEARANCE UR: ABNORMAL
BACTERIA URNS QL MICRO: ABNORMAL /HPF
BILIRUB UR QL: NEGATIVE
BUN SERPL-MCNC: 13 MG/DL (ref 8–23)
CALCIUM SERPL-MCNC: 8.1 MG/DL (ref 8.3–10.4)
CHLORIDE SERPL-SCNC: 111 MMOL/L (ref 98–107)
CO2 SERPL-SCNC: 23 MMOL/L (ref 21–32)
COLOR UR: ABNORMAL
CREAT SERPL-MCNC: 0.62 MG/DL (ref 0.8–1.5)
EPI CELLS #/AREA URNS HPF: ABNORMAL /HPF
ERYTHROCYTE [DISTWIDTH] IN BLOOD BY AUTOMATED COUNT: 13.5 % (ref 11.9–14.6)
GLUCOSE BLD STRIP.AUTO-MCNC: 146 MG/DL (ref 65–100)
GLUCOSE BLD STRIP.AUTO-MCNC: 166 MG/DL (ref 65–100)
GLUCOSE BLD STRIP.AUTO-MCNC: 191 MG/DL (ref 65–100)
GLUCOSE BLD STRIP.AUTO-MCNC: 193 MG/DL (ref 65–100)
GLUCOSE SERPL-MCNC: 186 MG/DL (ref 65–100)
GLUCOSE UR STRIP.AUTO-MCNC: NEGATIVE MG/DL
HCT VFR BLD AUTO: 27.8 % (ref 41.1–50.3)
HGB BLD-MCNC: 9.1 G/DL (ref 13.6–17.2)
HGB UR QL STRIP: ABNORMAL
KETONES UR QL STRIP.AUTO: NEGATIVE MG/DL
LEUKOCYTE ESTERASE UR QL STRIP.AUTO: NEGATIVE
MAGNESIUM SERPL-MCNC: 1.9 MG/DL (ref 1.8–2.4)
MCH RBC QN AUTO: 31.7 PG (ref 26.1–32.9)
MCHC RBC AUTO-ENTMCNC: 32.7 G/DL (ref 31.4–35)
MCV RBC AUTO: 96.9 FL (ref 79.6–97.8)
MM INDURATION POC: 0 MM (ref 0–5)
MUCOUS THREADS URNS QL MICRO: ABNORMAL /LPF
NITRITE UR QL STRIP.AUTO: NEGATIVE
NRBC # BLD: 0 K/UL (ref 0–0.2)
PH UR STRIP: 6 [PH] (ref 5–9)
PLATELET # BLD AUTO: 118 K/UL (ref 150–450)
PMV BLD AUTO: 10 FL (ref 9.4–12.3)
POTASSIUM SERPL-SCNC: 4.1 MMOL/L (ref 3.5–5.1)
PPD POC: NEGATIVE NEGATIVE
PROT UR STRIP-MCNC: 30 MG/DL
RBC # BLD AUTO: 2.87 M/UL (ref 4.23–5.6)
RBC #/AREA URNS HPF: ABNORMAL /HPF
SODIUM SERPL-SCNC: 139 MMOL/L (ref 136–145)
SP GR UR REFRACTOMETRY: 1.03 (ref 1–1.02)
UROBILINOGEN UR QL STRIP.AUTO: 1 EU/DL (ref 0.2–1)
WBC # BLD AUTO: 24.4 K/UL (ref 4.3–11.1)
WBC URNS QL MICRO: ABNORMAL /HPF

## 2020-03-07 PROCEDURE — 74011250636 HC RX REV CODE- 250/636: Performed by: PHYSICIAN ASSISTANT

## 2020-03-07 PROCEDURE — 74011250636 HC RX REV CODE- 250/636: Performed by: THORACIC SURGERY (CARDIOTHORACIC VASCULAR SURGERY)

## 2020-03-07 PROCEDURE — 74011250637 HC RX REV CODE- 250/637: Performed by: PHYSICIAN ASSISTANT

## 2020-03-07 PROCEDURE — 83735 ASSAY OF MAGNESIUM: CPT

## 2020-03-07 PROCEDURE — 99233 SBSQ HOSP IP/OBS HIGH 50: CPT | Performed by: INTERNAL MEDICINE

## 2020-03-07 PROCEDURE — 77030032994 HC SOL INJ SOD CL 0.9% LFCR 500ML

## 2020-03-07 PROCEDURE — 74011250637 HC RX REV CODE- 250/637: Performed by: THORACIC SURGERY (CARDIOTHORACIC VASCULAR SURGERY)

## 2020-03-07 PROCEDURE — 87086 URINE CULTURE/COLONY COUNT: CPT

## 2020-03-07 PROCEDURE — 85027 COMPLETE CBC AUTOMATED: CPT

## 2020-03-07 PROCEDURE — 81001 URINALYSIS AUTO W/SCOPE: CPT

## 2020-03-07 PROCEDURE — 36600 WITHDRAWAL OF ARTERIAL BLOOD: CPT

## 2020-03-07 PROCEDURE — 80048 BASIC METABOLIC PNL TOTAL CA: CPT

## 2020-03-07 PROCEDURE — 82962 GLUCOSE BLOOD TEST: CPT

## 2020-03-07 PROCEDURE — 74011636637 HC RX REV CODE- 636/637: Performed by: THORACIC SURGERY (CARDIOTHORACIC VASCULAR SURGERY)

## 2020-03-07 PROCEDURE — 71045 X-RAY EXAM CHEST 1 VIEW: CPT

## 2020-03-07 PROCEDURE — 74011250637 HC RX REV CODE- 250/637: Performed by: INTERNAL MEDICINE

## 2020-03-07 PROCEDURE — P9047 ALBUMIN (HUMAN), 25%, 50ML: HCPCS | Performed by: THORACIC SURGERY (CARDIOTHORACIC VASCULAR SURGERY)

## 2020-03-07 PROCEDURE — 65610000006 HC RM INTENSIVE CARE

## 2020-03-07 RX ORDER — ALBUMIN HUMAN 250 G/1000ML
25 SOLUTION INTRAVENOUS ONCE
Status: COMPLETED | OUTPATIENT
Start: 2020-03-07 | End: 2020-03-07

## 2020-03-07 RX ORDER — ADHESIVE BANDAGE
30 BANDAGE TOPICAL DAILY PRN
Status: DISCONTINUED | OUTPATIENT
Start: 2020-03-07 | End: 2020-03-09

## 2020-03-07 RX ORDER — FAMOTIDINE 10 MG/1
20 TABLET ORAL EVERY 12 HOURS
Status: DISCONTINUED | OUTPATIENT
Start: 2020-03-07 | End: 2020-03-09

## 2020-03-07 RX ORDER — KETOROLAC TROMETHAMINE 15 MG/ML
15 INJECTION, SOLUTION INTRAMUSCULAR; INTRAVENOUS
Status: DISCONTINUED | OUTPATIENT
Start: 2020-03-07 | End: 2020-03-07

## 2020-03-07 RX ADMIN — Medication 10 ML: at 06:42

## 2020-03-07 RX ADMIN — Medication 80 MCG/MIN: at 16:29

## 2020-03-07 RX ADMIN — SODIUM CHLORIDE 25 ML/HR: 900 INJECTION, SOLUTION INTRAVENOUS at 07:02

## 2020-03-07 RX ADMIN — INSULIN HUMAN 5 UNITS: 100 INJECTION, SOLUTION PARENTERAL at 22:37

## 2020-03-07 RX ADMIN — Medication 1 AMPULE: at 21:32

## 2020-03-07 RX ADMIN — KETOROLAC TROMETHAMINE 15 MG: 15 INJECTION, SOLUTION INTRAMUSCULAR; INTRAVENOUS at 13:13

## 2020-03-07 RX ADMIN — TRAMADOL HYDROCHLORIDE 50 MG: 50 TABLET, FILM COATED ORAL at 00:13

## 2020-03-07 RX ADMIN — Medication 10 ML: at 21:35

## 2020-03-07 RX ADMIN — Medication 1 AMPULE: at 08:12

## 2020-03-07 RX ADMIN — Medication 70 MCG/MIN: at 09:17

## 2020-03-07 RX ADMIN — Medication 70 MCG/MIN: at 00:56

## 2020-03-07 RX ADMIN — MAGNESIUM HYDROXIDE 30 ML: 2400 SUSPENSION ORAL at 13:06

## 2020-03-07 RX ADMIN — ACETAMINOPHEN 650 MG: 325 TABLET, FILM COATED ORAL at 09:18

## 2020-03-07 RX ADMIN — ASPIRIN 81 MG 81 MG: 81 TABLET ORAL at 08:12

## 2020-03-07 RX ADMIN — ALBUMIN (HUMAN) 25 G: 0.25 INJECTION, SOLUTION INTRAVENOUS at 08:12

## 2020-03-07 RX ADMIN — INSULIN HUMAN 5 UNITS: 100 INJECTION, SOLUTION PARENTERAL at 08:32

## 2020-03-07 RX ADMIN — EPINEPHRINE 0.02 MCG/KG/MIN: 1 INJECTION PARENTERAL at 14:24

## 2020-03-07 RX ADMIN — TRAMADOL HYDROCHLORIDE 50 MG: 50 TABLET, FILM COATED ORAL at 22:38

## 2020-03-07 RX ADMIN — FAMOTIDINE 20 MG: 20 TABLET, FILM COATED ORAL at 21:34

## 2020-03-07 RX ADMIN — INSULIN HUMAN 5 UNITS: 100 INJECTION, SOLUTION PARENTERAL at 17:34

## 2020-03-07 RX ADMIN — DEXTROSE MONOHYDRATE, SODIUM CHLORIDE, AND POTASSIUM CHLORIDE 25 ML/HR: 50; 4.5; 1.49 INJECTION, SOLUTION INTRAVENOUS at 16:29

## 2020-03-07 RX ADMIN — AMIODARONE HYDROCHLORIDE 200 MG: 200 TABLET ORAL at 08:12

## 2020-03-07 RX ADMIN — Medication 10 ML: at 14:15

## 2020-03-07 RX ADMIN — ATORVASTATIN CALCIUM 80 MG: 80 TABLET, FILM COATED ORAL at 21:34

## 2020-03-07 RX ADMIN — FAMOTIDINE 20 MG: 20 TABLET, FILM COATED ORAL at 09:12

## 2020-03-07 RX ADMIN — AMIODARONE HYDROCHLORIDE 200 MG: 200 TABLET ORAL at 21:34

## 2020-03-07 RX ADMIN — ONDANSETRON 4 MG: 2 INJECTION INTRAMUSCULAR; INTRAVENOUS at 23:23

## 2020-03-07 NOTE — PROGRESS NOTES
Within minutes of Epi gtt infusing, immediate improvement of HR to 59,  and CI 2.0. Will continue to monitor and goal of MAP 70.

## 2020-03-07 NOTE — PROGRESS NOTES
Dr Orly Unger updated on pt's status, hemodynamics reviewed, urinary output poor and orders received to start Epi gtt at 0.02mcg/kg/min

## 2020-03-07 NOTE — PROGRESS NOTES
Labs noted with WBC still in 20's with platelets still almost 120's. cxr with small left apiacal ptx with noted effusion in Right base and persistent atelectasis in left base        Kathy Adan MD      LABS    Recent Labs     03/07/20 0317 03/06/20 0411 03/05/20  2203  03/05/20  1420   WBC 24.4* 20.1*  --   --  23.2*   HGB 9.1* 9.9* 10.2*   < > 10.7*   HCT 27.8* 29.8* 30.2*   < > 32.1*   * 136*  --   --  127*    < > = values in this interval not displayed. Recent Labs     03/07/20 0317 03/06/20 0411 03/06/20  0021  03/05/20  1420    142  --   --  145   K 4.1 4.0 4.2   < > 3.6   * 114*  --   --  115*   * 97  --   --  110*   CO2 23 21  --   --  23   BUN 13 9  --   --  13   CREA 0.62* 0.52*  --   --  0.59*   MG 1.9 2.3 1.8   < > 2.3   INR  --   --   --   --  1.4    < > = values in this interval not displayed. Recent Labs     03/05/20  1412 03/05/20  1325 03/05/20  1234   PHI 7.322* 7.301* 7.320*   PCO2I 39.6 44.4 42.9   PO2I 120* 284* 257*   HCO3I 20.5* 21.9* 22.1     No results for input(s): LCAD, LAC in the last 72 hours.

## 2020-03-07 NOTE — PROGRESS NOTES
POD 2 Day Post-Op    Procedure:  Procedure(s):  CORONARY ARTERY BYPASS GRAFT WITH MVR/ CLOSURE OF PFO/ (CABG x 3)/ LIMA/ LYSIS OF MYOCARDIAL ADHESIONS/ CLIPPING OF LEFT ATRIAL APPENDAGE  ESOPHAGEAL TRANS ECHOCARDIOGRAM  VEIN HARVEST/ GREATER SAPHENOUS      Subjective:     Patient has No significant medical complaints. CVP 6. Pressor dependent still. Objective:     Patient Vitals for the past 8 hrs:   BP Temp Pulse Resp SpO2   20 0401 97/53  66 29 95 %   20 0348   66  91 %   20 0337 101/55  67  93 %   20 0330 99/55  66  93 %   20 0315   65 26 95 %   20 0301 99/54 99.4 °F (37.4 °C) 64  93 %   20 0245   63  91 %   20 0230 103/54  65 28 95 %   20 0215   66 26 94 %   20 0201 99/54  66 (!) 32 91 %   20 0145   65 29 96 %   20 0130 94/52  65 29 97 %   20 0115   65  96 %   20 0101 98/54  66  96 %   20 0045   66 (!) 35 97 %   20 0031 105/50  64  94 %   20 0015   66  97 %   20 0001 99/57  66  94 %   20 2346   68 29 95 %   20 2330 99/54  68 29 95 %   20 2315   67 27 94 %   20 2301 97/54 99.3 °F (37.4 °C) 68 26 96 %   20 2245   67 (!) 31 95 %   20 2230 99/56  67 28 96 %   20 2215   68 25 97 %   20 2201 100/53  69 26 97 %     Temp (24hrs), Av °F (37.2 °C), Min:98.3 °F (36.8 °C), Max:99.6 °F (37.6 °C)        Hemodynamics  CVP 6     1901 -  0700  In: -   Out: 478 [Urine:248]  701 -  1900  In: 6329.5 [P.O.:240;  I.V.:5589.5]  Out: 5982 [Urine:2990]    CT Drainage:  460             Heart:  regular rate and rhythm, S1, S2 normal, no murmur, click, rub or gallop  Lung:  clear to auscultation bilaterally  Neuro: Grossly non focal  Incisions: Clean, dry, and intact    Labs:  Recent Results (from the past 24 hour(s))   GLUCOSE, POC    Collection Time: 20  6:00 AM   Result Value Ref Range    Glucose (POC) 74 65 - 100 mg/dL   GLUCOSE, POC    Collection Time: 03/06/20  4:37 PM   Result Value Ref Range    Glucose (POC) 184 (H) 65 - 100 mg/dL   PLEASE READ & DOCUMENT PPD TEST IN 24 HRS    Collection Time: 03/06/20  8:14 PM   Result Value Ref Range    PPD Negative Negative    mm Induration 0 0 - 5 mm   GLUCOSE, POC    Collection Time: 03/06/20  9:28 PM   Result Value Ref Range    Glucose (POC) 179 (H) 65 - 100 mg/dL   MAGNESIUM    Collection Time: 03/07/20  3:17 AM   Result Value Ref Range    Magnesium 1.9 1.8 - 2.4 mg/dL   METABOLIC PANEL, BASIC    Collection Time: 03/07/20  3:17 AM   Result Value Ref Range    Sodium 139 136 - 145 mmol/L    Potassium 4.1 3.5 - 5.1 mmol/L    Chloride 111 (H) 98 - 107 mmol/L    CO2 23 21 - 32 mmol/L    Anion gap 5 (L) 7 - 16 mmol/L    Glucose 186 (H) 65 - 100 mg/dL    BUN 13 8 - 23 MG/DL    Creatinine 0.62 (L) 0.8 - 1.5 MG/DL    GFR est AA >60 >60 ml/min/1.73m2    GFR est non-AA >60 >60 ml/min/1.73m2    Calcium 8.1 (L) 8.3 - 10.4 MG/DL   CBC W/O DIFF    Collection Time: 03/07/20  3:17 AM   Result Value Ref Range    WBC 24.4 (H) 4.3 - 11.1 K/uL    RBC 2.87 (L) 4.23 - 5.6 M/uL    HGB 9.1 (L) 13.6 - 17.2 g/dL    HCT 27.8 (L) 41.1 - 50.3 %    MCV 96.9 79.6 - 97.8 FL    MCH 31.7 26.1 - 32.9 PG    MCHC 32.7 31.4 - 35.0 g/dL    RDW 13.5 11.9 - 14.6 %    PLATELET 401 (L) 642 - 450 K/uL    MPV 10.0 9.4 - 12.3 FL    ABSOLUTE NRBC 0.00 0.0 - 0.2 K/uL       Assessment:     Active Problems:    Patent foramen ovale (3/5/2020)      Mitral valve regurgitation (3/5/2020)      CAD (coronary artery disease) (3/5/2020)      Mitral valve insufficiency (3/5/2020)      Atherosclerosis of coronary artery of native heart with unstable angina pectoris (HCC) (3/5/2020)      Type II diabetes mellitus (HCC) (3/5/2020)      Respiratory failure, post-operative ([address] (3/5/2020)      Hypoxia (3/5/2020)      Atelectasis (3/6/2020)      Acute pulmonary edema (HCC) (3/6/2020)      Ventricular fibrillation (HCC) (3/6/2020)        Plan/Recommendations/Medical Decision Making:     Hold lasix; CVP low; 25% alb; wean pressors as tolerated. Small left apical PTX; leave chest tubes in today. WBC 24 today. Check urine, sputum. Question of whether abx should be started left to pulmonary. Otherwise stable.

## 2020-03-07 NOTE — PROGRESS NOTES
Lilo Dural III  Admission Date: 3/5/2020             Daily Progress Note: 3/7/2020    The patient's chart is reviewed and the patient is discussed with the staff. Kailee Torres is a 63-year-old gentleman with a 20-pack-year smoking history (quit 1997), normal PFTs, ROSENDO not on CPAP, diabetes, left-sided weakness, BPH with mitral regurgitation who underwent CABG, MVR and PFO closure, left atrial appendage clipping. V. fib at 230 on 3/4 requiring defibrillation, amio drip. Subjective: Today is awake and alert -- still on small amount of levo but still on francisco. Awake and aler but sore if chest. Less confusion now. Does report no BM in 3 days. Also was on pain meds for back 2 weeks ago.      Review of Systems:  -Fever  -Headaches  -Chest pain +chest wall pain  -Dyspnea, -wheezing, -cough  -Abdominal pain,+ constipation  -Leg swelling  All other organ systems grossly normal.      Current Facility-Administered Medications   Medication Dose Route Frequency    albumin human 5% (BUMINATE) solution 25 g  25 g IntraVENous ONCE    insulin regular (NOVOLIN R, HUMULIN R) injection   SubCUTAneous AC&HS    amiodarone (CORDARONE) tablet 200 mg  200 mg Oral Q12H    traMADoL (ULTRAM) tablet 50 mg  50 mg Oral Q6H PRN    alcohol 62% (NOZIN) nasal  1 Ampule  1 Ampule Topical Q12H    0.9% sodium chloride infusion  25 mL/hr IntraVENous CONTINUOUS    dextrose 5% - 0.45% NaCl with KCl 20 mEq/L infusion  25 mL/hr IntraVENous CONTINUOUS    sodium chloride (NS) flush 5-40 mL  5-40 mL IntraVENous Q8H    sodium chloride (NS) flush 5-40 mL  5-40 mL IntraVENous PRN    acetaminophen (TYLENOL) tablet 650 mg  650 mg Oral Q4H PRN    morphine 10 mg/ml injection 3-5 mg  3-5 mg IntraVENous Q1H PRN    naloxone (NARCAN) injection 0.4 mg  0.4 mg IntraVENous PRN    DOBUTamine (DOBUTREX) 500 mg/250 mL (2,000 mcg/mL) infusion  2-10 mcg/kg/min IntraVENous TITRATE    EPINEPHrine (ADRENALIN) 4 mg in 0.9% sodium chloride 250 mL infusion  0.01-0.08 mcg/kg/min IntraVENous TITRATE    nitroglycerin (Tridil) 200 mcg/ml infusion   mcg/min IntraVENous TITRATE    NOREPINephrine (LEVOPHED) 8,000 mcg in dextrose 5% 250 mL infusion  0.01-0.2 mcg/kg/min IntraVENous TITRATE    niCARdipine in Saline (CARDENE) 25 MG/250 mL infusion kit  2.5-15 mg/hr IntraVENous TITRATE    metoprolol tartrate (LOPRESSOR) tablet 25 mg  25 mg Oral BID    atorvastatin (LIPITOR) tablet 80 mg  80 mg Oral QHS    ondansetron (ZOFRAN) injection 4-8 mg  4-8 mg IntraVENous Q4H PRN    insulin regular (NOVOLIN R, HUMULIN R) 100 Units in 0.9% sodium chloride 100 mL infusion  1 Units/hr IntraVENous TITRATE    dextrose (D50W) injection syrg 12.5 g  25 mL IntraVENous PRN    aspirin chewable tablet 81 mg  81 mg Oral DAILY    magnesium sulfate 1 g/100 ml IVPB (premix or compounded)  1 g IntraVENous PRN    midazolam (VERSED) injection 1 mg  1 mg IntraVENous Q1H PRN    propofol (DIPRIVAN) 10 mg/mL infusion  5-50 mcg/kg/min IntraVENous TITRATE    famotidine (PF) (PEPCID) 20 mg in 0.9% sodium chloride 10 mL injection  20 mg IntraVENous Q12H    PHENYLephrine (PF)(KATERINA-SYNEPHRINE) 30 mg in 0.9% sodium chloride 250 mL infusion   mcg/min IntraVENous TITRATE    amiodarone (CORDARONE) 450 mg in dextrose 5% 250 mL infusion  0.5-1 mg/min IntraVENous TITRATE            Objective:     Vitals:    03/07/20 0130 03/07/20 0201 03/07/20 0215 03/07/20 0230   BP: 94/52 99/54  103/54   Pulse: 65 66 66 65   Resp: 29 (!) 32 26 28   Temp:       SpO2: 97% 91% 94% 95%   Weight:       Height:             Intake/Output Summary (Last 24 hours) at 3/7/2020 0313  Last data filed at 3/7/2020 0101  Gross per 24 hour   Intake 2838.92 ml   Output 1643 ml   Net 1195.92 ml       Physical Exam:   Constitution:  the patient is well developed and in no acute distress  EENMT:  Sclera clear, pupils equal, oral mucosa moist  Respiratory:decreased sounds in the bases. No wheezing. Cardiovascular:  RRR without M,G,R  Gastrointestinal: soft and non-tender; with positive bowel sounds. Musculoskeletal: warm without cyanosis. There is no lower extremity edema. Skin:  no jaundice or rashes  Neurologic: no gross neuro deficits     Psychiatric:  alert and oriented x 3    CXR: pending    LAB  Recent Labs     03/06/20  2128 03/06/20  1637 03/06/20  0600 03/06/20  0410 03/06/20  0324   GLUCPOC 179* 184* 74 100 109*      Recent Labs     03/06/20  0411 03/05/20  2203 03/05/20  1806 03/05/20  1420   WBC 20.1*  --   --  23.2*   HGB 9.9* 10.2* 9.9* 10.7*   HCT 29.8* 30.2* 29.6* 32.1*   *  --   --  127*   INR  --   --   --  1.4     Recent Labs     03/06/20  0411 03/06/20  0021 03/05/20  1806 03/05/20  1420     --   --  145   K 4.0 4.2 3.4* 3.6   *  --   --  115*   CO2 21  --   --  23   GLU 97  --   --  110*   BUN 9  --   --  13   CREA 0.52*  --   --  0.59*   MG 2.3 1.8 2.1 2.3   CA 7.9*  --   --  8.7     Recent Labs     03/05/20  1412 03/05/20  1325 03/05/20  1234   PHI 7.322* 7.301* 7.320*   PCO2I 39.6 44.4 42.9   PO2I 120* 284* 257*   HCO3I 20.5* 21.9* 22.1     No results for input(s): LCAD, LAC in the last 72 hours.       Assessment:  (Medical Decision Making)     Hospital Problems  Date Reviewed: 2/24/2020          Codes Class Noted POA    Atelectasis ICD-10-CM: J98.11  ICD-9-CM: 518.0  3/6/2020 Unknown        Acute pulmonary edema (HCC) ICD-10-CM: J81.0  ICD-9-CM: 518.4  3/6/2020 Unknown        Ventricular fibrillation (HCC) ICD-10-CM: I49.01  ICD-9-CM: 427.41  3/6/2020 Unknown        Patent foramen ovale ICD-10-CM: Q21.1  ICD-9-CM: 745.5  3/5/2020 Yes        Mitral valve regurgitation ICD-10-CM: I34.0  ICD-9-CM: 424.0  3/5/2020 Yes        CAD (coronary artery disease) ICD-10-CM: I25.10  ICD-9-CM: 414.00  3/5/2020 Yes        Mitral valve insufficiency ICD-10-CM: I34.0  ICD-9-CM: 424.0  3/5/2020 Yes        Atherosclerosis of coronary artery of native heart with unstable angina pectoris ([facility] ICD-10-CM: I25.110  ICD-9-CM: 414.01, 411.1  3/5/2020 Yes        Type II diabetes mellitus ([facility] (Chronic) ICD-10-CM: E11.9  ICD-9-CM: 250.00  3/5/2020 Yes        Respiratory failure, post-operative ([facility] ICD-10-CM: B71.099  ICD-9-CM: 518.51  3/5/2020 No        Hypoxia ICD-10-CM: R09.02  ICD-9-CM: 799.02  3/5/2020 No            Patient s/p CABG x 3. Off vent. Having issues with pain and still on pressors. Constipated  Plan:  (Medical Decision Making)     --wean pressor per CV surgery. CI is 2.8 now  --chest tubes per CV surgery  --taper FIO2 as tolerated on NC now. --Incentive spriometry  --off amiodarone IV and now still on PO -- having periods of periods of junctional rhyme prn. ?need for cardiology -- defer to CV surgery. --pain control since sore currently. --f/u CXR and labs in AM  --laxatives  --continue remaining treatment. More than 50% of the time documented was spent in face-to-face contact with the patient and in the care of the patient on the floor/unit where the patient is located.     Venkatesh Suarez MD

## 2020-03-08 ENCOUNTER — APPOINTMENT (OUTPATIENT)
Dept: GENERAL RADIOLOGY | Age: 70
DRG: 228 | End: 2020-03-08
Attending: THORACIC SURGERY (CARDIOTHORACIC VASCULAR SURGERY)
Payer: MEDICARE

## 2020-03-08 PROBLEM — J90 PLEURAL EFFUSION, RIGHT: Status: ACTIVE | Noted: 2020-03-08

## 2020-03-08 LAB
ALBUMIN SERPL-MCNC: 3 G/DL (ref 3.2–4.6)
ANION GAP SERPL CALC-SCNC: 7 MMOL/L (ref 7–16)
BUN SERPL-MCNC: 16 MG/DL (ref 8–23)
CALCIUM SERPL-MCNC: 8.3 MG/DL (ref 8.3–10.4)
CHLORIDE SERPL-SCNC: 109 MMOL/L (ref 98–107)
CO2 SERPL-SCNC: 22 MMOL/L (ref 21–32)
CREAT SERPL-MCNC: 0.59 MG/DL (ref 0.8–1.5)
ERYTHROCYTE [DISTWIDTH] IN BLOOD BY AUTOMATED COUNT: 13.6 % (ref 11.9–14.6)
GLUCOSE BLD STRIP.AUTO-MCNC: 124 MG/DL (ref 65–100)
GLUCOSE BLD STRIP.AUTO-MCNC: 129 MG/DL (ref 65–100)
GLUCOSE BLD STRIP.AUTO-MCNC: 181 MG/DL (ref 65–100)
GLUCOSE BLD STRIP.AUTO-MCNC: 214 MG/DL (ref 65–100)
GLUCOSE SERPL-MCNC: 196 MG/DL (ref 65–100)
HCT VFR BLD AUTO: 25 % (ref 41.1–50.3)
HGB BLD-MCNC: 8.3 G/DL (ref 13.6–17.2)
MAGNESIUM SERPL-MCNC: 2.1 MG/DL (ref 1.8–2.4)
MCH RBC QN AUTO: 31.9 PG (ref 26.1–32.9)
MCHC RBC AUTO-ENTMCNC: 33.2 G/DL (ref 31.4–35)
MCV RBC AUTO: 96.2 FL (ref 79.6–97.8)
MM INDURATION POC: 0 MM (ref 0–5)
NRBC # BLD: 0 K/UL (ref 0–0.2)
PLATELET # BLD AUTO: 108 K/UL (ref 150–450)
PMV BLD AUTO: 10 FL (ref 9.4–12.3)
POTASSIUM SERPL-SCNC: 4.1 MMOL/L (ref 3.5–5.1)
PPD POC: NEGATIVE NEGATIVE
RBC # BLD AUTO: 2.6 M/UL (ref 4.23–5.6)
SODIUM SERPL-SCNC: 138 MMOL/L (ref 136–145)
WBC # BLD AUTO: 16.9 K/UL (ref 4.3–11.1)

## 2020-03-08 PROCEDURE — 82040 ASSAY OF SERUM ALBUMIN: CPT

## 2020-03-08 PROCEDURE — 80048 BASIC METABOLIC PNL TOTAL CA: CPT

## 2020-03-08 PROCEDURE — 74011250636 HC RX REV CODE- 250/636: Performed by: PHYSICIAN ASSISTANT

## 2020-03-08 PROCEDURE — 74011636637 HC RX REV CODE- 636/637: Performed by: THORACIC SURGERY (CARDIOTHORACIC VASCULAR SURGERY)

## 2020-03-08 PROCEDURE — 85027 COMPLETE CBC AUTOMATED: CPT

## 2020-03-08 PROCEDURE — P9047 ALBUMIN (HUMAN), 25%, 50ML: HCPCS | Performed by: THORACIC SURGERY (CARDIOTHORACIC VASCULAR SURGERY)

## 2020-03-08 PROCEDURE — 99233 SBSQ HOSP IP/OBS HIGH 50: CPT | Performed by: INTERNAL MEDICINE

## 2020-03-08 PROCEDURE — 65610000006 HC RM INTENSIVE CARE

## 2020-03-08 PROCEDURE — 74011250637 HC RX REV CODE- 250/637: Performed by: THORACIC SURGERY (CARDIOTHORACIC VASCULAR SURGERY)

## 2020-03-08 PROCEDURE — 77010033678 HC OXYGEN DAILY

## 2020-03-08 PROCEDURE — 74011250636 HC RX REV CODE- 250/636: Performed by: INTERNAL MEDICINE

## 2020-03-08 PROCEDURE — 74011250637 HC RX REV CODE- 250/637: Performed by: PHYSICIAN ASSISTANT

## 2020-03-08 PROCEDURE — 74011250636 HC RX REV CODE- 250/636: Performed by: THORACIC SURGERY (CARDIOTHORACIC VASCULAR SURGERY)

## 2020-03-08 PROCEDURE — 36600 WITHDRAWAL OF ARTERIAL BLOOD: CPT

## 2020-03-08 PROCEDURE — 82962 GLUCOSE BLOOD TEST: CPT

## 2020-03-08 PROCEDURE — 71045 X-RAY EXAM CHEST 1 VIEW: CPT

## 2020-03-08 PROCEDURE — 83735 ASSAY OF MAGNESIUM: CPT

## 2020-03-08 RX ORDER — ALBUMIN HUMAN 250 G/1000ML
25 SOLUTION INTRAVENOUS 2 TIMES DAILY
Status: COMPLETED | OUTPATIENT
Start: 2020-03-08 | End: 2020-03-08

## 2020-03-08 RX ORDER — AMOXICILLIN 250 MG
1 CAPSULE ORAL EVERY 12 HOURS
Status: DISCONTINUED | OUTPATIENT
Start: 2020-03-08 | End: 2020-03-09

## 2020-03-08 RX ORDER — INSULIN LISPRO 100 [IU]/ML
0-10 INJECTION, SOLUTION INTRAVENOUS; SUBCUTANEOUS
Status: DISCONTINUED | OUTPATIENT
Start: 2020-03-08 | End: 2020-03-09

## 2020-03-08 RX ORDER — FUROSEMIDE 10 MG/ML
20 INJECTION INTRAMUSCULAR; INTRAVENOUS 2 TIMES DAILY
Status: COMPLETED | OUTPATIENT
Start: 2020-03-08 | End: 2020-03-08

## 2020-03-08 RX ADMIN — Medication 10 ML: at 07:09

## 2020-03-08 RX ADMIN — SENNOSIDES AND DOCUSATE SODIUM 1 TABLET: 8.6; 5 TABLET ORAL at 21:19

## 2020-03-08 RX ADMIN — ALBUMIN (HUMAN) 25 G: 0.25 INJECTION, SOLUTION INTRAVENOUS at 07:10

## 2020-03-08 RX ADMIN — FUROSEMIDE 20 MG: 10 INJECTION, SOLUTION INTRAMUSCULAR; INTRAVENOUS at 07:15

## 2020-03-08 RX ADMIN — Medication 10 ML: at 21:19

## 2020-03-08 RX ADMIN — TRAMADOL HYDROCHLORIDE 50 MG: 50 TABLET, FILM COATED ORAL at 22:21

## 2020-03-08 RX ADMIN — ASPIRIN 81 MG 81 MG: 81 TABLET ORAL at 10:01

## 2020-03-08 RX ADMIN — Medication 1 AMPULE: at 21:18

## 2020-03-08 RX ADMIN — AMIODARONE HYDROCHLORIDE 200 MG: 200 TABLET ORAL at 10:01

## 2020-03-08 RX ADMIN — Medication 1 AMPULE: at 10:01

## 2020-03-08 RX ADMIN — FAMOTIDINE 20 MG: 20 TABLET, FILM COATED ORAL at 10:01

## 2020-03-08 RX ADMIN — Medication 60 MCG/MIN: at 00:15

## 2020-03-08 RX ADMIN — INSULIN HUMAN 5 UNITS: 100 INJECTION, SOLUTION PARENTERAL at 07:17

## 2020-03-08 RX ADMIN — ALBUMIN (HUMAN) 25 G: 0.25 INJECTION, SOLUTION INTRAVENOUS at 18:22

## 2020-03-08 RX ADMIN — INSULIN HUMAN 10 UNITS: 100 INJECTION, SOLUTION PARENTERAL at 11:15

## 2020-03-08 RX ADMIN — ONDANSETRON 4 MG: 2 INJECTION INTRAMUSCULAR; INTRAVENOUS at 22:25

## 2020-03-08 RX ADMIN — AMIODARONE HYDROCHLORIDE 200 MG: 200 TABLET ORAL at 21:19

## 2020-03-08 RX ADMIN — FUROSEMIDE 20 MG: 10 INJECTION, SOLUTION INTRAMUSCULAR; INTRAVENOUS at 18:22

## 2020-03-08 RX ADMIN — Medication 20 ML: at 14:18

## 2020-03-08 RX ADMIN — FAMOTIDINE 20 MG: 20 TABLET, FILM COATED ORAL at 21:19

## 2020-03-08 RX ADMIN — ATORVASTATIN CALCIUM 80 MG: 80 TABLET, FILM COATED ORAL at 21:19

## 2020-03-08 RX ADMIN — ACETAMINOPHEN 650 MG: 325 TABLET, FILM COATED ORAL at 11:15

## 2020-03-08 NOTE — PROGRESS NOTES
Report per SBAR at bedside. Map with NIBP has been 70-73 with epi gtt at 0.03mcg/kg/min.  Pt denies c/o

## 2020-03-08 NOTE — PROGRESS NOTES
Imelda Boland III  Admission Date: 3/5/2020             Daily Progress Note: 3/8/2020    The patient's chart is reviewed and the patient is discussed with the staff. Rosie Jesus is a 71-year-old gentleman with a 20-pack-year smoking history (quit 1997), normal PFTs, ROSENDO not on CPAP, diabetes, left-sided weakness, BPH with mitral regurgitation who underwent CABG, MVR and PFO closure, left atrial appendage clipping. V. fib at 230 on 3/4 requiring defibrillation, amio drip. Subjective:   Patient is awake and feels ok. Still on Roman and some Epi. Mildly sore.   Has not sat in a chair yet    Review of Systems:  -Fever  -Headaches  -Chest pain +chest wall pain  -Dyspnea, -wheezing, -cough  -Abdominal pain, + constipation  -Leg swelling  All other organ systems grossly normal.      Current Facility-Administered Medications   Medication Dose Route Frequency    magnesium hydroxide (MILK OF MAGNESIA) 400 mg/5 mL oral suspension 30 mL  30 mL Oral DAILY PRN    famotidine (PEPCID) tablet 20 mg  20 mg Oral Q12H    influenza vaccine 2019-20 (6 mos+)(PF) (FLUARIX/FLULAVAL/FLUZONE QUAD) injection 0.5 mL  0.5 mL IntraMUSCular PRIOR TO DISCHARGE    insulin regular (NOVOLIN R, HUMULIN R) injection   SubCUTAneous AC&HS    amiodarone (CORDARONE) tablet 200 mg  200 mg Oral Q12H    traMADoL (ULTRAM) tablet 50 mg  50 mg Oral Q6H PRN    alcohol 62% (NOZIN) nasal  1 Ampule  1 Ampule Topical Q12H    0.9% sodium chloride infusion  25 mL/hr IntraVENous CONTINUOUS    dextrose 5% - 0.45% NaCl with KCl 20 mEq/L infusion  25 mL/hr IntraVENous CONTINUOUS    sodium chloride (NS) flush 5-40 mL  5-40 mL IntraVENous Q8H    sodium chloride (NS) flush 5-40 mL  5-40 mL IntraVENous PRN    acetaminophen (TYLENOL) tablet 650 mg  650 mg Oral Q4H PRN    morphine 10 mg/ml injection 3-5 mg  3-5 mg IntraVENous Q1H PRN    naloxone (NARCAN) injection 0.4 mg  0.4 mg IntraVENous PRN    DOBUTamine (DOBUTREX) 500 mg/250 mL (2,000 mcg/mL) infusion  2-10 mcg/kg/min IntraVENous TITRATE    EPINEPHrine (ADRENALIN) 4 mg in 0.9% sodium chloride 250 mL infusion  0.01-0.08 mcg/kg/min IntraVENous TITRATE    nitroglycerin (Tridil) 200 mcg/ml infusion   mcg/min IntraVENous TITRATE    NOREPINephrine (LEVOPHED) 8,000 mcg in dextrose 5% 250 mL infusion  0.01-0.2 mcg/kg/min IntraVENous TITRATE    niCARdipine in Saline (CARDENE) 25 MG/250 mL infusion kit  2.5-15 mg/hr IntraVENous TITRATE    [Held by provider] metoprolol tartrate (LOPRESSOR) tablet 25 mg  25 mg Oral BID    atorvastatin (LIPITOR) tablet 80 mg  80 mg Oral QHS    ondansetron (ZOFRAN) injection 4-8 mg  4-8 mg IntraVENous Q4H PRN    insulin regular (NOVOLIN R, HUMULIN R) 100 Units in 0.9% sodium chloride 100 mL infusion  1 Units/hr IntraVENous TITRATE    dextrose (D50W) injection syrg 12.5 g  25 mL IntraVENous PRN    aspirin chewable tablet 81 mg  81 mg Oral DAILY    magnesium sulfate 1 g/100 ml IVPB (premix or compounded)  1 g IntraVENous PRN    midazolam (VERSED) injection 1 mg  1 mg IntraVENous Q1H PRN    propofol (DIPRIVAN) 10 mg/mL infusion  5-50 mcg/kg/min IntraVENous TITRATE    PHENYLephrine (PF)(KATERINA-SYNEPHRINE) 30 mg in 0.9% sodium chloride 250 mL infusion   mcg/min IntraVENous TITRATE    amiodarone (CORDARONE) 450 mg in dextrose 5% 250 mL infusion  0.5-1 mg/min IntraVENous TITRATE            Objective:     Vitals:    03/08/20 0400 03/08/20 0415 03/08/20 0431 03/08/20 0445   BP: 108/56  102/57    Pulse: 63 62 63 63   Resp: 24 24 23 25   Temp:       SpO2: 97% 96% 95% 96%   Weight:       Height:             Intake/Output Summary (Last 24 hours) at 3/8/2020 0538  Last data filed at 3/8/2020 0400  Gross per 24 hour   Intake 2651.83 ml   Output 835 ml   Net 1816.83 ml       Physical Exam:   Constitution:  the patient is well developed and in no acute distress  EENMT:  Sclera clear, pupils equal, oral mucosa moist  Respiratory:decreased sounds  Mostly in the right lung base. No wheezing. Cardiovascular:  RRR without M,G,R  Gastrointestinal: soft and non-tender; with positive bowel sounds. Musculoskeletal: warm without cyanosis. There is no lower extremity edema. Skin:  no jaundice or rashes  Neurologic: no gross neuro deficits     Psychiatric:  alert and oriented x 3    CXR: noted increased Right sided and left chest tubes. CM.        LAB  Recent Labs     03/07/20  2225 03/07/20  1725 03/07/20  1219 03/07/20  0645 03/06/20  2128   GLUCPOC 191* 193* 146* 166* 179*      Recent Labs     03/08/20  0347 03/07/20  0317 03/06/20  0411 03/05/20  2203  03/05/20  1420   WBC 16.9* 24.4* 20.1*  --   --  23.2*   HGB 8.3* 9.1* 9.9* 10.2*   < > 10.7*   HCT 25.0* 27.8* 29.8* 30.2*   < > 32.1*   * 118* 136*  --   --  127*   INR  --   --   --   --   --  1.4    < > = values in this interval not displayed. Recent Labs     03/08/20  0347 03/07/20  0317 03/06/20  0411    139 142   K 4.1 4.1 4.0   * 111* 114*   CO2 22 23 21   * 186* 97   BUN 16 13 9   CREA 0.59* 0.62* 0.52*   MG 2.1 1.9 2.3   CA 8.3 8.1* 7.9*     Recent Labs     03/05/20  1412 03/05/20  1325 03/05/20  1234   PHI 7.322* 7.301* 7.320*   PCO2I 39.6 44.4 42.9   PO2I 120* 284* 257*   HCO3I 20.5* 21.9* 22.1     No results for input(s): LCAD, LAC in the last 72 hours.       Assessment:  (Medical Decision Making)     Hospital Problems  Date Reviewed: 2/24/2020          Codes Class Noted POA    Atelectasis ICD-10-CM: J98.11  ICD-9-CM: 518.0  3/6/2020 Unknown        Acute pulmonary edema (HCC) ICD-10-CM: J81.0  ICD-9-CM: 518.4  3/6/2020 Unknown        Ventricular fibrillation (HCC) ICD-10-CM: I49.01  ICD-9-CM: 427.41  3/6/2020 Unknown        Patent foramen ovale ICD-10-CM: Q21.1  ICD-9-CM: 745.5  3/5/2020 Yes        Mitral valve regurgitation ICD-10-CM: I34.0  ICD-9-CM: 424.0  3/5/2020 Yes        CAD (coronary artery disease) ICD-10-CM: I25.10  ICD-9-CM: 414.00  3/5/2020 Yes        Mitral valve insufficiency ICD-10-CM: I34.0  ICD-9-CM: 424.0  3/5/2020 Yes        Atherosclerosis of coronary artery of native heart with unstable angina pectoris (HCC) ICD-10-CM: I25.110  ICD-9-CM: 414.01, 411.1  3/5/2020 Yes        Type II diabetes mellitus ([facility] (Chronic) ICD-10-CM: E11.9  ICD-9-CM: 250.00  3/5/2020 Yes        Respiratory failure, post-operative ([facility] ICD-10-CM: O27.002  ICD-9-CM: 518.51  3/5/2020 No        Hypoxia ICD-10-CM: R09.02  ICD-9-CM: 799.02  3/5/2020 No            Patient s/p CABG x 3. Still on pressors Roman and Epi. Constipated. cxr appears to show increased congestion/effusion on Right. Plan:  (Medical Decision Making)     --wean pressors per CV surgery. CI is 2.9 now  --spoke with CV surgery and will give him lasix 20 mg BID x 2 dose. Check albumin. --may need US of chest and possible tap if x-ray does not improve  --chest tubes per CV surgery  --taper FIO2 as tolerated on NC   --Incentive spriometry  --pain control since sore currently. --laxatives but no BM and may sit in the chair  --continue remaining treatment. More than 50% of the time documented was spent in face-to-face contact with the patient and in the care of the patient on the floor/unit where the patient is located.     Venkatesh Suarez MD

## 2020-03-08 NOTE — PROGRESS NOTES
Patient stood at bedside. Patient denied dizziness, orthostatic vitals on patient. Patient's bp remained the same from lying to standing. The patient was on epinephrine during this standing trial. The patient then became tired and this RN and the assisting RN assisted the patient back to bed. The patient reports, \"felt pretty good, but I am weak\". Education regarding physical therapy was provided.  Currently attempting to wean drips, so that patient may be able to participate in therapies

## 2020-03-08 NOTE — PROGRESS NOTES
Pt rested well and stated that he felt better. While sleeping pt appeared slightly pinkish/ grey in the face- inconclusive  with skin w/d, hemodynamics stable, good urinary output, and when awake denied any issues Pt educated throughout the day regarding gtts, hemodynamics, why he has to stay in CV, to report s/s and v/u. No confusion today, remained oriented x4.

## 2020-03-08 NOTE — PROGRESS NOTES
POD 3 Day Post-Op    Procedure:  Procedure(s):  CORONARY ARTERY BYPASS GRAFT WITH MVR/ CLOSURE OF PFO/ (CABG x 3)/ LIMA/ LYSIS OF MYOCARDIAL ADHESIONS/ CLIPPING OF LEFT ATRIAL APPENDAGE  ESOPHAGEAL TRANS ECHOCARDIOGRAM  VEIN HARVEST/ GREATER SAPHENOUS      Subjective:     Patient has No significant medical complaints. CVP 11. Pressor dependent still. Epi started for borderline hemodynamics and low BP; parameters improved on flotrac.       Objective:     Patient Vitals for the past 8 hrs:   BP Temp Pulse Resp SpO2   20 0445   63 25 96 %   20 0431 102/57  63 23 95 %   20 0415   62 24 96 %   20 0400 108/56  63 24 97 %   20 0345   63 28 97 %   20 0330 109/57  63 28 97 %   20 0315   63 22 97 %   20 0301 104/58 99.1 °F (37.3 °C) 64 28 98 %   20 0130 108/57  63 21 97 %   20 0115   64  97 %   20 0101 105/55  64 26 96 %   20 0045   64 22 96 %   20 0030 103/57  64 25 96 %   20 0015   66 23 97 %   20 0002   65 27 94 %   20 2345   64 28 95 %   20 2330 115/56  65 27 94 %   20 2315   62 29 93 %   20 2301 106/59 99.3 °F (37.4 °C) 62 25 94 %   20 2245   62 25 94 %   20 2230 113/55  62 26 92 %   20 2215   63  (!) 89 %     Temp (24hrs), Av.6 °F (37 °C), Min:97.6 °F (36.4 °C), Max:99.3 °F (37.4 °C)        Hemodynamics  CVP 12    1901 -  0700  In: -   Out: 402 [Urine:257]  701 - 1900  In: 3316.1 [P.O.:640; I.V.:2676.1]  Out:  [Urine:1346]    CT Drainage:  295             Heart:  regular rate and rhythm, S1, S2 normal, no murmur, click, rub or gallop  Lung:  clear to auscultation bilaterally  Neuro: Grossly non focal  Incisions: Clean, dry, and intact    Labs:  Recent Results (from the past 24 hour(s))   URINALYSIS W/ RFLX MICROSCOPIC    Collection Time: 20  6:38 AM   Result Value Ref Range    Color DEN      Appearance CLOUDY Specific gravity 1.028 (H) 1.001 - 1.023      pH (UA) 6.0 5.0 - 9.0      Protein 30 (A) NEG mg/dL    Glucose NEGATIVE  mg/dL    Ketone NEGATIVE  NEG mg/dL    Bilirubin NEGATIVE  NEG      Blood LARGE (A) NEG      Urobilinogen 1.0 0.2 - 1.0 EU/dL    Nitrites NEGATIVE  NEG      Leukocyte Esterase NEGATIVE  NEG      WBC 5-10 0 /hpf    RBC 10-20 0 /hpf    Epithelial cells 3-5 0 /hpf    Bacteria 1+ (H) 0 /hpf    Mucus 1+ (H) 0 /lpf   GLUCOSE, POC    Collection Time: 03/07/20  6:45 AM   Result Value Ref Range    Glucose (POC) 166 (H) 65 - 100 mg/dL   GLUCOSE, POC    Collection Time: 03/07/20 12:19 PM   Result Value Ref Range    Glucose (POC) 146 (H) 65 - 100 mg/dL   GLUCOSE, POC    Collection Time: 03/07/20  5:25 PM   Result Value Ref Range    Glucose (POC) 193 (H) 65 - 100 mg/dL   PLEASE READ & DOCUMENT PPD TEST IN 48 HRS    Collection Time: 03/07/20  8:14 PM   Result Value Ref Range    PPD Negative Negative    mm Induration 0 0 - 5 mm   GLUCOSE, POC    Collection Time: 03/07/20 10:25 PM   Result Value Ref Range    Glucose (POC) 191 (H) 65 - 100 mg/dL   MAGNESIUM    Collection Time: 03/08/20  3:47 AM   Result Value Ref Range    Magnesium 2.1 1.8 - 2.4 mg/dL   CBC W/O DIFF    Collection Time: 03/08/20  3:47 AM   Result Value Ref Range    WBC 16.9 (H) 4.3 - 11.1 K/uL    RBC 2.60 (L) 4.23 - 5.6 M/uL    HGB 8.3 (L) 13.6 - 17.2 g/dL    HCT 25.0 (L) 41.1 - 50.3 %    MCV 96.2 79.6 - 97.8 FL    MCH 31.9 26.1 - 32.9 PG    MCHC 33.2 31.4 - 35.0 g/dL    RDW 13.6 11.9 - 14.6 %    PLATELET 192 (L) 399 - 450 K/uL    MPV 10.0 9.4 - 12.3 FL    ABSOLUTE NRBC 0.00 0.0 - 0.2 K/uL   METABOLIC PANEL, BASIC    Collection Time: 03/08/20  3:47 AM   Result Value Ref Range    Sodium 138 136 - 145 mmol/L    Potassium 4.1 3.5 - 5.1 mmol/L    Chloride 109 (H) 98 - 107 mmol/L    CO2 22 21 - 32 mmol/L    Anion gap 7 7 - 16 mmol/L    Glucose 196 (H) 65 - 100 mg/dL    BUN 16 8 - 23 MG/DL    Creatinine 0.59 (L) 0.8 - 1.5 MG/DL    GFR est AA >60 >60 ml/min/1.73m2    GFR est non-AA >60 >60 ml/min/1.73m2    Calcium 8.3 8.3 - 10.4 MG/DL       Assessment:     Active Problems:    Patent foramen ovale (3/5/2020)      Mitral valve regurgitation (3/5/2020)      CAD (coronary artery disease) (3/5/2020)      Mitral valve insufficiency (3/5/2020)      Atherosclerosis of coronary artery of native heart with unstable angina pectoris ([address] (3/5/2020)      Type II diabetes mellitus ([address] (3/5/2020)      Respiratory failure, post-operative ([address] (3/5/2020)      Hypoxia (3/5/2020)      Atelectasis (3/6/2020)      Acute pulmonary edema ([address] (3/6/2020)      Ventricular fibrillation ([address] (3/6/2020)      Pleural effusion, right (3/8/2020)        Plan/Recommendations/Medical Decision Making:     Start gentle diuresis. No PTX. WBC down to 17 today. Otherwise stable. Wean pressors, inotropes.

## 2020-03-09 LAB
ABO + RH BLD: NORMAL
ANION GAP SERPL CALC-SCNC: 7 MMOL/L (ref 7–16)
BACTERIA SPEC CULT: NORMAL
BLD PROD TYP BPU: NORMAL
BLOOD GROUP ANTIBODIES SERPL: NORMAL
BPU ID: NORMAL
BUN SERPL-MCNC: 12 MG/DL (ref 8–23)
CALCIUM SERPL-MCNC: 8.5 MG/DL (ref 8.3–10.4)
CHLORIDE SERPL-SCNC: 109 MMOL/L (ref 98–107)
CO2 SERPL-SCNC: 22 MMOL/L (ref 21–32)
CREAT SERPL-MCNC: 0.55 MG/DL (ref 0.8–1.5)
CROSSMATCH RESULT,%XM: NORMAL
ERYTHROCYTE [DISTWIDTH] IN BLOOD BY AUTOMATED COUNT: 13.4 % (ref 11.9–14.6)
GLUCOSE BLD STRIP.AUTO-MCNC: 130 MG/DL (ref 65–100)
GLUCOSE BLD STRIP.AUTO-MCNC: 131 MG/DL (ref 65–100)
GLUCOSE BLD STRIP.AUTO-MCNC: 135 MG/DL (ref 65–100)
GLUCOSE SERPL-MCNC: 124 MG/DL (ref 65–100)
HCT VFR BLD AUTO: 23.5 % (ref 41.1–50.3)
HGB BLD-MCNC: 7.8 G/DL (ref 13.6–17.2)
MAGNESIUM SERPL-MCNC: 2.1 MG/DL (ref 1.8–2.4)
MCH RBC QN AUTO: 31.7 PG (ref 26.1–32.9)
MCHC RBC AUTO-ENTMCNC: 33.2 G/DL (ref 31.4–35)
MCV RBC AUTO: 95.5 FL (ref 79.6–97.8)
NRBC # BLD: 0 K/UL (ref 0–0.2)
PLATELET # BLD AUTO: 113 K/UL (ref 150–450)
PMV BLD AUTO: 10.6 FL (ref 9.4–12.3)
POTASSIUM SERPL-SCNC: 3.9 MMOL/L (ref 3.5–5.1)
RBC # BLD AUTO: 2.46 M/UL (ref 4.23–5.6)
SERVICE CMNT-IMP: NORMAL
SODIUM SERPL-SCNC: 138 MMOL/L (ref 136–145)
SPECIMEN EXP DATE BLD: NORMAL
STATUS OF UNIT,%ST: NORMAL
UNIT DIVISION, %UDIV: 0
WBC # BLD AUTO: 8.6 K/UL (ref 4.3–11.1)

## 2020-03-09 PROCEDURE — 86900 BLOOD TYPING SEROLOGIC ABO: CPT

## 2020-03-09 PROCEDURE — 74011250637 HC RX REV CODE- 250/637: Performed by: THORACIC SURGERY (CARDIOTHORACIC VASCULAR SURGERY)

## 2020-03-09 PROCEDURE — 97161 PT EVAL LOW COMPLEX 20 MIN: CPT

## 2020-03-09 PROCEDURE — 97110 THERAPEUTIC EXERCISES: CPT

## 2020-03-09 PROCEDURE — 86923 COMPATIBILITY TEST ELECTRIC: CPT

## 2020-03-09 PROCEDURE — 82962 GLUCOSE BLOOD TEST: CPT

## 2020-03-09 PROCEDURE — 74011250637 HC RX REV CODE- 250/637: Performed by: PHYSICIAN ASSISTANT

## 2020-03-09 PROCEDURE — 80048 BASIC METABOLIC PNL TOTAL CA: CPT

## 2020-03-09 PROCEDURE — P9016 RBC LEUKOCYTES REDUCED: HCPCS

## 2020-03-09 PROCEDURE — 99233 SBSQ HOSP IP/OBS HIGH 50: CPT | Performed by: INTERNAL MEDICINE

## 2020-03-09 PROCEDURE — 85027 COMPLETE CBC AUTOMATED: CPT

## 2020-03-09 PROCEDURE — 74011250637 HC RX REV CODE- 250/637: Performed by: INTERNAL MEDICINE

## 2020-03-09 PROCEDURE — P9047 ALBUMIN (HUMAN), 25%, 50ML: HCPCS | Performed by: THORACIC SURGERY (CARDIOTHORACIC VASCULAR SURGERY)

## 2020-03-09 PROCEDURE — P9047 ALBUMIN (HUMAN), 25%, 50ML: HCPCS | Performed by: INTERNAL MEDICINE

## 2020-03-09 PROCEDURE — 77010033678 HC OXYGEN DAILY

## 2020-03-09 PROCEDURE — 36430 TRANSFUSION BLD/BLD COMPNT: CPT

## 2020-03-09 PROCEDURE — 83735 ASSAY OF MAGNESIUM: CPT

## 2020-03-09 PROCEDURE — 74011250636 HC RX REV CODE- 250/636: Performed by: THORACIC SURGERY (CARDIOTHORACIC VASCULAR SURGERY)

## 2020-03-09 PROCEDURE — 36600 WITHDRAWAL OF ARTERIAL BLOOD: CPT

## 2020-03-09 PROCEDURE — 77030012890

## 2020-03-09 PROCEDURE — 77030013064 HC TRNSF BLD FENW -A

## 2020-03-09 PROCEDURE — 30233N1 TRANSFUSION OF NONAUTOLOGOUS RED BLOOD CELLS INTO PERIPHERAL VEIN, PERCUTANEOUS APPROACH: ICD-10-PCS | Performed by: THORACIC SURGERY (CARDIOTHORACIC VASCULAR SURGERY)

## 2020-03-09 PROCEDURE — 74011250636 HC RX REV CODE- 250/636: Performed by: INTERNAL MEDICINE

## 2020-03-09 PROCEDURE — 65660000004 HC RM CVT STEPDOWN

## 2020-03-09 RX ORDER — ALBUMIN HUMAN 250 G/1000ML
25 SOLUTION INTRAVENOUS 2 TIMES DAILY
Status: DISCONTINUED | OUTPATIENT
Start: 2020-03-09 | End: 2020-03-09

## 2020-03-09 RX ORDER — FUROSEMIDE 10 MG/ML
20 INJECTION INTRAMUSCULAR; INTRAVENOUS 2 TIMES DAILY
Status: DISCONTINUED | OUTPATIENT
Start: 2020-03-09 | End: 2020-03-09

## 2020-03-09 RX ORDER — AMIODARONE HYDROCHLORIDE 200 MG/1
200 TABLET ORAL EVERY 12 HOURS
Status: DISCONTINUED | OUTPATIENT
Start: 2020-03-09 | End: 2020-03-13 | Stop reason: HOSPADM

## 2020-03-09 RX ORDER — CITALOPRAM 10 MG/1
10 TABLET ORAL DAILY
Status: DISCONTINUED | OUTPATIENT
Start: 2020-03-10 | End: 2020-03-13 | Stop reason: HOSPADM

## 2020-03-09 RX ORDER — METFORMIN HYDROCHLORIDE 500 MG/1
500 TABLET ORAL 2 TIMES DAILY WITH MEALS
Status: DISCONTINUED | OUTPATIENT
Start: 2020-03-09 | End: 2020-03-13 | Stop reason: HOSPADM

## 2020-03-09 RX ORDER — ATORVASTATIN CALCIUM 80 MG/1
80 TABLET, FILM COATED ORAL
Status: DISCONTINUED | OUTPATIENT
Start: 2020-03-09 | End: 2020-03-13 | Stop reason: HOSPADM

## 2020-03-09 RX ORDER — CARVEDILOL 3.12 MG/1
3.12 TABLET ORAL EVERY 12 HOURS
Status: DISCONTINUED | OUTPATIENT
Start: 2020-03-09 | End: 2020-03-13 | Stop reason: HOSPADM

## 2020-03-09 RX ORDER — LANOLIN ALCOHOL/MO/W.PET/CERES
400 CREAM (GRAM) TOPICAL
Status: DISCONTINUED | OUTPATIENT
Start: 2020-03-09 | End: 2020-03-13 | Stop reason: HOSPADM

## 2020-03-09 RX ORDER — ONDANSETRON 2 MG/ML
4 INJECTION INTRAMUSCULAR; INTRAVENOUS
Status: DISCONTINUED | OUTPATIENT
Start: 2020-03-09 | End: 2020-03-13 | Stop reason: HOSPADM

## 2020-03-09 RX ORDER — POTASSIUM CHLORIDE 20 MEQ/1
20 TABLET, EXTENDED RELEASE ORAL
Status: DISCONTINUED | OUTPATIENT
Start: 2020-03-09 | End: 2020-03-13 | Stop reason: HOSPADM

## 2020-03-09 RX ORDER — DOCUSATE SODIUM 100 MG/1
100 CAPSULE, LIQUID FILLED ORAL DAILY
Status: DISCONTINUED | OUTPATIENT
Start: 2020-03-10 | End: 2020-03-09

## 2020-03-09 RX ORDER — MAG HYDROX/ALUMINUM HYD/SIMETH 200-200-20
30 SUSPENSION, ORAL (FINAL DOSE FORM) ORAL
Status: DISCONTINUED | OUTPATIENT
Start: 2020-03-09 | End: 2020-03-13 | Stop reason: HOSPADM

## 2020-03-09 RX ORDER — SODIUM CHLORIDE 9 MG/ML
250 INJECTION, SOLUTION INTRAVENOUS AS NEEDED
Status: DISCONTINUED | OUTPATIENT
Start: 2020-03-09 | End: 2020-03-13 | Stop reason: HOSPADM

## 2020-03-09 RX ORDER — TERAZOSIN 5 MG/1
10 CAPSULE ORAL
Status: DISCONTINUED | OUTPATIENT
Start: 2020-03-09 | End: 2020-03-13 | Stop reason: HOSPADM

## 2020-03-09 RX ORDER — MUPIROCIN 20 MG/G
OINTMENT TOPICAL 2 TIMES DAILY
Status: DISCONTINUED | OUTPATIENT
Start: 2020-03-09 | End: 2020-03-09

## 2020-03-09 RX ORDER — POTASSIUM CHLORIDE 20 MEQ/1
40 TABLET, EXTENDED RELEASE ORAL
Status: DISCONTINUED | OUTPATIENT
Start: 2020-03-09 | End: 2020-03-13 | Stop reason: HOSPADM

## 2020-03-09 RX ORDER — FUROSEMIDE 40 MG/1
40 TABLET ORAL DAILY
Status: COMPLETED | OUTPATIENT
Start: 2020-03-10 | End: 2020-03-12

## 2020-03-09 RX ORDER — SODIUM CHLORIDE 0.9 % (FLUSH) 0.9 %
5-40 SYRINGE (ML) INJECTION AS NEEDED
Status: DISCONTINUED | OUTPATIENT
Start: 2020-03-09 | End: 2020-03-13 | Stop reason: HOSPADM

## 2020-03-09 RX ORDER — AMOXICILLIN 250 MG
2 CAPSULE ORAL EVERY 12 HOURS
Status: DISCONTINUED | OUTPATIENT
Start: 2020-03-09 | End: 2020-03-13 | Stop reason: HOSPADM

## 2020-03-09 RX ORDER — ASPIRIN 81 MG/1
81 TABLET ORAL DAILY
Status: DISCONTINUED | OUTPATIENT
Start: 2020-03-10 | End: 2020-03-13 | Stop reason: HOSPADM

## 2020-03-09 RX ORDER — INSULIN LISPRO 100 [IU]/ML
INJECTION, SOLUTION INTRAVENOUS; SUBCUTANEOUS
Status: DISCONTINUED | OUTPATIENT
Start: 2020-03-09 | End: 2020-03-13 | Stop reason: HOSPADM

## 2020-03-09 RX ORDER — NITROGLYCERIN 0.4 MG/1
0.4 TABLET SUBLINGUAL AS NEEDED
Status: DISCONTINUED | OUTPATIENT
Start: 2020-03-09 | End: 2020-03-13 | Stop reason: HOSPADM

## 2020-03-09 RX ORDER — FAMOTIDINE 10 MG/1
20 TABLET ORAL 2 TIMES DAILY
Status: DISCONTINUED | OUTPATIENT
Start: 2020-03-09 | End: 2020-03-13 | Stop reason: HOSPADM

## 2020-03-09 RX ORDER — ACETAMINOPHEN 325 MG/1
650 TABLET ORAL
Status: DISCONTINUED | OUTPATIENT
Start: 2020-03-09 | End: 2020-03-13 | Stop reason: HOSPADM

## 2020-03-09 RX ORDER — ALBUMIN HUMAN 250 G/1000ML
25 SOLUTION INTRAVENOUS 2 TIMES DAILY
Status: COMPLETED | OUTPATIENT
Start: 2020-03-09 | End: 2020-03-10

## 2020-03-09 RX ORDER — POTASSIUM CHLORIDE 750 MG/1
10 TABLET, EXTENDED RELEASE ORAL DAILY
Status: COMPLETED | OUTPATIENT
Start: 2020-03-10 | End: 2020-03-12

## 2020-03-09 RX ORDER — SODIUM CHLORIDE 0.9 % (FLUSH) 0.9 %
5-40 SYRINGE (ML) INJECTION EVERY 8 HOURS
Status: DISCONTINUED | OUTPATIENT
Start: 2020-03-09 | End: 2020-03-13 | Stop reason: HOSPADM

## 2020-03-09 RX ORDER — FUROSEMIDE 10 MG/ML
20 INJECTION INTRAMUSCULAR; INTRAVENOUS 2 TIMES DAILY
Status: DISCONTINUED | OUTPATIENT
Start: 2020-03-09 | End: 2020-03-10

## 2020-03-09 RX ORDER — NITROGLYCERIN 400 UG/1
1 SPRAY ORAL
Status: DISCONTINUED | OUTPATIENT
Start: 2020-03-09 | End: 2020-03-09 | Stop reason: CLARIF

## 2020-03-09 RX ORDER — POLYETHYLENE GLYCOL 3350 17 G/17G
17 POWDER, FOR SOLUTION ORAL
Status: DISCONTINUED | OUTPATIENT
Start: 2020-03-09 | End: 2020-03-13 | Stop reason: HOSPADM

## 2020-03-09 RX ORDER — LISINOPRIL 5 MG/1
2.5 TABLET ORAL DAILY
Status: DISCONTINUED | OUTPATIENT
Start: 2020-03-10 | End: 2020-03-13 | Stop reason: HOSPADM

## 2020-03-09 RX ADMIN — ALBUMIN (HUMAN) 25 G: 0.25 INJECTION, SOLUTION INTRAVENOUS at 21:23

## 2020-03-09 RX ADMIN — ASPIRIN 81 MG 81 MG: 81 TABLET ORAL at 08:37

## 2020-03-09 RX ADMIN — SENNOSIDES AND DOCUSATE SODIUM 1 TABLET: 8.6; 5 TABLET ORAL at 08:39

## 2020-03-09 RX ADMIN — Medication 10 ML: at 06:42

## 2020-03-09 RX ADMIN — POLYETHYLENE GLYCOL 3350 17 G: 17 POWDER, FOR SOLUTION ORAL at 09:02

## 2020-03-09 RX ADMIN — TRAMADOL HYDROCHLORIDE 50 MG: 50 TABLET, FILM COATED ORAL at 08:36

## 2020-03-09 RX ADMIN — ALBUMIN (HUMAN) 25 G: 0.25 INJECTION, SOLUTION INTRAVENOUS at 08:38

## 2020-03-09 RX ADMIN — TERAZOSIN HYDROCHLORIDE 10 MG: 5 CAPSULE ORAL at 21:54

## 2020-03-09 RX ADMIN — ACETAMINOPHEN 650 MG: 325 TABLET, FILM COATED ORAL at 22:49

## 2020-03-09 RX ADMIN — METFORMIN HYDROCHLORIDE 500 MG: 500 TABLET ORAL at 17:54

## 2020-03-09 RX ADMIN — SENNOSIDES AND DOCUSATE SODIUM 2 TABLET: 8.6; 5 TABLET ORAL at 21:42

## 2020-03-09 RX ADMIN — FAMOTIDINE 20 MG: 20 TABLET, FILM COATED ORAL at 08:36

## 2020-03-09 RX ADMIN — Medication 1 AMPULE: at 08:36

## 2020-03-09 RX ADMIN — FUROSEMIDE 20 MG: 10 INJECTION, SOLUTION INTRAMUSCULAR; INTRAVENOUS at 21:49

## 2020-03-09 RX ADMIN — FUROSEMIDE 20 MG: 10 INJECTION, SOLUTION INTRAMUSCULAR; INTRAVENOUS at 08:36

## 2020-03-09 RX ADMIN — TRAMADOL HYDROCHLORIDE 50 MG: 50 TABLET, FILM COATED ORAL at 17:54

## 2020-03-09 RX ADMIN — AMIODARONE HYDROCHLORIDE 200 MG: 200 TABLET ORAL at 08:36

## 2020-03-09 RX ADMIN — ATORVASTATIN CALCIUM 80 MG: 80 TABLET, FILM COATED ORAL at 21:42

## 2020-03-09 RX ADMIN — Medication 10 ML: at 21:41

## 2020-03-09 RX ADMIN — FAMOTIDINE 20 MG: 10 TABLET ORAL at 17:54

## 2020-03-09 RX ADMIN — Medication 10 ML: at 17:00

## 2020-03-09 NOTE — PROGRESS NOTES
TRANSFER - IN REPORT:    Verbal report received from [facility] on [facility] [facility]  being received from CVICU for routine progression of care      Report consisted of patients Situation, Background, Assessment and   Recommendations(SBAR). Information from the following report(s) SBAR was reviewed with the receiving nurse. Opportunity for questions and clarification was provided. Assessment completed upon patients arrival to unit and care assumed. Dual assessment completed with RN. No skin breakdown noted. Allevyn has been pulled back. MS and LLE incisions open to air. Pacing wires taped and isolated.

## 2020-03-09 NOTE — PROGRESS NOTES
Adonis Covarrubias III  Admission Date: 3/5/2020             Daily Progress Note: 3/9/2020    The patient's chart is reviewed and the patient is discussed with the staff. Glen Lares is a 73-year-old gentleman with a 20-pack-year smoking history (quit 1997), normal PFTs, ROSENDO not on CPAP, diabetes, left-sided weakness, BPH with mitral regurgitation who underwent CABG, MVR and PFO closure, left atrial appendage clipping. V. fib at 230 on 3/4 requiring defibrillation, amio drip. Still on some pressors. Off pressors on 3/8    Subjective:   Patient overnight was unremarkable. Off pressors yesterday. Feels constipated and not moved his bowels.     Review of Systems:  -Fever  -Headaches  -Chest pain +chest wall pain (less)  -Dyspnea, -wheezing, -cough  -Abdominal pain, + constipation   -Leg swelling  All other organ systems grossly normal.      Current Facility-Administered Medications   Medication Dose Route Frequency    insulin lispro (HUMALOG) injection 0-10 Units  0-10 Units SubCUTAneous AC&HS    senna-docusate (PERICOLACE) 8.6-50 mg per tablet 1 Tab  1 Tab Oral Q12H    magnesium hydroxide (MILK OF MAGNESIA) 400 mg/5 mL oral suspension 30 mL  30 mL Oral DAILY PRN    famotidine (PEPCID) tablet 20 mg  20 mg Oral Q12H    influenza vaccine 2019-20 (6 mos+)(PF) (FLUARIX/FLULAVAL/FLUZONE QUAD) injection 0.5 mL  0.5 mL IntraMUSCular PRIOR TO DISCHARGE    amiodarone (CORDARONE) tablet 200 mg  200 mg Oral Q12H    traMADoL (ULTRAM) tablet 50 mg  50 mg Oral Q6H PRN    alcohol 62% (NOZIN) nasal  1 Ampule  1 Ampule Topical Q12H    0.9% sodium chloride infusion  25 mL/hr IntraVENous CONTINUOUS    dextrose 5% - 0.45% NaCl with KCl 20 mEq/L infusion  25 mL/hr IntraVENous CONTINUOUS    sodium chloride (NS) flush 5-40 mL  5-40 mL IntraVENous Q8H    sodium chloride (NS) flush 5-40 mL  5-40 mL IntraVENous PRN    acetaminophen (TYLENOL) tablet 650 mg  650 mg Oral Q4H PRN    morphine 10 mg/ml injection 3-5 mg  3-5 mg IntraVENous Q1H PRN    naloxone (NARCAN) injection 0.4 mg  0.4 mg IntraVENous PRN    DOBUTamine (DOBUTREX) 500 mg/250 mL (2,000 mcg/mL) infusion  2-10 mcg/kg/min IntraVENous TITRATE    EPINEPHrine (ADRENALIN) 4 mg in 0.9% sodium chloride 250 mL infusion  0.01-0.08 mcg/kg/min IntraVENous TITRATE    NOREPINephrine (LEVOPHED) 8,000 mcg in dextrose 5% 250 mL infusion  0.01-0.2 mcg/kg/min IntraVENous TITRATE    [Held by provider] metoprolol tartrate (LOPRESSOR) tablet 25 mg  25 mg Oral BID    atorvastatin (LIPITOR) tablet 80 mg  80 mg Oral QHS    ondansetron (ZOFRAN) injection 4-8 mg  4-8 mg IntraVENous Q4H PRN    dextrose (D50W) injection syrg 12.5 g  25 mL IntraVENous PRN    aspirin chewable tablet 81 mg  81 mg Oral DAILY    magnesium sulfate 1 g/100 ml IVPB (premix or compounded)  1 g IntraVENous PRN    midazolam (VERSED) injection 1 mg  1 mg IntraVENous Q1H PRN    PHENYLephrine (PF)(KATERINA-SYNEPHRINE) 30 mg in 0.9% sodium chloride 250 mL infusion   mcg/min IntraVENous TITRATE    amiodarone (CORDARONE) 450 mg in dextrose 5% 250 mL infusion  0.5-1 mg/min IntraVENous TITRATE            Objective:     Vitals:    03/09/20 0502 03/09/20 0531 03/09/20 0602 03/09/20 0631   BP: 98/55 103/56 97/54 115/55   Pulse: (!) 57 (!) 56 (!) 57 62   Resp: 25 20 19 20   Temp:       SpO2: 96% 95% 97% 92%   Weight: 174 lb 13.2 oz (79.3 kg)      Height: 5' 9\" (1.753 m)            Intake/Output Summary (Last 24 hours) at 3/9/2020 0701  Last data filed at 3/9/2020 0302  Gross per 24 hour   Intake 2856.07 ml   Output 2091 ml   Net 765.07 ml       Physical Exam:   Constitution:  the patient is well developed and in no acute distress  EENMT:  Sclera clear, pupils equal, oral mucosa moist  Respiratory:decreased sounds  Mostly in the right lung base. No wheezing. Cardiovascular:  RRR without M,G,R  Gastrointestinal: soft and non-tender; with positive bowel sounds. Musculoskeletal: warm without cyanosis. There is no lower extremity edema. Skin:  no jaundice or rashes  Neurologic: no gross neuro deficits     Psychiatric:  alert and oriented x 3    CXR: none today. Yesterday: noted increased Right sided and left chest tubes. CM.        LAB  Recent Labs     03/09/20  0637 03/08/20  2124 03/08/20  1647 03/08/20  1114 03/08/20  0709   GLUCPOC 131* 124* 129* 214* 181*      Recent Labs     03/09/20  0400 03/08/20  0347 03/07/20  0317   WBC 8.6 16.9* 24.4*   HGB 7.8* 8.3* 9.1*   HCT 23.5* 25.0* 27.8*   * 108* 118*     Recent Labs     03/09/20  0400 03/08/20  0347 03/07/20  0317    138 139   K 3.9 4.1 4.1   * 109* 111*   CO2 22 22 23   * 196* 186*   BUN 12 16 13   CREA 0.55* 0.59* 0.62*   MG 2.1 2.1 1.9   CA 8.5 8.3 8.1*   ALB  --  3.0*  --      No results for input(s): PH, PCO2, PO2, HCO3, PHI, PCO2I, PO2I, HCO3I in the last 72 hours. No results for input(s): LCAD, LAC in the last 72 hours.       Assessment:  (Medical Decision Making)     Hospital Problems  Date Reviewed: 2/24/2020          Codes Class Noted POA    Pleural effusion, right ICD-10-CM: J90  ICD-9-CM: 511.9  3/8/2020 Unknown        Atelectasis ICD-10-CM: J98.11  ICD-9-CM: 518.0  3/6/2020 Unknown        Acute pulmonary edema (HCC) ICD-10-CM: J81.0  ICD-9-CM: 518.4  3/6/2020 Unknown        Ventricular fibrillation (HCC) ICD-10-CM: I49.01  ICD-9-CM: 427.41  3/6/2020 Unknown        Patent foramen ovale ICD-10-CM: Q21.1  ICD-9-CM: 745.5  3/5/2020 Yes        Mitral valve regurgitation ICD-10-CM: I34.0  ICD-9-CM: 424.0  3/5/2020 Yes        CAD (coronary artery disease) ICD-10-CM: I25.10  ICD-9-CM: 414.00  3/5/2020 Yes        Mitral valve insufficiency ICD-10-CM: I34.0  ICD-9-CM: 424.0  3/5/2020 Yes        Atherosclerosis of coronary artery of native heart with unstable angina pectoris (HCC) ICD-10-CM: I25.110  ICD-9-CM: 414.01, 411.1  3/5/2020 Yes        Type II diabetes mellitus (HCC) (Chronic) ICD-10-CM: E11.9  ICD-9-CM: 250.00 3/5/2020 Yes        Respiratory failure, post-operative ([facility] [address] ICD-10-CM: K61.462  ICD-9-CM: 518.51  3/5/2020 No        Hypoxia ICD-10-CM: R09.02  ICD-9-CM: 799.02  3/5/2020 No            Patient s/p CABG x 3. Still on pressors Roman and Epi. Constipated. cxr appears to show increased congestion/effusion on Right. Diuresis is still in positive balance, but off pressors now. Plan:  (Medical Decision Making)     --off pressors and SBP about 90. Continue to monitor. Hold off pulling cordis today. --still in positive balance. Will given him  lasix 20 mg BID x 2 dose and few doses of albumin. cxr yesterday with moderate pleural effusion in right chest. May need tap, but would like to first diurses  --hg lower and transfuse per CV surgery  --laxative since constipated, but now sitting in chair. --on RA now  --Incentive spriometry  --continue remaining treatment. More than 50% of the time documented was spent in face-to-face contact with the patient and in the care of the patient on the floor/unit where the patient is located.     Naveed Stuart MD

## 2020-03-09 NOTE — PROGRESS NOTES
TRANSFER - OUT REPORT:    Verbal report given to [facility] RN on [address] [facility]  being transferred to 223 for routine progression of care       Report consisted of patients Situation, Background, Assessment and   Recommendations(SBAR). Information from the following report(s) SBAR, OR Summary, Procedure Summary, Intake/Output, MAR and Cardiac Rhythm Sinus Bradycardia was reviewed with the receiving nurse. Lines:   Peripheral IV 03/05/20 Right Hand (Active)   Site Assessment Clean, dry, & intact 3/9/2020 11:02 AM   Phlebitis Assessment 0 3/9/2020 11:02 AM   Infiltration Assessment 0 3/9/2020 11:02 AM   Dressing Status Clean, dry, & intact 3/9/2020 11:02 AM   Dressing Type Transparent;Tape 3/9/2020 11:02 AM   Hub Color/Line Status Green;Capped 3/9/2020 11:02 AM   Action Taken Open ports on tubing capped 3/8/2020  3:01 PM   Alcohol Cap Used No 3/9/2020 11:02 AM        Opportunity for questions and clarification was provided.       Patient transported with:   Monitor  O2 @ 2 liters  Registered Nurse

## 2020-03-09 NOTE — PROGRESS NOTES
Problem: Mobility Impaired (Adult and Pediatric)  Goal: *Acute Goals and Plan of Care (Insert Text)  Description  LTG:  (1.)Mr. Jesse Mejia will move from supine to sit and sit to supine , scoot up and down and roll side to side in flat bed without siderails with  CONTACT GUARD ASSIST within 7 day(s). (2.)Mr. Jesse Mejia will perform all functional transfers with  CONTACT GUARD ASSIST using the least restrictive/no device within 7 day(s). (3.)Mr. Jesse Mejia will ambulate with  CONTACT GUARD ASSIST for 250+ feet with normal vital sign response with the least restrictive/no device within 7 day(s). Outcome: Progressing Towards Goal     PHYSICAL THERAPY: Initial Assessment, Daily Note, and PM 3/9/2020  INPATIENT: PT Visit Days : 1  [place] / [place] / Product Type: Medicare /       NAME/AGE/GENDER: WM Donya Nolan III is a 78 y.o. male   PRIMARY DIAGNOSIS: Atherosclerosis of native coronary artery of native heart with unstable angina pectoris ([address] [I25.110]  Mitral valve insufficiency, unspecified etiology [I34.0]  Patent foramen ovale [Q21.1]  CAD (coronary artery disease) [I25.10]  Mitral valve regurgitation [I34.0]  Patent foramen ovale [Q21.1]  Atherosclerosis of coronary artery of native heart with unstable angina pectoris (HCC) [I25.110]  Mitral valve insufficiency [I34.0]  Patent foramen ovale [Q21.1] <principal problem not specified> <principal problem not specified>  Procedure(s) (LRB):  CORONARY ARTERY BYPASS GRAFT WITH MVR/ CLOSURE OF PFO/ (CABG x 3)/ LIMA/ LYSIS OF MYOCARDIAL ADHESIONS/ CLIPPING OF LEFT ATRIAL APPENDAGE (N/A)  ESOPHAGEAL TRANS ECHOCARDIOGRAM (N/A)  VEIN HARVEST/ GREATER SAPHENOUS (Left)  4 Days Post-Op  ICD-10: Treatment Diagnosis:    Other abnormalities of gait and mobility (R26.89)  History of falling (Z91.81)   Precaution/Allergies:  Patient has no known allergies. ASSESSMENT:     Mr. Jesse Mejia underwent above surgery.   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 He lives with his sister and ambulates independently but has suffered 1 fall in the past couple months while stepping off a curb. Patient supine in bed and agreeable for PT, but reports feeling extremely tired-has been up since 3:30 am he reports. B LE strength WFL and light touch sensation intact. Patient required mod A to roll to sidelying then come to sit. He stood with min A to RW and ambulated 25' in room with min A.  Mr. Tyler Swanson is functioning well below baseline and is therefore appropriate for skilled PT to maximize rehab potential.   Patient may benefit from [facility] at baseline, as his decreased vision affects his mobility. Initiated treatment to include LE ex's in sit. Good participation. This section established at most recent assessment   PROBLEM LIST (Impairments causing functional limitations):  Decreased Transfer Abilities  Decreased Ambulation Ability/Technique  Decreased Balance  Decreased Activity Tolerance  Decreased Knowledge of Precautions   INTERVENTIONS PLANNED: (Benefits and precautions of physical therapy have been discussed with the patient.)  Balance Exercise  Bed Mobility  Gait Training  Neuromuscular Re-education/Strengthening  Therapeutic Activites  Therapeutic Exercise/Strengthening  Transfer Training  education      TREATMENT PLAN: Frequency/Duration: twice daily for duration of hospital stay  Rehabilitation Potential For Stated Goals: Good     REHAB RECOMMENDATIONS (at time of discharge pending progress):    Placement: It is my opinion, based on this patient's performance to date, that Mr. Tyler Swanson may benefit from intensive therapy at an [address] [place] [address] after discharge due to a probable need for multiple therapy disciplines and potential to make ongoing and sustainable functional improvement that is of practical value. .  Equipment:   tbd   None at this time              HISTORY:   History of Present Injury/Illness (Reason for Referral): Admitted for above. Past Medical History/Comorbidities:   Mr. Joe Vaz  has a past medical history of Arthritis, CAD (coronary artery disease), CVA (cerebral vascular accident) ([facility] [address] (1/3/2020), Diabetes type 2, uncontrolled ([facility] [address] Hypercholesteremia, Sleep apnea, Systolic CHF, chronic ([facility] [address] (1/5/2020), TIA (transient ischemic attack) (2009), and Troponin I above reference range (1/3/2020). Mr. Joe Vaz  has a past surgical history that includes hx hernia repair; hx knee arthroscopy; and hx cyst removal.  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 He lives with his sister and ambulates independently but has suffered 1 fall in the past couple months while stepping off a curb. Number of Personal Factors/Comorbidities that affect the Plan of Care: 1-2: MODERATE COMPLEXITY   EXAMINATION:   Most Recent Physical Functioning:   Gross Assessment:  AROM: Generally decreased, functional  Strength: Generally decreased, functional  Tone: Normal  Sensation: Intact               Posture:  Posture (WDL): Exceptions to WDL  Posture Assessment: Forward head, Rounded shoulders  Balance:  Sitting: Intact  Standing: Impaired  Standing - Static: Fair  Standing - Dynamic : Constant support Bed Mobility:  Rolling: Moderate assistance  Supine to Sit: Moderate assistance  Scooting:  Moderate assistance  Wheelchair Mobility:     Transfers:  Sit to Stand: Minimum assistance  Stand to Sit: Minimum assistance  Bed to Chair: Minimum assistance  Gait:     Base of Support: Widened  Speed/Clara: Slow  Step Length: Right shortened;Left shortened  Distance (ft): 25 Feet (ft)  Assistive Device: Walker, rolling;Gait belt  Ambulation - Level of Assistance: Minimal assistance      Body Structures Involved:  Eyes and Ears  Heart  Lungs  Muscles Body Functions Affected:  Sensory/Pain  Cardio  Respiratory  Movement Related  education  Activities and Participation Affected: [address], Social and [place]   Number of elements that affect the Plan of Care: 4+: HIGH COMPLEXITY   CLINICAL PRESENTATION:   Presentation: Stable and uncomplicated: LOW COMPLEXITY   CLINICAL DECISION MAKIN [facility] Inpatient Short Form  How much difficulty does the patient currently have. .. Unable A Lot A Little None   1. Turning over in bed (including adjusting bedclothes, sheets and blankets)? [] 1   [x] 2   [] 3   [] 4   2. Sitting down on and standing up from a chair with arms ( e.g., wheelchair, bedside commode, etc.)   [] 1   [] 2   [x] 3   [] 4   3. Moving from lying on back to sitting on the side of the bed? [] 1   [x] 2   [] 3   [] 4   How much help from another person does the patient currently need. .. Total A Lot A Little None   4. Moving to and from a bed to a chair (including a wheelchair)? [] 1   [] 2   [x] 3   [] 4   5. Need to walk in hospital room? [] 1   [] 2   [x] 3   [] 4   6. Climbing 3-5 steps with a railing? [] 1   [x] 2   [] 3   [] 4   © , Trustees of [address] [place], [place] [address], under license to [employer]. All rights reserved      Score:  Initial: 15 Most Recent: X (Date: -- )    Interpretation of Tool:  Represents activities that are increasingly more difficult (i.e. Bed mobility, Transfers, Gait). Medical Necessity:     Patient is expected to demonstrate progress in   balance, functional technique, and activity tolerance    to   increase independence with   and improve safety during all functional mobility   .   Reason for Services/Other Comments:  Patient continues to require skilled intervention due to   medical complications and patient unable to attend/participate in therapy as expected  . Use of outcome tool(s) and clinical judgement create a POC that gives a: Clear prediction of patient's progress: LOW COMPLEXITY            TREATMENT:   (In addition to Assessment/Re-Assessment sessions the following treatments were rendered)   Pre-treatment Symptoms/Complaints:  \"worn out\"  Pain: Initial:   Pain Intensity 1: 0  Pain Intervention(s) 1: Repositioned  Post Session:  3     Therapeutic Exercise: ( 10 minutes):  Exercises per grid below to improve mobility, strength, and balance. Required minimal visual and verbal cues to promote proper body alignment. Progressed complexity of movement as indicated. DATE: 3/9/20       Ambulation        Hip Flexion X15 AB       Long Arc Quads X15 AB       Hip ab/ad        Heel Raises X15 AB       Toe Raises X15 AB                                Key:  A=active, AA=active assisted, P=passive, B=bilaterally, R=right, L=left   DF=dorsiflexion, PF=plantarflexion      Braces/Orthotics/Lines/Etc:   O2 Device: (P) Nasal cannula  Treatment/Session Assessment:    Response to Treatment:  cooperative. Interdisciplinary Collaboration:   Physical Therapist  Registered Nurse  After treatment position/precautions:   Up in chair  Bed alarm/tab alert on  Bed/Chair-wheels locked  Call light within reach  RN notified   Compliance with Program/Exercises: Compliant all of the time, Will assess as treatment progresses  Recommendations/Intent for next treatment session: \"Next visit will focus on advancements to more challenging activities and reduction in assistance provided\".   Total Treatment Duration:  PT Patient Time In/Time Out  Time In: 1445  Time Out: Katelynn Go, PT, DPT

## 2020-03-09 NOTE — PROGRESS NOTES
Bedside shift change report given to Kenny Johnson RN (oncoming nurse) by Rere Wallace RN (offgoing nurse). Report included the following information SBAR, Kardex, OR Summary, Intake/Output, MAR, Recent Results, Med Rec Status and Cardiac Rhythm of Sinus Nakia Larve.

## 2020-03-09 NOTE — PROGRESS NOTES
POD 4 Days Post-Op    Procedure:  Procedure(s):  CORONARY ARTERY BYPASS GRAFT WITH MVR/ CLOSURE OF PFO/ (CABG x 3)/ LIMA/ LYSIS OF MYOCARDIAL ADHESIONS/ CLIPPING OF LEFT ATRIAL APPENDAGE  ESOPHAGEAL TRANS ECHOCARDIOGRAM  VEIN HARVEST/ GREATER SAPHENOUS      Subjective:     Patient has No significant medical complaints      Objective:     Patient Vitals for the past 8 hrs:   BP Temp Pulse Resp SpO2 Height Weight   20 0731 94/50 98.3 °F (36.8 °C) (!) 58 17      20 0702 90/52  (!) 59 20 94 %     20 0631 115/55  62 20 92 %     20 0602 97/54  (!) 57 19 97 %     20 0531 103/56  (!) 56 20 95 %     20 0502 98/55  (!) 57 25 96 % 5' 9\" (1.753 m) 174 lb 13.2 oz (79.3 kg)   20 0431 96/51  (!) 56 15 97 %     20 0402 96/53  (!) 56 20 95 %     20 0331 93/50  (!) 55 24 96 %     20 0302 (!) 89/50  (!) 56 17 96 %     20 0245   (!) 56 24 97 %     20 0231 97/56  (!) 56 8 97 %     20 0215   (!) 56  97 %     20 0202 100/57  (!) 56 10 96 %     20 0145   (!) 57 21 96 %     20 0131 95/53  (!) 56 21 98 %     20 0115   (!) 57 23 96 %     20 0102 94/50  (!) 57 22 97 %     20 0045   (!) 57 22 95 %     20 0031 (!) 87/54  (!) 55 22 96 %       Temp (24hrs), Av.5 °F (36.9 °C), Min:98.3 °F (36.8 °C), Max:98.8 °F (37.1 °C)        Hemodynamics  PAP Systolic: 1 PAP  CO (l/min): 4.5 l/min CO  CI (l/min/m2): 2.4 l/min/m2 CI    No intake/output data recorded.  1901 -  0700  In: 3735.7 [P.O.:1140;  I.V.:2595.7]  Out: 2520 [Urine:2275]    CT Drainage              total of all CT's    Heart:  regular rate and rhythm, S1, S2 normal, no murmur, click, rub or gallop  Lung:  clear to auscultation bilaterally  Neuro: Grossly non focal  Incisions: Clean, dry, and intact    Labs:  Recent Results (from the past 24 hour(s))   GLUCOSE, POC    Collection Time: 20 11:14 AM   Result Value Ref Range    Glucose (POC) 214 (H) 65 - 100 mg/dL   GLUCOSE, POC    Collection Time: 03/08/20  4:47 PM   Result Value Ref Range    Glucose (POC) 129 (H) 65 - 100 mg/dL   PLEASE READ & DOCUMENT PPD TEST IN 72 HRS    Collection Time: 03/08/20  8:14 PM   Result Value Ref Range    PPD Negative Negative    mm Induration 0 0 - 5 mm   GLUCOSE, POC    Collection Time: 03/08/20  9:24 PM   Result Value Ref Range    Glucose (POC) 124 (H) 65 - 100 mg/dL   CBC W/O DIFF    Collection Time: 03/09/20  4:00 AM   Result Value Ref Range    WBC 8.6 4.3 - 11.1 K/uL    RBC 2.46 (L) 4.23 - 5.6 M/uL    HGB 7.8 (L) 13.6 - 17.2 g/dL    HCT 23.5 (L) 41.1 - 50.3 %    MCV 95.5 79.6 - 97.8 FL    MCH 31.7 26.1 - 32.9 PG    MCHC 33.2 31.4 - 35.0 g/dL    RDW 13.4 11.9 - 14.6 %    PLATELET 275 (L) 122 - 450 K/uL    MPV 10.6 9.4 - 12.3 FL    ABSOLUTE NRBC 0.00 0.0 - 0.2 K/uL   METABOLIC PANEL, BASIC    Collection Time: 03/09/20  4:00 AM   Result Value Ref Range    Sodium 138 136 - 145 mmol/L    Potassium 3.9 3.5 - 5.1 mmol/L    Chloride 109 (H) 98 - 107 mmol/L    CO2 22 21 - 32 mmol/L    Anion gap 7 7 - 16 mmol/L    Glucose 124 (H) 65 - 100 mg/dL    BUN 12 8 - 23 MG/DL    Creatinine 0.55 (L) 0.8 - 1.5 MG/DL    GFR est AA >60 >60 ml/min/1.73m2    GFR est non-AA >60 >60 ml/min/1.73m2    Calcium 8.5 8.3 - 10.4 MG/DL   MAGNESIUM    Collection Time: 03/09/20  4:00 AM   Result Value Ref Range    Magnesium 2.1 1.8 - 2.4 mg/dL   GLUCOSE, POC    Collection Time: 03/09/20  6:37 AM   Result Value Ref Range    Glucose (POC) 131 (H) 65 - 100 mg/dL       Assessment:     Active Problems:    Patent foramen ovale (3/5/2020)      Mitral valve regurgitation (3/5/2020)      CAD (coronary artery disease) (3/5/2020)      Mitral valve insufficiency (3/5/2020)      Atherosclerosis of coronary artery of native heart with unstable angina pectoris (HCC) (3/5/2020)      Type II diabetes mellitus (HCC) (3/5/2020)      Respiratory failure, post-operative (HCC) (3/5/2020)      Hypoxia (3/5/2020)      Atelectasis (3/6/2020)      Acute pulmonary edema (HCC) (3/6/2020)      Ventricular fibrillation ([address] (3/6/2020)      Pleural effusion, right (3/8/2020)        Plan/Recommendations/Medical Decision Making:     Transfuse one unit PRBC, to floor later today    See orders

## 2020-03-10 PROBLEM — I25.10 CAD (CORONARY ARTERY DISEASE): Chronic | Status: ACTIVE | Noted: 2020-03-05

## 2020-03-10 PROBLEM — Z95.1 S/P CABG X 3: Status: ACTIVE | Noted: 2020-03-10

## 2020-03-10 LAB
ABO + RH BLD: NORMAL
ANION GAP SERPL CALC-SCNC: 7 MMOL/L (ref 7–16)
BLD PROD TYP BPU: NORMAL
BLOOD GROUP ANTIBODIES SERPL: NORMAL
BPU ID: NORMAL
BUN SERPL-MCNC: 20 MG/DL (ref 8–23)
CALCIUM SERPL-MCNC: 8.4 MG/DL (ref 8.3–10.4)
CHLORIDE SERPL-SCNC: 105 MMOL/L (ref 98–107)
CO2 SERPL-SCNC: 26 MMOL/L (ref 21–32)
CREAT SERPL-MCNC: 0.61 MG/DL (ref 0.8–1.5)
CROSSMATCH RESULT,%XM: NORMAL
CRP SERPL HS-MCNC: 121 MG/L
ERYTHROCYTE [DISTWIDTH] IN BLOOD BY AUTOMATED COUNT: 15.3 % (ref 11.9–14.6)
GLUCOSE BLD STRIP.AUTO-MCNC: 136 MG/DL (ref 65–100)
GLUCOSE BLD STRIP.AUTO-MCNC: 146 MG/DL (ref 65–100)
GLUCOSE BLD STRIP.AUTO-MCNC: 147 MG/DL (ref 65–100)
GLUCOSE BLD STRIP.AUTO-MCNC: 172 MG/DL (ref 65–100)
GLUCOSE SERPL-MCNC: 120 MG/DL (ref 65–100)
HCT VFR BLD AUTO: 25.5 % (ref 41.1–50.3)
HGB BLD-MCNC: 8.6 G/DL (ref 13.6–17.2)
MAGNESIUM SERPL-MCNC: 2.1 MG/DL (ref 1.8–2.4)
MCH RBC QN AUTO: 30.7 PG (ref 26.1–32.9)
MCHC RBC AUTO-ENTMCNC: 33.7 G/DL (ref 31.4–35)
MCV RBC AUTO: 91.1 FL (ref 79.6–97.8)
NRBC # BLD: 0 K/UL (ref 0–0.2)
PLATELET # BLD AUTO: 132 K/UL (ref 150–450)
PMV BLD AUTO: 9.6 FL (ref 9.4–12.3)
POTASSIUM SERPL-SCNC: 3.5 MMOL/L (ref 3.5–5.1)
RBC # BLD AUTO: 2.8 M/UL (ref 4.23–5.6)
SODIUM SERPL-SCNC: 138 MMOL/L (ref 136–145)
SPECIMEN EXP DATE BLD: NORMAL
STATUS OF UNIT,%ST: NORMAL
UNIT DIVISION, %UDIV: 0
WBC # BLD AUTO: 8 K/UL (ref 4.3–11.1)

## 2020-03-10 PROCEDURE — 74011250637 HC RX REV CODE- 250/637: Performed by: THORACIC SURGERY (CARDIOTHORACIC VASCULAR SURGERY)

## 2020-03-10 PROCEDURE — 94640 AIRWAY INHALATION TREATMENT: CPT

## 2020-03-10 PROCEDURE — 99232 SBSQ HOSP IP/OBS MODERATE 35: CPT | Performed by: INTERNAL MEDICINE

## 2020-03-10 PROCEDURE — 80048 BASIC METABOLIC PNL TOTAL CA: CPT

## 2020-03-10 PROCEDURE — 97530 THERAPEUTIC ACTIVITIES: CPT

## 2020-03-10 PROCEDURE — 86141 C-REACTIVE PROTEIN HS: CPT

## 2020-03-10 PROCEDURE — 74011636637 HC RX REV CODE- 636/637: Performed by: THORACIC SURGERY (CARDIOTHORACIC VASCULAR SURGERY)

## 2020-03-10 PROCEDURE — 65660000004 HC RM CVT STEPDOWN

## 2020-03-10 PROCEDURE — 82962 GLUCOSE BLOOD TEST: CPT

## 2020-03-10 PROCEDURE — 77030012890

## 2020-03-10 PROCEDURE — P9047 ALBUMIN (HUMAN), 25%, 50ML: HCPCS | Performed by: THORACIC SURGERY (CARDIOTHORACIC VASCULAR SURGERY)

## 2020-03-10 PROCEDURE — 94760 N-INVAS EAR/PLS OXIMETRY 1: CPT

## 2020-03-10 PROCEDURE — 74011250636 HC RX REV CODE- 250/636: Performed by: INTERNAL MEDICINE

## 2020-03-10 PROCEDURE — 83735 ASSAY OF MAGNESIUM: CPT

## 2020-03-10 PROCEDURE — 97110 THERAPEUTIC EXERCISES: CPT

## 2020-03-10 PROCEDURE — 85027 COMPLETE CBC AUTOMATED: CPT

## 2020-03-10 PROCEDURE — 74011000250 HC RX REV CODE- 250: Performed by: INTERNAL MEDICINE

## 2020-03-10 PROCEDURE — 36415 COLL VENOUS BLD VENIPUNCTURE: CPT

## 2020-03-10 PROCEDURE — 74011250637 HC RX REV CODE- 250/637: Performed by: PHYSICIAN ASSISTANT

## 2020-03-10 PROCEDURE — 74011250636 HC RX REV CODE- 250/636: Performed by: THORACIC SURGERY (CARDIOTHORACIC VASCULAR SURGERY)

## 2020-03-10 RX ORDER — ALBUTEROL SULFATE 0.83 MG/ML
2.5 SOLUTION RESPIRATORY (INHALATION)
Status: DISCONTINUED | OUTPATIENT
Start: 2020-03-10 | End: 2020-03-12

## 2020-03-10 RX ORDER — CARBOXYMETHYLCELLULOSE SODIUM 10 MG/ML
1 GEL OPHTHALMIC AS NEEDED
Status: DISCONTINUED | OUTPATIENT
Start: 2020-03-10 | End: 2020-03-13 | Stop reason: HOSPADM

## 2020-03-10 RX ADMIN — Medication 10 ML: at 21:27

## 2020-03-10 RX ADMIN — ACETAMINOPHEN 650 MG: 325 TABLET, FILM COATED ORAL at 21:42

## 2020-03-10 RX ADMIN — FUROSEMIDE 40 MG: 40 TABLET ORAL at 08:55

## 2020-03-10 RX ADMIN — CITALOPRAM HYDROBROMIDE 10 MG: 10 TABLET ORAL at 08:55

## 2020-03-10 RX ADMIN — TRAMADOL HYDROCHLORIDE 50 MG: 50 TABLET, FILM COATED ORAL at 12:08

## 2020-03-10 RX ADMIN — ALBUMIN (HUMAN) 25 G: 0.25 INJECTION, SOLUTION INTRAVENOUS at 12:01

## 2020-03-10 RX ADMIN — ALBUMIN (HUMAN) 25 G: 0.25 INJECTION, SOLUTION INTRAVENOUS at 17:56

## 2020-03-10 RX ADMIN — FAMOTIDINE 20 MG: 10 TABLET ORAL at 08:55

## 2020-03-10 RX ADMIN — ASPIRIN 81 MG: 81 TABLET ORAL at 08:55

## 2020-03-10 RX ADMIN — Medication 10 ML: at 17:51

## 2020-03-10 RX ADMIN — ALBUTEROL SULFATE 2.5 MG: 2.5 SOLUTION RESPIRATORY (INHALATION) at 16:18

## 2020-03-10 RX ADMIN — ALBUTEROL SULFATE 2.5 MG: 2.5 SOLUTION RESPIRATORY (INHALATION) at 20:07

## 2020-03-10 RX ADMIN — FAMOTIDINE 20 MG: 10 TABLET ORAL at 17:40

## 2020-03-10 RX ADMIN — POTASSIUM CHLORIDE 10 MEQ: 750 TABLET, EXTENDED RELEASE ORAL at 08:55

## 2020-03-10 RX ADMIN — METFORMIN HYDROCHLORIDE 500 MG: 500 TABLET ORAL at 17:40

## 2020-03-10 RX ADMIN — Medication 10 ML: at 06:13

## 2020-03-10 RX ADMIN — SENNOSIDES AND DOCUSATE SODIUM 2 TABLET: 8.6; 5 TABLET ORAL at 21:25

## 2020-03-10 RX ADMIN — TRAMADOL HYDROCHLORIDE 50 MG: 50 TABLET, FILM COATED ORAL at 17:40

## 2020-03-10 RX ADMIN — ATORVASTATIN CALCIUM 80 MG: 80 TABLET, FILM COATED ORAL at 21:25

## 2020-03-10 RX ADMIN — METFORMIN HYDROCHLORIDE 500 MG: 500 TABLET ORAL at 08:55

## 2020-03-10 RX ADMIN — INSULIN LISPRO 2 UNITS: 100 INJECTION, SOLUTION INTRAVENOUS; SUBCUTANEOUS at 21:26

## 2020-03-10 RX ADMIN — AMIODARONE HYDROCHLORIDE 200 MG: 200 TABLET ORAL at 21:25

## 2020-03-10 RX ADMIN — METHYLPREDNISOLONE SODIUM SUCCINATE 60 MG: 125 INJECTION, POWDER, FOR SOLUTION INTRAMUSCULAR; INTRAVENOUS at 17:40

## 2020-03-10 RX ADMIN — POTASSIUM CHLORIDE 20 MEQ: 20 TABLET, EXTENDED RELEASE ORAL at 06:13

## 2020-03-10 RX ADMIN — ACETAMINOPHEN 650 MG: 325 TABLET, FILM COATED ORAL at 17:40

## 2020-03-10 RX ADMIN — SENNOSIDES AND DOCUSATE SODIUM 2 TABLET: 8.6; 5 TABLET ORAL at 08:55

## 2020-03-10 RX ADMIN — CARBOXYMETHYLCELLULOSE SODIUM 1 DROP: 10 GEL OPHTHALMIC at 10:18

## 2020-03-10 RX ADMIN — TERAZOSIN HYDROCHLORIDE 10 MG: 5 CAPSULE ORAL at 21:25

## 2020-03-10 RX ADMIN — POTASSIUM CHLORIDE 20 MEQ: 20 TABLET, EXTENDED RELEASE ORAL at 17:56

## 2020-03-10 RX ADMIN — Medication 1 AMPULE: at 08:55

## 2020-03-10 RX ADMIN — ONDANSETRON 4 MG: 2 INJECTION INTRAMUSCULAR; INTRAVENOUS at 21:29

## 2020-03-10 NOTE — PROGRESS NOTES
CRP very high at 121. No evidence for infection. Likely inflammatory process in L chest. Will give SM x 2 and monitor.      Cam Mas MD

## 2020-03-10 NOTE — PROGRESS NOTES
Bedside and Verbal shift change report given to Dalton Roberts RN (oncoming nurse) by self (offgoing nurse). Report included the following information SBAR, Kardex, Intake/Output, MAR, Recent Results and Cardiac Rhythm sinus sonam.

## 2020-03-10 NOTE — PROGRESS NOTES
Bedside shift change report given to myself (oncoming nurse) by Erin Schreiber RN (offgoing nurse). Report included the following information SBAR, Kardex, Procedure Summary, Intake/Output, MAR, Recent Results and Cardiac Rhythm NSR/Sinus Liliana Fraga.

## 2020-03-10 NOTE — PROGRESS NOTES
Bedside shift change report given to MARY Mccullough (oncoming nurse) by Damion Piña RN (offgoing nurse). Report included the following information SBAR, Kardex, Intake/Output, MAR, Recent Results and Cardiac Rhythm NSR/Sinus Aide Poor.

## 2020-03-10 NOTE — PROGRESS NOTES
Care Management Interventions  PCP Verified by CM: Yes(Armand Bonifacio)  Mode of Transport at Discharge: Other (see comment)(TBD based on need)  Transition of Care Consult (CM Consult): Discharge Planning  Discharge Durable Medical Equipment: No  Physical Therapy Consult: Yes  Occupational Therapy Consult: Yes  Speech Therapy Consult: No  Current Support Network: Relative's Home, Other(Pt lives with his sister and her family)  Confirm Follow Up Transport: Other (see comment)(TBD based on need)  The Plan for Transition of Care is Related to the Following Treatment Goals : home with home health verse post acute rehab  [address] Provided?: No  Discharge Location  Discharge Placement: Unable to determine at this time      This CM met with pt at bedside this day to complete assessment. Pt verified his PCP, insurance, emergency contact, and home address. He reports no difficulty obtaining his medications in the community. He lives with his sister in her home and with her family with a ramped entrance. He reports no home DME. He confirms that at baseline prior to admission he is independent with his ADLs including bathing, dressing, cooking, and ambulating. Pt does not drive. We discussed discharge planning this day. This CM explained that it has been recommended by physical therapy that pt would benefit from continued inpatient therapy when stable for discharge. Pt has experience with inpatient therapy and would be interested in referral to 9th floor IRC. He would like to discuss options with his sister later. This CM to continue to follow for discharge planning.

## 2020-03-10 NOTE — PROGRESS NOTES
Today's Date: 3/10/2020  Date of Admission: 3/5/2020    Chart Reviewed. Subjective:     Patient feels ok. Appetite fair. He denies any dyspnea. Medications Reviewed. Objective:     Vitals:    03/10/20 0319 03/10/20 0717 03/10/20 0748 03/10/20 0855   BP: (!) 88/52 90/53     Pulse: (!) 57 (!) 53  (!) 59   Resp: 19 18     Temp: 97.7 °F (36.5 °C) 97.9 °F (36.6 °C)     SpO2: 94% 95% 96%    Weight: 166 lb 4.8 oz (75.4 kg)      Height:           Intake and Output  Current Shift: 03/10 0701 - 03/10 1900  In: 600 [P.O.:600]  Out: -    Last 3 Shifts: 03/08 1901 - 03/10 0700  In: 1190 [P.O.:880]  Out: 1510 [Urine:1460]    Physical Exam:  General: Well Developed, Well Nourished, No Acute Distress, Alert & Oriented x 3, Appropriate mood  Neck: supple, no JVD  Heart: S1S2 with RRR without murmurs or gallops  Lungs: decreased right base  Abd: soft, nontender, nondistended, with good bowel sounds  Ext: no edema bilaterally  Sternal incision: clean, dry, and intact  Skin: warm and dry    LABS  Data Review:   Recent Labs     03/10/20  0343 03/09/20  0400    138   K 3.5 3.9   MG 2.1 2.1   BUN 20 12   CREA 0.61* 0.55*   * 124*   WBC 8.0 8.6   HGB 8.6* 7.8*   HCT 25.5* 23.5*   * 113*       Estimated Creatinine Clearance: 101 mL/min (A) (by C-G formula based on SCr of 0.61 mg/dL (L)).       Assessment/Plan:     Principal Problem:    S/P CABG x 3 (3/10/2020)  On ASA, holding BB and ACE-I given low BP, continue statin, stable on room air, will leave pacing wires in another day, continue PT     Active Problems:    Patent foramen ovale (3/5/2020)  S/p closure      Mitral valve regurgitation (3/5/2020)  S/p MVR      CAD (coronary artery disease) (3/5/2020)  S/p CABG, continue medical therapy       Mitral valve insufficiency (3/5/2020)  S/p MVR       Atherosclerosis of coronary artery of native heart with unstable angina pectoris ([facility] (3/5/2020)  S/p CABG       Type II diabetes mellitus ([facility] (3/5/2020)  On Metformin, SSI      Respiratory failure, post-operative ([address] (3/5/2020)          Hypoxia (3/5/2020)  Resolved       Atelectasis (3/6/2020)  IS       Acute pulmonary edema ([address] (3/6/2020)  On oral lasix, repeat chest xray in AM      Ventricular fibrillation ([address] (3/6/2020)  Isolated event      Pleural effusion, right (3/8/2020)  Decreased BS on right, repeat chest xray in AM           Kevin Tubbs PA-C

## 2020-03-10 NOTE — PROGRESS NOTES
Problem: Mobility Impaired (Adult and Pediatric)  Goal: *Acute Goals and Plan of Care (Insert Text)  Description  LTG:  (1.)Mr. Neena Sanchez will move from supine to sit and sit to supine , scoot up and down and roll side to side in flat bed without siderails with  CONTACT GUARD ASSIST within 7 day(s). (2.)Mr. Neena Sanchez will perform all functional transfers with  CONTACT GUARD ASSIST using the least restrictive/no device within 7 day(s). (3.)Mr. Neena Sanchez will ambulate with  CONTACT GUARD ASSIST for 250+ feet with normal vital sign response with the least restrictive/no device within 7 day(s). Outcome: Progressing Towards Goal     PHYSICAL THERAPY: Daily Note and AM 3/10/2020  INPATIENT: PT Visit Days : 2  [place] / [place] / Product Type: Medicare /       NAME/AGE/GENDER: WM Vinetta Landau III is a 78 y.o. male   PRIMARY DIAGNOSIS: Atherosclerosis of native coronary artery of native heart with unstable angina pectoris ([facility] [I25.110]  Mitral valve insufficiency, unspecified etiology [I34.0]  Patent foramen ovale [Q21.1]  CAD (coronary artery disease) [I25.10]  Mitral valve regurgitation [I34.0]  Patent foramen ovale [Q21.1]  Atherosclerosis of coronary artery of native heart with unstable angina pectoris ([facility]) [I25.110]  Mitral valve insufficiency [I34.0]  Patent foramen ovale [Q21.1] S/P CABG x 3 S/P CABG x 3  Procedure(s) (LRB):  CORONARY ARTERY BYPASS GRAFT WITH MVR/ CLOSURE OF PFO/ (CABG x 3)/ LIMA/ LYSIS OF MYOCARDIAL ADHESIONS/ CLIPPING OF LEFT ATRIAL APPENDAGE (N/A)  ESOPHAGEAL TRANS ECHOCARDIOGRAM (N/A)  VEIN HARVEST/ GREATER SAPHENOUS (Left)  5 Days Post-Op  ICD-10: Treatment Diagnosis:    · Other abnormalities of gait and mobility (R26.89)  · History of falling (Z91.81)   Precaution/Allergies:  Patient has no known allergies. ASSESSMENT:     Mr. Neena Sanchez underwent above surgery. 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   He lives with his sister and ambulates independently but has suffered 1 fall in the past couple months while stepping off a curb. Patient sitting in the recliner and agreeable for PT, but reports feeling extremely tired-has been up since 5:30 am.  Sit to stand with min assist as the patient has difficulty with the activity. Patient needed assistance to don underwear. Gait training with rolling walker x 200 feet with slow gianfranco. Patient is returned to the recliner and after a short rest break the patient is instructed in therapeutic exercises. Tolerated well. Good session. Patient is making progress towards goals. Mr. Glenn Stern is functioning well below baseline and is therefore appropriate for skilled PT to maximize rehab potential.   Patient may benefit from [facility] at baseline, as his decreased vision affects his mobility. Will continue PT efforts. This section established at most recent assessment   PROBLEM LIST (Impairments causing functional limitations):  1. Decreased Transfer Abilities  2. Decreased Ambulation Ability/Technique  3. Decreased Balance  4. Decreased Activity Tolerance  5. Decreased Knowledge of Precautions   INTERVENTIONS PLANNED: (Benefits and precautions of physical therapy have been discussed with the patient.)  1. Balance Exercise  2. Bed Mobility  3. Gait Training  4. Neuromuscular Re-education/Strengthening  5. Therapeutic Activites  6. Therapeutic Exercise/Strengthening  7. Transfer Training  8. education      TREATMENT PLAN: Frequency/Duration: twice daily for duration of hospital stay  Rehabilitation Potential For Stated Goals: Good     REHAB RECOMMENDATIONS (at time of discharge pending progress):    Placement:   It is my opinion, based on this patient's performance to date, that Mr. Glenn Stern may benefit from intensive therapy at an [address] [place] [address] after discharge due to a probable need for multiple therapy disciplines and potential to make ongoing and sustainable functional improvement that is of practical value. .  Equipment:    tbd    None at this time              HISTORY:   History of Present Injury/Illness (Reason for Referral): Admitted for above. Past Medical History/Comorbidities:   Mr. Janey Gil  has a past medical history of Arthritis, CAD (coronary artery disease), CVA (cerebral vascular accident) ([facility] (1/3/2020), Diabetes type 2, uncontrolled ([facility] Hypercholesteremia, Sleep apnea, Systolic CHF, chronic ([facility] (1/5/2020), TIA (transient ischemic attack) (2009), and Troponin I above reference range (1/3/2020). Mr. Janey Gil  has a past surgical history that includes hx hernia repair; hx knee arthroscopy; and hx cyst removal.  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 He lives with his sister and ambulates independently but has suffered 1 fall in the past couple months while stepping off a curb. Number of Personal Factors/Comorbidities that affect the Plan of Care: 1-2: MODERATE COMPLEXITY   EXAMINATION:   Most Recent Physical Functioning:   Gross Assessment:                  Posture:     Balance:  Sitting: Intact  Standing: Impaired  Standing - Static: Fair  Standing - Dynamic : Fair Bed Mobility:     Wheelchair Mobility:     Transfers:  Sit to Stand: Minimum assistance  Stand to Sit: Minimum assistance  Gait:     Speed/Clara: Slow  Distance (ft): 200 Feet (ft)  Assistive Device: Walker, rolling  Ambulation - Level of Assistance: Minimal assistance      Body Structures Involved:  1. Eyes and Ears  2. Heart  3. Lungs  4.  Muscles Body Functions Affected:  1. Sensory/Pain  2. Cardio  3. Respiratory  4. Movement Related  5. education  Activities and Participation Affected:  1. Mobility  2. Self Care  3. Domestic Life  4. Community, Social and [place]   Number of elements that affect the Plan of Care: 4+: HIGH COMPLEXITY   CLINICAL PRESENTATION:   Presentation: Stable and uncomplicated: LOW COMPLEXITY   CLINICAL DECISION MAKIN [facility] Mobility Inpatient Short Form  How much difficulty does the patient currently have. .. Unable A Lot A Little None   1. Turning over in bed (including adjusting bedclothes, sheets and blankets)? [] 1   [x] 2   [] 3   [] 4   2. Sitting down on and standing up from a chair with arms ( e.g., wheelchair, bedside commode, etc.)   [] 1   [] 2   [x] 3   [] 4   3. Moving from lying on back to sitting on the side of the bed? [] 1   [x] 2   [] 3   [] 4   How much help from another person does the patient currently need. .. Total A Lot A Little None   4. Moving to and from a bed to a chair (including a wheelchair)? [] 1   [] 2   [x] 3   [] 4   5. Need to walk in hospital room? [] 1   [] 2   [x] 3   [] 4   6. Climbing 3-5 steps with a railing? [] 1   [x] 2   [] 3   [] 4   © , Trustees of [address] [place], [place] [id number], under license to [employer]. All rights reserved      Score:  Initial: 15 Most Recent: X (Date: -- )    Interpretation of Tool:  Represents activities that are increasingly more difficult (i.e. Bed mobility, Transfers, Gait). Medical Necessity:     · Patient is expected to demonstrate progress in   · balance, functional technique, and activity tolerance   ·  to   · increase independence with   and improve safety during all functional mobility   · . Reason for Services/Other Comments:  · Patient continues to require skilled intervention due to   · medical complications and patient unable to attend/participate in therapy as expected  · .    Use of outcome tool(s) and clinical judgement create a POC that gives a: Clear prediction of patient's progress: LOW COMPLEXITY            TREATMENT:   (In addition to Assessment/Re-Assessment sessions the following treatments were rendered)   Pre-treatment Symptoms/Complaints: \"OH no\"  Pain: Initial: 0/10     Post Session:  No number given  Left side of his chest   Therapeutic Activity: (    14 minutes): Therapeutic activities including transfers, balance and gait training to improve mobility, strength, balance and coordination. Required minimum assist    to promote safety with functional mobility activities. Therapeutic Exercise: ( 10 minutes):  Exercises per grid below to improve mobility, strength, and balance. Required minimal visual and verbal cues to promote proper body alignment. Progressed complexity of movement as indicated. DATE: 3/10/20       Shoulder shrugs        Hip Flexion X15 AB       Long Arc Quads X15 AB       Hip ab/ad        Ankle pumps X15 AB       Gluteal sets X15 AB                                Key:  A=active, AA=active assisted, P=passive, B=bilaterally, R=right, L=left   DF=dorsiflexion, PF=plantarflexion      Braces/Orthotics/Lines/Etc:   ·   Treatment/Session Assessment:    · Response to Treatment:  cooperative. · Interdisciplinary Collaboration:   o Physical Therapy Assistant  o Registered Nurse  · After treatment position/precautions:   o Up in chair  o Bed alarm/tab alert on  o Bed/Chair-wheels locked  o Call light within reach  o RN notified   · Compliance with Program/Exercises: Will assess as treatment progresses  · Recommendations/Intent for next treatment session: \"Next visit will focus on advancements to more challenging activities and reduction in assistance provided\".   Total Treatment Duration:  PT Patient Time In/Time Out  Time In: 1019  Time Out: [address] [place] [address]

## 2020-03-10 NOTE — PROGRESS NOTES
Problem: Falls - Risk of  Goal: *Absence of Falls  Description  Document Albert Shell Fall Risk and appropriate interventions in the flowsheet. Outcome: Progressing Towards Goal  Note: Fall Risk Interventions:  Mobility Interventions: Bed/chair exit alarm, Communicate number of staff needed for ambulation/transfer, Patient to call before getting OOB    Mentation Interventions: Adequate sleep, hydration, pain control    Medication Interventions: Bed/chair exit alarm, Patient to call before getting OOB, Teach patient to arise slowly    Elimination Interventions: Bed/chair exit alarm, Call light in reach, Patient to call for help with toileting needs    History of Falls Interventions: Bed/chair exit alarm, Investigate reason for fall         Problem: Pressure Injury - Risk of  Goal: *Prevention of pressure injury  Description  Document Leon Scale and appropriate interventions in the flowsheet.   Outcome: Progressing Towards Goal  Note: Pressure Injury Interventions:  Sensory Interventions: Assess changes in LOC         Activity Interventions: Increase time out of bed, Pressure redistribution bed/mattress(bed type), PT/OT evaluation    Mobility Interventions: HOB 30 degrees or less, Pressure redistribution bed/mattress (bed type), PT/OT evaluation    Nutrition Interventions: Document food/fluid/supplement intake, Offer support with meals,snacks and hydration    Friction and Shear Interventions: Foam dressings/transparent film/skin sealants, Lift sheet, Lift team/patient mobility team, Minimize layers

## 2020-03-10 NOTE — PROGRESS NOTES
Problem: Mobility Impaired (Adult and Pediatric)  Goal: *Acute Goals and Plan of Care (Insert Text)  Description  LTG:  (1.)Mr. Lety Menard will move from supine to sit and sit to supine , scoot up and down and roll side to side in flat bed without siderails with  CONTACT GUARD ASSIST within 7 day(s). (2.)Mr. Lety Menard will perform all functional transfers with  CONTACT GUARD ASSIST using the least restrictive/no device within 7 day(s). (3.)Mr. Lety Menard will ambulate with  CONTACT GUARD ASSIST for 250+ feet with normal vital sign response with the least restrictive/no device within 7 day(s). Outcome: Progressing Towards Goal     PHYSICAL THERAPY: Daily Note and PM 3/10/2020  INPATIENT: PT Visit Days : 2  [place] / [place] / Product Type: Medicare /       NAME/AGE/GENDER: WM Alpa Warner III is a 78 y.o. male   PRIMARY DIAGNOSIS: Atherosclerosis of native coronary artery of native heart with unstable angina pectoris ([facility] [address] [I25.110]  Mitral valve insufficiency, unspecified etiology [I34.0]  Patent foramen ovale [Q21.1]  CAD (coronary artery disease) [I25.10]  Mitral valve regurgitation [I34.0]  Patent foramen ovale [Q21.1]  Atherosclerosis of coronary artery of native heart with unstable angina pectoris ([facility]) [I25.110]  Mitral valve insufficiency [I34.0]  Patent foramen ovale [Q21.1] S/P CABG x 3 S/P CABG x 3  Procedure(s) (LRB):  CORONARY ARTERY BYPASS GRAFT WITH MVR/ CLOSURE OF PFO/ (CABG x 3)/ LIMA/ LYSIS OF MYOCARDIAL ADHESIONS/ CLIPPING OF LEFT ATRIAL APPENDAGE (N/A)  ESOPHAGEAL TRANS ECHOCARDIOGRAM (N/A)  VEIN HARVEST/ GREATER SAPHENOUS (Left)  5 Days Post-Op  ICD-10: Treatment Diagnosis:    · Other abnormalities of gait and mobility (R26.89)  · History of falling (Z91.81)   Precaution/Allergies:  Patient has no known allergies. ASSESSMENT:     Mr. Lety Menard underwent above surgery. 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   He lives with his sister and ambulates independently but has suffered 1 fall in the past couple months while stepping off a curb. Patient sitting in the recliner and agreeable for PT, but reports feeling extremely tired-has been up since 5:30 am.  Sit to stand with min assist as the patient has difficulty with the activity. Patient needed assistance to don underwear. Gait training with rolling walker x 200 feet with slow gianfranco. Patient is returned to the recliner and after a short rest break the patient is instructed in therapeutic exercises. Tolerated well. Good session. Patient is making progress towards goals. Mr. Alexandra Hartley is functioning well below baseline and is therefore appropriate for skilled PT to maximize rehab potential.   Patient may benefit from [address] at baseline, as his decreased vision affects his mobility. Will continue PT efforts. PM note:  Patient agreeable after several attempts. Patient with family present. Sit to stand requires min assist.  Balance fair. Gait training with rolling walker x 125 feet with slow gianfranco. Patient is returned to the room and request to lay down. Patient was able to scoot up in the bed and needed assist for sit to to supine. Patient required assist for positioning in the bed. Patient left with needs within reach. Limited progress demonstrated. Will continue PT efforts. This section established at most recent assessment   PROBLEM LIST (Impairments causing functional limitations):  1. Decreased Transfer Abilities  2. Decreased Ambulation Ability/Technique  3. Decreased Balance  4. Decreased Activity Tolerance  5. Decreased Knowledge of Precautions   INTERVENTIONS PLANNED: (Benefits and precautions of physical therapy have been discussed with the patient.)  1. Balance Exercise  2. Bed Mobility  3. Gait Training  4. Neuromuscular Re-education/Strengthening  5. Therapeutic Activites  6. Therapeutic Exercise/Strengthening  7.  Transfer Training  8. education TREATMENT PLAN: Frequency/Duration: twice daily for duration of hospital stay  Rehabilitation Potential For Stated Goals: Good     REHAB RECOMMENDATIONS (at time of discharge pending progress):    Placement: It is my opinion, based on this patient's performance to date, that Mr. José Ray may benefit from intensive therapy at an [address] [place] [address] after discharge due to a probable need for multiple therapy disciplines and potential to make ongoing and sustainable functional improvement that is of practical value. .  Equipment:    tbd    None at this time              HISTORY:   History of Present Injury/Illness (Reason for Referral): Admitted for above. Past Medical History/Comorbidities:   Mr. José Ray  has a past medical history of Arthritis, CAD (coronary artery disease), CVA (cerebral vascular accident) ([facility] [address] (1/3/2020), Diabetes type 2, uncontrolled ([facility] [address] Hypercholesteremia, Sleep apnea, Systolic CHF, chronic ([facility] [address] (1/5/2020), TIA (transient ischemic attack) (2009), and Troponin I above reference range (1/3/2020). Mr. José Ray  has a past surgical history that includes hx hernia repair; hx knee arthroscopy; and hx cyst removal.  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 He lives with his sister and ambulates independently but has suffered 1 fall in the past couple months while stepping off a curb.        Number of Personal Factors/Comorbidities that affect the Plan of Care: 1-2: MODERATE COMPLEXITY   EXAMINATION:   Most Recent Physical Functioning:   Gross Assessment:                  Posture: Balance:  Sitting: Intact  Standing: Impaired  Standing - Static: Fair  Standing - Dynamic : Fair Bed Mobility:     Wheelchair Mobility:     Transfers:  Sit to Stand: Minimum assistance  Stand to Sit: Minimum assistance  Gait:     Speed/Clara: Slow  Distance (ft): 125 Feet (ft)  Assistive Device: Walker, rolling  Ambulation - Level of Assistance: Minimal assistance      Body Structures Involved:  1. Eyes and Ears  2. Heart  3. Lungs  4. Muscles Body Functions Affected:  1. Sensory/Pain  2. Cardio  3. Respiratory  4. Movement Related  5. education  Activities and Participation Affected:  1. Mobility  2. Self Care  3. Domestic Life  4. Community, Social and [facility]   Number of elements that affect the Plan of Care: 4+: HIGH COMPLEXITY   CLINICAL PRESENTATION:   Presentation: Stable and uncomplicated: LOW COMPLEXITY   CLINICAL DECISION MAKIN [facility] Inpatient Short Form  How much difficulty does the patient currently have. .. Unable A Lot A Little None   1. Turning over in bed (including adjusting bedclothes, sheets and blankets)? [] 1   [x] 2   [] 3   [] 4   2. Sitting down on and standing up from a chair with arms ( e.g., wheelchair, bedside commode, etc.)   [] 1   [] 2   [x] 3   [] 4   3. Moving from lying on back to sitting on the side of the bed? [] 1   [x] 2   [] 3   [] 4   How much help from another person does the patient currently need. .. Total A Lot A Little None   4. Moving to and from a bed to a chair (including a wheelchair)? [] 1   [] 2   [x] 3   [] 4   5. Need to walk in hospital room? [] 1   [] 2   [x] 3   [] 4   6. Climbing 3-5 steps with a railing? [] 1   [x] 2   [] 3   [] 4   © , Trustees of [address] [place] [address] [address] [id number], under license to [employer].  All rights reserved      Score:  Initial: 15 Most Recent: X (Date: -- )    Interpretation of Tool:  Represents activities that are increasingly more difficult (i.e. Bed mobility, Transfers, Gait). Medical Necessity:     · Patient is expected to demonstrate progress in   · balance, functional technique, and activity tolerance   ·  to   · increase independence with   and improve safety during all functional mobility   · . Reason for Services/Other Comments:  · Patient continues to require skilled intervention due to   · medical complications and patient unable to attend/participate in therapy as expected  · . Use of outcome tool(s) and clinical judgement create a POC that gives a: Clear prediction of patient's progress: LOW COMPLEXITY            TREATMENT:   (In addition to Assessment/Re-Assessment sessions the following treatments were rendered)   Pre-treatment Symptoms/Complaints:  \"OK\"  Pain: Initial: 0/10  Pain Intensity 1: (no number given)  Post Session:  No number given  Left side of his chest   Therapeutic Activity: (    10 minutes): Therapeutic activities including transfers, balance and gait training to improve mobility, strength, balance and coordination. Required minimum assist    to promote safety with functional mobility activities. Therapeutic Exercise: (  minutes):  Exercises per grid below to improve mobility, strength, and balance. Required minimal visual and verbal cues to promote proper body alignment. Progressed complexity of movement as indicated.   DATE: 3/10/20       Shoulder shrugs        Hip Flexion X15 AB       Long Arc Quads X15 AB       Hip ab/ad        Ankle pumps X15 AB       Gluteal sets X15 AB                                Key:  A=active, AA=active assisted, P=passive, B=bilaterally, R=right, L=left   DF=dorsiflexion, PF=plantarflexion      Braces/Orthotics/Lines/Etc:   ·   Treatment/Session Assessment:    · Response to Treatment:  Mobility slow  · Interdisciplinary Collaboration:   o Physical Therapy Assistant  o Registered Nurse  · After treatment position/precautions:   o Supine in bed  o Bed/Chair-wheels locked  o Bed in low position  o Call light within reach  o RN notified  o Family at bedside   · Compliance with Program/Exercises: Will assess as treatment progresses  · Recommendations/Intent for next treatment session: \"Next visit will focus on advancements to more challenging activities and reduction in assistance provided\".   Total Treatment Duration:  PT Patient Time In/Time Out  Time In: 1526  Time Out: [facility]

## 2020-03-10 NOTE — PROGRESS NOTES
Problem: Diabetes Self-Management  Goal: *Disease process and treatment process  Description  Define diabetes and identify own type of diabetes; list 3 options for treating diabetes. Outcome: Progressing Towards Goal     Problem: Falls - Risk of  Goal: *Absence of Falls  Description  Document [place] Fall Risk and appropriate interventions in the flowsheet. Outcome: Progressing Towards Goal  Note: Fall Risk Interventions:  Mobility Interventions: Patient to call before getting OOB, Communicate number of staff needed for ambulation/transfer, Bed/chair exit alarm    Mentation Interventions: Adequate sleep, hydration, pain control, Bed/chair exit alarm    Medication Interventions: Patient to call before getting OOB, Evaluate medications/consider consulting pharmacy, Bed/chair exit alarm    Elimination Interventions: Bed/chair exit alarm, Call light in reach, Patient to call for help with toileting needs    History of Falls Interventions: Bed/chair exit alarm         Problem: Pressure Injury - Risk of  Goal: *Prevention of pressure injury  Description  Document Leon Scale and appropriate interventions in the flowsheet.   Outcome: Progressing Towards Goal  Note: Pressure Injury Interventions:  Sensory Interventions: Keep linens dry and wrinkle-free, Monitor skin under medical devices         Activity Interventions: Pressure redistribution bed/mattress(bed type), Increase time out of bed    Mobility Interventions: Pressure redistribution bed/mattress (bed type), HOB 30 degrees or less    Nutrition Interventions: Document food/fluid/supplement intake, Offer support with meals,snacks and hydration    Friction and Shear Interventions: HOB 30 degrees or less, Foam dressings/transparent film/skin sealants                Problem: Cardiac Valve Surgery: Day of Surgery/Post-Op (Initiate SCIP measures for post-op care)  Goal: *Hemodynamically stable (eg: Cardiac output/index; pulmonary arterial pressures; mixed venous oxygen saturation within set parameters)  Outcome: Progressing Towards Goal

## 2020-03-11 ENCOUNTER — APPOINTMENT (OUTPATIENT)
Dept: GENERAL RADIOLOGY | Age: 70
DRG: 228 | End: 2020-03-11
Attending: PHYSICIAN ASSISTANT
Payer: MEDICARE

## 2020-03-11 LAB
ANION GAP SERPL CALC-SCNC: 9 MMOL/L (ref 7–16)
APPEARANCE FLD: NORMAL
BUN SERPL-MCNC: 19 MG/DL (ref 8–23)
CALCIUM SERPL-MCNC: 8.9 MG/DL (ref 8.3–10.4)
CHLORIDE SERPL-SCNC: 104 MMOL/L (ref 98–107)
CO2 SERPL-SCNC: 24 MMOL/L (ref 21–32)
COLOR FLD: NORMAL
CREAT SERPL-MCNC: 0.67 MG/DL (ref 0.8–1.5)
ERYTHROCYTE [DISTWIDTH] IN BLOOD BY AUTOMATED COUNT: 14.7 % (ref 11.9–14.6)
GLUCOSE BLD STRIP.AUTO-MCNC: 174 MG/DL (ref 65–100)
GLUCOSE BLD STRIP.AUTO-MCNC: 194 MG/DL (ref 65–100)
GLUCOSE BLD STRIP.AUTO-MCNC: 221 MG/DL (ref 65–100)
GLUCOSE BLD STRIP.AUTO-MCNC: 223 MG/DL (ref 65–100)
GLUCOSE FLD-MCNC: 195 MG/DL
GLUCOSE SERPL-MCNC: 179 MG/DL (ref 65–100)
HCT VFR BLD AUTO: 26.9 % (ref 41.1–50.3)
HGB BLD-MCNC: 9 G/DL (ref 13.6–17.2)
LDH FLD L TO P-CCNC: 188 U/L
LYMPHOCYTES NFR BRONCH MANUAL: 54 %
MACROPHAGES NFR BRONCH MANUAL: 15 %
MCH RBC QN AUTO: 31 PG (ref 26.1–32.9)
MCHC RBC AUTO-ENTMCNC: 33.5 G/DL (ref 31.4–35)
MCV RBC AUTO: 92.8 FL (ref 79.6–97.8)
NEUTROPHILS NFR BRONCH MANUAL: 31 %
NRBC # BLD: 0.02 K/UL (ref 0–0.2)
NUC CELL # FLD: 348 /CU MM
PLATELET # BLD AUTO: 150 K/UL (ref 150–450)
PMV BLD AUTO: 9.9 FL (ref 9.4–12.3)
POTASSIUM SERPL-SCNC: 4.5 MMOL/L (ref 3.5–5.1)
PROT FLD-MCNC: 3.2 G/DL
RBC # BLD AUTO: 2.9 M/UL (ref 4.23–5.6)
RBC # FLD: NORMAL /CU MM
SODIUM SERPL-SCNC: 137 MMOL/L (ref 136–145)
SPECIMEN SOURCE FLD: NORMAL
WBC # BLD AUTO: 5.7 K/UL (ref 4.3–11.1)
WBC MORPH BLD: NORMAL

## 2020-03-11 PROCEDURE — 84157 ASSAY OF PROTEIN OTHER: CPT

## 2020-03-11 PROCEDURE — 83615 LACTATE (LD) (LDH) ENZYME: CPT

## 2020-03-11 PROCEDURE — 80048 BASIC METABOLIC PNL TOTAL CA: CPT

## 2020-03-11 PROCEDURE — 36415 COLL VENOUS BLD VENIPUNCTURE: CPT

## 2020-03-11 PROCEDURE — 71046 X-RAY EXAM CHEST 2 VIEWS: CPT

## 2020-03-11 PROCEDURE — 99232 SBSQ HOSP IP/OBS MODERATE 35: CPT | Performed by: INTERNAL MEDICINE

## 2020-03-11 PROCEDURE — 94640 AIRWAY INHALATION TREATMENT: CPT

## 2020-03-11 PROCEDURE — 74011250637 HC RX REV CODE- 250/637: Performed by: THORACIC SURGERY (CARDIOTHORACIC VASCULAR SURGERY)

## 2020-03-11 PROCEDURE — 85027 COMPLETE CBC AUTOMATED: CPT

## 2020-03-11 PROCEDURE — 94760 N-INVAS EAR/PLS OXIMETRY 1: CPT

## 2020-03-11 PROCEDURE — 82962 GLUCOSE BLOOD TEST: CPT

## 2020-03-11 PROCEDURE — 65660000004 HC RM CVT STEPDOWN

## 2020-03-11 PROCEDURE — 87205 SMEAR GRAM STAIN: CPT

## 2020-03-11 PROCEDURE — 88305 TISSUE EXAM BY PATHOLOGIST: CPT

## 2020-03-11 PROCEDURE — 82945 GLUCOSE OTHER FLUID: CPT

## 2020-03-11 PROCEDURE — 0W993ZZ DRAINAGE OF RIGHT PLEURAL CAVITY, PERCUTANEOUS APPROACH: ICD-10-PCS | Performed by: INTERNAL MEDICINE

## 2020-03-11 PROCEDURE — 77030012890

## 2020-03-11 PROCEDURE — 77030014147 HC TY THORCENT PARA TELE -B: Performed by: INTERNAL MEDICINE

## 2020-03-11 PROCEDURE — 97535 SELF CARE MNGMENT TRAINING: CPT

## 2020-03-11 PROCEDURE — 32555 ASPIRATE PLEURA W/ IMAGING: CPT | Performed by: INTERNAL MEDICINE

## 2020-03-11 PROCEDURE — 74011250636 HC RX REV CODE- 250/636: Performed by: INTERNAL MEDICINE

## 2020-03-11 PROCEDURE — 97110 THERAPEUTIC EXERCISES: CPT

## 2020-03-11 PROCEDURE — 76040000019: Performed by: INTERNAL MEDICINE

## 2020-03-11 PROCEDURE — 89050 BODY FLUID CELL COUNT: CPT

## 2020-03-11 PROCEDURE — 88112 CYTOPATH CELL ENHANCE TECH: CPT

## 2020-03-11 PROCEDURE — 97530 THERAPEUTIC ACTIVITIES: CPT

## 2020-03-11 PROCEDURE — 87116 MYCOBACTERIA CULTURE: CPT

## 2020-03-11 PROCEDURE — 97166 OT EVAL MOD COMPLEX 45 MIN: CPT

## 2020-03-11 PROCEDURE — 74011636637 HC RX REV CODE- 636/637: Performed by: THORACIC SURGERY (CARDIOTHORACIC VASCULAR SURGERY)

## 2020-03-11 PROCEDURE — 75810000165 HC THORACENTESIS

## 2020-03-11 PROCEDURE — 87102 FUNGUS ISOLATION CULTURE: CPT

## 2020-03-11 PROCEDURE — 99221 1ST HOSP IP/OBS SF/LOW 40: CPT | Performed by: PHYSICAL MEDICINE & REHABILITATION

## 2020-03-11 PROCEDURE — 74011000250 HC RX REV CODE- 250: Performed by: INTERNAL MEDICINE

## 2020-03-11 RX ADMIN — SENNOSIDES AND DOCUSATE SODIUM 2 TABLET: 8.6; 5 TABLET ORAL at 20:24

## 2020-03-11 RX ADMIN — Medication 1 AMPULE: at 09:21

## 2020-03-11 RX ADMIN — INSULIN LISPRO 4 UNITS: 100 INJECTION, SOLUTION INTRAVENOUS; SUBCUTANEOUS at 12:15

## 2020-03-11 RX ADMIN — TERAZOSIN HYDROCHLORIDE 10 MG: 5 CAPSULE ORAL at 20:24

## 2020-03-11 RX ADMIN — TRAMADOL HYDROCHLORIDE 50 MG: 50 TABLET, FILM COATED ORAL at 18:13

## 2020-03-11 RX ADMIN — INSULIN LISPRO 4 UNITS: 100 INJECTION, SOLUTION INTRAVENOUS; SUBCUTANEOUS at 17:04

## 2020-03-11 RX ADMIN — CITALOPRAM HYDROBROMIDE 10 MG: 10 TABLET ORAL at 09:21

## 2020-03-11 RX ADMIN — AMIODARONE HYDROCHLORIDE 200 MG: 200 TABLET ORAL at 09:20

## 2020-03-11 RX ADMIN — METHYLPREDNISOLONE SODIUM SUCCINATE 60 MG: 125 INJECTION, POWDER, FOR SOLUTION INTRAMUSCULAR; INTRAVENOUS at 00:26

## 2020-03-11 RX ADMIN — FAMOTIDINE 20 MG: 10 TABLET ORAL at 09:20

## 2020-03-11 RX ADMIN — POTASSIUM CHLORIDE 10 MEQ: 750 TABLET, EXTENDED RELEASE ORAL at 09:22

## 2020-03-11 RX ADMIN — ALBUTEROL SULFATE 2.5 MG: 2.5 SOLUTION RESPIRATORY (INHALATION) at 11:17

## 2020-03-11 RX ADMIN — ACETAMINOPHEN 650 MG: 325 TABLET, FILM COATED ORAL at 20:24

## 2020-03-11 RX ADMIN — METFORMIN HYDROCHLORIDE 500 MG: 500 TABLET ORAL at 17:04

## 2020-03-11 RX ADMIN — INSULIN LISPRO 2 UNITS: 100 INJECTION, SOLUTION INTRAVENOUS; SUBCUTANEOUS at 20:34

## 2020-03-11 RX ADMIN — SENNOSIDES AND DOCUSATE SODIUM 2 TABLET: 8.6; 5 TABLET ORAL at 09:21

## 2020-03-11 RX ADMIN — FAMOTIDINE 20 MG: 10 TABLET ORAL at 18:10

## 2020-03-11 RX ADMIN — ATORVASTATIN CALCIUM 80 MG: 80 TABLET, FILM COATED ORAL at 20:24

## 2020-03-11 RX ADMIN — METFORMIN HYDROCHLORIDE 500 MG: 500 TABLET ORAL at 09:21

## 2020-03-11 RX ADMIN — ACETAMINOPHEN 650 MG: 325 TABLET, FILM COATED ORAL at 15:30

## 2020-03-11 RX ADMIN — ASPIRIN 81 MG: 81 TABLET ORAL at 09:21

## 2020-03-11 RX ADMIN — TRAMADOL HYDROCHLORIDE 50 MG: 50 TABLET, FILM COATED ORAL at 12:15

## 2020-03-11 RX ADMIN — ALBUTEROL SULFATE 2.5 MG: 2.5 SOLUTION RESPIRATORY (INHALATION) at 15:47

## 2020-03-11 RX ADMIN — INSULIN LISPRO 2 UNITS: 100 INJECTION, SOLUTION INTRAVENOUS; SUBCUTANEOUS at 09:52

## 2020-03-11 RX ADMIN — Medication 10 ML: at 20:25

## 2020-03-11 RX ADMIN — Medication 10 ML: at 05:19

## 2020-03-11 RX ADMIN — FUROSEMIDE 40 MG: 40 TABLET ORAL at 09:20

## 2020-03-11 RX ADMIN — Medication 10 ML: at 15:30

## 2020-03-11 RX ADMIN — AMIODARONE HYDROCHLORIDE 200 MG: 200 TABLET ORAL at 20:24

## 2020-03-11 NOTE — INTERVAL H&P NOTE
H&P Update: WM Larissa Judge III was seen and examined. History and physical has been reviewed. The patient has been examined. There have been no significant clinical changes since the completion of the originally dated progress note from earlier today.  
 
Janice Francis MD

## 2020-03-11 NOTE — PROGRESS NOTES
Problem: Falls - Risk of  Goal: *Absence of Falls  Description: Document Allison Brar Fall Risk and appropriate interventions in the flowsheet. Outcome: Progressing Towards Goal  Note: Fall Risk Interventions:  Mobility Interventions: Patient to call before getting OOB, Communicate number of staff needed for ambulation/transfer, Bed/chair exit alarm    Mentation Interventions: Adequate sleep, hydration, pain control    Medication Interventions: Patient to call before getting OOB, Evaluate medications/consider consulting pharmacy, Bed/chair exit alarm    Elimination Interventions: Call light in reach    History of Falls Interventions: Bed/chair exit alarm, Evaluate medications/consider consulting pharmacy         Problem: Pressure Injury - Risk of  Goal: *Prevention of pressure injury  Description: Document Leon Scale and appropriate interventions in the flowsheet.   Outcome: Progressing Towards Goal  Note: Pressure Injury Interventions:  Sensory Interventions: Keep linens dry and wrinkle-free, Minimize linen layers         Activity Interventions: Pressure redistribution bed/mattress(bed type), PT/OT evaluation, Increase time out of bed, Chair cushion    Mobility Interventions: Pressure redistribution bed/mattress (bed type), HOB 30 degrees or less    Nutrition Interventions: Offer support with meals,snacks and hydration, Document food/fluid/supplement intake    Friction and Shear Interventions: Foam dressings/transparent film/skin sealants, HOB 30 degrees or less, Minimize layers                Problem: Cardiac Valve Surgery: Discharge Outcomes  Goal: *Hemodynamically stable  Outcome: Progressing Towards Goal  Goal: *Lungs clear or at baseline  Outcome: Progressing Towards Goal  Goal: *Weight  is stable  Outcome: Progressing Towards Goal

## 2020-03-11 NOTE — PROGRESS NOTES
Today's Date: 3/11/2020  Date of Admission: 3/5/2020    Chart Reviewed. Subjective:     Pt feels ok. He denies any dyspnea. Appetite ok. Medications Reviewed. Objective:     Vitals:    03/11/20 0312 03/11/20 0734 03/11/20 1117 03/11/20 1129   BP: 99/61 103/58  101/55   Pulse: (!) 58 61  69   Resp: 16 20  18   Temp: 98 °F (36.7 °C) 97.8 °F (36.6 °C)  98 °F (36.7 °C)   SpO2: 93% 92% 92% 92%   Weight: 166 lb 1.6 oz (75.3 kg)      Height:           Intake and Output  Current Shift: 03/11 0701 - 03/11 1900  In: 240 [P.O.:240]  Out: -    Last 3 Shifts: 03/09 1901 - 03/11 0700  In: 1260 [P.O.:1260]  Out: 1100 [Urine:1050]    Physical Exam:  General: Well Developed, Well Nourished, No Acute Distress, Alert & Oriented x 3, Appropriate mood  Neck: supple, no JVD  Heart: S1S2 with RRR without murmurs or gallops  Lungs: decreased right base   Abd: soft, nontender, nondistended, with good bowel sounds  Ext: no edema bilaterally  Sternal incision: clean, dry, and intact  Skin: warm and dry    LABS  Data Review:   Recent Labs     03/11/20  0336 03/10/20  0343 03/09/20  0400    138 138   K 4.5 3.5 3.9   MG  --  2.1 2.1   BUN 19 20 12   CREA 0.67* 0.61* 0.55*   * 120* 124*   WBC 5.7 8.0 8.6   HGB 9.0* 8.6* 7.8*   HCT 26.9* 25.5* 23.5*    132* 113*       Estimated Creatinine Clearance: 101 mL/min (A) (by C-G formula based on SCr of 0.67 mg/dL (L)).       Assessment/Plan:     Principal Problem:    S/P CABG x 3 (3/10/2020)  On ASA, holding BB and ACE-I given low BP, continue statin, pacing wires removed, stable on room air, continue PT      Active Problems:    Patent foramen ovale (3/5/2020)  S/p closure       Mitral valve regurgitation (3/5/2020)  S/p MVR       CAD (coronary artery disease) (3/5/2020)  S/p CABG, continue medical therapy        Mitral valve insufficiency (3/5/2020)  S/p MVR        Atherosclerosis of coronary artery of native heart with unstable angina pectoris ([address] (3/5/2020)  S/p CABG        Type II diabetes mellitus ([address] (3/5/2020)  On Metformin, SSI       Respiratory failure, post-operative ([address] (3/5/2020)           Hypoxia (3/5/2020)  Resolved        Atelectasis (3/6/2020)  IS        Acute pulmonary edema ([address] (3/6/2020)  On oral lasix, repeat chest xray showed persistent right effusion, pulmonary evaluating        Ventricular fibrillation ([address] (3/6/2020)  Isolated event       Pleural effusion, right (3/8/2020)  Persistent on repeat chest xray, pulmonary planning US    Systolic CHF  Holding BB and ACE-I/ARB due to low BP, he states BP was low pre-op as well, will repeat limited echo in AM to see if [employer] needed         Eden Sheriff PA-C

## 2020-03-11 NOTE — PROGRESS NOTES
Spiritual Care Visit, initial visit. Visited with patient at bedside. Listened as patient spoke of his family, especially of his father, a Zoroastrian  in [place], whom I had known. Affirmed his feelings. Encouraged his hope for a full recovery. Prayed for patient's healing and health. Visit by Diego Caruso, Staff .  Sulema., [email or web address]., B.A.

## 2020-03-11 NOTE — PROGRESS NOTES
Problem: Mobility Impaired (Adult and Pediatric)  Goal: *Acute Goals and Plan of Care (Insert Text)  Description  LTG:  (1.)Mr. Gideon Garcia will move from supine to sit and sit to supine , scoot up and down and roll side to side in flat bed without siderails with  CONTACT GUARD ASSIST within 7 day(s). (2.)Mr. Gideon Garcia will perform all functional transfers with  CONTACT GUARD ASSIST using the least restrictive/no device within 7 day(s). (3.)Mr. Gideon Garcia will ambulate with  CONTACT GUARD ASSIST for 250+ feet with normal vital sign response with the least restrictive/no device within 7 day(s). Outcome: Progressing Towards Goal     PHYSICAL THERAPY: Daily Note and AM 3/11/2020  INPATIENT: PT Visit Days : 3  [place] / [place] / Product Type: Medicare /       NAME/AGE/GENDER: WM Sherrie Winkler III is a 78 y.o. male   PRIMARY DIAGNOSIS: Atherosclerosis of native coronary artery of native heart with unstable angina pectoris (HCC) [I25.110]  Mitral valve insufficiency, unspecified etiology [I34.0]  Patent foramen ovale [Q21.1]  CAD (coronary artery disease) [I25.10]  Mitral valve regurgitation [I34.0]  Patent foramen ovale [Q21.1]  Atherosclerosis of coronary artery of native heart with unstable angina pectoris (HCC) [I25.110]  Mitral valve insufficiency [I34.0]  Patent foramen ovale [Q21.1] S/P CABG x 3 S/P CABG x 3  Procedure(s) (LRB):  CORONARY ARTERY BYPASS GRAFT WITH MVR/ CLOSURE OF PFO/ (CABG x 3)/ LIMA/ LYSIS OF MYOCARDIAL ADHESIONS/ CLIPPING OF LEFT ATRIAL APPENDAGE (N/A)  ESOPHAGEAL TRANS ECHOCARDIOGRAM (N/A)  VEIN HARVEST/ GREATER SAPHENOUS (Left)  6 Days Post-Op  ICD-10: Treatment Diagnosis:    · Other abnormalities of gait and mobility (R26.89)  · History of falling (Z91.81)   Precaution/Allergies:  Patient has no known allergies. ASSESSMENT:     Mr. Gideon Garcia underwent above surgery. 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   He lives with his sister and ambulates independently but has suffered 1 fall in the past couple months while stepping off a curb. Patient sitting in the recliner and agreeable for PT. Reports that he slept better last night. Sit to stand with min assist, the patient is doing better with this activity today. Gait training with rolling walker x 200 feet with slow gianfranco. Patient is returned to the recliner and after a short rest break the patient is instructed in therapeutic exercises. Tolerated well. Good session. Patient is making progress towards goals. His posture has also improved. Mr. Stephane Franco is functioning well below baseline and is therefore appropriate for skilled PT to maximize rehab potential.   Patient may benefit from [facility] at baseline, as his decreased vision affects his mobility. Will continue PT efforts. PM note:  Patient is agreeable. Sister present but is leaving. MD visits during treatment session. Patient is doing better with sit to stand. Was able to maintain am gait distance and perform exercises. Doing better with overall mobility. Will continue PT efforts. This section established at most recent assessment   PROBLEM LIST (Impairments causing functional limitations):  1. Decreased Transfer Abilities  2. Decreased Ambulation Ability/Technique  3. Decreased Balance  4. Decreased Activity Tolerance  5. Decreased Knowledge of Precautions   INTERVENTIONS PLANNED: (Benefits and precautions of physical therapy have been discussed with the patient.)  1. Balance Exercise  2. Bed Mobility  3. Gait Training  4. Neuromuscular Re-education/Strengthening  5. Therapeutic Activites  6. Therapeutic Exercise/Strengthening  7. Transfer Training  8. education      TREATMENT PLAN: Frequency/Duration: twice daily for duration of hospital stay  Rehabilitation Potential For Stated Goals: Good     REHAB RECOMMENDATIONS (at time of discharge pending progress):    Placement:   It is my opinion, based on this patient's performance to date, that Mr. Rafael Alonzo may benefit from intensive therapy at an [address] [place] [address] after discharge due to a probable need for multiple therapy disciplines and potential to make ongoing and sustainable functional improvement that is of practical value. .  Equipment:    tbd    None at this time              HISTORY:   History of Present Injury/Illness (Reason for Referral): Admitted for above. Past Medical History/Comorbidities:   Mr. Rafael Alonzo  has a past medical history of Arthritis, CAD (coronary artery disease), CVA (cerebral vascular accident) ([facility] (1/3/2020), Diabetes type 2, uncontrolled ([facility] Hypercholesteremia, Sleep apnea, Systolic CHF, chronic ([facility] (1/5/2020), TIA (transient ischemic attack) (2009), and Troponin I above reference range (1/3/2020). Mr. Rafael Alonzo  has a past surgical history that includes hx hernia repair; hx knee arthroscopy; and hx cyst removal.  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 He lives with his sister and ambulates independently but has suffered 1 fall in the past couple months while stepping off a curb.        Number of Personal Factors/Comorbidities that affect the Plan of Care: 1-2: MODERATE COMPLEXITY   EXAMINATION:   Most Recent Physical Functioning:   Gross Assessment:                  Posture:     Balance:  Sitting: Intact  Standing: Impaired  Standing - Static: Fair  Standing - Dynamic : Fair Bed Mobility:     Wheelchair Mobility:     Transfers:  Sit to Stand: Minimum assistance  Stand to Sit: Minimum assistance  Gait:     Speed/Clara: Slow  Distance (ft): 200 Feet (ft)  Assistive Device: Walker, rolling  Ambulation - Level of Assistance: Minimal assistance      Body Structures Involved:  1. Eyes and Ears  2. Heart  3. Lungs  4. Muscles Body Functions Affected:  1. Sensory/Pain  2. Cardio  3. Respiratory  4. Movement Related  5. education  Activities and Participation Affected:  1. Mobility  2. Self Care  3. Domestic Life  4. Community, Social and [place]   Number of elements that affect the Plan of Care: 4+: HIGH COMPLEXITY   CLINICAL PRESENTATION:   Presentation: Stable and uncomplicated: LOW COMPLEXITY   CLINICAL DECISION MAKIN [facility] Mobility Inpatient Short Form  How much difficulty does the patient currently have. .. Unable A Lot A Little None   1. Turning over in bed (including adjusting bedclothes, sheets and blankets)? [] 1   [x] 2   [] 3   [] 4   2. Sitting down on and standing up from a chair with arms ( e.g., wheelchair, bedside commode, etc.)   [] 1   [] 2   [x] 3   [] 4   3. Moving from lying on back to sitting on the side of the bed? [] 1   [x] 2   [] 3   [] 4   How much help from another person does the patient currently need. .. Total A Lot A Little None   4. Moving to and from a bed to a chair (including a wheelchair)? [] 1   [] 2   [x] 3   [] 4   5. Need to walk in hospital room? [] 1   [] 2   [x] 3   [] 4   6. Climbing 3-5 steps with a railing? [] 1   [x] 2   [] 3   [] 4   © , Trustees of [address] [place] [address] [address] [id number], under license to [employer]. All rights reserved      Score:  Initial: 15 Most Recent: X (Date: -- )    Interpretation of Tool:  Represents activities that are increasingly more difficult (i.e. Bed mobility, Transfers, Gait). Medical Necessity:     · Patient is expected to demonstrate progress in   · balance, functional technique, and activity tolerance   ·  to   · increase independence with   and improve safety during all functional mobility   · .   Reason for Services/Other Comments:  · Patient continues to require skilled intervention due to   · medical complications and patient unable to attend/participate in therapy as expected  · . Use of outcome tool(s) and clinical judgement create a POC that gives a: Clear prediction of patient's progress: LOW COMPLEXITY            TREATMENT:   (In addition to Assessment/Re-Assessment sessions the following treatments were rendered)   Pre-treatment Symptoms/Complaints:  \"OK\"  Pain: Initial: 0/10  Pain Intensity 1: 0  Post Session:  0/10   Therapeutic Activity: (    13 minutes): Therapeutic activities including transfers, balance and gait training to improve mobility, strength, balance and coordination. Required minimum assist    to promote safety with functional mobility activities. Therapeutic Exercise: ( 10 minutes):  Exercises per grid below to improve mobility, strength, and balance. Required minimal visual and verbal cues to promote proper body alignment. Progressed complexity of movement as indicated. DATE: 3/10/20 3/11/20  PM      Shoulder shrugs  15      Hip Flexion X15 AB 15      Long Arc Quads X15 AB 15      Hip ab/ad  15      Ankle pumps X15 AB 15      Gluteal sets X15 AB 15                               Key:  A=active, AA=active assisted, P=passive, B=bilaterally, R=right, L=left   DF=dorsiflexion, PF=plantarflexion      Braces/Orthotics/Lines/Etc:   ·   Treatment/Session Assessment:    · Response to Treatment:  cooperative. · Interdisciplinary Collaboration:   o Physical Therapy Assistant  o Registered Nurse  · After treatment position/precautions:   o Up in chair  o Bed alarm/tab alert on  o Bed/Chair-wheels locked  o Caregiver at bedside  o Call light within reach   · Compliance with Program/Exercises: Compliant all of the time  · Recommendations/Intent for next treatment session: \"Next visit will focus on advancements to more challenging activities and reduction in assistance provided\".   Total Treatment Duration:  PT Patient Time In/Time Out  Time In: 1318  Time Out: 1341    Laura Caldera-Duane, PTA

## 2020-03-11 NOTE — PROGRESS NOTES
Cardiac Rehab: Spoke with patient regarding referral to cardiac rehab. Patient meets admission criteria based on CABGx3 with MVR (03/05/20). Written information about Cardiac Rehab given and reviewed with patient. Discussed lifestyle modifications to promote cardiac wellness. Patient indicated that he wants to participate in the cardiac rehab program has been scheduled to allow for him to complete Short term rehab. His Cardiologist is Dr. Henrry Banks.       Thank you,  ROMAN Beltre, RN  Cardiopulmonary Rehabilitation Nurse Liaison  [facility]

## 2020-03-11 NOTE — PROGRESS NOTES
Pt sat up on side of bed for thoracentesis. Consent obtained. Time out performed. Pts vitals monitored throughout procedure. Right ultrasound done and pic taken of pleural fluid.  ~1000 ml serosanguinous pleural fluid from right. Pt tolerated procedure well with no adverse rxn. Specimens sent to the lab x 3 and labeled appropriately. Site dressed appropriately and report given to pts RN.    Lung sliding done and ultrasound findings reviewed by MD.

## 2020-03-11 NOTE — PROGRESS NOTES
Bedside shift change report given to MARY Mccullough (oncoming nurse) by myself (offgoing nurse). Report included the following information SBAR, Kardex, Intake/Output, MAR, Recent Results and Cardiac Rhythm NSR/Sinus Sofya Kurtz.

## 2020-03-11 NOTE — PROGRESS NOTES
Bedside and Verbal shift change report given to Nando Motta RN (oncoming nurse) by self Oneyda Lobo nurse). Report included the following information SBAR, Kardex, Intake/Output, MAR, Recent Results and Cardiac Rhythm sinus sonam.

## 2020-03-11 NOTE — PROGRESS NOTES
Problem: Diabetes Self-Management  Goal: *Disease process and treatment process  Description: Define diabetes and identify own type of diabetes; list 3 options for treating diabetes. Outcome: Progressing Towards Goal     Problem: Falls - Risk of  Goal: *Absence of Falls  Description: Document Nitin Xiong Fall Risk and appropriate interventions in the flowsheet. 3/11/2020 1310 by Selene Rodriguez RN  Outcome: Progressing Towards Goal  Note: Fall Risk Interventions:  Mobility Interventions: Patient to call before getting OOB, Communicate number of staff needed for ambulation/transfer, Bed/chair exit alarm    Mentation Interventions: Adequate sleep, hydration, pain control    Medication Interventions: Patient to call before getting OOB, Evaluate medications/consider consulting pharmacy, Bed/chair exit alarm    Elimination Interventions: Call light in reach    History of Falls Interventions: Bed/chair exit alarm, Evaluate medications/consider consulting pharmacy      3/11/2020 1308 by Selene Rodriguez RN  Outcome: Progressing Towards Goal  Note: Fall Risk Interventions:  Mobility Interventions: Patient to call before getting OOB, Communicate number of staff needed for ambulation/transfer, Bed/chair exit alarm    Mentation Interventions: Adequate sleep, hydration, pain control    Medication Interventions: Patient to call before getting OOB, Evaluate medications/consider consulting pharmacy, Bed/chair exit alarm    Elimination Interventions: Call light in reach    History of Falls Interventions: Bed/chair exit alarm, Evaluate medications/consider consulting pharmacy         Problem: Pressure Injury - Risk of  Goal: *Prevention of pressure injury  Description: Document Leon Scale and appropriate interventions in the flowsheet.   Outcome: Progressing Towards Goal  Note: Pressure Injury Interventions:  Sensory Interventions: Keep linens dry and wrinkle-free, Minimize linen layers         Activity Interventions: Pressure redistribution bed/mattress(bed type), PT/OT evaluation, Increase time out of bed, Chair cushion    Mobility Interventions: Pressure redistribution bed/mattress (bed type), HOB 30 degrees or less    Nutrition Interventions: Offer support with meals,snacks and hydration, Document food/fluid/supplement intake    Friction and Shear Interventions: Foam dressings/transparent film/skin sealants, HOB 30 degrees or less, Minimize layers                Problem: Cardiac Valve Surgery: Discharge Outcomes  Goal: *Hemodynamically stable  3/11/2020 1310 by Colin Daley RN  Outcome: Progressing Towards Goal  3/11/2020 1308 by Colin Daley RN  Outcome: Progressing Towards Goal  Goal: *Stable cardiac rhythm  Outcome: Progressing Towards Goal  Goal: *Lungs clear or at baseline  3/11/2020 1310 by Colin Daley RN  Outcome: Progressing Towards Goal  3/11/2020 1308 by Colin Daley RN  Outcome: Progressing Towards Goal  Goal: *Weight  is stable  Outcome: Progressing Towards Goal

## 2020-03-11 NOTE — PROGRESS NOTES
Problem: Diabetes Self-Management  Goal: *Disease process and treatment process  Description  Define diabetes and identify own type of diabetes; list 3 options for treating diabetes. Outcome: Progressing Towards Goal     Problem: Falls - Risk of  Goal: *Absence of Falls  Description  Document Jodi Walker Fall Risk and appropriate interventions in the flowsheet. Outcome: Progressing Towards Goal  Note: Fall Risk Interventions:  Mobility Interventions: Bed/chair exit alarm, Communicate number of staff needed for ambulation/transfer, Patient to call before getting OOB, Strengthening exercises (ROM-active/passive)    Mentation Interventions: Adequate sleep, hydration, pain control, Bed/chair exit alarm    Medication Interventions: Bed/chair exit alarm, Evaluate medications/consider consulting pharmacy, Patient to call before getting OOB, Teach patient to arise slowly    Elimination Interventions: Bed/chair exit alarm, Call light in reach, Patient to call for help with toileting needs, Urinal in reach    History of Falls Interventions: Bed/chair exit alarm    Problem: Pressure Injury - Risk of  Goal: *Prevention of pressure injury  Description  Document Leon Scale and appropriate interventions in the flowsheet.   Outcome: Progressing Towards Goal  Note: Pressure Injury Interventions:  Sensory Interventions: Keep linens dry and wrinkle-free, Monitor skin under medical devices    Activity Interventions: Increase time out of bed, Pressure redistribution bed/mattress(bed type)    Mobility Interventions: Pressure redistribution bed/mattress (bed type), HOB 30 degrees or less    Nutrition Interventions: Document food/fluid/supplement intake, Offer support with meals,snacks and hydration    Friction and Shear Interventions: HOB 30 degrees or less, Minimize layers

## 2020-03-11 NOTE — PROGRESS NOTES
Domitila Rebolledoows III  Admission Date: 3/5/2020             Daily Progress Note: 3/11/2020       The patient's chart is reviewed and the patient is discussed with the staff. 82-year-old gentleman with a 20-pack-year smoking history (quit 1997), normal PFTs, ROSENDO not on CPAP, diabetes, left-sided weakness, BPH with mitral regurgitation who underwent CABG, MVR and PFO closure, left atrial appendage clipping. he has an EF of 25 to 30%. Required post op pressor - now weaned.       V. fib at 230 on 3/4 requiring defibrillation, on amio. Transfused 1 unit PRBCs on 3/9. Subjective:       Still feels short of breath. No congestion. Appetite some better. Ambulating.     Current Facility-Administered Medications   Medication Dose Route Frequency    carboxymethylcellulose sodium (REFRESH LIQUIGEL) 1 % ophthalmic solution 1 Drop  1 Drop Both Eyes PRN    albuterol (PROVENTIL VENTOLIN) nebulizer solution 2.5 mg  2.5 mg Nebulization QID RT    polyethylene glycol (MIRALAX) packet 17 g  17 g Oral DAILY PRN    citalopram (CELEXA) tablet 10 mg  10 mg Oral DAILY    metFORMIN (GLUCOPHAGE) tablet 500 mg  500 mg Oral BID WITH MEALS    terazosin (HYTRIN) capsule 10 mg  10 mg Oral QHS    sodium chloride (NS) flush 5-40 mL  5-40 mL IntraVENous Q8H    sodium chloride (NS) flush 5-40 mL  5-40 mL IntraVENous PRN    furosemide (LASIX) tablet 40 mg  40 mg Oral DAILY    alum-mag hydroxide-simeth (MYLANTA) oral suspension 30 mL  30 mL Oral Q4H PRN    famotidine (PEPCID) tablet 20 mg  20 mg Oral BID    ondansetron (ZOFRAN) injection 4 mg  4 mg IntraVENous Q6H PRN    acetaminophen (TYLENOL) tablet 650 mg  650 mg Oral Q4H PRN    atorvastatin (LIPITOR) tablet 80 mg  80 mg Oral QHS    amiodarone (CORDARONE) tablet 200 mg  200 mg Oral Q12H    aspirin delayed-release tablet 81 mg  81 mg Oral DAILY    magnesium oxide (MAG-OX) tablet 400 mg  400 mg Oral TID PRN    magnesium oxide (MAG-OX) tablet 400 mg  400 mg Oral QID PRN    potassium chloride (KLOR-CON) tablet 10 mEq  10 mEq Oral DAILY    potassium chloride (K-DUR, KLOR-CON) SR tablet 20 mEq  20 mEq Oral BID PRN    potassium chloride (K-DUR, KLOR-CON) SR tablet 40 mEq  40 mEq Oral BID PRN    [Held by provider] carvediloL (COREG) tablet 3.125 mg  3.125 mg Oral Q12H    [Held by provider] lisinopriL (PRINIVIL, ZESTRIL) tablet 2.5 mg  2.5 mg Oral DAILY    0.9% sodium chloride infusion 250 mL  250 mL IntraVENous PRN    senna-docusate (PERICOLACE) 8.6-50 mg per tablet 2 Tab  2 Tab Oral Q12H    insulin lispro (HUMALOG) injection   SubCUTAneous AC&HS    alcohol 62% (NOZIN) nasal  1 Ampule  1 Ampule Topical DAILY    nitroglycerin (NITROSTAT) tablet 0.4 mg  0.4 mg SubLINGual PRN    influenza vaccine 2019-20 (6 mos+)(PF) (FLUARIX/FLULAVAL/FLUZONE QUAD) injection 0.5 mL  0.5 mL IntraMUSCular PRIOR TO DISCHARGE    traMADoL (ULTRAM) tablet 50 mg  50 mg Oral Q6H PRN         Objective:     Vitals:    03/10/20 2125 03/10/20 2257 03/11/20 0312 03/11/20 0734   BP: 104/68 110/67 99/61 103/58   Pulse: 60 60 (!) 58 61   Resp:  16 16 20   Temp:  98.2 °F (36.8 °C) 98 °F (36.7 °C) 97.8 °F (36.6 °C)   SpO2:  92% 93% 92%   Weight:   166 lb 1.6 oz (75.3 kg)    Height:         Intake and Output:   03/09 1901 - 03/11 0700  In: 1260 [P.O.:1260]  Out: 1100 [Urine:1050]  03/11 0701 - 03/11 1900  In: 240 [P.O.:240]  Out: -     Physical Exam:   Constitutional:  the patient is well developed and in no acute distress  HEENT:  Sclera clear, pupils equal, oral mucosa moist  Lungs: clear but decreased from the posterior - dullness on the right. On room air  Cardiovascular:  RRR without M,G,R. Sinus sonam per telemetry  Abd/GI: soft and non-tender; with positive bowel sounds. Ext: warm without cyanosis. There is no lower leg edema.   Musculoskeletal: moves all four extremities with equal strength  Skin:  no jaundice or rashes, chest and leg wounds   Neuro: no gross neuro deficits Musculoskeletal: can ambulate. No deformity  Psychiatric: Calm. Review of Systems - General ROS: positive for  - fatigue  Respiratory ROS: positive for - none  Cardiovascular ROS: positive for - chest pain related to cardiac surgery   Gastrointestinal ROS: positive for - appetite loss and constipation  Musculoskeletal ROS: positive for - muscular weakness   Lines: peripheral IV, pacing wires    CHEST XRAY:     3/11:            3/8        LAB  Recent Labs     03/11/20  0336 03/10/20  0343 03/09/20  0400   WBC 5.7 8.0 8.6   HGB 9.0* 8.6* 7.8*   HCT 26.9* 25.5* 23.5*    132* 113*     Recent Labs     03/11/20  0336 03/10/20  0343 03/09/20  0400    138 138   K 4.5 3.5 3.9    105 109*   CO2 24 26 22   * 120* 124*   BUN 19 20 12   CREA 0.67* 0.61* 0.55*   MG  --  2.1 2.1       Assessment:  (Medical Decision Making)     Hospital Problems  Date Reviewed: 2/24/2020          Codes Class Noted POA    * (Principal) S/P CABG x 3 ICD-10-CM: Z95.1  ICD-9-CM: V45.81  3/10/2020 No    Sinus sonam per telemetry.  Transfused yesterday for anemia    Pleural effusion, right ICD-10-CM: J90  ICD-9-CM: 511.9  3/8/2020 No    On exam, Sounds like fair amount of fluid    Atelectasis ICD-10-CM: J98.11  ICD-9-CM: 518.0  3/6/2020 No    Secondary to recent chest surgery    Acute pulmonary edema (HCC) ICD-10-CM: J81.0  ICD-9-CM: 518.4  3/6/2020 No    On lasix - fluid balance + and blood pressure marginal    Ventricular fibrillation (HCC) ICD-10-CM: I49.01  ICD-9-CM: 427.41  3/6/2020 No    EF 25 to 30%     Patent foramen ovale ICD-10-CM: Q21.1  ICD-9-CM: 745.5  3/5/2020 Yes    S/p closure    Mitral valve regurgitation ICD-10-CM: I34.0  ICD-9-CM: 424.0  3/5/2020 Yes    S/p MVR    CAD (coronary artery disease) (Chronic) ICD-10-CM: I25.10  ICD-9-CM: 414.00  3/5/2020 Yes    S/p CABG    Mitral valve insufficiency ICD-10-CM: I34.0  ICD-9-CM: 424.0  3/5/2020 Yes        Atherosclerosis of coronary artery of native heart with unstable angina pectoris ([facility] ICD-10-CM: I25.110  ICD-9-CM: 414.01, 411.1  3/5/2020 Yes        Type II diabetes mellitus ([facility] (Chronic) ICD-10-CM: E11.9  ICD-9-CM: 250.00  3/5/2020 Yes    controlled    Respiratory failure, post-operative ([facility] ICD-10-CM: D60.783  ICD-9-CM: 518.51  3/5/2020 No    Tolerated extubation    Hypoxia ICD-10-CM: R09.02  ICD-9-CM: 799.02  3/5/2020 No    Now on room air          Plan:  (Medical Decision Making)   1. Follow up CXR reviewed - ? Tap. No diuresis with lasix (fluid balance +180) and blood pressure marginal  2. On room air. Volumes on IS < 1 liter - ? Secondary to effusion. Using EZPAP  3. Sinus bradycardia - on low dose Coreg and Amiodarone. CV surgery following - held Coreg yesterday. HR slightly better today  4. ? Need life vest at discharge with cardiomyopathy and recent arrest?    Jeevan Lynch NP     More than 50% of time documented was spent face-to-face contact with the patient and in the care of the patient on the floor/unit where the patient is located. Lungs: Decreased BS at bases   Heart:  RRR with no Murmur/Rubs/Gallops    Additional Comments:  CRP yesterday high at 121. Effusions likely due to inflammation. Will US and tap if sufficient fluid. I have spoken with and examined the patient. I agree with the above assessment and plan as documented.     Brad Jaime MD

## 2020-03-11 NOTE — PROGRESS NOTES
This CM met with pt at bedside to follow up about discharge planning. Pt reports he spoke with his sister and he would like to proceed with consult to 9th floor [facility]. Referral placed this day. Will await determination. No additional CM needs at this time. Will continue to follow.

## 2020-03-11 NOTE — PROCEDURES
Thoracentesis Procedure Note      Procedure:  R Thoracentesis with ultrasound guidance    Indication:  R Pleural effusion      Summary:    After informed consent was obtained, the patient was positioned upright. Ultrasound was used to identify the optimal spot for pleural drainage. The lower R intercostal space was anesthetized with 1% Lidocaine to achieve adequate anesthesia. The pleural space was entered with serosanguinous fluid identified. Lidocaine was instilled within the pleural space as well. A small stab incision was made at the site of local anesthesia and the thoracentesis catheter was advanced into the pleural space. Approximately 1000 ml of fluid was obtained with ease. The procedure was terminated after pleural drainage stopped. Air was not aspirated during the procedure. A pressure dressing was placed over the incision after adequate hemostasis was achieved. A CXR is not pending as a follow up US revealed a + lung slide c/w no PTX. The pleural fluid will be sent for protein, LDH, glucose, cell count with differential, Gram stain, culture and sensitivity, AFB smear and culture, fungal smear and culture, cytology and cell block. EBL: negligible    Post Procedure Dx: Pleural effusion    The procedure was not repeated on the L though there was a small effusion present due to borderline BPs.      Elisa Scott MD           Signed By: Araseli Gage MD

## 2020-03-11 NOTE — PROGRESS NOTES
Problem: Mobility Impaired (Adult and Pediatric)  Goal: *Acute Goals and Plan of Care (Insert Text)  Description  LTG:  (1.)Mr. Lilli Red will move from supine to sit and sit to supine , scoot up and down and roll side to side in flat bed without siderails with  CONTACT GUARD ASSIST within 7 day(s). (2.)Mr. Lilli Red will perform all functional transfers with  CONTACT GUARD ASSIST using the least restrictive/no device within 7 day(s). (3.)Mr. Lilli Red will ambulate with  CONTACT GUARD ASSIST for 250+ feet with normal vital sign response with the least restrictive/no device within 7 day(s). Outcome: Progressing Towards Goal     PHYSICAL THERAPY: Daily Note and AM 3/11/2020  INPATIENT: PT Visit Days : 3  [place] / Product Type: Medicare /       NAME/AGE/GENDER: WM Juan Martin III is a 78 y.o. male   PRIMARY DIAGNOSIS: Atherosclerosis of native coronary artery of native heart with unstable angina pectoris (HCC) [I25.110]  Mitral valve insufficiency, unspecified etiology [I34.0]  Patent foramen ovale [Q21.1]  CAD (coronary artery disease) [I25.10]  Mitral valve regurgitation [I34.0]  Patent foramen ovale [Q21.1]  Atherosclerosis of coronary artery of native heart with unstable angina pectoris (HCC) [I25.110]  Mitral valve insufficiency [I34.0]  Patent foramen ovale [Q21.1] S/P CABG x 3 S/P CABG x 3  Procedure(s) (LRB):  CORONARY ARTERY BYPASS GRAFT WITH MVR/ CLOSURE OF PFO/ (CABG x 3)/ LIMA/ LYSIS OF MYOCARDIAL ADHESIONS/ CLIPPING OF LEFT ATRIAL APPENDAGE (N/A)  ESOPHAGEAL TRANS ECHOCARDIOGRAM (N/A)  VEIN HARVEST/ GREATER SAPHENOUS (Left)  6 Days Post-Op  ICD-10: Treatment Diagnosis:    · Other abnormalities of gait and mobility (R26.89)  · History of falling (Z91.81)   Precaution/Allergies:  Patient has no known allergies. ASSESSMENT:     Mr. Lilli Red underwent above surgery. 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   He lives with his sister and ambulates independently but has suffered 1 fall in the past couple months while stepping off a curb. Patient sitting in the recliner and agreeable for PT. Reports that he slept better last night. Sit to stand with min assist, the patient is doing better with this activity today. Gait training with rolling walker x 200 feet with slow gianfranco. Patient is returned to the recliner and after a short rest break the patient is instructed in therapeutic exercises. Tolerated well. Good session. Patient is making progress towards goals. His posture has also improved. Mr. José Ray is functioning well below baseline and is therefore appropriate for skilled PT to maximize rehab potential.   Patient may benefit from [facility] at baseline, as his decreased vision affects his mobility. Will continue PT efforts. This section established at most recent assessment   PROBLEM LIST (Impairments causing functional limitations):  1. Decreased Transfer Abilities  2. Decreased Ambulation Ability/Technique  3. Decreased Balance  4. Decreased Activity Tolerance  5. Decreased Knowledge of Precautions   INTERVENTIONS PLANNED: (Benefits and precautions of physical therapy have been discussed with the patient.)  1. Balance Exercise  2. Bed Mobility  3. Gait Training  4. Neuromuscular Re-education/Strengthening  5. Therapeutic Activites  6. Therapeutic Exercise/Strengthening  7. Transfer Training  8. education      TREATMENT PLAN: Frequency/Duration: twice daily for duration of hospital stay  Rehabilitation Potential For Stated Goals: Good     REHAB RECOMMENDATIONS (at time of discharge pending progress):    Placement:   It is my opinion, based on this patient's performance to date, that Mr. José Ray may benefit from intensive therapy at an [address] [place] [address] after discharge due to a probable need for multiple therapy disciplines and potential to make ongoing and sustainable functional improvement that is of practical value. .  Equipment:    tbd    None at this time              HISTORY:   History of Present Injury/Illness (Reason for Referral): Admitted for above. Past Medical History/Comorbidities:   Mr. Gideon Garcia  has a past medical history of Arthritis, CAD (coronary artery disease), CVA (cerebral vascular accident) ([facility] [address] (1/3/2020), Diabetes type 2, uncontrolled ([facility] [address] Hypercholesteremia, Sleep apnea, Systolic CHF, chronic ([facility] [address] (1/5/2020), TIA (transient ischemic attack) (2009), and Troponin I above reference range (1/3/2020). Mr. Gideon Garcia  has a past surgical history that includes hx hernia repair; hx knee arthroscopy; and hx cyst removal.  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 He lives with his sister and ambulates independently but has suffered 1 fall in the past couple months while stepping off a curb. Number of Personal Factors/Comorbidities that affect the Plan of Care: 1-2: MODERATE COMPLEXITY   EXAMINATION:   Most Recent Physical Functioning:   Gross Assessment:                  Posture:     Balance:  Sitting: Intact  Standing: Impaired  Standing - Static: Fair  Standing - Dynamic : Fair Bed Mobility:     Wheelchair Mobility:     Transfers:  Sit to Stand: Minimum assistance  Stand to Sit: Minimum assistance  Gait:     Speed/Clara: Slow  Distance (ft): 200 Feet (ft)  Assistive Device: Walker, rolling  Ambulation - Level of Assistance: Minimal assistance      Body Structures Involved:  1. Eyes and Ears  2. Heart  3. Lungs  4.  Muscles Body Functions Affected:  1. Sensory/Pain  2. Cardio  3. Respiratory  4. Movement Related  5. education  Activities and Participation Affected:  1. Mobility  2. Self Care  3. Domestic Life  4. Community, Social and [place]   Number of elements that affect the Plan of Care: 4+: HIGH COMPLEXITY   CLINICAL PRESENTATION:   Presentation: Stable and uncomplicated: LOW COMPLEXITY   CLINICAL DECISION MAKIN [facility] Mobility Inpatient Short Form  How much difficulty does the patient currently have. .. Unable A Lot A Little None   1. Turning over in bed (including adjusting bedclothes, sheets and blankets)? [] 1   [x] 2   [] 3   [] 4   2. Sitting down on and standing up from a chair with arms ( e.g., wheelchair, bedside commode, etc.)   [] 1   [] 2   [x] 3   [] 4   3. Moving from lying on back to sitting on the side of the bed? [] 1   [x] 2   [] 3   [] 4   How much help from another person does the patient currently need. .. Total A Lot A Little None   4. Moving to and from a bed to a chair (including a wheelchair)? [] 1   [] 2   [x] 3   [] 4   5. Need to walk in hospital room? [] 1   [] 2   [x] 3   [] 4   6. Climbing 3-5 steps with a railing? [] 1   [x] 2   [] 3   [] 4   © , Trustees of [address] [place], [place] [id number], under license to [employer]. All rights reserved      Score:  Initial: 15 Most Recent: X (Date: -- )    Interpretation of Tool:  Represents activities that are increasingly more difficult (i.e. Bed mobility, Transfers, Gait). Medical Necessity:     · Patient is expected to demonstrate progress in   · balance, functional technique, and activity tolerance   ·  to   · increase independence with   and improve safety during all functional mobility   · . Reason for Services/Other Comments:  · Patient continues to require skilled intervention due to   · medical complications and patient unable to attend/participate in therapy as expected  · .    Use of outcome tool(s) and clinical judgement create a POC that gives a: Clear prediction of patient's progress: LOW COMPLEXITY            TREATMENT:   (In addition to Assessment/Re-Assessment sessions the following treatments were rendered)   Pre-treatment Symptoms/Complaints:  \"Good morning\"  Pain: Initial: 0/10  Pain Intensity 1: 0  Post Session:  0/10   Therapeutic Activity: (    14 minutes): Therapeutic activities including transfers, balance and gait training to improve mobility, strength, balance and coordination. Required minimum assist    to promote safety with functional mobility activities. Therapeutic Exercise: ( 10 minutes):  Exercises per grid below to improve mobility, strength, and balance. Required minimal visual and verbal cues to promote proper body alignment. Progressed complexity of movement as indicated. DATE: 3/10/20 3/11/20      Shoulder shrugs  15      Hip Flexion X15 AB 15      Long Arc Quads X15 AB 15      Hip ab/ad  15      Ankle pumps X15 AB 15      Gluteal sets X15 AB 15                               Key:  A=active, AA=active assisted, P=passive, B=bilaterally, R=right, L=left   DF=dorsiflexion, PF=plantarflexion      Braces/Orthotics/Lines/Etc:   ·   Treatment/Session Assessment:    · Response to Treatment:  cooperative. · Interdisciplinary Collaboration:   o Physical Therapy Assistant  o Registered Nurse  · After treatment position/precautions:   o Up in chair  o Bed alarm/tab alert on  o Bed/Chair-wheels locked  o Call light within reach  o RN notified   · Compliance with Program/Exercises: Compliant all of the time  · Recommendations/Intent for next treatment session: \"Next visit will focus on advancements to more challenging activities and reduction in assistance provided\".   Total Treatment Duration:  PT Patient Time In/Time Out  Time In: 0936  Time Out: 1000    Laura Vásquez, ISABEL

## 2020-03-11 NOTE — PROGRESS NOTES
Problem: Self Care Deficits Care Plan (Adult)  Goal: *Acute Goals and Plan of Care (Insert Text)  Description: 1. Pt will toilet with SBA   2. Pt will complete functional mobility for ADLs with SBA  3. Pt will complete lower body dressing with SBA using AE as needed  4. Pt will complete grooming and hygiene at sink with SBA  5. Pt will demonstrate independence with HEP to promote increased BUE strength and functional use for ADLs  6. Pt will tolerate 23 minutes functional activity with min or fewer rest breaks to promote increased endurance for ADLs  7. Pt will complete bed mobility with SBA in prep for ADLs  8. Pt will independently demonstrate/ verbalize sternal precautions    Timeframe: 7 days     Outcome: Progressing Towards Goal     OCCUPATIONAL THERAPY: Initial Assessment and Daily Note 3/11/2020  INPATIENT: OT Visit Days: 1  [place] / Product Type: Medicare /      NAME/AGE/GENDER: WM Gallo Slater III is a 78 y.o. male   PRIMARY DIAGNOSIS:  Atherosclerosis of native coronary artery of native heart with unstable angina pectoris ([facility] [address] [I25.110]  Mitral valve insufficiency, unspecified etiology [I34.0]  Patent foramen ovale [Q21.1]  CAD (coronary artery disease) [I25.10]  Mitral valve regurgitation [I34.0]  Patent foramen ovale [Q21.1]  Atherosclerosis of coronary artery of native heart with unstable angina pectoris (HCC) [I25.110]  Mitral valve insufficiency [I34.0]  Patent foramen ovale [Q21.1] S/P CABG x 3 S/P CABG x 3  Procedure(s) (LRB):  THORACENTESIS (N/A)  ULTRASOUND (N/A)  Day of Surgery  ICD-10: Treatment Diagnosis:    Generalized Muscle Weakness (M62.81)   Precautions/Allergies:    sternal Patient has no known allergies. ASSESSMENT:     Mr. Orly Unger is s/p CABG X3. Pt lives with his sister and is independent at baseline, does not use an AD for mobility.  This session, pt presented with deficits in endurance, mobility, strength, balance, and limitations of sternal precautions impacting ADLs. Pt endorsed fatigue following recent PT session and shower, however very pleasant and agreeable to OT session. Pt reported requiring significant assistance with shower. Pt demonstrated bed mobility with CGA with HOB elevated, stood from elevated bed with CGA-min A and completed mobility around room and to/ from sink with CGA using RW. Pt educated on sternal precautions and on adaptive techniques to maintain during ADLs and mobility for ADLs, donned socks with CGA using cross leg technique. Pt completed grooming and hygiene at sink with CGA. Pt generally weak, reported fatigue post session. Pt is well below his functional baseline and would benefit from skilled OT services to address deficits. Recommend d/c to rehab. This section established at most recent assessment   PROBLEM LIST (Impairments causing functional limitations):  Decreased Strength  Decreased ADL/Functional Activities  Decreased Transfer Abilities  Decreased Balance  Increased Pain  Decreased Activity Tolerance  Increased Fatigue   INTERVENTIONS PLANNED: (Benefits and precautions of occupational therapy have been discussed with the patient.)  Activities of daily living training  Adaptive equipment training  Balance training  Therapeutic activity  Therapeutic exercise     TREATMENT PLAN: Frequency/Duration: Follow patient 3 times/ week to address above goals. Rehabilitation Potential For Stated Goals: Good     REHAB RECOMMENDATIONS (at time of discharge pending progress):    Placement: It is my opinion, based on this patient's performance to date, that Mr. Cy Smith may benefit from intensive therapy at an [address] [place] [address] after discharge due to potential to make ongoing and sustainable functional improvement that is of practical value. .  Equipment:   None at this time              OCCUPATIONAL PROFILE AND HISTORY:   History of Present Injury/Illness (Reason for Referral):  See H&P  Past Medical History/Comorbidities:   Mr. Bernadette Sawyer  has a past medical history of Arthritis, CAD (coronary artery disease), CVA (cerebral vascular accident) ([facility] [address] (1/3/2020), Diabetes type 2, uncontrolled ([facility] [address] Hypercholesteremia, Sleep apnea, Systolic CHF, chronic ([facility] [address] (1/5/2020), TIA (transient ischemic attack) (2009), and Troponin I above reference range (1/3/2020).   Mr. Bernadette Sawyer  has a past surgical history that includes hx hernia repair; hx knee arthroscopy; and hx cyst removal.  Social History/Living Environment:   Home Environment: Private residence  # Steps to Enter: 2  Wheelchair Ramp: Yes  One/Two Story Residence: One story  Living Alone: No  Support Systems: Family member(s)  Patient Expects to be Discharged to[de-identified] Rehabilitation facility  Current DME Used/Available at Home: None  Tub or Shower Type: Shower  Prior Level of Function/Work/Activity:  Independent     Number of Personal Factors/Comorbidities that affect the Plan of Care: Expanded review of therapy/medical records (1-2):  MODERATE COMPLEXITY   ASSESSMENT OF OCCUPATIONAL PERFORMANCE[de-identified]   Activities of Daily Living:   Basic ADLs (From Assessment) Complex ADLs (From Assessment)   Feeding: Independent  Oral Facial Hygiene/Grooming: Contact guard assistance  Bathing: Minimum assistance  Upper Body Dressing: Minimum assistance  Lower Body Dressing: Minimum assistance  Toileting: Minimum assistance     Grooming/Bathing/Dressing Activities of Daily Living   Grooming  Grooming Assistance: Contact guard assistance                       Lower Body Dressing Assistance  Socks: Contact guard assistance Bed/Mat Mobility  Supine to Sit: Contact guard assistance;Bed Modified  Sit to Supine: Contact guard assistance  Sit to Stand: Minimum assistance  Stand to Sit: Contact guard assistance  Bed to Chair: Minimum assistance     Most Recent Physical Functioning:   Gross Assessment:  AROM: Generally decreased, functional  Strength: Generally decreased, functional Posture:  Posture (WDL): Exceptions to WDL  Posture Assessment: Forward head, Rounded shoulders  Balance:  Sitting: Intact  Standing: Impaired  Standing - Static: Fair  Standing - Dynamic : Fair Bed Mobility:  Supine to Sit: Contact guard assistance;Bed Modified  Sit to Supine: Contact guard assistance  Wheelchair Mobility:     Transfers:  Sit to Stand: Minimum assistance  Stand to Sit: Contact guard assistance  Bed to Chair: Minimum assistance            Patient Vitals for the past 6 hrs:   BP BP Patient Position SpO2 Pulse   20 1117 -- -- 92 % --   20 1129 101/55 At rest 92 % 69   20 1130 101/55 -- -- 69   20 1213 106/56 -- -- 67   20 1228 105/61 -- -- 74   20 1243 104/57 -- -- 70   20 1453 98/57 -- 90 % 61   20 1459 100/58 -- 94 % 61   20 1505 (!) 87/54 -- 94 % 61   20 1509 90/55 -- 94 % 61   20 1511 91/53 -- 95 % 61       Mental Status  Neurologic State: Alert  Orientation Level: Oriented X4  Cognition: Appropriate decision making  Safety/Judgement: Insight into deficits, Fall prevention                          Physical Skills Involved:  Balance  Strength  Activity Tolerance  Pain (acute) Cognitive Skills Affected (resulting in the inability to perform in a timely and safe manner):  none  Psychosocial Skills Affected:  Habits/Routines  Environmental Adaptation   Number of elements that affect the Plan of Care: 3-5:  MODERATE COMPLEXITY   CLINICAL DECISION MAKIN [facility] [place] [id number] AM-PAC 6 Clicks   Daily Activity Inpatient Short Form  How much help from another person does the patient currently need. .. Total A Lot A Little None   1. Putting on and taking off regular lower body clothing? [] 1   [] 2   [x] 3   [] 4   2. Bathing (including washing, rinsing, drying)? [] 1   [] 2   [x] 3   [] 4   3. Toileting, which includes using toilet, bedpan or urinal?   [] 1   [] 2   [x] 3   [] 4   4.   Putting on and taking off regular upper body clothing? [] 1   [] 2   [x] 3   [] 4   5. Taking care of personal grooming such as brushing teeth? [] 1   [] 2   [x] 3   [] 4   6. Eating meals? [] 1   [] 2   [] 3   [x] 4   © 2007, Trustees of [address] [place] [address] [address] [address], under license to [employer]. All rights reserved      Score:  Initial: 19 Most Recent: X (Date: -- )    Interpretation of Tool:  Represents activities that are increasingly more difficult (i.e. Bed mobility, Transfers, Gait). Medical Necessity:     Patient demonstrates   good   rehab potential due to higher previous functional level. Reason for Services/Other Comments:  Patient   continues to require present interventions due to patient's inability to independently complete ADLs   . Use of outcome tool(s) and clinical judgement create a POC that gives a: MODERATE COMPLEXITY         TREATMENT:   (In addition to Assessment/Re-Assessment sessions the following treatments were rendered)     Pre-treatment Symptoms/Complaints:    Pain: Initial:     0 Post Session:  7 (pt called for pain  meds)     Self Care: (10 min): Procedure(s) (per grid) utilized to improve and/or restore self-care/home management as related to dressing and grooming. Required minimal   cueing to facilitate activities of daily living skills and compensatory activities. Braces/Orthotics/Lines/Etc:   O2 Device: Room air  Treatment/Session Assessment:    Response to Treatment:  fatigue  Interdisciplinary Collaboration:   Occupational Therapist  Registered Nurse  After treatment position/precautions:   Supine in bed  Bed/Chair-wheels locked  Bed in low position  Call light within reach  RN notified   Compliance with Program/Exercises: Compliant all of the time. Recommendations/Intent for next treatment session: \"Next visit will focus on advancements to more challenging activities and reduction in assistance provided\".   Total Treatment Duration:  OT Patient Time In/Time Out  Time In: 2187  Time Out: [address] OT

## 2020-03-11 NOTE — CONSULTS
PM&R Rehab Consult    Subjective:     Date of Consultation:  March 11, 2020    Referring Physician:Dr Melissa Choudhury    Patient is a 78 y.o. male who is being seen for rehab recommendations s/p CABG x 3 and PFO closure  Principal Problem:    S/P CABG x 3 (3/10/2020)    Active Problems:    Patent foramen ovale (3/5/2020)      Mitral valve regurgitation (3/5/2020)      CAD (coronary artery disease) (3/5/2020)      Mitral valve insufficiency (3/5/2020)      Atherosclerosis of coronary artery of native heart with unstable angina pectoris ([address] (3/5/2020)      Type II diabetes mellitus ([address] (3/5/2020)      Respiratory failure, post-operative ([address] (3/5/2020)      Hypoxia (3/5/2020)      Atelectasis (3/6/2020)      Acute pulmonary edema ([address] (3/6/2020)      Ventricular fibrillation ([address] (3/6/2020)      Pleural effusion, right (3/8/2020)    HPI; Mr Dayo Crews is a pleasant 69yo gentleman known to our service from Jan 2020 s/p CVA with residual left peripheral visual field loss and mild left sided weakness. . At that time he was too functional and went home with [facility]. He has a PMH of arthritis, TIA 2977, systolic CHF with EF 19-69% in Feb, type 2 DM with hyperglycemia, tobacco abuse and ROSENDO, HTN and HLD who was seen by cardiology as an outpt due to low EF and PFO found during CVA w/u. He underwent a nuclear stress test that showed a large area of rula-infarct ischemia in the inferolateral wall. He had noted some EDGE and has strong FH of CAD. He underwent SANDEEP and LHC on 2/26 . SANDEEP showed mild to moderate MR with small PFO. LHC showed severe multivessel disease. He was seen by Dr Melissa Choudhury in consultation on 2/26. Preop he had a baseline spirometry done that was normal. Thus, he underwent a CABG x 3, clipping of left atrial appendage and PFO closure. Post op he had a vfib arrest while turning and responded in less than one minute with cardioversion x 1 and amio gtt. This occurred when Luisa Birks cath pulled. Extubated day of surgery. He remained on vasopressors post op for sev days and was requiring 31% FiO2. He has been followed by pulmonary medicine. He had significant post op leukocytosis. He had a noted small left apical PTX. Thrombocytopenia noted with plts of 118 on 3/7. CXR with effusion in the right base and atelectasis in left base. He required an epi gtt on 3/7 due to bradycardia and hypotension. He subsequently came of pressors on 3/9, POD 4. He required blood transfusion on this day. Tried diuresis for the right pleural effusion but eventually had thoracentesis 3/11. 1000ml yield. Leukocytosis resolved spontaneously. CRP elevated at 121, thought to be due to inflammatory process in left chest per Dr Silvia Gupta. Functionally, min assist with transfers and ambul 200ft with RW. NO OT as of yet.    Past Medical History:   Diagnosis Date    Arthritis     CAD (coronary artery disease)     heart cath 2020    CVA (cerebral vascular accident) [facility] 1/3/2020    Diabetes type 2, uncontrolled ([facility]     Type II on metformin last A1C 6.3 2020    Hypercholesteremia     Sleep apnea     no cpap    Systolic CHF, chronic ([address] 2020    echo 2020 EF 30-35%    TIA (transient ischemic attack)     Troponin I above reference range 1/3/2020      Family History   Problem Relation Age of Onset    Heart Disease Father     Diabetes Father     Heart Disease Paternal Grandfather     Diabetes Sister     Diabetes Sister       Social History     Tobacco Use    Smoking status: Former Smoker     Packs/day: 1.00     Years: 20.00     Pack years: 20.00     Last attempt to quit: 10/20/1997     Years since quittin.4    Smokeless tobacco: Never Used   Substance Use Topics    Alcohol use: Not Currently     Alcohol/week: 0.0 standard drinks     Comment: rare   Home Environment: Private residence  # Steps to Enter: 2  Wheelchair Ramp: Yes  One/Two Story Residence: One story  Living Alone: No  Support Systems: Family member(s)  Patient Expects to be Discharged to[de-identified] Private residence  Current DME Used/Available at Home: Other (comment)([employer])  Prior Level of Function/Work/Activity:   In January 2020 he suffered R occipital CVA with resulting L visual field cut. He lives with his sister and ambulates independently but has suffered 1 fall in the past couple months while stepping off a curb. Finalizing divorce this month. Has 2 dtgs  Past Surgical History:   Procedure Laterality Date    HX CYST REMOVAL      HX HERNIA REPAIR      HX KNEE ARTHROSCOPY      rt knee      Prior to Admission medications    Medication Sig Start Date End Date Taking? Authorizing Provider   amiodarone (CORDARONE) 200 mg tablet Take 600 mg by mouth once. Yes Provider, Historical   acetaminophen (TYLENOL) 500 mg tablet Take 500 mg by mouth every six (6) hours as needed for Pain. Yes Provider, Historical   aspirin delayed-release 81 mg tablet Take 81 mg by mouth daily. Yes Provider, Historical   atorvastatin (LIPITOR) 80 mg tablet Take 80 mg by mouth nightly. Yes Provider, Historical   terazosin (HYTRIN) 10 mg capsule Take 10 mg by mouth nightly. Yes Provider, Historical   SITagliptin (JANUVIA) 100 mg tablet Take 100 mg by mouth daily. Yes Provider, Historical   citalopram (CELEXA) 20 mg tablet Take  by mouth daily. Yes Provider, Historical   carvediloL (COREG) 6.25 mg tablet Take 1 Tab by mouth two (2) times daily (with meals). 1/24/20  Yes Yoel Santos DO   metFORMIN (GLUCOPHAGE) 500 mg tablet Take 2 tablets by mouth every morning then Take 2 tablets by mouth every night  Patient taking differently: 500 mg two (2) times daily (with meals). 4/15/19  Yes JAVI Barry   clopidogreL (PLAVIX) 75 mg tab Take 75 mg by mouth daily.     Provider, Historical     No Known Allergies     Review of Systems:  A comprehensive review of systems was negative except for: Constitutional: positive for fatigue  Respiratory: positive for cough, wheezing or dyspnea on exertion  Cardiovascular: positive for fatigue  Gastrointestinal: positive for dyspepsia and constipation  Musculoskeletal: positive for stiff joints, back pain and muscle weakness  Neurological: positive for coordination problems, gait problems and weakness    Objective:     Vitals:  Blood pressure 91/53, pulse 61, temperature 98 °F (36.7 °C), resp. rate 18, height 5' 9\" (1.753 m), weight 166 lb 1.6 oz (75.3 kg), SpO2 95 %. Temp (24hrs), Av.1 °F (36.7 °C), Min:97.6 °F (36.4 °C), Max:98.9 °F (37.2 °C)      Intake and Output:   1901 -  0700  In: 1260 [P.O.:1260]  Out: 1100 [Urine:1050]    Physical Exam:  Constitutional:  the patient is well developed and in no acute distress; pleasant and appropriate  HEENT:  Sclera clear, pupils equal, oral mucosa moist  Lungs: clear but decreased post bases. No w/r/r  Cardiovascular:  RRR without M,G,R. Sinus sonam per telemetry  Abd/GI: soft and non-tender; with positive bowel sounds. Neuro; PERRLA, EOMI; no dysmetria, no sensory deficits. Prox> distal weakness, generalized. No drift.  Speech and conversation approp  Skin/ext:  no jaundice or rashes, chest and leg wounds c/d/i; np periph edema     Labs/Studies:  Recent Results (from the past 72 hour(s))   GLUCOSE, POC    Collection Time: 20  4:47 PM   Result Value Ref Range    Glucose (POC) 129 (H) 65 - 100 mg/dL   PLEASE READ & DOCUMENT PPD TEST IN 72 HRS    Collection Time: 20  8:14 PM   Result Value Ref Range    PPD Negative Negative    mm Induration 0 0 - 5 mm   GLUCOSE, POC    Collection Time: 20  9:24 PM   Result Value Ref Range    Glucose (POC) 124 (H) 65 - 100 mg/dL   CBC W/O DIFF    Collection Time: 20  4:00 AM   Result Value Ref Range    WBC 8.6 4.3 - 11.1 K/uL    RBC 2.46 (L) 4.23 - 5.6 M/uL    HGB 7.8 (L) 13.6 - 17.2 g/dL    HCT 23.5 (L) 41.1 - 50.3 %    MCV 95.5 79.6 - 97.8 FL    MCH 31.7 26.1 - 32.9 PG    MCHC 33.2 31.4 - 35.0 g/dL    RDW 13.4 11.9 - 14.6 %    PLATELET 855 (L) 150 - 450 K/uL    MPV 10.6 9.4 - 12.3 FL    ABSOLUTE NRBC 0.00 0.0 - 0.2 K/uL   METABOLIC PANEL, BASIC    Collection Time: 03/09/20  4:00 AM   Result Value Ref Range    Sodium 138 136 - 145 mmol/L    Potassium 3.9 3.5 - 5.1 mmol/L    Chloride 109 (H) 98 - 107 mmol/L    CO2 22 21 - 32 mmol/L    Anion gap 7 7 - 16 mmol/L    Glucose 124 (H) 65 - 100 mg/dL    BUN 12 8 - 23 MG/DL    Creatinine 0.55 (L) 0.8 - 1.5 MG/DL    GFR est AA >60 >60 ml/min/1.73m2    GFR est non-AA >60 >60 ml/min/1.73m2    Calcium 8.5 8.3 - 10.4 MG/DL   MAGNESIUM    Collection Time: 03/09/20  4:00 AM   Result Value Ref Range    Magnesium 2.1 1.8 - 2.4 mg/dL   GLUCOSE, POC    Collection Time: 03/09/20  6:37 AM   Result Value Ref Range    Glucose (POC) 131 (H) 65 - 100 mg/dL   TYPE + CROSSMATCH    Collection Time: 03/09/20  8:55 AM   Result Value Ref Range    Crossmatch Expiration 03/12/2020     ABO/Rh(D) O NEGATIVE     Antibody screen NEG     Unit number [id number]     Blood component type [facility]     Unit division 00     Status of unit TRANSFUSED     Crossmatch result Compatible    GLUCOSE, POC    Collection Time: 03/09/20 11:00 AM   Result Value Ref Range    Glucose (POC) 130 (H) 65 - 100 mg/dL   GLUCOSE, POC    Collection Time: 03/09/20  9:39 PM   Result Value Ref Range    Glucose (POC) 135 (H) 65 - 586 mg/dL   METABOLIC PANEL, BASIC    Collection Time: 03/10/20  3:43 AM   Result Value Ref Range    Sodium 138 136 - 145 mmol/L    Potassium 3.5 3.5 - 5.1 mmol/L    Chloride 105 98 - 107 mmol/L    CO2 26 21 - 32 mmol/L    Anion gap 7 7 - 16 mmol/L    Glucose 120 (H) 65 - 100 mg/dL    BUN 20 8 - 23 MG/DL    Creatinine 0.61 (L) 0.8 - 1.5 MG/DL    GFR est AA >60 >60 ml/min/1.73m2    GFR est non-AA >60 >60 ml/min/1.73m2    Calcium 8.4 8.3 - 10.4 MG/DL   MAGNESIUM    Collection Time: 03/10/20  3:43 AM   Result Value Ref Range    Magnesium 2.1 1.8 - 2.4 mg/dL   CBC W/O DIFF    Collection Time: 03/10/20  3:43 AM   Result Value Ref Range    WBC 8.0 4.3 - 11.1 K/uL    RBC 2.80 (L) 4.23 - 5.6 M/uL    HGB 8.6 (L) 13.6 - 17.2 g/dL    HCT 25.5 (L) 41.1 - 50.3 %    MCV 91.1 79.6 - 97.8 FL    MCH 30.7 26.1 - 32.9 PG    MCHC 33.7 31.4 - 35.0 g/dL    RDW 15.3 (H) 11.9 - 14.6 %    PLATELET 301 (L) 098 - 450 K/uL    MPV 9.6 9.4 - 12.3 FL    ABSOLUTE NRBC 0.00 0.0 - 0.2 K/uL   CRP, HIGH SENSITIVITY    Collection Time: 03/10/20  3:43 AM   Result Value Ref Range    CRP, High sensitivity 121.0 mg/L   GLUCOSE, POC    Collection Time: 03/10/20  6:26 AM   Result Value Ref Range    Glucose (POC) 146 (H) 65 - 100 mg/dL   GLUCOSE, POC    Collection Time: 03/10/20 11:10 AM   Result Value Ref Range    Glucose (POC) 147 (H) 65 - 100 mg/dL   GLUCOSE, POC    Collection Time: 03/10/20  3:53 PM   Result Value Ref Range    Glucose (POC) 136 (H) 65 - 100 mg/dL   GLUCOSE, POC    Collection Time: 03/10/20  9:02 PM   Result Value Ref Range    Glucose (POC) 172 (H) 65 - 683 mg/dL   METABOLIC PANEL, BASIC    Collection Time: 03/11/20  3:36 AM   Result Value Ref Range    Sodium 137 136 - 145 mmol/L    Potassium 4.5 3.5 - 5.1 mmol/L    Chloride 104 98 - 107 mmol/L    CO2 24 21 - 32 mmol/L    Anion gap 9 7 - 16 mmol/L    Glucose 179 (H) 65 - 100 mg/dL    BUN 19 8 - 23 MG/DL    Creatinine 0.67 (L) 0.8 - 1.5 MG/DL    GFR est AA >60 >60 ml/min/1.73m2    GFR est non-AA >60 >60 ml/min/1.73m2    Calcium 8.9 8.3 - 10.4 MG/DL   CBC W/O DIFF    Collection Time: 03/11/20  3:36 AM   Result Value Ref Range    WBC 5.7 4.3 - 11.1 K/uL    RBC 2.90 (L) 4.23 - 5.6 M/uL    HGB 9.0 (L) 13.6 - 17.2 g/dL    HCT 26.9 (L) 41.1 - 50.3 %    MCV 92.8 79.6 - 97.8 FL    MCH 31.0 26.1 - 32.9 PG    MCHC 33.5 31.4 - 35.0 g/dL    RDW 14.7 (H) 11.9 - 14.6 %    PLATELET 438 201 - 780 K/uL    MPV 9.9 9.4 - 12.3 FL    ABSOLUTE NRBC 0.02 0.0 - 0.2 K/uL   GLUCOSE, POC    Collection Time: 03/11/20  5:57 AM   Result Value Ref Range    Glucose (POC) 194 (H) 65 - 100 mg/dL   GLUCOSE, POC    Collection Time: 03/11/20 11:31 AM   Result Value Ref Range    Glucose (POC) 223 (H) 65 - 100 mg/dL       Functional Assessment:  Functional Assessment  Fall in Past 12 Months: Yes  Fall With Injury: No  Number of Falls Within 12 Months: 3  Approximate Date of Fall: 2 weeks ago  Decline in Gait/Transfer/Balance: No  Decline in Capacity to Feed/Dress/Bathe: No  Developmental Delay: No  Chewing/Swallowing Problems: No  Difficulty with Secretions: No  Speech Slurred/Thick/Garbled: No     Ambulation:  Activity and Safety  Activity Level:  Up with Assistance  Ambulate: No (Comment)  Activity: In bed, Resting quietly  Activity Assistance: Partial (one person)  Weight Bearing Status: WBAT (Weight Bearing as Tolerated)  Mode of Transportation: Wheelchair, Stretcher  Repositioned: Sitting  Patient Turned: Turns self  Head of Bed Elevated: Self regulated  Time Ambulated (min): 5 minutes  Activity Response: Fairly tolerated  Safety Measures: Bed/Chair alarm on, Bed/Chair-Wheels locked, Bed in low position, Caregiver at bedside, Call light within reach, Fall prevention (comment), Gripper socks, Side rails X2     Impression/Plan:     Principal Problem:    S/P CABG x 3 (3/10/2020)    Active Problems:    Patent foramen ovale (3/5/2020)      Mitral valve regurgitation (3/5/2020)      CAD (coronary artery disease) (3/5/2020)      Mitral valve insufficiency (3/5/2020)      Atherosclerosis of coronary artery of native heart with unstable angina pectoris ([address] (3/5/2020)      Type II diabetes mellitus ([address] (3/5/2020)      Respiratory failure, post-operative ([address] (3/5/2020)      Hypoxia (3/5/2020)      Atelectasis (3/6/2020)      Acute pulmonary edema (HCC) (3/6/2020)      Ventricular fibrillation (HCC) (3/6/2020)      Pleural effusion, right (3/8/2020)    Post cardiac surgery debility and medical comorbidities  Recommendations: Continue Acute Rehab Program  Coordination of rehab/medical care  Counseling of PM & R care issues management  Monitoring and management of medical conditions per plan of care/orders   -rehab potential is very good. Suspect he will mobilize quickly based on current PT notation. Awaiting OT. Need this to determine rehab needs and environment. IRC vs HH. -will follow with you. Medically, he remains hypotensive. hgb improved s/p transfusion. O2 needs dec s/p thoracentesis. Medically improving but remains at risk.   -has good support from his sister. Has a safe dc plan. Thank you for this consultation.  Will f/u OT eval for decision regarding rehab stay  Discussion with pt/Staff  Reviewed Therapies/Labs/Meds/Records  Gretta Russo MD

## 2020-03-12 LAB
GLUCOSE BLD STRIP.AUTO-MCNC: 138 MG/DL (ref 65–100)
GLUCOSE BLD STRIP.AUTO-MCNC: 148 MG/DL (ref 65–100)
GLUCOSE BLD STRIP.AUTO-MCNC: 169 MG/DL (ref 65–100)
GLUCOSE BLD STRIP.AUTO-MCNC: 173 MG/DL (ref 65–100)

## 2020-03-12 PROCEDURE — 74011000250 HC RX REV CODE- 250: Performed by: INTERNAL MEDICINE

## 2020-03-12 PROCEDURE — 74011000250 HC RX REV CODE- 250: Performed by: THORACIC SURGERY (CARDIOTHORACIC VASCULAR SURGERY)

## 2020-03-12 PROCEDURE — 74011250637 HC RX REV CODE- 250/637: Performed by: THORACIC SURGERY (CARDIOTHORACIC VASCULAR SURGERY)

## 2020-03-12 PROCEDURE — 94640 AIRWAY INHALATION TREATMENT: CPT

## 2020-03-12 PROCEDURE — C8924 2D TTE W OR W/O FOL W/CON,FU: HCPCS

## 2020-03-12 PROCEDURE — 99231 SBSQ HOSP IP/OBS SF/LOW 25: CPT | Performed by: PHYSICAL MEDICINE & REHABILITATION

## 2020-03-12 PROCEDURE — 65660000004 HC RM CVT STEPDOWN

## 2020-03-12 PROCEDURE — 94760 N-INVAS EAR/PLS OXIMETRY 1: CPT

## 2020-03-12 PROCEDURE — 74011250636 HC RX REV CODE- 250/636: Performed by: THORACIC SURGERY (CARDIOTHORACIC VASCULAR SURGERY)

## 2020-03-12 PROCEDURE — 74011636637 HC RX REV CODE- 636/637: Performed by: THORACIC SURGERY (CARDIOTHORACIC VASCULAR SURGERY)

## 2020-03-12 PROCEDURE — 97110 THERAPEUTIC EXERCISES: CPT

## 2020-03-12 PROCEDURE — 77030012890

## 2020-03-12 PROCEDURE — 97530 THERAPEUTIC ACTIVITIES: CPT

## 2020-03-12 PROCEDURE — 82962 GLUCOSE BLOOD TEST: CPT

## 2020-03-12 PROCEDURE — 99232 SBSQ HOSP IP/OBS MODERATE 35: CPT | Performed by: INTERNAL MEDICINE

## 2020-03-12 RX ORDER — FACIAL-BODY WIPES
10 EACH TOPICAL DAILY PRN
Status: DISCONTINUED | OUTPATIENT
Start: 2020-03-12 | End: 2020-03-13 | Stop reason: HOSPADM

## 2020-03-12 RX ORDER — ALBUTEROL SULFATE 0.83 MG/ML
2.5 SOLUTION RESPIRATORY (INHALATION)
Status: DISCONTINUED | OUTPATIENT
Start: 2020-03-12 | End: 2020-03-13 | Stop reason: HOSPADM

## 2020-03-12 RX ADMIN — ALBUTEROL SULFATE 2.5 MG: 2.5 SOLUTION RESPIRATORY (INHALATION) at 08:05

## 2020-03-12 RX ADMIN — TRAMADOL HYDROCHLORIDE 50 MG: 50 TABLET, FILM COATED ORAL at 21:38

## 2020-03-12 RX ADMIN — PERFLUTREN 1 ML: 6.52 INJECTION, SUSPENSION INTRAVENOUS at 12:00

## 2020-03-12 RX ADMIN — ACETAMINOPHEN 650 MG: 325 TABLET, FILM COATED ORAL at 18:15

## 2020-03-12 RX ADMIN — CITALOPRAM HYDROBROMIDE 10 MG: 10 TABLET ORAL at 09:59

## 2020-03-12 RX ADMIN — ASPIRIN 81 MG: 81 TABLET ORAL at 09:58

## 2020-03-12 RX ADMIN — POTASSIUM CHLORIDE 10 MEQ: 750 TABLET, EXTENDED RELEASE ORAL at 09:59

## 2020-03-12 RX ADMIN — TRAMADOL HYDROCHLORIDE 50 MG: 50 TABLET, FILM COATED ORAL at 13:56

## 2020-03-12 RX ADMIN — Medication 10 ML: at 21:28

## 2020-03-12 RX ADMIN — ATORVASTATIN CALCIUM 80 MG: 80 TABLET, FILM COATED ORAL at 21:27

## 2020-03-12 RX ADMIN — INSULIN LISPRO 2 UNITS: 100 INJECTION, SOLUTION INTRAVENOUS; SUBCUTANEOUS at 10:05

## 2020-03-12 RX ADMIN — AMIODARONE HYDROCHLORIDE 200 MG: 200 TABLET ORAL at 09:58

## 2020-03-12 RX ADMIN — AMIODARONE HYDROCHLORIDE 200 MG: 200 TABLET ORAL at 21:27

## 2020-03-12 RX ADMIN — SENNOSIDES AND DOCUSATE SODIUM 2 TABLET: 8.6; 5 TABLET ORAL at 09:58

## 2020-03-12 RX ADMIN — INSULIN LISPRO 2 UNITS: 100 INJECTION, SOLUTION INTRAVENOUS; SUBCUTANEOUS at 13:53

## 2020-03-12 RX ADMIN — Medication 10 ML: at 06:18

## 2020-03-12 RX ADMIN — FAMOTIDINE 20 MG: 10 TABLET ORAL at 18:07

## 2020-03-12 RX ADMIN — METFORMIN HYDROCHLORIDE 500 MG: 500 TABLET ORAL at 18:07

## 2020-03-12 RX ADMIN — FUROSEMIDE 40 MG: 40 TABLET ORAL at 09:59

## 2020-03-12 RX ADMIN — TERAZOSIN HYDROCHLORIDE 10 MG: 5 CAPSULE ORAL at 21:27

## 2020-03-12 RX ADMIN — METFORMIN HYDROCHLORIDE 500 MG: 500 TABLET ORAL at 09:58

## 2020-03-12 RX ADMIN — Medication 1 AMPULE: at 09:59

## 2020-03-12 RX ADMIN — Medication 10 ML: at 14:18

## 2020-03-12 RX ADMIN — FAMOTIDINE 20 MG: 10 TABLET ORAL at 09:59

## 2020-03-12 RX ADMIN — SENNOSIDES AND DOCUSATE SODIUM 2 TABLET: 8.6; 5 TABLET ORAL at 21:27

## 2020-03-12 NOTE — PROGRESS NOTES
Problem: Mobility Impaired (Adult and Pediatric)  Goal: *Acute Goals and Plan of Care (Insert Text)  Description  LTG:  (1.)Mr. Emilie Brody will move from supine to sit and sit to supine , scoot up and down and roll side to side in flat bed without siderails with  CONTACT GUARD ASSIST within 7 day(s). (2.)Mr. Emilie Brody will perform all functional transfers with  CONTACT GUARD ASSIST using the least restrictive/no device within 7 day(s). (3.)Mr. Emilie Brody will ambulate with  CONTACT GUARD ASSIST for 250+ feet with normal vital sign response with the least restrictive/no device within 7 day(s). Outcome: Progressing Towards Goal     PHYSICAL THERAPY: Daily Note and AM 3/12/2020  INPATIENT: PT Visit Days : 4  [place] / [place] / Product Type: Medicare /       NAME/AGE/GENDER: WM Neal Segura III is a 78 y.o. male   PRIMARY DIAGNOSIS: Atherosclerosis of native coronary artery of native heart with unstable angina pectoris (HCC) [I25.110]  Mitral valve insufficiency, unspecified etiology [I34.0]  Patent foramen ovale [Q21.1]  CAD (coronary artery disease) [I25.10]  Mitral valve regurgitation [I34.0]  Patent foramen ovale [Q21.1]  Atherosclerosis of coronary artery of native heart with unstable angina pectoris (HCC) [I25.110]  Mitral valve insufficiency [I34.0]  Patent foramen ovale [Q21.1] S/P CABG x 3 S/P CABG x 3  Procedure(s) (LRB):  THORACENTESIS (N/A)  ULTRASOUND (N/A)  1 Day Post-Op  ICD-10: Treatment Diagnosis:    · Other abnormalities of gait and mobility (R26.89)  · History of falling (Z91.81)   Precaution/Allergies:  Patient has no known allergies. ASSESSMENT:     Mr. Emilie Brody underwent above surgery. 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 He lives with his sister and ambulates independently but has suffered 1 fall in the past couple months while stepping off a curb. Patient sitting in the recliner and agreeable for PT.   Sit to stand with min assist, the patient is doing better with this activity today. Gait training with rolling walker x 200 feet with slow gianfranco. Patient is returned to the recliner and after a short rest break the patient performs  therapeutic exercises. Tolerated well. Good session. Patient is making progress towards goals. Mr. Glenn Hayes is functioning well below baseline and is therefore appropriate for skilled PT to maximize rehab potential.   Patient may benefit from [facility] at baseline, as his decreased vision affects his mobility. Will continue PT efforts. This section established at most recent assessment   PROBLEM LIST (Impairments causing functional limitations):  1. Decreased Transfer Abilities  2. Decreased Ambulation Ability/Technique  3. Decreased Balance  4. Decreased Activity Tolerance  5. Decreased Knowledge of Precautions   INTERVENTIONS PLANNED: (Benefits and precautions of physical therapy have been discussed with the patient.)  1. Balance Exercise  2. Bed Mobility  3. Gait Training  4. Neuromuscular Re-education/Strengthening  5. Therapeutic Activites  6. Therapeutic Exercise/Strengthening  7. Transfer Training  8. education      TREATMENT PLAN: Frequency/Duration: twice daily for duration of hospital stay  Rehabilitation Potential For Stated Goals: Good     REHAB RECOMMENDATIONS (at time of discharge pending progress):    Placement: It is my opinion, based on this patient's performance to date, that Mr. Glenn Hayes may benefit from intensive therapy at an [address] [place] [address] after discharge due to a probable need for multiple therapy disciplines and potential to make ongoing and sustainable functional improvement that is of practical value. .  Equipment:    tbd    None at this time              HISTORY:   History of Present Injury/Illness (Reason for Referral): Admitted for above.   Past Medical History/Comorbidities:   Mr. Glenn Hayes  has a past medical history of Arthritis, CAD (coronary artery disease), CVA (cerebral vascular accident) ([facility] (1/3/2020), Diabetes type 2, uncontrolled ([facility] Hypercholesteremia, Sleep apnea, Systolic CHF, chronic ([facility] (1/5/2020), TIA (transient ischemic attack) (2009), and Troponin I above reference range (1/3/2020). Mr. Elmer Liao  has a past surgical history that includes hx hernia repair; hx knee arthroscopy; and hx cyst removal.  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 He lives with his sister and ambulates independently but has suffered 1 fall in the past couple months while stepping off a curb. Number of Personal Factors/Comorbidities that affect the Plan of Care: 1-2: MODERATE COMPLEXITY   EXAMINATION:   Most Recent Physical Functioning:   Gross Assessment:                  Posture:     Balance:  Sitting: Intact  Standing: Impaired  Standing - Static: Good  Standing - Dynamic : Fair Bed Mobility:     Wheelchair Mobility:     Transfers:  Sit to Stand: Minimum assistance  Stand to Sit: Contact guard assistance  Gait:     Speed/Clara: Slow  Distance (ft): 200 Feet (ft)  Assistive Device: Walker, rolling  Ambulation - Level of Assistance: Minimal assistance      Body Structures Involved:  1. Eyes and Ears  2. Heart  3. Lungs  4. Muscles Body Functions Affected:  1. Sensory/Pain  2. Cardio  3. Respiratory  4. Movement Related  5. education  Activities and Participation Affected:  1. Mobility  2. Self Care  3. Domestic Life  4.  Community, Social and [place]   Number of elements that affect the Plan of Care: 4+: HIGH COMPLEXITY   CLINICAL PRESENTATION:   Presentation: Stable and uncomplicated: LOW COMPLEXITY   CLINICAL DECISION MAKIN[address] [place] [id number] AM-PAC 6 Clicks   Basic Mobility Inpatient Short Form  How much difficulty does the patient currently have. .. Unable A Lot A Little None   1. Turning over in bed (including adjusting bedclothes, sheets and blankets)? [] 1   [x] 2   [] 3   [] 4   2. Sitting down on and standing up from a chair with arms ( e.g., wheelchair, bedside commode, etc.)   [] 1   [] 2   [x] 3   [] 4   3. Moving from lying on back to sitting on the side of the bed? [] 1   [x] 2   [] 3   [] 4   How much help from another person does the patient currently need. .. Total A Lot A Little None   4. Moving to and from a bed to a chair (including a wheelchair)? [] 1   [] 2   [x] 3   [] 4   5. Need to walk in hospital room? [] 1   [] 2   [x] 3   [] 4   6. Climbing 3-5 steps with a railing? [] 1   [x] 2   [] 3   [] 4   © , Trustees of [address] [place], [place] [id number], under license to [employer]. All rights reserved      Score:  Initial: 15 Most Recent: X (Date: -- )    Interpretation of Tool:  Represents activities that are increasingly more difficult (i.e. Bed mobility, Transfers, Gait). Medical Necessity:     · Patient is expected to demonstrate progress in   · balance, functional technique, and activity tolerance   ·  to   · increase independence with   and improve safety during all functional mobility   · . Reason for Services/Other Comments:  · Patient continues to require skilled intervention due to   · medical complications and patient unable to attend/participate in therapy as expected  · .    Use of outcome tool(s) and clinical judgement create a POC that gives a: Clear prediction of patient's progress: LOW COMPLEXITY            TREATMENT:   (In addition to Assessment/Re-Assessment sessions the following treatments were rendered)   Pre-treatment Symptoms/Complaints:  \"Good morning\"  Pain: Initial: 0/10  Pain Intensity 1: 0  Post Session:  0/10   Therapeutic Activity: (    13 minutes): Therapeutic activities including transfers, balance and gait training to improve mobility, strength, balance and coordination. Required minimum assist    to promote safety with functional mobility activities. Therapeutic Exercise: ( 10 minutes):  Exercises per grid below to improve mobility, strength, and balance. Required minimal visual and verbal cues to promote proper body alignment. Progressed complexity of movement as indicated. DATE: 3/10/20 3/11/20  PM 3/12/20     Shoulder shrugs  15 15     Hip Flexion X15 AB 15 15     Long Arc Quads X15 AB 15 15     Hip ab/ad  15 15     Ankle pumps X15 AB 15 15     Gluteal sets X15 AB 15 15                              Key:  A=active, AA=active assisted, P=passive, B=bilaterally, R=right, L=left   DF=dorsiflexion, PF=plantarflexion      Braces/Orthotics/Lines/Etc:   ·   Treatment/Session Assessment:    · Response to Treatment:  cooperative. · Interdisciplinary Collaboration:   o Physical Therapy Assistant  o Registered Nurse  · After treatment position/precautions:   o Up in chair  o Bed alarm/tab alert on  o Bed/Chair-wheels locked  o Caregiver at bedside  o Call light within reach   · Compliance with Program/Exercises: Compliant all of the time  · Recommendations/Intent for next treatment session: \"Next visit will focus on advancements to more challenging activities and reduction in assistance provided\".   Total Treatment Duration:  PT Patient Time In/Time Out  Time In: 0920  Time Out: [facility]    Lakshmi Rush PTA

## 2020-03-12 NOTE — PROGRESS NOTES
This CM met with pt at bedside this day to inform him that he has been accepted to 9th floor for continued inpatient rehab at discharge. Pt remains agreeable with discharge plan with no voiced concerns. At this time, discharge date tentatively tomorrow. This CM to continue to follow.

## 2020-03-12 NOTE — PROGRESS NOTES
WM Seay Nose III  Admission Date: 3/5/2020             Daily Progress Note: 3/12/2020       The patient's chart is reviewed and the patient is discussed with the staff. 73-year-old gentleman with a 20-pack-year smoking history (quit 1997), normal PFTs, ROSENDO not on CPAP, diabetes, left-sided weakness, BPH with mitral regurgitation who underwent CABG, MVR and PFO closure, left atrial appendage clipping. he has an EF of 25 to 30%. Required post op pressor - now weaned.       V. fib at 230 on 3/4 requiring defibrillation, on amio. Transfused 1 unit PRBCs on 3/9. Thoracentesis 3/11/20 with 1000cc removed from the R.     Subjective:   No events overnight, feels better after thoracentesis    Current Facility-Administered Medications   Medication Dose Route Frequency    albuterol (PROVENTIL VENTOLIN) nebulizer solution 2.5 mg  2.5 mg Nebulization Q4H PRN    carboxymethylcellulose sodium (REFRESH LIQUIGEL) 1 % ophthalmic solution 1 Drop  1 Drop Both Eyes PRN    polyethylene glycol (MIRALAX) packet 17 g  17 g Oral DAILY PRN    citalopram (CELEXA) tablet 10 mg  10 mg Oral DAILY    metFORMIN (GLUCOPHAGE) tablet 500 mg  500 mg Oral BID WITH MEALS    terazosin (HYTRIN) capsule 10 mg  10 mg Oral QHS    sodium chloride (NS) flush 5-40 mL  5-40 mL IntraVENous Q8H    sodium chloride (NS) flush 5-40 mL  5-40 mL IntraVENous PRN    alum-mag hydroxide-simeth (MYLANTA) oral suspension 30 mL  30 mL Oral Q4H PRN    famotidine (PEPCID) tablet 20 mg  20 mg Oral BID    ondansetron (ZOFRAN) injection 4 mg  4 mg IntraVENous Q6H PRN    acetaminophen (TYLENOL) tablet 650 mg  650 mg Oral Q4H PRN    atorvastatin (LIPITOR) tablet 80 mg  80 mg Oral QHS    amiodarone (CORDARONE) tablet 200 mg  200 mg Oral Q12H    aspirin delayed-release tablet 81 mg  81 mg Oral DAILY    magnesium oxide (MAG-OX) tablet 400 mg  400 mg Oral TID PRN    magnesium oxide (MAG-OX) tablet 400 mg  400 mg Oral QID PRN    potassium chloride (K-DUR, KLOR-CON) SR tablet 20 mEq  20 mEq Oral BID PRN    potassium chloride (K-DUR, KLOR-CON) SR tablet 40 mEq  40 mEq Oral BID PRN    [Held by provider] carvediloL (COREG) tablet 3.125 mg  3.125 mg Oral Q12H    [Held by provider] lisinopriL (PRINIVIL, ZESTRIL) tablet 2.5 mg  2.5 mg Oral DAILY    0.9% sodium chloride infusion 250 mL  250 mL IntraVENous PRN    senna-docusate (PERICOLACE) 8.6-50 mg per tablet 2 Tab  2 Tab Oral Q12H    insulin lispro (HUMALOG) injection   SubCUTAneous AC&HS    alcohol 62% (NOZIN) nasal  1 Ampule  1 Ampule Topical DAILY    nitroglycerin (NITROSTAT) tablet 0.4 mg  0.4 mg SubLINGual PRN    influenza vaccine 2019-20 (6 mos+)(PF) (FLUARIX/FLULAVAL/FLUZONE QUAD) injection 0.5 mL  0.5 mL IntraMUSCular PRIOR TO DISCHARGE    traMADoL (ULTRAM) tablet 50 mg  50 mg Oral Q6H PRN         Objective:     Vitals:    03/12/20 0432 03/12/20 0719 03/12/20 0806 03/12/20 0958   BP:  103/58     Pulse:  (!) 55  64   Resp:  17     Temp:  97.3 °F (36.3 °C)     SpO2:  95% 95%    Weight: 164 lb 6.4 oz (74.6 kg)      Height:         Intake and Output:   03/10 1901 - 03/12 0700  In: 480 [P.O.:480]  Out: 1700 [Urine:1650]  03/12 0701 - 03/12 1900  In: 600 [P.O.:600]  Out: -     Physical Exam:   Constitutional:  the patient is well developed and in no acute distress  HEENT:  Sclera clear, pupils equal, oral mucosa moist  Lungs: clear but decreased from the posterior - dullness on the right. On room air  Cardiovascular:  RRR without M,G,R. Sinus sonam per telemetry  Abd/GI: soft and non-tender; with positive bowel sounds. Ext: warm without cyanosis. There is no lower leg edema. Musculoskeletal: moves all four extremities with equal strength  Skin:  no jaundice or rashes, chest and leg wounds   Neuro: no gross neuro deficits   Musculoskeletal: can ambulate. No deformity  Psychiatric: Calm.      Review of Systems - General ROS: positive for  - fatigue  Respiratory ROS: positive for - none  Cardiovascular ROS: positive for - chest pain related to cardiac surgery   Gastrointestinal ROS: positive for - appetite loss and constipation  Musculoskeletal ROS: positive for - muscular weakness   Lines: peripheral IV, pacing wires    CHEST XRAY:     3/11:            3/8        LAB  Recent Labs     03/11/20  0336 03/10/20  0343   WBC 5.7 8.0   HGB 9.0* 8.6*   HCT 26.9* 25.5*    132*     Recent Labs     03/11/20  0336 03/10/20  0343    138   K 4.5 3.5    105   CO2 24 26   * 120*   BUN 19 20   CREA 0.67* 0.61*   MG  --  2.1       Assessment:  (Medical Decision Making)     Hospital Problems  Date Reviewed: 2/24/2020          Codes Class Noted POA    * (Principal) S/P CABG x 3 ICD-10-CM: Z95.1  ICD-9-CM: V45.81  3/10/2020 No    Sinus sonam per telemetry.  Transfused yesterday for anemia    Pleural effusion, right ICD-10-CM: J90  ICD-9-CM: 511.9  3/8/2020 No    1000 cc removed from the R on 3/11/20    Atelectasis ICD-10-CM: J98.11  ICD-9-CM: 518.0  3/6/2020 No    Secondary to recent chest surgery    Acute pulmonary edema ([address] ICD-10-CM: J81.0  ICD-9-CM: 518.4  3/6/2020 No    Off lasix - fluid balance -320cc only today    Ventricular fibrillation (HCC) ICD-10-CM: I49.01  ICD-9-CM: 427.41  3/6/2020 No    EF 25 to 30%     Patent foramen ovale ICD-10-CM: Q21.1  ICD-9-CM: 745.5  3/5/2020 Yes    S/p closure    Mitral valve regurgitation ICD-10-CM: I34.0  ICD-9-CM: 424.0  3/5/2020 Yes    S/p MVR    CAD (coronary artery disease) (Chronic) ICD-10-CM: I25.10  ICD-9-CM: 414.00  3/5/2020 Yes    S/p CABG    Mitral valve insufficiency ICD-10-CM: I34.0  ICD-9-CM: 424.0  3/5/2020 Yes        Atherosclerosis of coronary artery of native heart with unstable angina pectoris ([facility] ICD-10-CM: I25.110  ICD-9-CM: 414.01, 411.1  3/5/2020 Yes        Type II diabetes mellitus ([facility] (Chronic) ICD-10-CM: E11.9  ICD-9-CM: 250.00  3/5/2020 Yes    controlled    Respiratory failure, post-operative ([facility] ICD-10-CM: P36.127  ICD-9-CM: 518.51  3/5/2020 No    Tolerated extubation    Hypoxia ICD-10-CM: R09.02  ICD-9-CM: 799.02  3/5/2020 No    Now on room air          Plan:  (Medical Decision Making)   1- pleural fluid is exudative  2. On room air. Volumes on IS < 1 liter - ? Secondary to effusion. Using EZPAP  3. Sinus bradycardia mild and asymptomatic - on low dose Coreg and Amiodarone. CV surgery following -   4. ? Need life vest at discharge with cardiomyopathy and recent arrest? To the discretion of cardiology/cv surgery. Nothing to add, please call if pleural fluid reocurrs. Ronald Hightower MD     More than 50% of time documented was spent face-to-face contact with the patient and in the care of the patient on the floor/unit where the patient is located.

## 2020-03-12 NOTE — PROGRESS NOTES
Nutrition Assessment for:   Length of Stay     Assessment: Pt s/p CABG, MVR, and closure of PFO. PMH of CVA, ROSENDO, CAD, DM2, CHF, and v-fib. Pt reports a 40# weight loss last year related to early jail, stress, and less structured meal intake. However, he reports some weight gain in the past 1-2 months. He denies difficulty with appetite and intake in past several months PTA. He usually consumes 3 meals per day. He notes that once diet advanced post-op he had limited PO intake and poor appetite. Appetite and intake has improved today, with intake of lunch ~75%. Patient has not used ONS before, but is open to trying. Per EMR, patient has been accepted to inpatient rehab with tentative d/c to 9th floor tomorrow. DIET DIABETIC CONSISTENT CARB No options chosen    Per documentation, patient consuming average 32% of 14 recorded meals in 6 days. Anthropometrics:  Height: 5' 9\" (175.3 cm), Weight: 71 kg (156 lb), Weight Source: Pre-Op Standing scale, Body mass index is 23.2 kg/m². BMI class of Normal weight. Weight Hx  Weight Loss Metrics Today's Wt   3/12/2020 164 lb 6.4 oz   3/3/2020 157 lb 2 oz   2/26/2020 145 lb   2/24/2020 156 lb   2/7/2020 152 lb   1/24/2020 148 lb   1/10/2020 148 lb 12.8 oz   1/5/2020 149 lb 11.2 oz   4/15/2019 140 lb   12/18/2017 172 lb   Unable to verify weights, however, note a weight loss from 2453-7904 and steady increase over the past year. Macronutrient needs for post-op wound healing: (using Admission weight 71 kg)  EER: 6442-2273 kcal/day (25-30 kcal/kg)  EPR:  g/day (20% of kcals)    Nutrition Diagnosis:  Inadequate oral intake related to recent surgery as evidenced by intake 26-50% and increased demand for nutrients for healing. Nutrition Intervention:  Meals and snacks: Continue current diet. Medical food supplement therapy: Add Glucerna TID  Discharge Nutrition Plan: Continue Oral Nutrition Supplement (ONS) at discharge.  Recommend Glucerna or a comparable/similar product Once daily for 30 days unless otherwise directed by your Primary Care Physician.     Sylvia Corey, Dietetic Intern

## 2020-03-12 NOTE — PROGRESS NOTES
PM&R Consult Progress Note      Patient: WM Itzel Coley III  Admit Date: 3/5/2020  Admit Diagnosis: Atherosclerosis of native coronary artery of native heart with unstable angina pectoris ([facility] [I25.110]; Mitral valve insufficiency, unspecified etiology [I34.0]; Patent foramen ovale [Q21.1];CAD (coronary artery disease) [I25.10]; Mitral valve regurgitation [I34.0]; Patent foramen ovale [Q21.1]; Atherosclerosis of coronary artery of native heart with unstable angina pectoris ([facility] [I25.110]; Mitral valve insufficiency [I34.0]; Patent foramen ovale [Q21.1]  Recommendations: Continue Acute Rehab Program, Coordination of rehab/medical care, Counseling of PM & R care issues management, Hospital Inpatient Rehab  - spoke with pt and his sister. Plan for [facility] tomorrow. Pt participating well. Feeling less sob after thoracentesis. On RA  -repeat ECHO EF 35-40% with severe hypokinesis of the inferolateral   Wall. Similar to ECHO in Jan 2020 but slt improvement  History/Subjective/Complaint:     Patient seen and examined. Records reviewed. Feels well.  Does have some sternal pain    Pain 1  Pain Scale 1: Numeric (0 - 10) (03/12/20 1355)  Pain Intensity 1: 6 (03/12/20 1355)  Patient Stated Pain Goal: 0 (03/12/20 1130)  Pain Reassessment 1: Yes (03/11/20 1306)  Pain Onset 1: post op (03/11/20 2024)  Pain Location 1: Chest (03/12/20 1355)  Pain Orientation 1: Right (03/12/20 1355)  Pain Description 1: Aching (03/12/20 1355)  Pain Intervention(s) 1: Medication (see MAR) (03/12/20 1355)     Objective:     Vitals:  Patient Vitals for the past 8 hrs:   BP Temp Pulse Resp SpO2   03/12/20 1232 106/61 97.9 °F (36.6 °C) 62 17 96 %   03/12/20 0958   64     03/12/20 0806     95 %   03/12/20 0719 103/58 97.3 °F (36.3 °C) (!) 55 17 95 %      Intake and Output:  03/10 1901 - 03/12 0700  In: 480 [P.O.:480]  Out: 1700 [Urine:1650]    No Known Allergies  Current Facility-Administered Medications   Medication Dose Route Frequency    albuterol (PROVENTIL VENTOLIN) nebulizer solution 2.5 mg  2.5 mg Nebulization Q4H PRN    carboxymethylcellulose sodium (REFRESH LIQUIGEL) 1 % ophthalmic solution 1 Drop  1 Drop Both Eyes PRN    polyethylene glycol (MIRALAX) packet 17 g  17 g Oral DAILY PRN    citalopram (CELEXA) tablet 10 mg  10 mg Oral DAILY    metFORMIN (GLUCOPHAGE) tablet 500 mg  500 mg Oral BID WITH MEALS    terazosin (HYTRIN) capsule 10 mg  10 mg Oral QHS    sodium chloride (NS) flush 5-40 mL  5-40 mL IntraVENous Q8H    sodium chloride (NS) flush 5-40 mL  5-40 mL IntraVENous PRN    alum-mag hydroxide-simeth (MYLANTA) oral suspension 30 mL  30 mL Oral Q4H PRN    famotidine (PEPCID) tablet 20 mg  20 mg Oral BID    ondansetron (ZOFRAN) injection 4 mg  4 mg IntraVENous Q6H PRN    acetaminophen (TYLENOL) tablet 650 mg  650 mg Oral Q4H PRN    atorvastatin (LIPITOR) tablet 80 mg  80 mg Oral QHS    amiodarone (CORDARONE) tablet 200 mg  200 mg Oral Q12H    aspirin delayed-release tablet 81 mg  81 mg Oral DAILY    magnesium oxide (MAG-OX) tablet 400 mg  400 mg Oral TID PRN    magnesium oxide (MAG-OX) tablet 400 mg  400 mg Oral QID PRN    potassium chloride (K-DUR, KLOR-CON) SR tablet 20 mEq  20 mEq Oral BID PRN    potassium chloride (K-DUR, KLOR-CON) SR tablet 40 mEq  40 mEq Oral BID PRN    [Held by provider] carvediloL (COREG) tablet 3.125 mg  3.125 mg Oral Q12H    [Held by provider] lisinopriL (PRINIVIL, ZESTRIL) tablet 2.5 mg  2.5 mg Oral DAILY    0.9% sodium chloride infusion 250 mL  250 mL IntraVENous PRN    senna-docusate (PERICOLACE) 8.6-50 mg per tablet 2 Tab  2 Tab Oral Q12H    insulin lispro (HUMALOG) injection   SubCUTAneous AC&HS    alcohol 62% (NOZIN) nasal  1 Ampule  1 Ampule Topical DAILY    nitroglycerin (NITROSTAT) tablet 0.4 mg  0.4 mg SubLINGual PRN    influenza vaccine 2019-20 (6 mos+)(PF) (FLUARIX/FLULAVAL/FLUZONE QUAD) injection 0.5 mL  0.5 mL IntraMUSCular PRIOR TO DISCHARGE    traMADoL (ULTRAM) tablet 50 mg  50 mg Oral Q6H PRN       Physical Exam:  Constitutional:  the patient is well developed and in no acute distress; pleasant and appropriate  HEENT:  Sclera clear, pupils equal, oral mucosa moist  Lungs: clear but decreased post bases. No w/r/r  Cardiovascular:  RRR without M,G,R. Sinus sonam per telemetry  Abd/GI: soft and non-tender; with positive bowel sounds. Neuro; PERRLA, EOMI; stable left visual field deficit; no dysmetria, no sensory deficits. Prox> distal weakness, generalized. No drift. Speech and conversation approp  Skin/ext:  no jaundice or rashes, chest and leg wounds c/d/i; no periph edema         Incision(s)/Wound(s): Wound Chest (Active)   Dressing Status Clean, dry, and intact 3/12/2020 11:30 AM   Dressing Type Open to air 3/12/2020 11:30 AM   Incision Site Well Approximated Yes 3/12/2020 11:30 AM   Assessment Clean, dry, and intact 3/12/2020 11:30 AM   Drainage Amount None 3/12/2020 11:30 AM   Wound Odor None 3/12/2020 11:30 AM   Mely-wound Assessment Clean;Dry; Intact 3/12/2020 11:30 AM   Cleansing and Cleansing Agents  Chlorhexidine gluconate 4% 3/11/2020  8:14 PM   Dressing Changed Changed/New 3/7/2020  3:00 AM   Dressing Type Applied Open to air 3/12/2020 11:30 AM   Procedure Tolerated Well 3/10/2020  9:42 PM   Number of days: 7       Wound Leg upper Left (Active)   Dressing Status Clean, dry, and intact 3/12/2020 11:30 AM   Dressing Type Open to air 3/12/2020 11:30 AM   Splint Type/Material Elastic wrap 3/10/2020 11:30 AM   Incision Site Well Approximated Yes 3/12/2020 11:30 AM   Assessment Clean, dry, and intact 3/12/2020 11:30 AM   Drainage Amount None 3/12/2020 11:30 AM   Drainage Color Serosanguinous 3/8/2020  7:30 PM   Wound Odor None 3/12/2020 11:30 AM   Mely-wound Assessment Clean;Dry; Intact 3/12/2020 11:30 AM   Cleansing and Cleansing Agents  Chlorhexidine gluconate 4% 3/11/2020  8:14 PM   Dressing Changed Changed/New 3/7/2020  3:00 AM   Dressing Type Applied Open to air 3/12/2020 11:30 AM   Procedure Tolerated Well 3/10/2020  9:42 PM   Number of days: 7          Functional Assessment:  Gross Assessment  AROM: Generally decreased, functional (03/11/20 1500)  Strength: Generally decreased, functional (03/11/20 1500)  Tone: Normal (03/09/20 1518)  Sensation: Intact (03/09/20 1518)     Gait  Base of Support: Widened (03/09/20 1518)  Speed/Clara: Slow (03/12/20 1100)  Step Length: Right shortened;Left shortened (03/09/20 1518)  Ambulation - Level of Assistance: Minimal assistance (03/12/20 1100)  Distance (ft): 200 Feet (ft) (03/12/20 1100)  Assistive Device: Walker, rolling (03/12/20 1100)     Bed Mobility  Rolling: Moderate assistance (03/09/20 1518)  Supine to Sit: Contact guard assistance;Bed Modified (03/11/20 1500)  Sit to Supine: Contact guard assistance (03/11/20 1500)  Scooting: Moderate assistance (03/09/20 1518)     Balance  Sitting: Intact (03/12/20 1100)  Standing: Impaired (03/12/20 1100)  Standing - Static: Good (03/12/20 1100)  Standing - Dynamic : Fair (03/12/20 1100)     Grooming  Grooming Assistance: Contact guard assistance (03/11/20 1500)                 Bed/Mat Mobility  Rolling: Moderate assistance (03/09/20 1518)  Supine to Sit: Contact guard assistance;Bed Modified (03/11/20 1500)  Sit to Supine: Contact guard assistance (03/11/20 1500)  Sit to Stand: Minimum assistance (03/12/20 1100)  Stand to Sit: Contact guard assistance (03/12/20 1100)  Bed to Chair: Minimum assistance (03/11/20 1500)  Scooting:  Moderate assistance (03/09/20 1518)     Labs/Studies:  Recent Results (from the past 72 hour(s))   GLUCOSE, POC    Collection Time: 03/09/20  9:39 PM   Result Value Ref Range    Glucose (POC) 135 (H) 65 - 958 mg/dL   METABOLIC PANEL, BASIC    Collection Time: 03/10/20  3:43 AM   Result Value Ref Range    Sodium 138 136 - 145 mmol/L    Potassium 3.5 3.5 - 5.1 mmol/L    Chloride 105 98 - 107 mmol/L    CO2 26 21 - 32 mmol/L    Anion gap 7 7 - 16 mmol/L    Glucose 120 (H) 65 - 100 mg/dL    BUN 20 8 - 23 MG/DL    Creatinine 0.61 (L) 0.8 - 1.5 MG/DL    GFR est AA >60 >60 ml/min/1.73m2    GFR est non-AA >60 >60 ml/min/1.73m2    Calcium 8.4 8.3 - 10.4 MG/DL   MAGNESIUM    Collection Time: 03/10/20  3:43 AM   Result Value Ref Range    Magnesium 2.1 1.8 - 2.4 mg/dL   CBC W/O DIFF    Collection Time: 03/10/20  3:43 AM   Result Value Ref Range    WBC 8.0 4.3 - 11.1 K/uL    RBC 2.80 (L) 4.23 - 5.6 M/uL    HGB 8.6 (L) 13.6 - 17.2 g/dL    HCT 25.5 (L) 41.1 - 50.3 %    MCV 91.1 79.6 - 97.8 FL    MCH 30.7 26.1 - 32.9 PG    MCHC 33.7 31.4 - 35.0 g/dL    RDW 15.3 (H) 11.9 - 14.6 %    PLATELET 783 (L) 743 - 450 K/uL    MPV 9.6 9.4 - 12.3 FL    ABSOLUTE NRBC 0.00 0.0 - 0.2 K/uL   CRP, HIGH SENSITIVITY    Collection Time: 03/10/20  3:43 AM   Result Value Ref Range    CRP, High sensitivity 121.0 mg/L   GLUCOSE, POC    Collection Time: 03/10/20  6:26 AM   Result Value Ref Range    Glucose (POC) 146 (H) 65 - 100 mg/dL   GLUCOSE, POC    Collection Time: 03/10/20 11:10 AM   Result Value Ref Range    Glucose (POC) 147 (H) 65 - 100 mg/dL   GLUCOSE, POC    Collection Time: 03/10/20  3:53 PM   Result Value Ref Range    Glucose (POC) 136 (H) 65 - 100 mg/dL   GLUCOSE, POC    Collection Time: 03/10/20  9:02 PM   Result Value Ref Range    Glucose (POC) 172 (H) 65 - 789 mg/dL   METABOLIC PANEL, BASIC    Collection Time: 03/11/20  3:36 AM   Result Value Ref Range    Sodium 137 136 - 145 mmol/L    Potassium 4.5 3.5 - 5.1 mmol/L    Chloride 104 98 - 107 mmol/L    CO2 24 21 - 32 mmol/L    Anion gap 9 7 - 16 mmol/L    Glucose 179 (H) 65 - 100 mg/dL    BUN 19 8 - 23 MG/DL    Creatinine 0.67 (L) 0.8 - 1.5 MG/DL    GFR est AA >60 >60 ml/min/1.73m2    GFR est non-AA >60 >60 ml/min/1.73m2    Calcium 8.9 8.3 - 10.4 MG/DL   CBC W/O DIFF    Collection Time: 03/11/20  3:36 AM   Result Value Ref Range    WBC 5.7 4.3 - 11.1 K/uL    RBC 2.90 (L) 4.23 - 5.6 M/uL    HGB 9.0 (L) 13.6 - 17.2 g/dL    HCT 26.9 (L) 41.1 - 50.3 %    MCV 92.8 79.6 - 97.8 FL    MCH 31.0 26.1 - 32.9 PG    MCHC 33.5 31.4 - 35.0 g/dL    RDW 14.7 (H) 11.9 - 14.6 %    PLATELET 906 472 - 162 K/uL    MPV 9.9 9.4 - 12.3 FL    ABSOLUTE NRBC 0.02 0.0 - 0.2 K/uL   GLUCOSE, POC    Collection Time: 03/11/20  5:57 AM   Result Value Ref Range    Glucose (POC) 194 (H) 65 - 100 mg/dL   GLUCOSE, POC    Collection Time: 03/11/20 11:31 AM   Result Value Ref Range    Glucose (POC) 223 (H) 65 - 100 mg/dL   CELL COUNT, BODY FLUID    Collection Time: 03/11/20  3:15 PM   Result Value Ref Range    BODY FLUID TYPE RIGHT      FLUID COLOR RED      FLUID APPEARANCE TURBID      FLUID RBC CT. 56,000 /cu mm    FLUID WBC COUNT 348 /cu mm    BRCH NEUTROPHIL 31 %    BRCH LYMPHS 54 %    BRCH MACROPHAGES 15 %    WBC COMMENTS MODERATE LARGE UNDEFINED MONONUCLEAR CELLS    GLUCOSE, FLUID    Collection Time: 03/11/20  3:15 PM   Result Value Ref Range    Fluid Type: RIGHT      Glucose, body fld. 195 MG/DL   LDH, BODY FLUID    Collection Time: 03/11/20  3:15 PM   Result Value Ref Range    Fluid Type: RIGHT      LD, body fld. 188 U/L   PROTEIN TOTAL, FLUID    Collection Time: 03/11/20  3:15 PM   Result Value Ref Range    Fluid Type: RIGHT      Protein total, body fld. 3.2 g/dL   CULTURE, BODY FLUID W GRAM STAIN    Collection Time: 03/11/20  3:15 PM   Result Value Ref Range    Special Requests: NO SPECIAL REQUESTS      GRAM STAIN 0 TO 4 WBC'S/OIF     GRAM STAIN NO DEFINITE ORGANISM SEEN      Culture result:        NO GROWTH AFTER SHORT PERIOD OF INCUBATION. FURTHER RESULTS TO FOLLOW AFTER OVERNIGHT INCUBATION.    GLUCOSE, POC    Collection Time: 03/11/20  4:08 PM   Result Value Ref Range    Glucose (POC) 221 (H) 65 - 100 mg/dL   GLUCOSE, POC    Collection Time: 03/11/20  8:22 PM   Result Value Ref Range    Glucose (POC) 174 (H) 65 - 100 mg/dL   GLUCOSE, POC    Collection Time: 03/12/20  6:17 AM   Result Value Ref Range    Glucose (POC) 173 (H) 65 - 100 mg/dL   GLUCOSE, POC    Collection Time: 03/12/20 12:32 PM Result Value Ref Range    Glucose (POC) 169 (H) 65 - 100 mg/dL        Assessment:     Principal Problem:    S/P CABG x 3 (3/10/2020)    Active Problems:    Patent foramen ovale (3/5/2020)      Mitral valve regurgitation (3/5/2020)      CAD (coronary artery disease) (3/5/2020)      Mitral valve insufficiency (3/5/2020)      Atherosclerosis of coronary artery of native heart with unstable angina pectoris ([address] (3/5/2020)      Type II diabetes mellitus ([address] (3/5/2020)      Respiratory failure, post-operative ([address] (3/5/2020)      Hypoxia (3/5/2020)      Atelectasis (3/6/2020)      Acute pulmonary edema ([address] (3/6/2020)      Ventricular fibrillation ([address] (3/6/2020)      Pleural effusion, right (3/8/2020)        Plan:     Recommendations: Continue Acute Rehab Program  Coordination of rehab/medical care  Counseling of PM & R care issues management  Monitoring and management of medical conditions per plan of care/orders  Discussion with Family/Caregiver/Staff  Reviewed Therapies/Labs/Medications/Records

## 2020-03-12 NOTE — PROGRESS NOTES
Bedside and Verbal shift change report given to Aida Saucedo RN (oncoming nurse) by self Cameron Balling nurse). Report included the following information SBAR, Intake/Output, MAR, Recent Results and Cardiac Rhythm sinus sonam.

## 2020-03-12 NOTE — PROGRESS NOTES
Bedside shift change report given to [address] [place] [address] (oncoming nurse) by Kareem Baum RN (offgoing nurse). Report included the following information SBAR, Kardex, Intake/Output, MAR, Recent Results and Cardiac Rhythm sinus sonam.

## 2020-03-12 NOTE — PROGRESS NOTES
Respiratory Care Services Policy Number: -[id number]    Title: Aerosolized Medication Protocol    Effective Date: 10/1998    Revised Date: 06/13, 03/16, 11/17, 07/19     Reviewed Date: 05/14/ 03/15 , 06/17, 5/18   I. Policy: The Aerosolized Medication Protocol shall by implemented by Respiratory Care Practitioners (RCP) for patients with orders to receive aerosol therapy with medication. II. Purpose: To open and maintain obstructed airways, the RCP, will utilize the following   protocol to select the indicated aerosolized medication(s) and determine the most effective method of delivery to the patient. III. Patient Type: All patients who are determined to meet aerosolized medication criteria as          outlined in this protocol. IV. Responsibility: Director, [address], registered Respiratory Care Practitioners (RCP's) with documented competency in the performance of respiratory therapeutic techniques. V. Equipment needed:  A. Stethoscope  B. Pulse oximeter  C. AeroEclipse nebulizer  D. Dry Powder Inhaler (DPI)     VI. Protocol:   A. The following conditions are accepted indications for aerosolized medication therapy. 1. Bronchospasm/wheezing  2. Impaired mucociliary clearance  3. Tracheobronchial mucosal congestion/and laryngeal stridor  4. Diseases which commonly require aerosolized medication therapy include, but are not limited to:  a. Asthma/reactive airway disease  b. Bronchitis/emphysema (COPD)  c. Cystic fibrosis  d. Severe laryngitis/tracheitis  e. Bronchiectasis  f. Smoke inhalation or chemical trauma to the lung or upper airway  g. Physical trauma to the upper airway  h. Laryngotracheobronchitis  i. Bronchiolitis  j. Non-specific wheezing              B. Indications for bronchodilator medications will include:  a. Bronchospasm/ wheezing  b. Asthma/reactive airway disease  c. Chronic obstructive pulmonary disease  d.  Obstructive defect on pulmonary function testing  C. Administration of medications  1. If a bronchodilator or any other type of respiratory medication is needed, a physician order must be indicated in the medication section in the patient's EMR. 2. When the physician specifies the medication and dosage at the time of request, the ordered medication will be used as part of the care plan. D. The following guidelines will be utilized in the evaluation and selection of the appropriate delivery device for indicated medication(s):  1. Unassisted aerosol (UA) is the preferred method of aerosol delivery and indicated if  a. Ventilation is inadequate  b. Patient demonstrates wheezing   c. Routine treatments shall be given via the AeroEclipse nebulizer. d. The Aerogen nebulizer shall be used in the following circumstances:  i. ER patients and they will continue with this nebulizer if admitted to 8th floor or ICU.  ii. Patients in ICU   iii. Patients on 8th floor with severe wheezing (at the RCP's discretion)  2. Dry PowderInhaler (DPI)   a. Patient should be alert/cooperative  b. Able to perform 3 second breath hold. c. Patient has used DPI therapy previously, either at home or in the hospital.  d. Note: The only approved inhaler on formulary is Spiriva. VII. Guidelines:   Monitor patient's vital signs and evaluate patient's clinical status. The need to change medication and/or modality may be indicated by:  1. A pulse greater than 120 bpm, or if a pulse increase of 20 bpm occurs with bronchodilator medications. 2. Significant worsening of dyspnea or wheezing occurring during or within 30 minutes of discontinuing therapy. 3. Worsening of patient's sensorium (e.g. patient becomes confused or obtunded, and unable to follow directions). 4. Worsening of patient's chest x-ray. 5. Change in sputum (e.g. increased pulmonary infiltrate, which might indicate need for volume expansion therapy).   6. Patient has difficulty coughing up secretions, which might indicate need for acetylcysteine and/or bronchial hygiene therapy. 7. Call physician immediately if dyspnea worsens and is not responsive to modifications allowed by protocol. VIII. Clinical Responsibility:  1. The therapy assessment guidelines will be used to evaluate all patients receiving aerosolized medications with the exception of critical care areas. 1. RCP's will perform changes in therapy per protocol. 2. It will be the responsibility of RCP to provide instruction regarding respiratory medications, possible side effects, aerosol therapy and proper DPI technique, as well as, spacer usage to patients ordered DPI therapy. 3. Current therapy that is part of a patient's home regimen will not be discontinued. a. Provide spacer and educate patient on proper inhaler technique if needed. IX. Documentation  A. Document assessment findings in the respiratory assessment section of the patient's EMR. B. Document changes in therapy per protocol in the respiratory orders section and in the care plan section of the patient's EMR. C. Document patient education in the patient education section of the patient's EMR. X. Outcome Criteria:  A. Relief of wheezes and obstruction  B. Improved cough and sputum color and consistency  C. Improved chest x-ray  D. Improved arterial oxygen tension and or SaO2  E. Improved Peak Flow on asthmatic patients        XI. Related Protocols:  A. Respiratory Patient Care Protocols  B. Bronchial Hygiene Therapy  C. Oxygen Protocol    Reference:  L - Respiratory Care Department Policy, Procedure and Protocol Guideline Manual, 1995, PENG Kirkland. L -  Therapist Driven Respiratory Care Protocols  A Practitioner's Guide for Criteria-Based Respiratory Care by Francisco Cameron M.D., and PENG Franco, TYSHAWN. L - The rationale for therapist-driven protocols: an update. Respiratory Care 1998; [id number]. N -[facility] Clinical Practice Guidelines.

## 2020-03-12 NOTE — PROGRESS NOTES
Bedside shift change report given to Chika Clemens RN (oncoming nurse) by Randy Rodarte RN (offgoing nurse). Report included the following information SBAR, Kardex, OR Summary, Intake/Output, MAR, Recent Results and Cardiac Rhythm NSR, sinus sonam.

## 2020-03-12 NOTE — PROGRESS NOTES
Today's Date: 3/12/2020  Date of Admission: 3/5/2020    Chart Reviewed. Subjective:     Patient feels ok. Appetite better. He denies any dyspnea. Medications Reviewed. Objective:     Vitals:    03/12/20 0432 03/12/20 0719 03/12/20 0806 03/12/20 0958   BP:  103/58     Pulse:  (!) 55  64   Resp:  17     Temp:  97.3 °F (36.3 °C)     SpO2:  95% 95%    Weight: 164 lb 6.4 oz (74.6 kg)      Height:           Intake and Output  Current Shift: 03/12 0701 - 03/12 1900  In: 600 [P.O.:600]  Out: -    Last 3 Shifts: 03/10 1901 - 03/12 0700  In: 480 [P.O.:480]  Out: 1700 [Urine:1650]    Physical Exam:  General: Well Developed, Well Nourished, No Acute Distress, Alert & Oriented x 3, Appropriate mood  Neck: supple, no JVD  Heart: S1S2 with RRR without murmurs or gallops  Lungs: Clear throughout auscultation bilaterally without adventitious sounds  Abd: soft, nontender, nondistended, with good bowel sounds  Ext: no edema bilaterally  Sternal incision: clean, dry, and intact  Skin: warm and dry    LABS  Data Review:   Recent Labs     03/11/20  0336 03/10/20  0343    138   K 4.5 3.5   MG  --  2.1   BUN 19 20   CREA 0.67* 0.61*   * 120*   WBC 5.7 8.0   HGB 9.0* 8.6*   HCT 26.9* 25.5*    132*       Estimated Creatinine Clearance: 101 mL/min (A) (by C-G formula based on SCr of 0.67 mg/dL (L)).       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)  On ASA, holding BB and ACE-I given low BP, continue statin, pacing wires removed, stable on room air, continue PT      Active Problems:    Patent foramen ovale (3/5/2020)  S/p closure       Mitral valve regurgitation (3/5/2020)  S/p MVR       CAD (coronary artery disease) (3/5/2020)  S/p CABG, continue medical therapy        Mitral valve insufficiency (3/5/2020)  S/p MVR        Atherosclerosis of coronary artery of native heart with unstable angina pectoris ([facility] (3/5/2020)  S/p CABG        Type II diabetes mellitus ([facility] (3/5/2020)  On Metformin, SSI       Respiratory failure, post-operative ([address] (3/5/2020)          Hypoxia (3/5/2020)  Resolved        Atelectasis (3/6/2020)  IS        Acute pulmonary edema ([address] (3/6/2020)  had persistent right effusion, s/p thoracentesis 3/11 with 1000ccs removed        Ventricular fibrillation ([address] (3/6/2020)  Isolated event       Pleural effusion, right (3/8/2020)  S/p thoracentesis     Systolic CHF  Holding BB and ACE-I/ARB due to low BP, he states BP was low pre-op as well, will repeat limited echo today to see if [facility] needed     Dispo  Plans for SANDOVAL RaineyC

## 2020-03-12 NOTE — PROGRESS NOTES
Problem: Mobility Impaired (Adult and Pediatric)  Goal: *Acute Goals and Plan of Care (Insert Text)  Description  LTG:  (1.)Mr. Glenn Hayes will move from supine to sit and sit to supine , scoot up and down and roll side to side in flat bed without siderails with  CONTACT GUARD ASSIST within 7 day(s). Goal met 3/12/20  (2.)Mr. Glenn Hayes will perform all functional transfers with  CONTACT GUARD ASSIST using the least restrictive/no device within 7 day(s). Goal met 3/12/20  (3.)Mr. Glenn Hayes will ambulate with  CONTACT GUARD ASSIST for 250+ feet with normal vital sign response with the least restrictive/no device within 7 day(s). Outcome: Progressing Towards Goal     PHYSICAL THERAPY: Daily Note and PM 3/12/2020  INPATIENT: PT Visit Days : 4  [place] / [place] / Product Type: Medicare /       NAME/AGE/GENDER: WM Edin Ochoa III is a 78 y.o. male   PRIMARY DIAGNOSIS: Atherosclerosis of native coronary artery of native heart with unstable angina pectoris (HCC) [I25.110]  Mitral valve insufficiency, unspecified etiology [I34.0]  Patent foramen ovale [Q21.1]  CAD (coronary artery disease) [I25.10]  Mitral valve regurgitation [I34.0]  Patent foramen ovale [Q21.1]  Atherosclerosis of coronary artery of native heart with unstable angina pectoris (HCC) [I25.110]  Mitral valve insufficiency [I34.0]  Patent foramen ovale [Q21.1] S/P CABG x 3 S/P CABG x 3  Procedure(s) (LRB):  THORACENTESIS (N/A)  ULTRASOUND (N/A)  1 Day Post-Op  ICD-10: Treatment Diagnosis:    · Other abnormalities of gait and mobility (R26.89)  · History of falling (Z91.81)   Precaution/Allergies:  Patient has no known allergies. ASSESSMENT:     Mr. Glenn Hayes underwent above surgery. 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 He lives with his sister and ambulates independently but has suffered 1 fall in the past couple months while stepping off a curb.   Patient sitting in the recliner and agreeable for PT. Sit to stand with min assist, the patient is doing better with this activity today. Gait training with rolling walker x 200 feet with slow gianfranco. Patient is returned to the recliner and after a short rest break the patient performs  therapeutic exercises. Tolerated well. Good session. Patient is making progress towards goals. Mr. Stephane Franco is functioning well below baseline and is therefore appropriate for skilled PT to maximize rehab potential.   Patient may benefit from [facility] at baseline, as his decreased vision affects his mobility. Will continue PT efforts. PM note:  Patient is agreeable to therapy. Patient is supine in bed and bed mobility is with supervision. Sitting balance good. Gait training with rolling walker continued and the patient is able to maintain am gait distance, exercises continued. Patient is returned to supine in bed with needs within reach. Progress demonstrated. Will continue PT efforts. This section established at most recent assessment   PROBLEM LIST (Impairments causing functional limitations):  1. Decreased Transfer Abilities  2. Decreased Ambulation Ability/Technique  3. Decreased Balance  4. Decreased Activity Tolerance  5. Decreased Knowledge of Precautions   INTERVENTIONS PLANNED: (Benefits and precautions of physical therapy have been discussed with the patient.)  1. Balance Exercise  2. Bed Mobility  3. Gait Training  4. Neuromuscular Re-education/Strengthening  5. Therapeutic Activites  6. Therapeutic Exercise/Strengthening  7. Transfer Training  8. education      TREATMENT PLAN: Frequency/Duration: twice daily for duration of hospital stay  Rehabilitation Potential For Stated Goals: Good     REHAB RECOMMENDATIONS (at time of discharge pending progress):    Placement:   It is my opinion, based on this patient's performance to date, that Mr. Stephane Franco may benefit from intensive therapy at an [address] [place] [address] after discharge due to a probable need for multiple therapy disciplines and potential to make ongoing and sustainable functional improvement that is of practical value. .  Equipment:    tbd    None at this time              HISTORY:   History of Present Injury/Illness (Reason for Referral): Admitted for above. Past Medical History/Comorbidities:   Mr. Rafael Alonzo  has a past medical history of Arthritis, CAD (coronary artery disease), CVA (cerebral vascular accident) ([facility] (1/3/2020), Diabetes type 2, uncontrolled ([facility] Hypercholesteremia, Sleep apnea, Systolic CHF, chronic ([facility] (1/5/2020), TIA (transient ischemic attack) (2009), and Troponin I above reference range (1/3/2020). Mr. Rafael Alonzo  has a past surgical history that includes hx hernia repair; hx knee arthroscopy; and hx cyst removal.  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 He lives with his sister and ambulates independently but has suffered 1 fall in the past couple months while stepping off a curb.        Number of Personal Factors/Comorbidities that affect the Plan of Care: 1-2: MODERATE COMPLEXITY   EXAMINATION:   Most Recent Physical Functioning:   Gross Assessment:                  Posture:     Balance:  Sitting: Intact  Standing: Impaired  Standing - Static: Good  Standing - Dynamic : Fair Bed Mobility:  Rolling: Supervision  Supine to Sit: Supervision  Sit to Supine: Supervision  Scooting: Supervision  Wheelchair Mobility:     Transfers:  Sit to Stand: Minimum assistance  Stand to Sit: Contact guard assistance  Gait:     Speed/Clara: Slow  Distance (ft): 200 Feet (ft)  Assistive Device: Walker, rolling  Ambulation - Level of Assistance: Minimal assistance      Body Structures Involved:  1. Eyes and Ears  2. Heart  3. Lungs  4. Muscles Body Functions Affected:  1. Sensory/Pain  2. Cardio  3. Respiratory  4. Movement Related  5. education  Activities and Participation Affected:  1. Mobility  2. Self Care  3. Domestic Life  4. Community, Social and [place]   Number of elements that affect the Plan of Care: 4+: HIGH COMPLEXITY   CLINICAL PRESENTATION:   Presentation: Stable and uncomplicated: LOW COMPLEXITY   CLINICAL DECISION MAKIN [facility] Inpatient Short Form  How much difficulty does the patient currently have. .. Unable A Lot A Little None   1. Turning over in bed (including adjusting bedclothes, sheets and blankets)? [] 1   [x] 2   [] 3   [] 4   2. Sitting down on and standing up from a chair with arms ( e.g., wheelchair, bedside commode, etc.)   [] 1   [] 2   [x] 3   [] 4   3. Moving from lying on back to sitting on the side of the bed? [] 1   [x] 2   [] 3   [] 4   How much help from another person does the patient currently need. .. Total A Lot A Little None   4. Moving to and from a bed to a chair (including a wheelchair)? [] 1   [] 2   [x] 3   [] 4   5. Need to walk in hospital room? [] 1   [] 2   [x] 3   [] 4   6. Climbing 3-5 steps with a railing? [] 1   [x] 2   [] 3   [] 4   © , Trustees of [address] [place] [address], under license to [employer]. All rights reserved      Score:  Initial: 15 Most Recent: X (Date: -- )    Interpretation of Tool:  Represents activities that are increasingly more difficult (i.e. Bed mobility, Transfers, Gait). Medical Necessity:     · Patient is expected to demonstrate progress in   · balance, functional technique, and activity tolerance   ·  to   · increase independence with   and improve safety during all functional mobility   · .   Reason for Services/Other Comments:  · Patient continues to require skilled intervention due to   · medical complications and patient unable to attend/participate in therapy as expected  · . Use of outcome tool(s) and clinical judgement create a POC that gives a: Clear prediction of patient's progress: LOW COMPLEXITY            TREATMENT:   (In addition to Assessment/Re-Assessment sessions the following treatments were rendered)   Pre-treatment Symptoms/Complaints: \"Hello\"  Pain: Initial: 0/10  Pain Intensity 1: 0  Post Session:  0/10 just sore   Therapeutic Activity: (    13 minutes): Therapeutic activities including transfers, balance and gait training to improve mobility, strength, balance and coordination. Required minimum assist    to promote safety with functional mobility activities. Therapeutic Exercise: ( 11 minutes):  Exercises per grid below to improve mobility, strength, and balance. Required minimal visual and verbal cues to promote proper body alignment. Progressed complexity of movement as indicated. DATE: 3/10/20 3/11/20  PM 3/12/20   PM     Shoulder shrugs  15 15     Hip Flexion X15 AB 15 15     Long Arc Quads X15 AB 15 15     Hip ab/ad  15 15     Ankle pumps X15 AB 15 15     Gluteal sets X15 AB 15 15                              Key:  A=active, AA=active assisted, P=passive, B=bilaterally, R=right, L=left   DF=dorsiflexion, PF=plantarflexion      Braces/Orthotics/Lines/Etc:   ·   Treatment/Session Assessment:    · Response to Treatment:  cooperative. · Interdisciplinary Collaboration:   o Physical Therapy Assistant  o Registered Nurse  · After treatment position/precautions:   o Supine in bed  o Bed alarm/tab alert on  o Bed/Chair-wheels locked  o Call light within reach  o RN notified   · Compliance with Program/Exercises: Compliant all of the time  · Recommendations/Intent for next treatment session:   \"Next visit will focus on advancements to more challenging activities and reduction in assistance provided\".   Total Treatment Duration:  PT Patient Time In/Time Out  Time In: 1500  Time Out: 1524    Laura Caldera-Duane, PTA

## 2020-03-13 ENCOUNTER — HOSPITAL ENCOUNTER (INPATIENT)
Age: 70
LOS: 6 days | Discharge: HOME HEALTH | DRG: 948 | End: 2020-03-19
Attending: PHYSICAL MEDICINE & REHABILITATION | Admitting: PHYSICAL MEDICINE & REHABILITATION
Payer: MEDICARE

## 2020-03-13 VITALS
WEIGHT: 164.1 LBS | DIASTOLIC BLOOD PRESSURE: 63 MMHG | TEMPERATURE: 97.7 F | SYSTOLIC BLOOD PRESSURE: 112 MMHG | RESPIRATION RATE: 17 BRPM | HEIGHT: 69 IN | BODY MASS INDEX: 24.3 KG/M2 | HEART RATE: 62 BPM | OXYGEN SATURATION: 97 %

## 2020-03-13 DIAGNOSIS — I63.9 CEREBROVASCULAR ACCIDENT (CVA), UNSPECIFIED MECHANISM (HCC): Chronic | ICD-10-CM

## 2020-03-13 DIAGNOSIS — I25.10 CORONARY ARTERY DISEASE INVOLVING NATIVE HEART WITHOUT ANGINA PECTORIS, UNSPECIFIED VESSEL OR LESION TYPE: Chronic | ICD-10-CM

## 2020-03-13 DIAGNOSIS — I25.110 ATHEROSCLEROSIS OF NATIVE CORONARY ARTERY OF NATIVE HEART WITH UNSTABLE ANGINA PECTORIS (HCC): ICD-10-CM

## 2020-03-13 DIAGNOSIS — J90 PLEURAL EFFUSION, RIGHT: ICD-10-CM

## 2020-03-13 DIAGNOSIS — J98.11 ATELECTASIS: ICD-10-CM

## 2020-03-13 DIAGNOSIS — J95.821 RESPIRATORY FAILURE, POST-OPERATIVE (HCC): ICD-10-CM

## 2020-03-13 DIAGNOSIS — J81.0 ACUTE PULMONARY EDEMA (HCC): ICD-10-CM

## 2020-03-13 DIAGNOSIS — Q21.12 PATENT FORAMEN OVALE: ICD-10-CM

## 2020-03-13 DIAGNOSIS — I34.0 MITRAL VALVE INSUFFICIENCY, UNSPECIFIED ETIOLOGY: ICD-10-CM

## 2020-03-13 DIAGNOSIS — Z95.1 S/P CABG X 3: Primary | ICD-10-CM

## 2020-03-13 DIAGNOSIS — R53.81 PHYSICAL DEBILITY: ICD-10-CM

## 2020-03-13 LAB
GLUCOSE BLD STRIP.AUTO-MCNC: 131 MG/DL (ref 65–100)
GLUCOSE BLD STRIP.AUTO-MCNC: 135 MG/DL (ref 65–100)
GLUCOSE BLD STRIP.AUTO-MCNC: 139 MG/DL (ref 65–100)
GLUCOSE BLD STRIP.AUTO-MCNC: 163 MG/DL (ref 65–100)

## 2020-03-13 PROCEDURE — 74011250637 HC RX REV CODE- 250/637: Performed by: PHYSICAL MEDICINE & REHABILITATION

## 2020-03-13 PROCEDURE — 97530 THERAPEUTIC ACTIVITIES: CPT

## 2020-03-13 PROCEDURE — 74011636637 HC RX REV CODE- 636/637: Performed by: PHYSICAL MEDICINE & REHABILITATION

## 2020-03-13 PROCEDURE — 99223 1ST HOSP IP/OBS HIGH 75: CPT | Performed by: PHYSICAL MEDICINE & REHABILITATION

## 2020-03-13 PROCEDURE — 97165 OT EVAL LOW COMPLEX 30 MIN: CPT

## 2020-03-13 PROCEDURE — 97116 GAIT TRAINING THERAPY: CPT

## 2020-03-13 PROCEDURE — 74011250636 HC RX REV CODE- 250/636: Performed by: THORACIC SURGERY (CARDIOTHORACIC VASCULAR SURGERY)

## 2020-03-13 PROCEDURE — 65310000000 HC RM PRIVATE REHAB

## 2020-03-13 PROCEDURE — 82962 GLUCOSE BLOOD TEST: CPT

## 2020-03-13 PROCEDURE — 90686 IIV4 VACC NO PRSV 0.5 ML IM: CPT | Performed by: THORACIC SURGERY (CARDIOTHORACIC VASCULAR SURGERY)

## 2020-03-13 PROCEDURE — 97110 THERAPEUTIC EXERCISES: CPT

## 2020-03-13 PROCEDURE — 77030012890

## 2020-03-13 PROCEDURE — 90471 IMMUNIZATION ADMIN: CPT

## 2020-03-13 PROCEDURE — 74011250637 HC RX REV CODE- 250/637: Performed by: THORACIC SURGERY (CARDIOTHORACIC VASCULAR SURGERY)

## 2020-03-13 RX ORDER — TERAZOSIN 5 MG/1
10 CAPSULE ORAL
Status: CANCELLED | OUTPATIENT
Start: 2020-03-13

## 2020-03-13 RX ORDER — CARBOXYMETHYLCELLULOSE SODIUM 10 MG/ML
1 GEL OPHTHALMIC AS NEEDED
Status: DISCONTINUED | OUTPATIENT
Start: 2020-03-13 | End: 2020-03-17

## 2020-03-13 RX ORDER — CITALOPRAM 10 MG/1
10 TABLET ORAL DAILY
Status: DISCONTINUED | OUTPATIENT
Start: 2020-03-14 | End: 2020-03-19 | Stop reason: HOSPADM

## 2020-03-13 RX ORDER — SODIUM CHLORIDE 0.9 % (FLUSH) 0.9 %
5-40 SYRINGE (ML) INJECTION AS NEEDED
Status: CANCELLED | OUTPATIENT
Start: 2020-03-13

## 2020-03-13 RX ORDER — FACIAL-BODY WIPES
10 EACH TOPICAL DAILY PRN
Status: DISCONTINUED | OUTPATIENT
Start: 2020-03-13 | End: 2020-03-19 | Stop reason: HOSPADM

## 2020-03-13 RX ORDER — SODIUM CHLORIDE 0.9 % (FLUSH) 0.9 %
5-40 SYRINGE (ML) INJECTION EVERY 8 HOURS
Status: DISCONTINUED | OUTPATIENT
Start: 2020-03-13 | End: 2020-03-16

## 2020-03-13 RX ORDER — AMIODARONE HYDROCHLORIDE 200 MG/1
200 TABLET ORAL EVERY 12 HOURS
Status: CANCELLED | OUTPATIENT
Start: 2020-03-13

## 2020-03-13 RX ORDER — FACIAL-BODY WIPES
10 EACH TOPICAL DAILY PRN
Status: CANCELLED | OUTPATIENT
Start: 2020-03-13

## 2020-03-13 RX ORDER — FAMOTIDINE 10 MG/1
20 TABLET ORAL 2 TIMES DAILY
Status: DISCONTINUED | OUTPATIENT
Start: 2020-03-13 | End: 2020-03-19 | Stop reason: HOSPADM

## 2020-03-13 RX ORDER — MAG HYDROX/ALUMINUM HYD/SIMETH 200-200-20
30 SUSPENSION, ORAL (FINAL DOSE FORM) ORAL
Status: CANCELLED | OUTPATIENT
Start: 2020-03-13

## 2020-03-13 RX ORDER — INSULIN LISPRO 100 [IU]/ML
INJECTION, SOLUTION INTRAVENOUS; SUBCUTANEOUS
Status: DISCONTINUED | OUTPATIENT
Start: 2020-03-13 | End: 2020-03-16

## 2020-03-13 RX ORDER — POLYETHYLENE GLYCOL 3350 17 G/17G
17 POWDER, FOR SOLUTION ORAL
Status: DISCONTINUED | OUTPATIENT
Start: 2020-03-13 | End: 2020-03-19 | Stop reason: HOSPADM

## 2020-03-13 RX ORDER — MAG HYDROX/ALUMINUM HYD/SIMETH 200-200-20
30 SUSPENSION, ORAL (FINAL DOSE FORM) ORAL
Status: DISCONTINUED | OUTPATIENT
Start: 2020-03-13 | End: 2020-03-19 | Stop reason: HOSPADM

## 2020-03-13 RX ORDER — ACETAMINOPHEN 325 MG/1
650 TABLET ORAL
Status: CANCELLED | OUTPATIENT
Start: 2020-03-13

## 2020-03-13 RX ORDER — FAMOTIDINE 10 MG/1
20 TABLET ORAL 2 TIMES DAILY
Status: CANCELLED | OUTPATIENT
Start: 2020-03-13

## 2020-03-13 RX ORDER — AMIODARONE HYDROCHLORIDE 200 MG/1
200 TABLET ORAL EVERY 12 HOURS
Status: DISCONTINUED | OUTPATIENT
Start: 2020-03-13 | End: 2020-03-15

## 2020-03-13 RX ORDER — TRAMADOL HYDROCHLORIDE 50 MG/1
50 TABLET ORAL
Status: DISCONTINUED | OUTPATIENT
Start: 2020-03-13 | End: 2020-03-19 | Stop reason: HOSPADM

## 2020-03-13 RX ORDER — NITROGLYCERIN 0.4 MG/1
0.4 TABLET SUBLINGUAL AS NEEDED
Qty: 2 BOTTLE | Refills: 4 | Status: SHIPPED | OUTPATIENT
Start: 2020-03-13

## 2020-03-13 RX ORDER — ALBUTEROL SULFATE 0.83 MG/ML
2.5 SOLUTION RESPIRATORY (INHALATION)
Status: DISCONTINUED | OUTPATIENT
Start: 2020-03-13 | End: 2020-03-19 | Stop reason: HOSPADM

## 2020-03-13 RX ORDER — ALBUTEROL SULFATE 0.83 MG/ML
2.5 SOLUTION RESPIRATORY (INHALATION)
Status: CANCELLED | OUTPATIENT
Start: 2020-03-13

## 2020-03-13 RX ORDER — SODIUM CHLORIDE 0.9 % (FLUSH) 0.9 %
5-40 SYRINGE (ML) INJECTION EVERY 8 HOURS
Status: CANCELLED | OUTPATIENT
Start: 2020-03-13

## 2020-03-13 RX ORDER — ONDANSETRON 4 MG/1
4 TABLET, ORALLY DISINTEGRATING ORAL
Status: DISCONTINUED | OUTPATIENT
Start: 2020-03-13 | End: 2020-03-19 | Stop reason: HOSPADM

## 2020-03-13 RX ORDER — POLYETHYLENE GLYCOL 3350 17 G/17G
17 POWDER, FOR SOLUTION ORAL
Status: CANCELLED | OUTPATIENT
Start: 2020-03-13

## 2020-03-13 RX ORDER — TRAMADOL HYDROCHLORIDE 50 MG/1
50 TABLET ORAL
Qty: 30 TAB | Refills: 0 | Status: SHIPPED
Start: 2020-03-13 | End: 2020-03-19

## 2020-03-13 RX ORDER — TERAZOSIN 5 MG/1
10 CAPSULE ORAL
Status: DISCONTINUED | OUTPATIENT
Start: 2020-03-13 | End: 2020-03-15

## 2020-03-13 RX ORDER — CITALOPRAM 10 MG/1
10 TABLET ORAL DAILY
Status: CANCELLED | OUTPATIENT
Start: 2020-03-14

## 2020-03-13 RX ORDER — NITROGLYCERIN 0.4 MG/1
0.4 TABLET SUBLINGUAL AS NEEDED
Status: DISCONTINUED | OUTPATIENT
Start: 2020-03-13 | End: 2020-03-19 | Stop reason: HOSPADM

## 2020-03-13 RX ORDER — NITROGLYCERIN 0.4 MG/1
0.4 TABLET SUBLINGUAL AS NEEDED
Status: CANCELLED | OUTPATIENT
Start: 2020-03-13

## 2020-03-13 RX ORDER — AMOXICILLIN 250 MG
2 CAPSULE ORAL EVERY 12 HOURS
Status: DISCONTINUED | OUTPATIENT
Start: 2020-03-13 | End: 2020-03-19 | Stop reason: HOSPADM

## 2020-03-13 RX ORDER — INSULIN LISPRO 100 [IU]/ML
INJECTION, SOLUTION INTRAVENOUS; SUBCUTANEOUS
Status: CANCELLED | OUTPATIENT
Start: 2020-03-13

## 2020-03-13 RX ORDER — SODIUM CHLORIDE 0.9 % (FLUSH) 0.9 %
5-40 SYRINGE (ML) INJECTION AS NEEDED
Status: DISCONTINUED | OUTPATIENT
Start: 2020-03-13 | End: 2020-03-19 | Stop reason: HOSPADM

## 2020-03-13 RX ORDER — CARBOXYMETHYLCELLULOSE SODIUM 10 MG/ML
1 GEL OPHTHALMIC AS NEEDED
Status: CANCELLED | OUTPATIENT
Start: 2020-03-13

## 2020-03-13 RX ORDER — METFORMIN HYDROCHLORIDE 500 MG/1
500 TABLET ORAL 2 TIMES DAILY WITH MEALS
Status: DISCONTINUED | OUTPATIENT
Start: 2020-03-13 | End: 2020-03-19 | Stop reason: HOSPADM

## 2020-03-13 RX ORDER — AMOXICILLIN 250 MG
2 CAPSULE ORAL EVERY 12 HOURS
Status: CANCELLED | OUTPATIENT
Start: 2020-03-13

## 2020-03-13 RX ORDER — TRAMADOL HYDROCHLORIDE 50 MG/1
50 TABLET ORAL
Status: CANCELLED | OUTPATIENT
Start: 2020-03-13

## 2020-03-13 RX ORDER — ATORVASTATIN CALCIUM 80 MG/1
80 TABLET, FILM COATED ORAL
Status: CANCELLED | OUTPATIENT
Start: 2020-03-13

## 2020-03-13 RX ORDER — ACETAMINOPHEN 325 MG/1
650 TABLET ORAL
Status: DISCONTINUED | OUTPATIENT
Start: 2020-03-13 | End: 2020-03-19 | Stop reason: HOSPADM

## 2020-03-13 RX ORDER — METFORMIN HYDROCHLORIDE 500 MG/1
500 TABLET ORAL 2 TIMES DAILY WITH MEALS
Status: CANCELLED | OUTPATIENT
Start: 2020-03-13

## 2020-03-13 RX ORDER — ONDANSETRON 4 MG/1
4 TABLET, ORALLY DISINTEGRATING ORAL
Status: CANCELLED | OUTPATIENT
Start: 2020-03-13

## 2020-03-13 RX ORDER — ASPIRIN 81 MG/1
81 TABLET ORAL DAILY
Status: DISCONTINUED | OUTPATIENT
Start: 2020-03-14 | End: 2020-03-19 | Stop reason: HOSPADM

## 2020-03-13 RX ORDER — ASPIRIN 81 MG/1
81 TABLET ORAL DAILY
Status: CANCELLED | OUTPATIENT
Start: 2020-03-14

## 2020-03-13 RX ORDER — ATORVASTATIN CALCIUM 80 MG/1
80 TABLET, FILM COATED ORAL
Status: DISCONTINUED | OUTPATIENT
Start: 2020-03-13 | End: 2020-03-19 | Stop reason: HOSPADM

## 2020-03-13 RX ADMIN — AMIODARONE HYDROCHLORIDE 200 MG: 200 TABLET ORAL at 09:14

## 2020-03-13 RX ADMIN — Medication 1 AMPULE: at 09:14

## 2020-03-13 RX ADMIN — METFORMIN HYDROCHLORIDE 500 MG: 500 TABLET ORAL at 09:14

## 2020-03-13 RX ADMIN — ONDANSETRON 4 MG: 2 INJECTION INTRAMUSCULAR; INTRAVENOUS at 11:31

## 2020-03-13 RX ADMIN — INSULIN LISPRO 2 UNITS: 100 INJECTION, SOLUTION INTRAVENOUS; SUBCUTANEOUS at 21:47

## 2020-03-13 RX ADMIN — FAMOTIDINE 20 MG: 10 TABLET ORAL at 09:14

## 2020-03-13 RX ADMIN — METFORMIN HYDROCHLORIDE 500 MG: 500 TABLET ORAL at 16:48

## 2020-03-13 RX ADMIN — ASPIRIN 81 MG: 81 TABLET ORAL at 09:14

## 2020-03-13 RX ADMIN — TRAMADOL HYDROCHLORIDE 50 MG: 50 TABLET, FILM COATED ORAL at 21:48

## 2020-03-13 RX ADMIN — FAMOTIDINE 20 MG: 10 TABLET ORAL at 16:52

## 2020-03-13 RX ADMIN — TERAZOSIN HYDROCHLORIDE 10 MG: 5 CAPSULE ORAL at 21:46

## 2020-03-13 RX ADMIN — SENNOSIDES AND DOCUSATE SODIUM 2 TABLET: 8.6; 5 TABLET ORAL at 21:49

## 2020-03-13 RX ADMIN — TRAMADOL HYDROCHLORIDE 50 MG: 50 TABLET, FILM COATED ORAL at 09:14

## 2020-03-13 RX ADMIN — AMIODARONE HYDROCHLORIDE 200 MG: 200 TABLET ORAL at 21:46

## 2020-03-13 RX ADMIN — INFLUENZA VIRUS VACCINE 0.5 ML: 15; 15; 15; 15 SUSPENSION INTRAMUSCULAR at 10:44

## 2020-03-13 RX ADMIN — Medication 10 ML: at 21:55

## 2020-03-13 RX ADMIN — Medication 10 ML: at 16:48

## 2020-03-13 RX ADMIN — ATORVASTATIN CALCIUM 80 MG: 80 TABLET, FILM COATED ORAL at 21:46

## 2020-03-13 RX ADMIN — Medication 10 ML: at 06:03

## 2020-03-13 RX ADMIN — CITALOPRAM HYDROBROMIDE 10 MG: 10 TABLET ORAL at 09:14

## 2020-03-13 NOTE — PROGRESS NOTES
Discharge instructions, follow up appointments and prescriptions reviewed with patient. Verbalizes understanding. All personal belongings taken with patient. . Patient escorted to room 904 via wheelchair. Patient is stable at discharge. 9th floor requested to keep patient IV. Removed heart monitor.   2 chest tube sutures removed this AM.

## 2020-03-13 NOTE — PROGRESS NOTES
Bedside shift change report given to [address] [place] [address] (oncoming nurse) by Sarahi Davis RN (offgoing nurse). Report included the following information SBAR, Kardex, OR Summary, Intake/Output, MAR, Recent Results and Cardiac Rhythm sinus sonam.

## 2020-03-13 NOTE — PROGRESS NOTES
Bedside shift change report given to Esther West RN (oncoming nurse) by Idalia Dawson RN (offgoing nurse). Report included the following information SBAR, Kardex, Intake/Output, MAR, Recent Results and Cardiac Rhythm sinus sonam.

## 2020-03-13 NOTE — PROGRESS NOTES
Skin assessment with MARY Lorenzo. Patient skin is warm, dry, flaky, fragile with healing scar to mid sternum, healing scar inside LLE, scattered abraions to mid abdomen, pt states is from tape. Bilateral swelling +1 non pitting, elevated. All other skin intact.

## 2020-03-13 NOTE — DISCHARGE INSTRUCTIONS
Patient Education      Mitral Valve Repair or Replacement Surgery: What to Expect at [address] [place] [address] have had surgery to repair or replace your heart's mitral valve. Your doctor did the surgery through a cut, called an incision, in your chest.  You will feel tired and sore for the first few weeks after surgery. You may have some brief, sharp pains on either side of your chest. Your chest, shoulders, and upper back may ache. The incision in your chest may be sore or swollen. These symptoms usually get better after 4 to 6 weeks. You will probably be able to do many of your usual activities after 4 to 6 weeks. But for at least 6 weeks, you will not be able to lift heavy objects or do activities that strain your chest or upper arm muscles. At first you may notice that you get tired easily and need to rest often. It may take 1 to 2 months to get your energy back. Some people find that they are more emotional after this surgery. You may cry easily or show emotion in ways that are unusual for you. This is common and may last for up to a year. Some people get depressed after this surgery. Talk with your doctor if you have sadness that continues or you are concerned about how you are feeling. Treatment and other support can help you feel better. Even though the surgery repaired your mitral valve, it is still important to eat a heart-healthy diet, get regular exercise, not smoke, take your heart medicines, and reduce stress. Your doctor may recommend that you work with a nurse, a dietitian, and a physical therapist to make these changes. This is sometimes called cardiac rehabilitation. This care sheet gives you a general idea about how long it will take for you to recover. But each person recovers at a different pace. Follow the steps below to get better as quickly as possible. How can you care for yourself at home? Activity    · Rest when you feel tired. Getting enough sleep will help you recover.  Try to sleep on your back while your breastbone (sternum) heals. This usually takes about 4 to 6 weeks.     · Try to walk each day. Start by walking a little more than you did the day before. Bit by bit, increase the amount you walk. Walking boosts blood flow and helps prevent pneumonia and constipation.     · Avoid strenuous activities, such as bicycle riding, jogging, weight lifting, or heavy aerobic exercise, until your doctor says it is okay.     · For 3 months, avoid activities that strain your chest or upper arm muscles. This includes pushing a  or vacuum, mopping floors, or swinging a golf club or tennis racquet.     · For at least 6 weeks, avoid lifting anything that would make you strain. This may include a child, heavy grocery bags and milk containers, a heavy briefcase or backpack, or cat litter or dog food bags.     · Hold a pillow firmly over your chest incision when you cough or take deep breaths. This will support your chest and reduce your pain.     · Do breathing exercises at home as instructed by your doctor. This will help prevent pneumonia.     · Ask your doctor when you can drive again.     · You will probably need to take 4 to 12 weeks off from work. It depends on the type of work you do and how you feel.     · You may shower as usual. Pat the incision dry. Do not take a bath for the first 3 weeks, or until your doctor tells you it is okay.     · Do not swim or use a hot tub for at least 1 month, or until your doctor says it is okay.     · Ask your doctor when it is okay for you to have sex. Diet    · Eat a heart-healthy, low-salt diet. If you have not been eating this way, talk to your doctor. You also may want to talk to a dietitian. A dietitian can help you plan meals and learn about healthy foods.     · Drink plenty of fluids (unless your doctor tells you not to).     · You may notice that your bowel movements are not regular right after your surgery. This is common.  Try to avoid constipation and straining with bowel movements. You may want to take a fiber supplement every day. If you have not had a bowel movement after a couple of days, ask your doctor about taking a mild laxative. Medicines    · Your doctor will tell you if and when you can restart your medicines. He or she will also give you instructions about taking any new medicines.     · If you take aspirin or some other blood thinner, ask your doctor if and when to start taking it again. Make sure that you understand exactly what your doctor wants you to do.     · Be safe with medicines. Take your medicines exactly as prescribed. Call your doctor if you think you are having a problem with your medicine.     · Take pain medicines exactly as directed. ? If the doctor gave you a prescription medicine for pain, take it as prescribed. ? If you are not taking a prescription pain medicine, ask your doctor if you can take an over-the-counter medicine. ? Do not take aspirin, ibuprofen (Advil, Motrin), naproxen (Aleve), or other nonsteroidal anti-inflammatory drugs (NSAIDs) unless your doctor says it is okay.     · If you think your pain medicine is making you sick to your stomach:  ? Take your medicine after meals (unless your doctor has told you not to). ? Ask your doctor for a different pain medicine.     · If your doctor prescribed antibiotics, take them as directed. Do not stop taking them just because you feel better. You need to take the full course of antibiotics.     · Your doctor may give you a blood thinner to prevent blood clots. If you take a blood thinner, be sure you get instructions about how to take your medicine safely. Blood thinners can cause serious bleeding problems. Incision care    · If you have strips of tape on the incision the doctor made, leave the tape on for a week or until it falls off.     · Wash the area daily with warm, soapy water and pat it dry.  Don't use hydrogen peroxide or alcohol, which can slow healing. You may cover the area with a gauze bandage if it weeps or rubs against clothing. Change the bandage every day.     · Keep the area clean and dry. Other instructions    · Keep track of your weight. Weigh yourself every day at the same time of day, on the same scale, in the same amount of clothing. A sudden increase in weight can be a sign of a problem with your heart. Tell your doctor if you suddenly gain weight, such as 3 pounds or more in 2 to 3 days.     · Be sure to tell all your doctors and your dentist that you have had mitral valve surgery. This is important, because you may need to take antibiotics before certain procedures to prevent infection. Follow-up care is a key part of your treatment and safety. Be sure to make and go to all appointments, and call your doctor if you are having problems. It's also a good idea to know your test results and keep a list of the medicines you take. When should you call for help? Call 911 anytime you think you may need emergency care. For example, call if:    · You passed out (lost consciousness).     · You have trouble breathing.    Call your doctor now or seek immediate medical care if:    · You have pain that does not get better after you take pain medicine.     · You have loose stitches, or your incision comes open.     · You are bleeding a lot from the incision.     · You have signs of infection, such as:  ? Increased pain, swelling, warmth, or redness. ? Red streaks leading from the incision. ? Pus draining from the incision. ? A fever.     · You have severe pain in your leg, or it becomes cold, pale, blue, tingly, or numb.     · You are sick to your stomach or cannot keep fluids down.    Watch closely for changes in your health, and be sure to contact your doctor if:    · You do not get better as expected. Where can you learn more?   Go to http://zulma-lissette.info/  Enter G858 in the search box to learn more about \"Mitral Valve Repair or Replacement Surgery: What to Expect at Home. \"  Current as of: December 15, 2019Content Version: 12.4  © 1168-4943 [employer]. Care instructions adapted under license by [employer] (which disclaims liability or warranty for this information). If you have questions about a medical condition or this instruction, always ask your healthcare professional. [facility] any warranty or liability for your use of this information. Coronary Artery Bypass Graft: What to Expect at Home  Your Recovery    Coronary artery bypass graft (CABG) is surgery to treat coronary artery disease. The surgery helps blood make a detour, or bypass, around one or more narrowed or blocked coronary arteries. Coronary arteries are the blood vessels that bring blood to the heart. Your doctor did the surgery through a cut, called an incision, in your chest.  You will feel tired and sore for the first few weeks after surgery. You may have some brief, sharp pains on either side of your chest. Your chest, shoulders, and upper back may ache. The incision in your chest and the area where the healthy vein was taken may be sore or swollen. These symptoms usually get better after 4 to 6 weeks. You will probably be able to do many of your usual activities after 4 to 6 weeks. But for 2 to 3 months you will not be able to lift heavy objects or do activities that strain your chest or upper arm muscles. At first you may notice that you get tired easily and need to rest often. It may take 1 to 2 months to get your energy back. Some people find that they are more emotional after this surgery. You may cry easily or show emotion in ways that are unusual for you. This is common and may last for up to a year. Some people get depressed after CABG surgery. Talk with your doctor if you have sadness that continues or you are concerned about how you are feeling.  Treatment and other support can help you feel better. Even though the surgery may improve your symptoms, you will still need to make changes in your lifestyle to lower your risk of a heart attack or stroke. It will be important to eat a heart-healthy diet, get regular exercise, not smoke, take your heart medicines, and reduce stress. You will likely start a cardiac rehabilitation (rehab) program in the hospital. You will continue with this rehab program after you go home to help you recover and prevent problems with your heart. Talk to your doctor about whether rehab is right for you. This care sheet gives you a general idea about how long it will take for you to recover. But each person recovers at a different pace. Follow the steps below to get better as quickly as possible. How can you care for yourself at home? Activity    · Rest when you feel tired. Getting enough sleep will help you recover. Try to sleep on your back for 4 to 6 weeks while your breastbone (sternum) heals. This usually takes about 4 to 6 weeks.     · Try to walk each day. Start by walking a little more than you did the day before. Bit by bit, increase the amount you walk. Walking boosts blood flow and helps prevent pneumonia and constipation.     · Avoid strenuous activities, such as bicycle riding, jogging, weight lifting, or heavy aerobic exercise, until your doctor says it is okay.     · For 3 months, avoid activities that strain your chest or upper arm muscles. This includes pushing a  or vacuum, mopping floors, or swinging a golf club or tennis racquet.     · For 2 to 3 months, avoid lifting anything that would make you strain. This may include a child, heavy grocery bags and milk containers, a heavy briefcase or backpack, or cat litter or dog food bags.     · Hold a pillow firmly over your chest incision when you cough or take deep breaths. This will support your chest and reduce your pain.     · Do breathing exercises at home as instructed by your doctor.  This will help prevent pneumonia.     · Ask your doctor when you can drive again.     · You will probably need to take 4 to 12 weeks off from work. It depends on the type of work you do and how you feel.     · You may shower as usual. Pat the incision dry. Do not take a bath for the first 3 weeks, or until your doctor tells you it is okay.     · Do not swim or use a hot tub for at least 1 month, or until your doctor says it is okay.     · Ask your doctor when it is okay for you to have sex. Diet    · Eat a heart-healthy diet. If you have not been eating this way, talk to your doctor. You also may want to talk to a dietitian. A dietitian can help you learn about healthy foods.     · Drink plenty of fluids (unless your doctor tells you not to).     · You may notice that your bowel movements are not regular right after your surgery. This is common. Try to avoid constipation and straining with bowel movements. You may want to take a fiber supplement every day. If you have not had a bowel movement after a couple of days, ask your doctor about taking a mild laxative. Medicines    · Your doctor will tell you if and when you can restart your medicines. He or she will also give you instructions about taking any new medicines.     · If you take aspirin or some other blood thinner, ask your doctor if and when to start taking it again. Make sure that you understand exactly what your doctor wants you to do.     · Your doctor may give you medicines to prevent blood clots, keep your heartbeat steady, and lower your blood pressure and cholesterol. Take your medicines exactly as prescribed. Call your doctor if you think you are having a problem with your medicine.     · Be safe with medicines. Take pain medicines exactly as directed. ? If the doctor gave you a prescription medicine for pain, take it as prescribed. ? If you are not taking a prescription pain medicine, ask your doctor if you can take an over-the-counter medicine.   ? Do not take aspirin, ibuprofen (Advil, Motrin), naproxen (Aleve), or other nonsteroidal anti-inflammatory drugs (NSAIDs) unless your doctor says it is okay.     · If you think your pain medicine is making you sick to your stomach:  ? Take your medicine after meals (unless your doctor has told you not to). ? Ask your doctor for a different pain medicine.     · If your doctor prescribed antibiotics, take them as directed. Do not stop taking them just because you feel better. You need to take the full course of antibiotics. Incision care    · If you have strips of tape on the incisions the doctor made, leave the tape on for a week or until it falls off.     · Wash the area daily with warm, soapy water, and pat it dry. Don't use hydrogen peroxide or alcohol, which can slow healing. You may cover the area with a gauze bandage if it weeps or rubs against clothing. Change the bandage every day.     · Keep the area clean and dry.     · Do not use any creams, lotions, powders, ointments, or oils unless your doctor tells you it is okay.     · If you have an incision in your leg:  ? Wear support stockings on your legs during the day for the first 2 weeks. You can take the stockings off at night while you sleep. ? Raise your legs above the level of your heart whenever you lay down for the first 4 to 6 weeks. Other instructions    · Keep track of your weight. Weigh yourself every day at the same time of day, on the same scale, in the same amount of clothing. A sudden increase in weight can be a sign of a problem with your heart. Tell your doctor if you suddenly gain weight, such as 3 pounds or more in 2 to 3 days.     · Do not smoke. Smoking can make it harder for you to recover and it will raise the chances of your arteries narrowing again. If you need help quitting, talk to your doctor about stop-smoking programs and medicines. These can increase your chances of quitting for good.    Follow-up care is a key part of your treatment and safety. Be sure to make and go to all appointments, and call your doctor if you are having problems. It's also a good idea to know your test results and keep a list of the medicines you take. When should you call for help? Call 911 anytime you think you may need emergency care. For example, call if:    · You passed out (lost consciousness).     · You have severe trouble breathing.     · You have sudden chest pain and shortness of breath, or you cough up blood.     · You have severe pain in your chest.     · You have symptoms of a heart attack. These may include:  ? Chest pain or pressure, or a strange feeling in the chest.  ? Sweating. ? Shortness of breath. ? Nausea or vomiting. ? Pain, pressure, or a strange feeling in the back, neck, jaw, or upper belly or in one or both shoulders or arms. ? Lightheadedness or sudden weakness. ? A fast or irregular heartbeat. After you call  911, the  may tell you to chew 1 adult-strength or 2 to 4 low-dose aspirin. Wait for an ambulance. Do not try to drive yourself.     · You have angina symptoms (such as chest pain or pressure) that do not go away with rest or are not getting better within 5 minutes after you take a dose of nitroglycerin.    Call your doctor now or seek immediate medical care if:    · You have pain that does not get better after you take pain medicine.     · You have a fever over 100°F.     · You have loose stitches, or your incision comes open.     · Bright red blood has soaked through the bandage over your incision.     · You have signs of infection, such as:  ? Increased pain, swelling, warmth, or redness. ? Red streaks leading from the incision. ? Pus draining from the incision. ? Swollen lymph nodes in your neck, armpits, or groin. ? A fever.     · You have signs of a blood clot in a leg. If you had a vein removed from your leg, you may have tenderness and swelling while your leg heals.  But signs of a blood clot may be in a different part of your leg and may include:  ? Pain in your calf, back of the knee, thigh, or groin. ? Redness and swelling in your leg or groin.     · Your heartbeat feels very fast or slow, skips beats, or flutters.     · You are dizzy or lightheaded, or you feel like you may faint.     · You have new or increased shortness of breath.    Watch closely for changes in your health, and be sure to contact your doctor if:    · You gain weight suddenly, such as 3 pounds or more in 2 to 3 days.     · You have increased swelling in your legs, ankles, or feet.     · You have any concerns about your incision.     · You feel very sad or have other signs of depression, such as trouble sleeping or eating.     · You have questions about diet, exercise, quitting smoking, or stress reduction after surgery. Where can you learn more? Go to http://zulma-lissette.info/  Enter F759 in the search box to learn more about \"Coronary Artery Bypass Graft: What to Expect at Home. \"  Current as of: December 15, 2019Content Version: 12.4  © 8997-9495 [employer]. Care instructions adapted under license by [employer] (which disclaims liability or warranty for this information). If you have questions about a medical condition or this instruction, always ask your healthcare professional. [address] any warranty or liability for your use of this information. Avoiding Triggers With Heart Failure: Care Instructions  Your Care Instructions    Triggers are anything that make your heart failure flare up. A flare-up is also called \"sudden heart failure\" or \"acute heart failure. \" When you have a flare-up, fluid builds up in your lungs, and you have problems breathing. You might need to go to the hospital. By watching for changes in your condition and avoiding triggers, you can prevent heart failure flare-ups. Follow-up care is a key part of your treatment and safety.  Be sure to make and go to all appointments, and call your doctor if you are having problems. It's also a good idea to know your test results and keep a list of the medicines you take. How can you care for yourself at home? Watch for changes in your weight and condition  · Weigh yourself without clothing at the same time each day. Record your weight. Call your doctor if you have sudden weight gain, such as more than 2 to 3 pounds in a day or 5 pounds in a week. (Your doctor may suggest a different range of weight gain.) A sudden weight gain may mean that your heart failure is getting worse. · Keep a daily record of your symptoms. Write down any changes in how you feel, such as new shortness of breath, cough, or problems eating. Also record if your ankles are more swollen than usual and if you feel more tired than usual. Note anything that you ate or did that could have triggered these changes. Limit sodium  Sodium causes your body to hold on to extra water. This may cause your heart failure symptoms to get worse. People get most of their sodium from processed foods. Fast food and restaurant meals also tend to be very high in sodium. · Your doctor may suggest that you limit sodium. Your doctor can tell you how much sodium is right for you. This includes limiting sodium in cooked and packaged foods. · Read food labels on cans and food packages. They tell you how much sodium you get in one serving. Check the serving size. If you eat more than one serving, you are getting more sodium. · Be aware that sodium can come in forms other than salt, including monosodium glutamate (MSG), sodium citrate, and sodium bicarbonate (baking soda). MSG is often added to Asian food. You can sometimes ask for food without MSG or salt. · Slowly reducing salt will help you adjust to the taste. Take the salt shaker off the table. · Flavor your food with garlic, lemon juice, onion, vinegar, herbs, and spices instead of salt.  Do not use soy sauce, steak sauce, onion salt, garlic salt, mustard, or ketchup on your food, unless it is labeled \"low-sodium\" or \"low-salt. \"  · Make your own salad dressings, sauces, and ketchup without adding salt. · Use fresh or frozen ingredients, instead of canned ones, whenever you can. Choose low-sodium canned goods. · Eat less processed food and food from restaurants, including fast food. Exercise as directed  Moderate, regular exercise is very good for your heart. It improves your blood flow and helps control your weight. But too much exercise can stress your heart and cause a heart failure flare-up. · Check with your doctor before you start an exercise program.  · Walking is an easy way to get exercise. Start out slowly. Gradually increase the length and pace of your walk. Swimming, riding a bike, and using a treadmill are also good forms of exercise. · When you exercise, watch for signs that your heart is working too hard. You are pushing yourself too hard if you cannot talk while you are exercising. If you become short of breath or dizzy or have chest pain, stop, sit down, and rest.  · Do not exercise when you do not feel well. Take medicines correctly  · Take your medicines exactly as prescribed. Call your doctor if you think you are having a problem with your medicine. · Make a list of all the medicines you take. Include those prescribed to you by other doctors and any over-the-counter medicines, vitamins, or supplements you take. Take this list with you when you go to any doctor. · Take your medicines at the same time every day. It may help you to post a list of all the medicines you take every day and what time of day you take them. · Make taking your medicine as simple as you can. Plan times to take your medicines when you are doing other things, such as eating a meal or getting ready for bed. This will make it easier to remember to take your medicines. · Get organized.  Use helpful tools, such as daily or weekly pill containers. When should you call for help? Call 911 if you have symptoms of sudden heart failure such as:    · You have severe trouble breathing.     · You cough up pink, foamy mucus.     · You have a new irregular or rapid heartbeat.    Call your doctor now or seek immediate medical care if:    · You have new or increased shortness of breath.     · You are dizzy or lightheaded, or you feel like you may faint.     · You have sudden weight gain, such as more than 2 to 3 pounds in a day or 5 pounds in a week. (Your doctor may suggest a different range of weight gain.)     · You have increased swelling in your legs, ankles, or feet.     · You are suddenly so tired or weak that you cannot do your usual activities.    Watch closely for changes in your health, and be sure to contact your doctor if you develop new symptoms. Where can you learn more? Go to http://zulma-lissette.info/  Enter V089 in the search box to learn more about \"Avoiding Triggers With Heart Failure: Care Instructions. \"  Current as of: December 15, 2019Content Version: 12.4  © 2194-0196 [employer]. Care instructions adapted under license by "[employer] (which disclaims liability or warranty for this information). If you have questions about a medical condition or this instruction, always ask your healthcare professional. [address] any warranty or liability for your use of this information. Heart-Healthy Diet: Care Instructions  Your Care Instructions    A heart-healthy diet has lots of vegetables, fruits, nuts, beans, and whole grains, and is low in salt. It limits foods that are high in saturated fat, such as meats, cheeses, and fried foods. It may be hard to change your diet, but even small changes can lower your risk of heart attack and heart disease. Follow-up care is a key part of your treatment and safety.  Be sure to make and go to all appointments, and call your doctor if you are having problems. It's also a good idea to know your test results and keep a list of the medicines you take. How can you care for yourself at home? Watch your portions  · Learn what a serving is. A \"serving\" and a \"portion\" are not always the same thing. Make sure that you are not eating larger portions than are recommended. For example, a serving of pasta is ½ cup. A serving size of meat is 2 to 3 ounces. A 3-ounce serving is about the size of a deck of cards. Measure serving sizes until you are good at [place]" them. Keep in mind that restaurants often serve portions that are 2 or 3 times the size of one serving. · To keep your energy level up and keep you from feeling hungry, eat often but in smaller portions. · Eat only the number of calories you need to stay at a healthy weight. If you need to lose weight, eat fewer calories than your body burns (through exercise and other physical activity). Eat more fruits and vegetables  · Eat a variety of fruit and vegetables every day. Dark green, deep orange, red, or yellow fruits and vegetables are especially good for you. Examples include spinach, carrots, peaches, and berries. · Keep carrots, celery, and other veggies handy for snacks. Buy fruit that is in season and store it where you can see it so that you will be tempted to eat it. · Cook dishes that have a lot of veggies in them, such as stir-fries and soups. Limit saturated and trans fat  · Read food labels, and try to avoid saturated and trans fats. They increase your risk of heart disease. Trans fat is found in many processed foods such as cookies and crackers. · Use olive or canola oil when you cook. Try cholesterol-lowering spreads, such as Benecol or Take Control. · Bake, broil, grill, or steam foods instead of frying them. · Choose lean meats instead of high-fat meats such as hot dogs and sausages. Cut off all visible fat when you prepare meat.   · Eat fish, skinless poultry, and meat alternatives such as soy products instead of high-fat meats. Soy products, such as tofu, may be especially good for your heart. · Choose low-fat or fat-free milk and dairy products. Eat foods high in fiber  · Eat a variety of grain products every day. Include whole-grain foods that have lots of fiber and nutrients. Examples of whole-grain foods include oats, whole wheat bread, and brown rice. · Buy whole-grain breads and cereals, instead of white bread or pastries. Limit salt and sodium  · Limit how much salt and sodium you eat to help lower your blood pressure. · Taste food before you salt it. Add only a little salt when you think you need it. With time, your taste buds will adjust to less salt. · Eat fewer snack items, fast foods, and other high-salt, processed foods. Check food labels for the amount of sodium in packaged foods. · Choose low-sodium versions of canned goods (such as soups, vegetables, and beans). Limit sugar  · Limit drinks and foods with added sugar. These include candy, desserts, and soda pop. Limit alcohol  · Limit alcohol to no more than 2 drinks a day for men and 1 drink a day for women. Too much alcohol can cause health problems. When should you call for help? Watch closely for changes in your health, and be sure to contact your doctor if:    · You would like help planning heart-healthy meals. Where can you learn more? Go to http://zulma-lissette.info/  Enter V137 in the search box to learn more about \"Heart-Healthy Diet: Care Instructions. \"  Current as of: December 15, 2019Content Version: 12.4  © 2748-2312 [employer]. Care instructions adapted under license by [employer] (which disclaims liability or warranty for this information).  If you have questions about a medical condition or this instruction, always ask your healthcare professional. [address] any warranty or liability for your use of this information.

## 2020-03-13 NOTE — PROGRESS NOTES
TRANSFER - IN REPORT:    Verbal report received from MARY Madden(name) on [facility]  being received from 2234(unit) for routine progression of care      Report consisted of patients Situation, Background, Assessment and   Recommendations(SBAR). Information from the following report(s) SBAR, Kardex, Procedure Summary, MAR and Recent Results was reviewed with the receiving nurse. Opportunity for questions and clarification was provided. Assessment completed upon patients arrival to unit and care assumed.

## 2020-03-13 NOTE — DISCHARGE SUMMARY
Discharge Summary     Patient ID:  WM Sabine Louis III  [id number]  78 y.o.  1950    Admit date: 3/5/2020    Discharge date:  3/13/2020    Admitting Physician: Kimberly Garza MD     Discharge Physician: JAVI Dos Santos/Dr. Ivania Leija    Admission Diagnoses: Atherosclerosis of native coronary artery of native heart with unstable angina pectoris ([address] [I25.110]  Mitral valve insufficiency, unspecified etiology [I34.0]  Patent foramen ovale [Q21.1]  CAD (coronary artery disease) [I25.10]  Mitral valve regurgitation [I34.0]  Patent foramen ovale [Q21.1]  Atherosclerosis of coronary artery of native heart with unstable angina pectoris (HCC) [I25.110]  Mitral valve insufficiency [I34.0]  Patent foramen ovale [Q21.1]    Discharge Diagnoses:   Patient Active Problem List    Diagnosis Date Noted    S/P CABG x 3 03/10/2020    Pleural effusion, right 03/08/2020    Atelectasis 03/06/2020    Acute pulmonary edema ([address] 03/06/2020    Ventricular fibrillation ([address] 03/06/2020    Patent foramen ovale 03/05/2020    Mitral valve regurgitation 03/05/2020    CAD (coronary artery disease) 03/05/2020    Mitral valve insufficiency 03/05/2020    Atherosclerosis of coronary artery of native heart with unstable angina pectoris ([address] 03/05/2020    Type II diabetes mellitus ([address] 03/05/2020    Respiratory failure, post-operative ([address] 03/05/2020    Hypoxia 03/05/2020    History of stroke 70/89/7334    Systolic CHF, chronic ([address] 01/05/2020    CVA (cerebral vascular accident) ([address] 01/03/2020       Procedures this admission:  Coronary Artery Bypass Graft Surgery, Mitral valve repair, closure of PFO  Consults: Pulmonary/Intensive Care    Hospital Course: Patient presented for elective CABG/MVR/closure of PFO. He was seen in the office by Dr. Fabian Cerna after recent hospitalization. He presented to the ER in January with blurred vision, dizziness and lightheadedness.  Head CT showed abnormal edema in the right PCA territory suggesting a potentially recent infarction at this level. Brain MRI showed an acute to subacute right occipital infarct and chronic appearing white matter change in the supratentorial white matter. Echo showed a reduced LV EF at 25-30% with moderate to severe MR. He underwent a nuclear stress test that showed a large area of rula-infarct ischemia in the inferolateral wall. He had noted some EDGE and has strong FH of CAD. He underwent SANDEEP and LHC. SANDEEP showed mild to moderate MR with small PFO. LHC showed severe multivessel disease. He denied any recent chest pain or discomfort. He had a prior TIA in 2009 but states symptoms resolved quickly then. He still has residual vision loss from recent CVA. He underwent CABG X 3 with LIMA to LAD, SVG to OM, SVG to PDA as well as mitral valve repair, closure of PFO, and clipping of left atrial appendage on 3/5/20. He was hypotensive post op and was gradually weaned off drips. He was transfused for anemia. He was transferred to  stepdown on 3/9. He gradually improved but BP remained borderline low. BB or ACE-I was not resumed during admission. He underwent thoracentesis on 3/11 for right pleural effusion with 1000ccs removed. Repeat limited echo on 3/12 showed EF 35-40%. The morning of 3/13/20, patient was feeling well and ambulating without any symptoms. Patient was determined stable and ready for discharge. Patient was instructed on the importance of medication compliance and outpatient follow up. For maximized medical therapy for CAD, patient will continue ASA, Plavix and statin. He is not on BB or ACE-I/ARB due to borderline low BP. These can hopefully be resumed at [facility] or on outpatient basis when BP improved. The patient will have close transitional care follow up with [facility] Dr. Vera Matias in 1 week after discharge from [facility].    The patient will follow up with Dr. Itzel Charles in 3 weeks after discharge from [facility] and has been referred to cardiac rehab. DISPOSITION: The patient is being discharged to [facility] in stable condition on a low saturated fat, low cholesterol and low salt diet. The patient is instructed to advance activities as tolerated to the limit of fatigue or shortness of breath. The patient is instructed to avoid all heavy lifting or activities that strain the chest or upper arm muscles. Strenuous activity should be avoided. The patient is instructed to watch for signs of infection which include: increasing area of redness, fever or purulent drainage at the incision site. The patient is instructed to call the office or return to the ER for immediate evaluation for any shortness of breath or chest pain not relieved by NTG.     Discharge Exam:   Visit Vitals  BP 95/54   Pulse (!) 58   Temp 98.1 °F (36.7 °C)   Resp 17   Ht 5' 9\" (1.753 m)   Wt 164 lb 1.6 oz (74.4 kg)   SpO2 93%   BMI 24.23 kg/m²         Physical Exam:  General: Well Developed, Well Nourished, No Acute Distress, Alert & Oriented  Neck: supple, no JVD  Heart: S1S2 with RRR without murmurs or gallops  Lungs: Clear throughout auscultation bilaterally without adventitious sounds  Abd: soft, nontender, nondistended, with good bowel sounds  Ext: warm, no edema  Sternal incision: clean, dry, and intact  Skin: warm and dry      Recent Results (from the past 24 hour(s))   GLUCOSE, POC    Collection Time: 03/12/20 12:32 PM   Result Value Ref Range    Glucose (POC) 169 (H) 65 - 100 mg/dL   GLUCOSE, POC    Collection Time: 03/12/20  4:06 PM   Result Value Ref Range    Glucose (POC) 138 (H) 65 - 100 mg/dL   GLUCOSE, POC    Collection Time: 03/12/20  9:26 PM   Result Value Ref Range    Glucose (POC) 148 (H) 65 - 100 mg/dL   GLUCOSE, POC    Collection Time: 03/13/20  6:03 AM   Result Value Ref Range    Glucose (POC) 135 (H) 65 - 100 mg/dL         Patient Instructions:   Current Discharge Medication List      START taking these medications    Details   traMADoL (ULTRAM) 50 mg tablet Take 1 Tab by mouth every six (6) hours as needed for Pain for up to 30 days. Max Daily Amount: 200 mg. Qty: 30 Tab, Refills: 0    Associated Diagnoses: S/P CABG x 3      nitroglycerin (NITROSTAT) 0.4 mg SL tablet 1 Tab by SubLINGual route as needed for Chest Pain. Up to 3 doses. Qty: 2 Bottle, Refills: 4         CONTINUE these medications which have NOT CHANGED    Details   acetaminophen (TYLENOL) 500 mg tablet Take 500 mg by mouth every six (6) hours as needed for Pain. aspirin delayed-release 81 mg tablet Take 81 mg by mouth daily. atorvastatin (LIPITOR) 80 mg tablet Take 80 mg by mouth nightly. terazosin (HYTRIN) 10 mg capsule Take 10 mg by mouth nightly. SITagliptin (JANUVIA) 100 mg tablet Take 100 mg by mouth daily. citalopram (CELEXA) 20 mg tablet Take  by mouth daily. metFORMIN (GLUCOPHAGE) 500 mg tablet Take 2 tablets by mouth every morning then Take 2 tablets by mouth every night  Qty: 120 Tab, Refills: 0      clopidogreL (PLAVIX) 75 mg tab Take 75 mg by mouth daily.          STOP taking these medications       amiodarone (CORDARONE) 200 mg tablet Comments:   Reason for Stopping:         carvediloL (COREG) 6.25 mg tablet Comments:   Reason for Stopping:                 Signed:  Sherry Oliveira PA-C  3/13/2020  9:13 AM

## 2020-03-13 NOTE — PROGRESS NOTES
[facility] OCCUPATIONAL THERAPY INITIAL EVALUATION    Time In: 9355  Time Out: 1322    Precautions: Falls    Pain: Patient had no complaint of pain. History of Presenting Illness (per previous reports):     Patient presented for elective CABG/MVR/closure of PFO. He was seen in the office by Dr. Henrry Banks after recent hospitalization. He presented to the ER in January with blurred vision, dizziness and lightheadedness. Head CT showed abnormal edema in the right PCA territory suggesting a potentially recent infarction at this level. Brain MRI showed an acute to subacute right occipital infarct and chronic appearing white matter change in the supratentorial white matter. Echo showed a reduced LV EF at 25-30% with moderate to severe MR. He underwent a nuclear stress test that showed a large area of rula-infarct ischemia in the inferolateral wall. He had noted some EDGE and has strong FH of CAD. He underwent SANDEEP and LHC. SANDEEP showed mild to moderate MR with small PFO. LHC showed severe multivessel disease. He denied any recent chest pain or discomfort. He had a prior TIA in 2009 but states symptoms resolved quickly then. He still has residual vision loss from recent CVA. He underwent CABG X 3 with LIMA to LAD, SVG to OM, SVG to PDA as well as mitral valve repair, closure of PFO, and clipping of left atrial appendage on 3/5/20. He was hypotensive post op and was gradually weaned off drips. He was transfused for anemia. He was transferred to CV stepdown on 3/9. He gradually improved but BP remained borderline low. BB or ACE-I was not resumed during admission. He underwent thoracentesis on 3/11 for right pleural effusion with 1000ccs removed. Repeat limited echo on 3/12 showed EF 35-40%. The morning of 3/13/20, patient was feeling well and ambulating without any symptoms. Patient was determined stable and ready for discharge. Patient was instructed on the importance of medication compliance and outpatient follow up. For maximized medical therapy for CAD, patient will continue ASA, Plavix and statin. He is not on BB or ACE-I/ARB due to borderline low BP. These can hopefully be resumed at [facility] or on outpatient basis when BP improved. Past Medical History/ Co-morbidities:   Past Medical History:   Diagnosis Date    Arthritis     CAD (coronary artery disease)     heart cath 2/24/2020    [facility]cere[facility]) [facility] 1/3/2020    Diabetes type 2, uncontrolled ([facility]     Type II on metformin last A1C 6.3 1/2020    Hypercholesteremia     Sleep apnea     no cpap    Systolic CHF, chronic ([facility] 1/5/2020    echo 2/26/2020 EF 30-35%    TIA (transient ischemic attack) 2009    Troponin I above reference range 1/3/2020       Patient's Goal: \"get around by myself\"    Previous Level of Function: Pt was I with ADL. Pt doesn't drive and family helps him get around. Sister does medication management, but he manages finances. Pt was getting  and had been recently d/c for a stroke in Jan. Pt reports residual L sided weakness. [address] Situation Private residence   Lives Alone No   Support Systems Family member(s)   Shower Situation Shower   Current DME None   Stairs to Enter 1      Rails Left      W/C Ramp Yes     Upper Extremity Function   UZAIRAKOSUA SANYA   [facility] Intact Intact   [facility] Intact Intact     Functional Mobility   Score Comments   Supine to Sit 6: Independent I   Sit to Stand 4: Supervision or touching A S   Transfer Assist 4: Supervision or touching A Transfer Type: SPT   Equipment: Rolling Walker   Comments: S     Activities of Daily Living    Score Comments   Donning/[place] Footwear 5: S/U or clean-up assist Items Applied: Slip-on shoes  Adaptive Equipment: N/A  Comments: S/U     Cognition: Pt reports sister does medication management, but he completes self-care and finances. Pt completed 2 pc abstract puzzle without issue.      Vision/Perception: Formal testing needs to be completed. Session: Pt was in bed and agreeable to tx. Pt walked to gym with S with RW. Pt completed two piece abstract puzzle with good quality. Pt appears to be at baseline with cognition. Interdisciplinary Communication: PT Carlos Hallman on d/c     Patient/Family Education: Patient was/were educated On the role of OT, On POC and On IRC expectations. Problem List: Activity Tolerance and Standing Balance    Functional Limitations: ADL, IADL, Functional Transfers and Functional Mobility    Goals: Please see Care Plan    OT order received and chart reviewed. OT orders have been acknowledged. Patient will benefit from skilled OT services to address ADL, functional transfers, balance and activity tolerance to maximize functional performance with daily self-care tasks and functional mobility. Treatment is likely to include ADL, Activity tolerance, Cognitive and Safety awareness training to increase independence with self-care. Patient will be seen for 1.5-2 hours of skilled OT services 5-6 days a week as appropriate. Initate POC.      Jerrold Simmonds OTR/L  3/13/2020

## 2020-03-13 NOTE — H&P
HISTORY AND PHYSICAL  IRC       Admit Date: 3/13/2020  Surgeon: Dr Akin Persaud  Primary Care Physician: Ej Damian MD  Specialty Group: [facility], [address] cardiology    Chief Complaint : Gait dysfunction secondary to below. Admit Diagnosis: Physical debility [R53.81]  S/p CABG x 3 and PFO closure  sCHF  Respiratory failure/pleural effusion req thoracentesis  Prior CVA with mild deficits  Post op vfib arrest, hypotension, bradycardia    Acute Rehab Dx:  Gait impairment/ gait dysfunction  Debility    deconditioning  Mobility and ambulation deficits  Self Care/ADL deficits    Medical Dx:  Past Medical History:   Diagnosis Date    Arthritis     CAD (coronary artery disease)     heart cath 2/24/2020    CVA (cerebral vascular accident) ([facility] 1/3/2020    Diabetes type 2, uncontrolled ([facility]     Type II on metformin last A1C 6.3 1/2020    Hypercholesteremia     Sleep apnea     no cpap    Systolic CHF, chronic ([facility] 1/5/2020    echo 2/26/2020 EF 30-35%    TIA (transient ischemic attack) 2009    Troponin I above reference range 1/3/2020       Date of Evaluation:  March 13, 2020    HPI: WM Vinetta Landau III is a 78 y.o. male patient at [address] [place] [address] who was admitted on 3/13/2020  by Von Dowell MD with below mentioned medical history ,is being seen for Physical Medicine and Rehabilitation. HPI; Mr Neena Sanchez is a pleasant 67yo gentleman known to our service from Jan 2020 s/p CVA with residual left peripheral visual field loss and mild left sided weakness. . At that time he was too functional and went home with [facility]. He has a PMH of arthritis, TIA 0699, systolic CHF with EF 78-06% in Feb, type 2 DM with hyperglycemia, tobacco abuse and ROSENDO, HTN and HLD who was seen by cardiology as an outpt due to low EF and PFO found during CVA w/u. He underwent a nuclear stress test that showed a large area of rula-infarct ischemia in the inferolateral wall.  He had noted some EDGE and has strong FH of CAD. He underwent SANDEEP and LHC on 2/26 . SANDEEP showed mild to moderate MR with small PFO. LHC showed severe multivessel disease. He was seen by Dr Farhad Hooper in consultation on 2/26. Preop he had a baseline spirometry done that was normal. Thus, he underwent a CABG x 3, clipping of left atrial appendage and PFO closure. Post op he had a vfib arrest while turning and responded in less than one minute with cardioversion x 1 and amio gtt. This occurred when Mounika Lieberman cath pulled. Extubated day of surgery. He remained on vasopressors post op for sev days and was requiring 31% FiO2. He has been followed by pulmonary medicine. He had significant post op leukocytosis. He had a noted small left apical PTX. Thrombocytopenia noted with plts of 118 on 3/7. CXR with effusion in the right base and atelectasis in left base. He required an epi gtt on 3/7 due to bradycardia and hypotension. He subsequently came of pressors on 3/9, POD 4. He required blood transfusion on this day. Tried diuresis for the right pleural effusion but eventually had thoracentesis 3/11. 1000ml yield. Leukocytosis resolved spontaneously. CRP elevated at 121, thought to be due to inflammatory process in left chest per Dr Hawa Barth. Functionally, min assist with transfers and ambul 200ft with RW. ADLs reported by OT at min assist for bathing/dressing/toileting; CGA grooming, indep feeding    Physical therapy was initiated and patient was starting to mobilize. However, Patient shows significant functional deficits due to prolonged immobility and hospitalization   Our service was consulted for rehab needs and we recommended inpatient hospital rehabilitation is reasonable and necessary due to ongoing acute medical issues which have not changed since initial pre-admission evaluation. I reviewed the preadmission screening and have approved for admission to the [facility]. The patient was cleared for transfer to rehab today.  Patient continues to have ongoing medical issues outlined above requiring physician medical supervision and functional deficits which would benefit from continued intensive therapies. Current Level of Function: (evaluated by acute therapy staff, with bed mobility, transfers, balance personally observed post-admission in the IRF setting minutes prior to submission of document)   Feeding: Independent  Oral Facial Hygiene/Grooming: Contact guard assistance  Bathing: Minimum assistance  Upper Body Dressing: Minimum assistance  Lower Body Dressing: Minimum assistance  Toileting: Minimum assistance    Bed Mobility;  Rolling: Supervision  Supine to Sit: Supervision  Sit to Supine: Supervision  Scooting: Supervision  Wheelchair Mobility:  Transfers:  Sit to Stand: Minimum assistance  Stand to Sit: Contact guard assistance  Gait:  Speed/Clara: Slow  Distance (ft): 200 Feet (ft)  Assistive Device: Walker, rolling  Ambulation - Level of Assistance: Minimal assistance      Prior Level of Function/Home Situation:   Home Environment: Private residence  # Steps to Enter: 2  Wheelchair Ramp: Yes  One/Two Story Residence: One story  Living Alone: No  Support Systems: Family member(s)  Patient Expects to be Discharged to[de-identified] Rehabilitation facility  Current DME Used/Available at Home: None  Tub or Shower Type: Shower  Prior Level of Function/Work/Activity:   In January 2020 he suffered R occipital CVA with resulting L visual field cut. He lives with his sister and ambulates independently but has suffered 1 fall in the past couple months while stepping off a curb.   Past Medical History:   Diagnosis Date    Arthritis     CAD (coronary artery disease)     heart cath 2/24/2020    CVA (cerebral vascular accident) ([facility] 1/3/2020    Diabetes type 2, uncontrolled ([facility] [facility]     Type II on metformin last A1C 6.3 1/2020    Hypercholesteremia     Sleep apnea     no cpap    Systolic CHF, chronic ([facility] 1/5/2020    echo 2/26/2020 EF 30-35%    TIA (transient ischemic attack)     Troponin I above reference range 1/3/2020      Past Surgical History:   Procedure Laterality Date    HX CYST REMOVAL      HX HERNIA REPAIR      HX KNEE ARTHROSCOPY      rt knee     No Known Allergies   Family History   Problem Relation Age of Onset    Heart Disease Father     Diabetes Father     Heart Disease Paternal Grandfather     Diabetes Sister     Diabetes Sister       Social History     Tobacco Use    Smoking status: Former Smoker     Packs/day: 1.00     Years: 20.00     Pack years: 20.00     Last attempt to quit: 10/20/1997     Years since quittin.4    Smokeless tobacco: Never Used   Substance Use Topics    Alcohol use: Not Currently     Alcohol/week: 0.0 standard drinks     Comment: rare      Current Facility-Administered Medications   Medication Dose Route Frequency    acetaminophen (TYLENOL) tablet 650 mg  650 mg Oral Q4H PRN    alum-mag hydroxide-simeth (MYLANTA) oral suspension 30 mL  30 mL Oral Q4H PRN    amiodarone (CORDARONE) tablet 200 mg  200 mg Oral Q12H    [START ON 3/14/2020] aspirin delayed-release tablet 81 mg  81 mg Oral DAILY    atorvastatin (LIPITOR) tablet 80 mg  80 mg Oral QHS    famotidine (PEPCID) tablet 20 mg  20 mg Oral BID    sodium chloride (NS) flush 5-40 mL  5-40 mL IntraVENous Q8H    sodium chloride (NS) flush 5-40 mL  5-40 mL IntraVENous PRN    albuterol (PROVENTIL VENTOLIN) nebulizer solution 2.5 mg  2.5 mg Nebulization Q4H PRN    [START ON 3/14/2020] alcohol 62% (NOZIN) nasal  1 Ampule  1 Ampule Topical DAILY    bisacodyL (DULCOLAX) suppository 10 mg  10 mg Rectal DAILY PRN    carboxymethylcellulose sodium (REFRESH LIQUIGEL) 1 % ophthalmic solution 1 Drop  1 Drop Both Eyes PRN    [START ON 3/14/2020] citalopram (CELEXA) tablet 10 mg  10 mg Oral DAILY    insulin lispro (HUMALOG) injection   SubCUTAneous AC&HS    metFORMIN (GLUCOPHAGE) tablet 500 mg  500 mg Oral BID WITH MEALS    nitroglycerin (NITROSTAT) tablet 0.4 mg 0.4 mg SubLINGual PRN    ondansetron (ZOFRAN ODT) tablet 4 mg  4 mg Oral Q6H PRN    polyethylene glycol (MIRALAX) packet 17 g  17 g Oral DAILY PRN    senna-docusate (PERICOLACE) 8.6-50 mg per tablet 2 Tab  2 Tab Oral Q12H    terazosin (HYTRIN) capsule 10 mg  10 mg Oral QHS    traMADoL (ULTRAM) tablet 50 mg  50 mg Oral Q6H PRN       Review of Systems:  Denies: fevers, chills, sweats, malaise, anorexia, + wt loss and fatigue  Denies: blurry vision, loss of vision, eye pain, photophobia + visual field deficit on Left from CVA Jan 2020; R PCA  Denies: hearing loss, ringing in the ears, earache, epistaxis   Denies: chest pain, palpitations, syncope, orthopnea, paroxysmal nocturnal dyspnea, claudication   Denies: dysphagia, odynophagia, nausea, vomiting, diarrhea, constipation, abdominal pain, jaundice, melena + GERD  Denies: frequency, dysuria, nocturia, urinary incontinence, stones, hematuria   Denies: polydipsia/polyuria, skin changes, temperature intolerance, unexpected weight gain   Denies: back pain, joint pain, joint swelling, muscle pain, + stiff joints and muscle weakness   Denies: bleeding problems, blood transfusions, bruising, pallor, swollen lymph nodes   Denies: headache, dysarthria, blurred vision, diplopia,seizure, focal deficits. + difficulties with coordination      Objective:     Visit Vitals  /61 (BP 1 Location: Left arm)   Pulse (!) 59   Temp 97.9 °F (36.6 °C)   Resp 16   SpO2 96%      Intake and Output:  No intake/output data recorded. Physical Exam:   Psych: Patient was oriented to person, place, and time. Without hallucinations, abnormal affect or abnormal behaviors during the examination. General:   Alert, appears stated age, No acute distress. HEENT:  Normocephalic, EOMI, facial symmetry  Intact. Oral mucosa pink and moist.    Lungs:  CTA Bilaterally,Respiration even and unlabored ; dec right base  No apparent use of accessory muscles for respiration.    Heart:  Regular rate and rhythm, S1, S2, No obvious murmur, click, rub or gallop. Genitourinary: defered   Abdomen:  Soft, non-tender to palpation in all four quadrants. Bowel sounds present. No obvious masses palpated.     Neuromuscular:  PERRL, EOMI  Generalized prox>distal weakness; however LLE 4+ vs RLE 5/5  fair upright posture  Follows commands well  Left visual field deficit   Skin:  Intact, dry, good skin turgor, age related changes present   Edema:none   Incision:  healing well, clean, dry, and intact        Lab Review:    Recent Results (from the past 72 hour(s))   GLUCOSE, POC    Collection Time: 03/10/20  3:53 PM   Result Value Ref Range    Glucose (POC) 136 (H) 65 - 100 mg/dL   GLUCOSE, POC    Collection Time: 03/10/20  9:02 PM   Result Value Ref Range    Glucose (POC) 172 (H) 65 - 583 mg/dL   METABOLIC PANEL, BASIC    Collection Time: 03/11/20  3:36 AM   Result Value Ref Range    Sodium 137 136 - 145 mmol/L    Potassium 4.5 3.5 - 5.1 mmol/L    Chloride 104 98 - 107 mmol/L    CO2 24 21 - 32 mmol/L    Anion gap 9 7 - 16 mmol/L    Glucose 179 (H) 65 - 100 mg/dL    BUN 19 8 - 23 MG/DL    Creatinine 0.67 (L) 0.8 - 1.5 MG/DL    GFR est AA >60 >60 ml/min/1.73m2    GFR est non-AA >60 >60 ml/min/1.73m2    Calcium 8.9 8.3 - 10.4 MG/DL   CBC W/O DIFF    Collection Time: 03/11/20  3:36 AM   Result Value Ref Range    WBC 5.7 4.3 - 11.1 K/uL    RBC 2.90 (L) 4.23 - 5.6 M/uL    HGB 9.0 (L) 13.6 - 17.2 g/dL    HCT 26.9 (L) 41.1 - 50.3 %    MCV 92.8 79.6 - 97.8 FL    MCH 31.0 26.1 - 32.9 PG    MCHC 33.5 31.4 - 35.0 g/dL    RDW 14.7 (H) 11.9 - 14.6 %    PLATELET 093 207 - 301 K/uL    MPV 9.9 9.4 - 12.3 FL    ABSOLUTE NRBC 0.02 0.0 - 0.2 K/uL   GLUCOSE, POC    Collection Time: 03/11/20  5:57 AM   Result Value Ref Range    Glucose (POC) 194 (H) 65 - 100 mg/dL   GLUCOSE, POC    Collection Time: 03/11/20 11:31 AM   Result Value Ref Range    Glucose (POC) 223 (H) 65 - 100 mg/dL   CELL COUNT, BODY FLUID    Collection Time: 03/11/20  3:15 PM Result Value Ref Range    BODY FLUID TYPE RIGHT      FLUID COLOR RED      FLUID APPEARANCE TURBID      FLUID RBC CT. 56,000 /cu mm    FLUID WBC COUNT 348 /cu mm    BRCH NEUTROPHIL 31 %    BRCH LYMPHS 54 %    BRCH MACROPHAGES 15 %    WBC COMMENTS MODERATE LARGE UNDEFINED MONONUCLEAR CELLS    GLUCOSE, FLUID    Collection Time: 03/11/20  3:15 PM   Result Value Ref Range    Fluid Type: RIGHT      Glucose, body fld. 195 MG/DL   LDH, BODY FLUID    Collection Time: 03/11/20  3:15 PM   Result Value Ref Range    Fluid Type: RIGHT      LD, body fld. 188 U/L   PROTEIN TOTAL, FLUID    Collection Time: 03/11/20  3:15 PM   Result Value Ref Range    Fluid Type: RIGHT      Protein total, body fld.  3.2 g/dL   CULTURE, BODY FLUID W GRAM STAIN    Collection Time: 03/11/20  3:15 PM   Result Value Ref Range    Special Requests: NO SPECIAL REQUESTS      GRAM STAIN 0 TO 4 WBC'S/OIF     GRAM STAIN NO DEFINITE ORGANISM SEEN      Culture result: NO GROWTH 1 DAY     GLUCOSE, POC    Collection Time: 03/11/20  4:08 PM   Result Value Ref Range    Glucose (POC) 221 (H) 65 - 100 mg/dL   GLUCOSE, POC    Collection Time: 03/11/20  8:22 PM   Result Value Ref Range    Glucose (POC) 174 (H) 65 - 100 mg/dL   GLUCOSE, POC    Collection Time: 03/12/20  6:17 AM   Result Value Ref Range    Glucose (POC) 173 (H) 65 - 100 mg/dL   GLUCOSE, POC    Collection Time: 03/12/20 12:32 PM   Result Value Ref Range    Glucose (POC) 169 (H) 65 - 100 mg/dL   GLUCOSE, POC    Collection Time: 03/12/20  4:06 PM   Result Value Ref Range    Glucose (POC) 138 (H) 65 - 100 mg/dL   GLUCOSE, POC    Collection Time: 03/12/20  9:26 PM   Result Value Ref Range    Glucose (POC) 148 (H) 65 - 100 mg/dL   GLUCOSE, POC    Collection Time: 03/13/20  6:03 AM   Result Value Ref Range    Glucose (POC) 135 (H) 65 - 100 mg/dL   GLUCOSE, POC    Collection Time: 03/13/20 11:33 AM   Result Value Ref Range    Glucose (POC) 131 (H) 65 - 100 mg/dL       Functional Assessment: Balance  Sitting - Static: Good (unsupported) (03/13/20 1400)  Sitting - Dynamic: Good (unsupported) (03/13/20 1400)  Standing - Static: Fair (03/13/20 1400)                     Speech Assessment:         Ambulation:  Gait  Distance (ft): 200 Feet (ft)(100' 2nd walk) (03/13/20 1400)  Assistive Device: Orthotic device (03/13/20 1400)  Rail Use: Both (03/13/20 1400)       Condition on Admission: Good      Assessment: Physical Debility s/p 3V CABG and PFO closure    The Post Assessment Physician Evaluation (TRINH) found the current functional status to be comparable with the Pre-admission Screening. The Patient is a good candidate for acute inpatient rehabilitation. Nothing since the Pre-admission screen has changed that determination. Rehabilitation Plan  The patient has shown the ability to tolerate and benefit from 3 hours of therapy daily and is being admitted to a comprehensive acute inpatient rehabilitation program consisting of at least 3 hours of combined physical and occupational therapies. Begin intensive Physical Therapy for a minimum of 1.5 hours a day, at least 5 out of 7 days per week to address bed mobility, transfers, ambulation, strengthening, balance, and endurance. Begin intensive Occupational Therapy for a minimum of 1.5 hours a day, at least 5 out of 7 days per week to address ADL ( bathing, LE dressing, toileting) and adaptive equipment as needed. The patient will also require 24-hour skilled rehabilitation nursing for bowel and bladder management, skin care for decubitus ulcer prevention , pain management and ongoing medication administration     Physical Debility s/p 3V CABG and PFO closure (LIMA to LAD, SVG to OM, SVG to PDA as well as mitral valve repair,  and clipping of left atrial appendage on 3/5/20  Continue daily physician medical management:  Pneumonia prophylaxis- Incentive spirometer every hour while awake    Right pleural effusion s/p R thoracentesis; 800-1000ml yield. Exudative. monitor for recurrence. Wean O2 as tolerated    Post op anemia secondary to blood loss; did require post op transfusion; hgb stable at 9. CAD s/p 3 V CABG; Vfib arrest s/p cardioversion with less than minute downtime. Cont ASA 81 and amio for afib prevention. Cont Statin    sCHF; Repeat limited echo on 3/12 showed EF 35-40%. Was 25-30 % pre op. Strict I/O and monitor wt    DVT risk / DVT Prophylaxis- Will require daily physician exam to assess for signs and symptoms as patient is at increased risk for of thromboembolism. Mobilization as tolerated. Intermittent pneumatic compression devices when in bed Thigh-high or knee-high thromboembolic deterrent hose when out of bed. Lovenox subq daily. Pain Control: stable, mild-to-moderate joint symptoms intermittently, reasonably well controlled by PRN meds. Will require regular pain assessment and comprenhensive pain management,     Wound Care: Monitor wound status daily per staff and physician. At risk for failure. Will require 24/7 rehab nursing. Keep wound clean and dry and Ice to area for comfort    Hypertension/hypotension - BP uncontrolled, fluctuating, managed medically.   -He is not on BB or ACE-I/ARB due to borderline low BP. These can hopefully be [facility] at [facility] or on outpatient basis when BP improved. needs for cardioprotective effects    Diabetes mellitus - Uncontrolled. poor glycemic control. Will require daily, close FSG monitoring and medication adjustment to optimize glycemic control in setting of acute illness and hospitalization.   -glucophage restarted. Cont SSI and diabetic diet    Urinary retention/ neurogenic bladder - schedule voids q6-8 hrs. Check post-void residual as needed; In and Out catheterize if post-void residual is more than 400 cc.  -strict I/o    bowel program - miralax and prn program    GERD - resume PPI.  At times may need additional antacids, Maalox prn.      -will need f/u with Dr Henrry Banks of [facility] and  J[facility] of cardiac surgery. The patient's prognosis for significant practical improvement within a reasonable period of time appears good and the estimated length of stay is 5 days and patient is expected to return home with spouse and continued rehabilitation with home health therapy. Given the patient's complex neurologic/medical condition and the risk of further medical complications including: DVT, PE, skin breakdown, pneumonia due to decreased mobility,   infection at surgical site , CVA, MI, cardiac arrhythmias due to HTN, increased risk of thromboembolism secondary to decreased blood volume and increased energy expenditure in a patient with known acute blood loss could potentially impact the IRF program with decreased cardiovascular efficiency, postural hypotension and cardiac arrhythmia. For these ongoing medical issues, rehabilitation services could not be safely provided at a lower level of care such as a skilled nursing facility or nursing home.         Signed By: Jeffrey Willams MD     March 13, 2020

## 2020-03-13 NOTE — PROGRESS NOTES
PHYSICAL THERAPY EXAMINATION    Patient Name: [facility]  Patient Age: 78 y.o. Past Medical History:   Past Medical History:   Diagnosis Date    Arthritis     CAD (coronary artery disease)     heart cath 2/24/2020    [facility] 1/3/2020    Diabetes type 2, uncontrolled ([facility]     Type II on metformin last A1C 6.3 1/2020    Hypercholesteremia     Sleep apnea     no cpap    Systolic CHF, chronic ([facility] 1/5/2020    echo 2/26/2020 EF 30-35%    TIA (transient ischemic attack) 2009    Troponin I above reference range 1/3/2020       Medical Diagnosis:  Cardiac <principal problem not specified>    Precautions at Admission: Other (comment)(fall)    Therapy Diagnosis:   Difficulty with bed mobility  []     Difficulty with functional transfers  [x]     Difficulty with ambulation  [x]     Difficulty with stair negotiations  [x]       Problem List:    Decreased strength B LE  [x]     Decreased strength trunk/core  [x]     Decreased AROM   [x]     Decreased PROM  []    Decreased endurance  [x]     Decreased balance sitting  [x]     Decreased balance standing  [x]     Pain   [x]     Slow ambulation velocity  [x]    Decreased coordination  [x]    Decreased safety awareness  [x]      Functional Limitations:   Decreased independence with bed mobility  []     Decreased independence with functional transfers  [x]     Decreased independence with ambulation  [x]     Decreased independence with stair negotiation  [x]       Previous Functional Level: Pt was I with ADL. Pt doesn't drive and family helps him get around. Sister does medication management, but he manages finances. Independent with mobility without an assistive device prior to this admission.     Home Environment: Home Environment: Private residence  # Steps to Enter: 1  Rails to Enter: Yes  Hand Rails : Left  Wheelchair Ramp: Yes  One/Two Story Residence: One story  Living Alone: No  Support Systems: Family member(s)  Patient Expects to be Discharged to[de-identified] Private residence  Current DME Used/Available at Home: None  Tub or Shower Type: Shower         Outcome Measures: Vital Signs:    Patient Vitals for the past 12 hrs:   Temp Pulse Resp BP SpO2   03/13/20 1247 97.9 °F (36.6 °C) (!) 59 16 102/61 96 %     Pain level:  Patient had no complaints of pain during therapy this morning    Patient education: Fall prevention, safety and calling for assistance when in the room. Interdisciplinary Communication:  Discussed LOS with OT    Cognition: Alert, oriented and follows commands well. Engages in conversation appropriately and has good attention to tasks.       MMT Initial Asssessment   Right Lower Extremity Left Lower Extremity   Hip Flexion 5 4   Knee Extension 5 4+   Knee Flexion 5 4+   Ankle Dorsiflexion 5 4+   0/5 No palpable muscle contraction  1/5 Palpable muscle contraction, no joint movement  2-/5 Less than full range of motion in gravity eliminated position  2/5 Able to complete full range of motion in gravity eliminated position  2+/5 Able to initiate movement against gravity  3-/5 More than half but not full range of motion against gravity  3/5 Able to complete full range of motion against gravity  3+/5 Completes full range of motion against gravity with minimal resistance  4-/5 Completes full range of motion against gravity with minimal-moderate resistance  4/5 Completes full range of motion against gravity with moderate resistance  4+/5 Completes full range of motion against gravity with moderate-maximum resistance  5/5 Completes full range of motion against gravity with maximum resistance     AROM: BLE AROM is WFLs    PRIMARY MODE OF LOCOMOTION: ambulatory with LRAD      BED/CHAIR/WHEELCHAIR TRANSFERS Initial Assessment   Rolling Right 6 (Modified independent)   Rolling Left 6 (Modified independent)   Supine to Sit 6 (Modified independent)   Sit to Stand Contact guard assistance   Sit to Supine 5 (Supervision)   Transfer Type SPT without device   Comments Patient tends to push r/walker aside and transitions without support   Car Transfer Not tested   Car Type         WHEELCHAIR MOBILITY/MANAGEMENT Initial Assessment   Able to Propel 0 feet   W/C Assistance     Curbs/ramps assistance required 0 (Not tested)   Wheelchair set up assistance required     Wheelchair management         WALKING INDEPENDENCE Initial Assessment   Assistive device Orthotic device   Ambulation assistance - level surface 4 (Contact guard assistance)   Distance 200 Feet (ft)(100' 2nd walk)   Comments Verbal cues needed to not drag his feet during gait and to stay cloer to the r/walker when using it   Ambulation assistance - unlevel surface 0 (Not tested)       STEPS/STAIRS Initial Assessment   Steps/Stairs ambulated 8   Stairs Assistance     Rail Use Both   Comments Step over step gait pattern used. Encouraged 1 step at a time but patient prefers step over step   Curbs/Ramps 0 (Not tested)       QUALITY INDICATOR ASSIST COMMENTS   Roll right (&return to back) 6: Independent    Roll left (& return to back) 6:  Independent    Supine to sit 5: S/U or clean-up assist    Sit to stand 4: Supervision or touching A    Chair/bed-to-chair transfer 4: Supervision or touching A    Walk 10 feet 4: Supervision or touching A    Walk 50 feet with 2 turns 4: Supervision or touching A    Walk 150 feet 4: Supervision or touching A    Walk 10 feet on uneven  Not Tested: Not attempted due to safety concerns    1 step/curb 4: Supervision or touching A    4 steps Not Tested: Not attempted due to environmental concerns: Time    12 steps Not Tested: Not attempted due to safety concerns     object Not Tested: Not attempted due to environmental concerns: Time    Wheel 48' w/2 turns Not Tested: Not applicable secondary to pt not completing activity previously    Wheel 150' Not Tested: Not applicable secondary to pt not completing activity previously    Car Transfer Not Tested: Not attempted due to environmental concerns: Time             PHYSICAL THERAPY PLAN OF CARE    Therapy Diagnosis:   Please see table above    Order received from MD for physical therapy services and chart reviewed. Pt to be seen at least 5 times per week for at least 1.5 hours of physical therapy for 7-10 days. Thank you for the referral.    LTGs:  1. Independent with bed mobility and supine to sit within 7-10 days. 2. Independent with sit to stand and SPT with LRAD within 7-10 days  3. Modified independent to supervision with ambulation 300+ feet with LRAD within 7-10 days  4. Amb. Up/down 12 steps with use of bilateral rails with supervision/independent within 7-10 days    Pt would benefit from skilled physical therapy in order to improve independent functional mobility within the home. Interventions may include range of motion (AROM, PROM B LE/trunk), motor function (B LE/trunk strengthening/coordination), activity tolerance (vitals, oxygen saturation levels), bed mobility training, balance activities, gait training (progressive ambulation program), and functional transfer training. Please see IRC; Interdisciplinary Eval, Care Plan, and Patient Education for further information regarding physical therapy examination and plan of care.      Varsha Mcguire, [place]  3/13/2020

## 2020-03-13 NOTE — PROGRESS NOTES
Pt with discharge orders this day. Pt to transition to 9th floor for continued inpatient rehab this day. No CM needs at time of discharge. Milestones met. Care Management Interventions  PCP Verified by CM: Yes(Armand Valdovinos)  Mode of Transport at Discharge:  Other (see comment)(9th floor)  Transition of Care Consult (CM Consult): Discharge Planning, Other(9th floor [facility])  Discharge Durable Medical Equipment: No  Physical Therapy Consult: Yes  Occupational Therapy Consult: Yes  Speech Therapy Consult: No  Current Support Network: Relative's Home, Other(Pt lives with his sister and her family)  Confirm Follow Up Transport: Other (see comment)(9th floor)  The Plan for Transition of Care is Related to the Following Treatment Goals : post acute rehab  The Patient and/or Patient Representative was Provided with a Choice of Provider and Agrees with the Discharge Plan?: Yes  Freedom of Choice List was Provided with Basic Dialogue that Supports the Patient's Individualized Plan of Care/Goals, Treatment Preferences and Shares the Quality Data Associated with the Providers?: Yes   Resource Information Provided?: No  Discharge Location  Discharge Placement: Rehab hospital/unit acute

## 2020-03-13 NOTE — PROGRESS NOTES
CM met with patient at bedside. Patient is alert and oriented X3. Patient lives with his sister in a one level home, 1STE. He was independent in ADL's and driving before admission. Demographics, insurance and PCP confirmed by patient. CM will continue to follow.      Care Management Interventions  PCP Verified by CM: Yes(Dr. Bud Turcios, unsure of last visit)  Current Support Network: Own Home(lives with sister)  The Patient and/or Patient Representative was Provided with a Choice of Provider and Agrees with the Discharge Plan?: Yes  Freedom of Choice List was Provided with Basic Dialogue that Supports the Patient's Individualized Plan of Care/Goals, Treatment Preferences and Shares the Quality Data Associated with the Providers?: Yes  Discharge Location  Discharge Placement: Unable to determine at this time

## 2020-03-14 LAB
ANION GAP SERPL CALC-SCNC: 1 MMOL/L (ref 7–16)
BACTERIA SPEC CULT: NORMAL
BUN SERPL-MCNC: 19 MG/DL (ref 8–23)
CALCIUM SERPL-MCNC: 9.1 MG/DL (ref 8.3–10.4)
CHLORIDE SERPL-SCNC: 104 MMOL/L (ref 98–107)
CO2 SERPL-SCNC: 33 MMOL/L (ref 21–32)
CREAT SERPL-MCNC: 0.71 MG/DL (ref 0.8–1.5)
ERYTHROCYTE [DISTWIDTH] IN BLOOD BY AUTOMATED COUNT: 14.2 % (ref 11.9–14.6)
GLUCOSE BLD STRIP.AUTO-MCNC: 120 MG/DL (ref 65–100)
GLUCOSE BLD STRIP.AUTO-MCNC: 128 MG/DL (ref 65–100)
GLUCOSE BLD STRIP.AUTO-MCNC: 131 MG/DL (ref 65–100)
GLUCOSE BLD STRIP.AUTO-MCNC: 132 MG/DL (ref 65–100)
GLUCOSE SERPL-MCNC: 124 MG/DL (ref 65–100)
GRAM STN SPEC: NORMAL
GRAM STN SPEC: NORMAL
HCT VFR BLD AUTO: 29.9 % (ref 41.1–50.3)
HGB BLD-MCNC: 10 G/DL (ref 13.6–17.2)
MCH RBC QN AUTO: 31.3 PG (ref 26.1–32.9)
MCHC RBC AUTO-ENTMCNC: 33.4 G/DL (ref 31.4–35)
MCV RBC AUTO: 93.4 FL (ref 79.6–97.8)
NRBC # BLD: 0.02 K/UL (ref 0–0.2)
PLATELET # BLD AUTO: 233 K/UL (ref 150–450)
PMV BLD AUTO: 9.1 FL (ref 9.4–12.3)
POTASSIUM SERPL-SCNC: 3.8 MMOL/L (ref 3.5–5.1)
RBC # BLD AUTO: 3.2 M/UL (ref 4.23–5.6)
SERVICE CMNT-IMP: NORMAL
SODIUM SERPL-SCNC: 138 MMOL/L (ref 136–145)
WBC # BLD AUTO: 11.7 K/UL (ref 4.3–11.1)

## 2020-03-14 PROCEDURE — 82962 GLUCOSE BLOOD TEST: CPT

## 2020-03-14 PROCEDURE — 80048 BASIC METABOLIC PNL TOTAL CA: CPT

## 2020-03-14 PROCEDURE — 97110 THERAPEUTIC EXERCISES: CPT

## 2020-03-14 PROCEDURE — 65310000000 HC RM PRIVATE REHAB

## 2020-03-14 PROCEDURE — 74011250637 HC RX REV CODE- 250/637: Performed by: PHYSICAL MEDICINE & REHABILITATION

## 2020-03-14 PROCEDURE — 85027 COMPLETE CBC AUTOMATED: CPT

## 2020-03-14 PROCEDURE — 99232 SBSQ HOSP IP/OBS MODERATE 35: CPT | Performed by: PHYSICAL MEDICINE & REHABILITATION

## 2020-03-14 PROCEDURE — 36415 COLL VENOUS BLD VENIPUNCTURE: CPT

## 2020-03-14 PROCEDURE — 97530 THERAPEUTIC ACTIVITIES: CPT

## 2020-03-14 PROCEDURE — 97535 SELF CARE MNGMENT TRAINING: CPT

## 2020-03-14 RX ADMIN — Medication 1 AMPULE: at 08:23

## 2020-03-14 RX ADMIN — TRAMADOL HYDROCHLORIDE 50 MG: 50 TABLET, FILM COATED ORAL at 11:30

## 2020-03-14 RX ADMIN — AMIODARONE HYDROCHLORIDE 200 MG: 200 TABLET ORAL at 08:23

## 2020-03-14 RX ADMIN — CITALOPRAM 10 MG: 10 TABLET ORAL at 08:24

## 2020-03-14 RX ADMIN — METFORMIN HYDROCHLORIDE 500 MG: 500 TABLET ORAL at 17:10

## 2020-03-14 RX ADMIN — SENNOSIDES AND DOCUSATE SODIUM 2 TABLET: 8.6; 5 TABLET ORAL at 20:20

## 2020-03-14 RX ADMIN — Medication 5 ML: at 14:00

## 2020-03-14 RX ADMIN — AMIODARONE HYDROCHLORIDE 200 MG: 200 TABLET ORAL at 20:07

## 2020-03-14 RX ADMIN — METFORMIN HYDROCHLORIDE 500 MG: 500 TABLET ORAL at 08:23

## 2020-03-14 RX ADMIN — FAMOTIDINE 20 MG: 10 TABLET ORAL at 08:23

## 2020-03-14 RX ADMIN — SENNOSIDES AND DOCUSATE SODIUM 2 TABLET: 8.6; 5 TABLET ORAL at 08:23

## 2020-03-14 RX ADMIN — Medication 10 ML: at 22:00

## 2020-03-14 RX ADMIN — ASPIRIN 81 MG: 81 TABLET ORAL at 08:23

## 2020-03-14 RX ADMIN — TRAMADOL HYDROCHLORIDE 50 MG: 50 TABLET, FILM COATED ORAL at 20:07

## 2020-03-14 RX ADMIN — ATORVASTATIN CALCIUM 80 MG: 80 TABLET, FILM COATED ORAL at 20:06

## 2020-03-14 RX ADMIN — FAMOTIDINE 20 MG: 10 TABLET ORAL at 17:10

## 2020-03-14 RX ADMIN — Medication 5 ML: at 07:14

## 2020-03-14 NOTE — PROGRESS NOTES
Dr. Torrey Pitts informed of patient overnight Vitals overnight. Patient was in no distress. No change in patient's condition.

## 2020-03-14 NOTE — PROGRESS NOTES
Iron Quiles MD,   Medical Director  [address] [facility], [address] [facility]  Tel: [phone]       SFD PROGRESS NOTE    WM Geena Nose III  Admit Date: 3/13/2020  Admit Diagnosis: Physical debility [R53.81]    Subjective     Slept well . No Oxygen required. Denies cp, sob.  EDGE; + decreased endurance    Objective:     Current Facility-Administered Medications   Medication Dose Route Frequency    acetaminophen (TYLENOL) tablet 650 mg  650 mg Oral Q4H PRN    alum-mag hydroxide-simeth (MYLANTA) oral suspension 30 mL  30 mL Oral Q4H PRN    amiodarone (CORDARONE) tablet 200 mg  200 mg Oral Q12H    aspirin delayed-release tablet 81 mg  81 mg Oral DAILY    atorvastatin (LIPITOR) tablet 80 mg  80 mg Oral QHS    famotidine (PEPCID) tablet 20 mg  20 mg Oral BID    sodium chloride (NS) flush 5-40 mL  5-40 mL IntraVENous Q8H    sodium chloride (NS) flush 5-40 mL  5-40 mL IntraVENous PRN    albuterol (PROVENTIL VENTOLIN) nebulizer solution 2.5 mg  2.5 mg Nebulization Q4H PRN    alcohol 62% (NOZIN) nasal  1 Ampule  1 Ampule Topical DAILY    bisacodyL (DULCOLAX) suppository 10 mg  10 mg Rectal DAILY PRN    carboxymethylcellulose sodium (REFRESH LIQUIGEL) 1 % ophthalmic solution 1 Drop  1 Drop Both Eyes PRN    citalopram (CELEXA) tablet 10 mg  10 mg Oral DAILY    insulin lispro (HUMALOG) injection   SubCUTAneous AC&HS    metFORMIN (GLUCOPHAGE) tablet 500 mg  500 mg Oral BID WITH MEALS    nitroglycerin (NITROSTAT) tablet 0.4 mg  0.4 mg SubLINGual PRN    ondansetron (ZOFRAN ODT) tablet 4 mg  4 mg Oral Q6H PRN    polyethylene glycol (MIRALAX) packet 17 g  17 g Oral DAILY PRN    senna-docusate (PERICOLACE) 8.6-50 mg per tablet 2 Tab  2 Tab Oral Q12H    terazosin (HYTRIN) capsule 10 mg  10 mg Oral QHS    traMADoL (ULTRAM) tablet 50 mg  50 mg Oral Q6H PRN     Review of Systems:Denies chest pain, shortness of breath, cough, headache, abdominal pain, dysurea, calf pain. Pertinent positives are as noted in the medical records and unremarkable otherwise. Visit Vitals  /53   Pulse 60   Temp 98.7 °F (37.1 °C)   Resp 16   SpO2 94%        Physical Exam:   General: Alert and age appropriately oriented. No acute cardio respiratory distress. HEENT: Normocephalic,no scleral icterus  Oral mucosa moist without cyanosis   Lungs: Clear to auscultation  bilaterally. Respiration even and unlabored   Heart: Regular rate and rhythm, S1, S2   No  murmurs, clicks, rub or gallops   Abdomen: Soft, non-tender, nondistended. Bowel sounds present. No organomegaly. Genitourinary: Benign . Neuromuscular:      Trace left sided weakness and left visual field deficit; chronic  No new neuro changes   Skin/extremity: No rashes, no erythema. No calf tenderness BLE; trace edema right ankle  Sternal inc and LE incisions c/d/i                                                                            Functional Assessment:          Balance  Sitting - Static: Good (unsupported) (03/13/20 1400)  Sitting - Dynamic: Good (unsupported) (03/13/20 1400)  Standing - Static: Fair (03/13/20 1400)                     Sánchez Ramus Fall Risk Assessment:  Sánchez Ramus Fall Risk  Mobility: Ambulates without gait disturbance (03/14/20 0720)  Mentation: Alert, oriented x 3 (03/14/20 0720)  Mentation Interventions: Adequate sleep, hydration, pain control;Evaluate medications/consider consulting pharmacy;Update white board (03/14/20 0331)  Medication: Patient receiving anticonvulsants, sedatives(tranquilizers), psychotropics or hypnotics, hypoglycemics, narcotics, sleep aids, antihypertensives, laxatives, or diuretics (03/14/20 0720)  Medication Interventions: Evaluate medications/consider consulting pharmacy; Patient to call before getting OOB; Teach patient to arise slowly (03/14/20 0331)  Elimination: Independent in elimination (03/14/20 0720)  Elimination Interventions: Call light in reach; Patient to call for help with toileting needs (03/14/20 0331)  Prior Fall History: Before admission in past 12 months _home or previous inpatient care) (03/14/20 0720)  History of Falls Interventions: Door open when patient unattended;Evaluate medications/consider consulting pharmacy (03/14/20 0331)  Total Score: 2 (03/14/20 0720)  Standard Fall Precautions: Yes (03/14/20 0720)     Speech Assessment:         Ambulation:  Gait  Distance (ft): 200 Feet (ft)(100' 2nd walk) (03/13/20 1400)  Assistive Device: Orthotic device (03/13/20 1400)  Rail Use: Both (03/13/20 1400)     Labs/Studies:  Recent Results (from the past 72 hour(s))   GLUCOSE, POC    Collection Time: 03/11/20 11:31 AM   Result Value Ref Range    Glucose (POC) 223 (H) 65 - 100 mg/dL   CELL COUNT, BODY FLUID    Collection Time: 03/11/20  3:15 PM   Result Value Ref Range    BODY FLUID TYPE RIGHT      FLUID COLOR RED      FLUID APPEARANCE TURBID      FLUID RBC CT. 56,000 /cu mm    FLUID WBC COUNT 348 /cu mm    BRCH NEUTROPHIL 31 %    BRCH LYMPHS 54 %    BRCH MACROPHAGES 15 %    WBC COMMENTS MODERATE LARGE UNDEFINED MONONUCLEAR CELLS    GLUCOSE, FLUID    Collection Time: 03/11/20  3:15 PM   Result Value Ref Range    Fluid Type: RIGHT      Glucose, body fld. 195 MG/DL   LDH, BODY FLUID    Collection Time: 03/11/20  3:15 PM   Result Value Ref Range    Fluid Type: RIGHT      LD, body fld. 188 U/L   PROTEIN TOTAL, FLUID    Collection Time: 03/11/20  3:15 PM   Result Value Ref Range    Fluid Type: RIGHT      Protein total, body fld.  3.2 g/dL   CULTURE, BODY FLUID W GRAM STAIN    Collection Time: 03/11/20  3:15 PM   Result Value Ref Range    Special Requests: NO SPECIAL REQUESTS      GRAM STAIN 0 TO 4 WBC'S/OIF     GRAM STAIN NO DEFINITE ORGANISM SEEN      Culture result: NO GROWTH 2 DAYS     FUNGUS CULTURE AND SMEAR    Collection Time: 03/11/20  3:15 PM   Result Value Ref Range    Source RIGHT      Fungus stain Direct Inoculation     Fungus (Mycology) Culture Other source received    AFB CULTURE + SMEAR W/RFLX ID FROM CULTURE    Collection Time: 03/11/20  3:15 PM   Result Value Ref Range    Source RIGHT      AFB Specimen processing Concentration     Acid Fast Smear NEGATIVE       Acid Fast Culture PENDING    GLUCOSE, POC    Collection Time: 03/11/20  4:08 PM   Result Value Ref Range    Glucose (POC) 221 (H) 65 - 100 mg/dL   GLUCOSE, POC    Collection Time: 03/11/20  8:22 PM   Result Value Ref Range    Glucose (POC) 174 (H) 65 - 100 mg/dL   GLUCOSE, POC    Collection Time: 03/12/20  6:17 AM   Result Value Ref Range    Glucose (POC) 173 (H) 65 - 100 mg/dL   GLUCOSE, POC    Collection Time: 03/12/20 12:32 PM   Result Value Ref Range    Glucose (POC) 169 (H) 65 - 100 mg/dL   GLUCOSE, POC    Collection Time: 03/12/20  4:06 PM   Result Value Ref Range    Glucose (POC) 138 (H) 65 - 100 mg/dL   GLUCOSE, POC    Collection Time: 03/12/20  9:26 PM   Result Value Ref Range    Glucose (POC) 148 (H) 65 - 100 mg/dL   GLUCOSE, POC    Collection Time: 03/13/20  6:03 AM   Result Value Ref Range    Glucose (POC) 135 (H) 65 - 100 mg/dL   GLUCOSE, POC    Collection Time: 03/13/20 11:33 AM   Result Value Ref Range    Glucose (POC) 131 (H) 65 - 100 mg/dL   GLUCOSE, POC    Collection Time: 03/13/20  4:14 PM   Result Value Ref Range    Glucose (POC) 139 (H) 65 - 100 mg/dL   GLUCOSE, POC    Collection Time: 03/13/20  7:46 PM   Result Value Ref Range    Glucose (POC) 163 (H) 65 - 100 mg/dL   GLUCOSE, POC    Collection Time: 03/14/20  7:11 AM   Result Value Ref Range    Glucose (POC) 132 (H) 65 - 100 mg/dL       Assessment:     Problem List as of 3/14/2020 Date Reviewed: 2/24/2020          Codes Class Noted - Resolved    Physical debility ICD-10-CM: R53.81  ICD-9-CM: 799.3  3/13/2020 - Present        S/P CABG x 3 ICD-10-CM: Z95.1  ICD-9-CM: V45.81  3/10/2020 - Present        Pleural effusion, right ICD-10-CM: J90  ICD-9-CM: 511.9  3/8/2020 - Present        Atelectasis ICD-10-CM: J98.11  ICD-9-CM: 518.0  3/6/2020 - Present Acute pulmonary edema (HCC) ICD-10-CM: J81.0  ICD-9-CM: 518.4  3/6/2020 - Present        Ventricular fibrillation (HCC) ICD-10-CM: I49.01  ICD-9-CM: 427.41  3/6/2020 - Present        Patent foramen ovale ICD-10-CM: Q21.1  ICD-9-CM: 745.5  3/5/2020 - Present        Mitral valve regurgitation ICD-10-CM: I34.0  ICD-9-CM: 424.0  3/5/2020 - Present        CAD (coronary artery disease) (Chronic) ICD-10-CM: I25.10  ICD-9-CM: 414.00  3/5/2020 - Present        Mitral valve insufficiency ICD-10-CM: I34.0  ICD-9-CM: 424.0  3/5/2020 - Present        Atherosclerosis of coronary artery of native heart with unstable angina pectoris (HCC) ICD-10-CM: I25.110  ICD-9-CM: 414.01, 411.1  3/5/2020 - Present        Type II diabetes mellitus ([facility] (Chronic) ICD-10-CM: E11.9  ICD-9-CM: 250.00  3/5/2020 - Present        Respiratory failure, post-operative [facility] ICD-10-CM: O38.207  ICD-9-CM: 518.51  3/5/2020 - Present        Hypoxia ICD-10-CM: R09.02  ICD-9-CM: 799.02  3/5/2020 - Present        History of stroke (Chronic) ICD-10-CM: Z86.73  ICD-9-CM: V12.54  2/7/2020 - Present        Systolic CHF, chronic (HCC) (Chronic) ICD-10-CM: I50.22  ICD-9-CM: 428.22, 428.0  1/5/2020 - Present        CVA (cerebral vascular accident) ([facility] (Chronic) ICD-10-CM: I63.9  ICD-9-CM: 434.91  1/3/2020 - Present        RESOLVED: Hypotension ICD-10-CM: I95.9  ICD-9-CM: 458.9  1/5/2020 - 3/5/2020        RESOLVED: Elevated brain natriuretic peptide (BNP) level ICD-10-CM: R79.89  ICD-9-CM: 790.99  1/3/2020 - 3/5/2020        RESOLVED: Pulmonary edema ICD-10-CM: J81.1  ICD-9-CM: 028  1/3/2020 - 3/5/2020               Physical Debility s/p 3V CABG and PFO closure (LIMA to LAD, SVG to OM, SVG to PDA as well as mitral valve repair,  and clipping of left atrial appendage on 3/5/20  Continue daily physician medical management:  Pneumonia prophylaxis- Incentive spirometer every hour while awake     Right pleural effusion s/p R thoracentesis; 800-1000ml yield. Exudative. monitor for recurrence. Wean O2 as tolerated     Post op anemia secondary to blood loss; did require post op transfusion; hgb stable at 9.      CAD s/p 3 V CABG; Vfib arrest s/p cardioversion with less than minute downtime. Cont ASA 81 and amio for afib prevention. Cont Statin     sCHF; Repeat limited echo on 3/12 showed EF 35-40%. Was 25-30 % pre op. Strict I/O and monitor wt     DVT risk / DVT Prophylaxis- Will require daily physician exam to assess for signs and symptoms as patient is at increased risk for of thromboembolism. Mobilization as tolerated. Intermittent pneumatic compression devices when in bed Thigh-high or knee-high thromboembolic deterrent hose when out of bed. Lovenox subq daily.      Pain Control: stable, mild-to-moderate joint symptoms intermittently, reasonably well controlled by PRN meds. Will require regular pain assessment and comprenhensive pain management,      Wound Care: Monitor wound status daily per staff and physician. At risk for failure. Will require 24/7 rehab nursing. Keep wound clean and dry and Ice to area for comfort     Hypertension/hypotension - BP uncontrolled, fluctuating, managed medically.   -He is not on BB or ACE-I/ARB due to borderline low BP. These can hopefully be resumed at [facility] or on outpatient basis when BP improved. needs for cardioprotective effects  -3/14 bp 101/53 this a.m., max      Diabetes mellitus - Uncontrolled. poor glycemic control. Will require daily, close FSG monitoring and medication adjustment to optimize glycemic control in setting of acute illness and hospitalization.   -glucophage restarted. Cont SSI and diabetic diet  -3/14 bs 132 this a.m., 163 at bedtime, 139 with supper, 131 with lunch; fairly well controlled     Urinary retention/ neurogenic bladder - schedule voids q6-8 hrs. Check post-void residual as needed;  In and Out catheterize if post-void residual is more than 400 cc.  -strict I/o     bowel program - miralax and prn program     GERD - resume PPI. At times may need additional antacids, Maalox prn.        -will need f/u with Dr Zoë Murrieta of [facility] and Dr Melissa Choudhury of cardiac surgery. Time spent was 25 minutes with over 1/2 in direct patient care/examination, consultation and coordination of care.      Signed By: Raul Aden MD     March 14, 2020

## 2020-03-14 NOTE — PROGRESS NOTES
Time In 0833   Time Out 0935     Mobility   Score Comments   Rolling 6: Independent Side: Left  Independent   Supine to Sit 6: Independent Independent   Sit to Supine     Sit to Stand 5: S/U or clean-up assist Supervision for safety   Transfer Assist 4: Supervision or touching A Transfer Type: SPT   Equipment: N/A   Comments: Supervision for safety     Activities of Daily Living    Score Comments   Eating 6: Independent INdependent   Oral Hyigene 4: Supervision or touching A Supervision standing sink side   Bathing 4: Supervision or touching A Type of Shower: Shower  Position: Supported sitting and Standing PRN   Adaptive  Equipment: Tub Transfer Bench and Grab Bars  Comments: Supervision for safety   Upper Body  Dressing 5: S/U or clean-up assist Items Applied: Pullover  Position: Unsupported Sitting  Comments: set up   Lower Body Dressing 4: Supervision or touching A Items Applied: Underwear and Elastic pants  Position: Standing PRN and Unsupported Sitting  Adaptive Equipment: N/A  Comments: Supervision for safety   Donning/Cataract Footwear 3: Partial/Moderate A Items Applied: Socks and moderate assist for MEGHA Hose  Adaptive Equipment: N/A  Comments: Assist for MEGHA HOSE   Toilet Transfer 4: Supervision or touching A Transfer Type: SPT   Equipment: grab bar   Comments: [facility] 4: Supervision or touching A Output: urine  Comments: Steadying assist for balance   Education  Educated patient on safety with ADLS     Patient steadily progressing towards.     Sharri Arreaga   3/14/2020

## 2020-03-14 NOTE — PROGRESS NOTES
PHYSICAL THERAPY DAILY NOTE  Time In: 5606  Time Out: 9466  Patient Seen For: AM;Balance activities;Gait training;Patient education; Therapeutic exercise    Subjective: Pt stated that his upper body was hurting 6-7/10. Pt stated that he had not contacted the RN. RN was contacted and pt received pain medication at the start of therapy. Objective: Other (comment)(fall)   Vital Signs:  Visit Vitals  /53   Pulse 60   Temp 98.7 °F (37.1 °C)   Resp 16   SpO2 94%   Pain level:6-7/10  Pain location: upper body  Pain interventions: RN contacted and pt received pain medication    Patient education:Bed mobility training,transfer training, gait training, fall precautions, activity pacing, sternal precautions, and parts management. Pt was unable to verbalize sternal precautions. Interdisciplinary Communication: Collaborated with OT regarding pt's progress. GROSS ASSESSMENT Daily Assessment     N/A       COGNITION Daily Assessment    No Changes. Pt is able to follow instructions 100% of time. BED/MAT MOBILITY Daily Assessment   Pt required min A to follow sternal precautions. Rolling Right : 6 (Modified independent)  Rolling Left : 6 (Modified independent)  Supine to Sit : 6 (Modified independent)  Sit to Supine : 4 (Minimal assistance)(assistance for sternal precautions)       TRANSFERS Daily Assessment   Pt required occasional min A to follow sternal precautions. Pt was educated on sternal precautions. Transfer Type: SPT without device  Sit to Stand Assistance: Contact guard assistance  Car Transfers: Not tested       GAIT Daily Assessment   No LOB  Distance (ft): 25 Feet (ft)  Assistive Device: (25ft X3 )   Pt worked on standing static endurance and balance with minimal rest breaks required. Pt verbalized increased fatigue after standing activity and declined ambulating longer distance.      STEPS or STAIRS Daily Assessment     not tested       BALANCE Daily Assessment   Worked on standing balance and endurance with minimal rest breaks required. Sitting - Static: Good (unsupported)  Sitting - Dynamic: Good (unsupported)  Standing - Static: Fair       WHEELCHAIR MOBILITY Daily Assessment    Able to Propel (ft): 0 feet  Curbs/Ramps Assist Required (FIM Score): 0 (Not tested)              Assessment: Pt tolerated static standing activities well with minimal rest breaks. Pt was fatigued after activity and did not ambulate further this AM. Pt will benefit from continued skilled PT to maximize functional mobility and independence. Patient returned to room at end of treatment. Patient supine in bed with head of bed elevated and bed rails up x 2. Needs placed in reach of patient. Plan of Care: Continue with POC and progress as tolerated.        Alexis Melissa, PT  3/14/2020

## 2020-03-15 LAB
GLUCOSE BLD STRIP.AUTO-MCNC: 113 MG/DL (ref 65–100)
GLUCOSE BLD STRIP.AUTO-MCNC: 126 MG/DL (ref 65–100)
GLUCOSE BLD STRIP.AUTO-MCNC: 130 MG/DL (ref 65–100)
GLUCOSE BLD STRIP.AUTO-MCNC: 154 MG/DL (ref 65–100)

## 2020-03-15 PROCEDURE — 74011636637 HC RX REV CODE- 636/637: Performed by: PHYSICAL MEDICINE & REHABILITATION

## 2020-03-15 PROCEDURE — 65310000000 HC RM PRIVATE REHAB

## 2020-03-15 PROCEDURE — 74011250637 HC RX REV CODE- 250/637: Performed by: PHYSICAL MEDICINE & REHABILITATION

## 2020-03-15 PROCEDURE — 82962 GLUCOSE BLOOD TEST: CPT

## 2020-03-15 RX ORDER — TERAZOSIN 5 MG/1
5 CAPSULE ORAL
Status: DISCONTINUED | OUTPATIENT
Start: 2020-03-15 | End: 2020-03-16

## 2020-03-15 RX ORDER — AMIODARONE HYDROCHLORIDE 200 MG/1
100 TABLET ORAL EVERY 12 HOURS
Status: DISCONTINUED | OUTPATIENT
Start: 2020-03-15 | End: 2020-03-19 | Stop reason: HOSPADM

## 2020-03-15 RX ADMIN — Medication 10 ML: at 15:23

## 2020-03-15 RX ADMIN — ACETAMINOPHEN 650 MG: 325 TABLET, FILM COATED ORAL at 12:48

## 2020-03-15 RX ADMIN — ONDANSETRON 4 MG: 4 TABLET, ORALLY DISINTEGRATING ORAL at 07:38

## 2020-03-15 RX ADMIN — AMIODARONE HYDROCHLORIDE 100 MG: 200 TABLET ORAL at 22:16

## 2020-03-15 RX ADMIN — CITALOPRAM 10 MG: 10 TABLET ORAL at 09:00

## 2020-03-15 RX ADMIN — AMIODARONE HYDROCHLORIDE 200 MG: 200 TABLET ORAL at 08:31

## 2020-03-15 RX ADMIN — ASPIRIN 81 MG: 81 TABLET ORAL at 08:31

## 2020-03-15 RX ADMIN — METFORMIN HYDROCHLORIDE 500 MG: 500 TABLET ORAL at 16:33

## 2020-03-15 RX ADMIN — INSULIN LISPRO 2 UNITS: 100 INJECTION, SOLUTION INTRAVENOUS; SUBCUTANEOUS at 16:33

## 2020-03-15 RX ADMIN — Medication 5 ML: at 22:10

## 2020-03-15 RX ADMIN — Medication 1 AMPULE: at 08:32

## 2020-03-15 RX ADMIN — FAMOTIDINE 20 MG: 10 TABLET ORAL at 16:33

## 2020-03-15 RX ADMIN — ATORVASTATIN CALCIUM 80 MG: 80 TABLET, FILM COATED ORAL at 22:09

## 2020-03-15 RX ADMIN — METFORMIN HYDROCHLORIDE 500 MG: 500 TABLET ORAL at 08:31

## 2020-03-15 RX ADMIN — ACETAMINOPHEN 650 MG: 325 TABLET, FILM COATED ORAL at 18:13

## 2020-03-15 RX ADMIN — FAMOTIDINE 20 MG: 10 TABLET ORAL at 08:31

## 2020-03-15 RX ADMIN — ACETAMINOPHEN 650 MG: 325 TABLET, FILM COATED ORAL at 22:24

## 2020-03-15 NOTE — PROGRESS NOTES
Varsha Monsalve MD,   Medical Director  [address] [facility], [address] [facility]  Tel: [phone]       SFD PROGRESS NOTE    WM Chester Fossa III  Admit Date: 3/13/2020  Admit Diagnosis: Physical debility [R53.81]    Subjective     Doing well. No cp, sob, but nausea this a.m. thinks its from taking a pain pill without food ? Ultram. Has sternal incisional pain; told to try just tylenol instead    Objective:     Current Facility-Administered Medications   Medication Dose Route Frequency    acetaminophen (TYLENOL) tablet 650 mg  650 mg Oral Q4H PRN    alum-mag hydroxide-simeth (MYLANTA) oral suspension 30 mL  30 mL Oral Q4H PRN    amiodarone (CORDARONE) tablet 200 mg  200 mg Oral Q12H    aspirin delayed-release tablet 81 mg  81 mg Oral DAILY    atorvastatin (LIPITOR) tablet 80 mg  80 mg Oral QHS    famotidine (PEPCID) tablet 20 mg  20 mg Oral BID    sodium chloride (NS) flush 5-40 mL  5-40 mL IntraVENous Q8H    sodium chloride (NS) flush 5-40 mL  5-40 mL IntraVENous PRN    albuterol (PROVENTIL VENTOLIN) nebulizer solution 2.5 mg  2.5 mg Nebulization Q4H PRN    alcohol 62% (NOZIN) nasal  1 Ampule  1 Ampule Topical DAILY    bisacodyL (DULCOLAX) suppository 10 mg  10 mg Rectal DAILY PRN    carboxymethylcellulose sodium (REFRESH LIQUIGEL) 1 % ophthalmic solution 1 Drop  1 Drop Both Eyes PRN    citalopram (CELEXA) tablet 10 mg  10 mg Oral DAILY    insulin lispro (HUMALOG) injection   SubCUTAneous AC&HS    metFORMIN (GLUCOPHAGE) tablet 500 mg  500 mg Oral BID WITH MEALS    nitroglycerin (NITROSTAT) tablet 0.4 mg  0.4 mg SubLINGual PRN    ondansetron (ZOFRAN ODT) tablet 4 mg  4 mg Oral Q6H PRN    polyethylene glycol (MIRALAX) packet 17 g  17 g Oral DAILY PRN    senna-docusate (PERICOLACE) 8.6-50 mg per tablet 2 Tab  2 Tab Oral Q12H    terazosin (HYTRIN) capsule 10 mg  10 mg Oral QHS    traMADoL (ULTRAM) tablet 50 mg  50 mg Oral Q6H PRN     Review of Systems:Denies chest pain, shortness of breath, cough, headache, visual problems, abdominal pain, dysurea, calf pain. Pertinent positives are as noted in the medical records and unremarkable otherwise. Visit Vitals  BP 95/62   Pulse (!) 56   Temp 97.6 °F (36.4 °C)   Resp 18   Wt 160 lb 9.6 oz (72.8 kg)   SpO2 94%   BMI 23.72 kg/m²        Physical Exam:   General: Alert and age appropriately oriented. No acute cardio respiratory distress. HEENT: Normocephalic,no scleral icterus  Oral mucosa moist without cyanosis   Lungs: Clear to auscultation  bilaterally. Respiration even and unlabored   Heart: Regular rate and rhythm, S1, S2   No  murmurs, clicks, rub or gallops   Abdomen: Soft, non-tender, nondistended. Bowel sounds present. No organomegaly. Genitourinary: Benign . Neuromuscular:      No new neuro deficits   Skin/extremity: No rashes, no erythema. No calf tenderness BLE  Wounds healing well. No peripheral edema                                                                            Functional Assessment:          Balance  Sitting - Static: Good (unsupported) (03/14/20 1500)  Sitting - Dynamic: Good (unsupported) (03/14/20 1500)  Standing - Static: Fair (03/14/20 1500)                     Cassi Blake Fall Risk Assessment:  Cassi Blake Fall Risk  Mobility: Ambulates or transfers with assist devices or assistance (03/15/20 0715)  Mobility Interventions: Patient to call before getting OOB;PT Consult for mobility concerns;Strengthening exercises (ROM-active/passive); Utilize walker, cane, or other assistive device (03/15/20 0715)  Mentation: Alert, oriented x 3 (03/15/20 0715)  Mentation Interventions: Adequate sleep, hydration, pain control;Door open when patient unattended;Evaluate medications/consider consulting pharmacy (03/15/20 2057)  Medication: Patient receiving anticonvulsants, sedatives(tranquilizers), psychotropics or hypnotics, hypoglycemics, narcotics, sleep aids, antihypertensives, laxatives, or diuretics (03/15/20 0715)  Medication Interventions: Patient to call before getting OOB; Teach patient to arise slowly (03/15/20 0715)  Elimination: Independent in elimination (03/15/20 0715)  Elimination Interventions: Call light in reach;Urinal in reach (03/15/20 0715)  Prior Fall History: Before admission in past 12 months _home or previous inpatient care) (03/15/20 0715)  History of Falls Interventions: Door open when patient unattended;Evaluate medications/consider consulting pharmacy (03/14/20 0331)  Total Score: 3 (03/15/20 0715)  Standard Fall Precautions: Yes (03/15/20 0715)     Speech Assessment:         Ambulation:  Gait  Distance (ft): 25 Feet (ft) (03/14/20 1500)  Assistive Device: (25ft X3 ) (03/14/20 1500)  Rail Use: Both (03/13/20 1400)     Labs/Studies:  Recent Results (from the past 72 hour(s))   GLUCOSE, POC    Collection Time: 03/12/20 12:32 PM   Result Value Ref Range    Glucose (POC) 169 (H) 65 - 100 mg/dL   GLUCOSE, POC    Collection Time: 03/12/20  4:06 PM   Result Value Ref Range    Glucose (POC) 138 (H) 65 - 100 mg/dL   GLUCOSE, POC    Collection Time: 03/12/20  9:26 PM   Result Value Ref Range    Glucose (POC) 148 (H) 65 - 100 mg/dL   GLUCOSE, POC    Collection Time: 03/13/20  6:03 AM   Result Value Ref Range    Glucose (POC) 135 (H) 65 - 100 mg/dL   GLUCOSE, POC    Collection Time: 03/13/20 11:33 AM   Result Value Ref Range    Glucose (POC) 131 (H) 65 - 100 mg/dL   GLUCOSE, POC    Collection Time: 03/13/20  4:14 PM   Result Value Ref Range    Glucose (POC) 139 (H) 65 - 100 mg/dL   GLUCOSE, POC    Collection Time: 03/13/20  7:46 PM   Result Value Ref Range    Glucose (POC) 163 (H) 65 - 100 mg/dL   GLUCOSE, POC    Collection Time: 03/14/20  7:11 AM   Result Value Ref Range    Glucose (POC) 132 (H) 65 - 100 mg/dL   CBC W/O DIFF    Collection Time: 03/14/20 10:57 AM   Result Value Ref Range    WBC 11.7 (H) 4.3 - 11.1 K/uL    RBC 3.20 (L) 4.23 - 5.6 M/uL    HGB 10.0 (L) 13.6 - 17.2 g/dL    HCT 29.9 (L) 41.1 - 50.3 %    MCV 93.4 79.6 - 97.8 FL    MCH 31.3 26.1 - 32.9 PG    MCHC 33.4 31.4 - 35.0 g/dL    RDW 14.2 11.9 - 14.6 %    PLATELET 804 629 - 736 K/uL    MPV 9.1 (L) 9.4 - 12.3 FL    ABSOLUTE NRBC 0.02 0.0 - 0.2 K/uL   METABOLIC PANEL, BASIC    Collection Time: 03/14/20 10:57 AM   Result Value Ref Range    Sodium 138 136 - 145 mmol/L    Potassium 3.8 3.5 - 5.1 mmol/L    Chloride 104 98 - 107 mmol/L    CO2 33 (H) 21 - 32 mmol/L    Anion gap 1 (L) 7 - 16 mmol/L    Glucose 124 (H) 65 - 100 mg/dL    BUN 19 8 - 23 MG/DL    Creatinine 0.71 (L) 0.8 - 1.5 MG/DL    GFR est AA >60 >60 ml/min/1.73m2    GFR est non-AA >60 >60 ml/min/1.73m2    Calcium 9.1 8.3 - 10.4 MG/DL   GLUCOSE, POC    Collection Time: 03/14/20 11:35 AM   Result Value Ref Range    Glucose (POC) 120 (H) 65 - 100 mg/dL   GLUCOSE, POC    Collection Time: 03/14/20  4:57 PM   Result Value Ref Range    Glucose (POC) 131 (H) 65 - 100 mg/dL   GLUCOSE, POC    Collection Time: 03/14/20  8:05 PM   Result Value Ref Range    Glucose (POC) 128 (H) 65 - 100 mg/dL   GLUCOSE, POC    Collection Time: 03/15/20  7:05 AM   Result Value Ref Range    Glucose (POC) 130 (H) 65 - 100 mg/dL       Assessment:     Problem List as of 3/15/2020 Date Reviewed: 2/24/2020          Codes Class Noted - Resolved    * (Principal) Physical debility ICD-10-CM: R53.81  ICD-9-CM: 799.3  3/13/2020 - Present        S/P CABG x 3 ICD-10-CM: Z95.1  ICD-9-CM: V45.81  3/10/2020 - Present        Pleural effusion, right ICD-10-CM: J90  ICD-9-CM: 511.9  3/8/2020 - Present        Atelectasis ICD-10-CM: J98.11  ICD-9-CM: 518.0  3/6/2020 - Present        Acute pulmonary edema (HCC) ICD-10-CM: J81.0  ICD-9-CM: 518.4  3/6/2020 - Present        Ventricular fibrillation (HCC) ICD-10-CM: I49.01  ICD-9-CM: 427.41  3/6/2020 - Present        Patent foramen ovale ICD-10-CM: Q21.1  ICD-9-CM: 745.5  3/5/2020 - Present        Mitral valve regurgitation ICD-10-CM: I34.0  ICD-9-CM: 424.0  3/5/2020 - Present CAD (coronary artery disease) (Chronic) ICD-10-CM: I25.10  ICD-9-CM: 414.00  3/5/2020 - Present        Mitral valve insufficiency ICD-10-CM: I34.0  ICD-9-CM: 424.0  3/5/2020 - Present        Atherosclerosis of coronary artery of native heart with unstable angina pectoris ([facility] ICD-10-CM: I25.110  ICD-9-CM: 414.01, 411.1  3/5/2020 - Present        Type II diabetes mellitus (HCC) (Chronic) ICD-10-CM: E11.9  ICD-9-CM: 250.00  3/5/2020 - Present        Respiratory failure, post-operative ([facility] ICD-10-CM: S46.823  ICD-9-CM: 518.51  3/5/2020 - Present        Hypoxia ICD-10-CM: R09.02  ICD-9-CM: 799.02  3/5/2020 - Present        History of stroke (Chronic) ICD-10-CM: Z86.73  ICD-9-CM: V12.54  2/7/2020 - Present        Systolic CHF, chronic (HCC) (Chronic) ICD-10-CM: I50.22  ICD-9-CM: 428.22, 428.0  1/5/2020 - Present        CVA (cerebral vascular accident) ([facility] (Chronic) ICD-10-CM: I63.9  ICD-9-CM: 434.91  1/3/2020 - Present        RESOLVED: Hypotension ICD-10-CM: I95.9  ICD-9-CM: 458.9  1/5/2020 - 3/5/2020        RESOLVED: Elevated brain natriuretic peptide (BNP) level ICD-10-CM: R79.89  ICD-9-CM: 790.99  1/3/2020 - 3/5/2020        RESOLVED: Pulmonary edema ICD-10-CM: J81.1  ICD-9-CM: 358  1/3/2020 - 3/5/2020               Physical Debility s/p 3V CABG and PFO closure (LIMA to LAD, SVG to OM, SVG to PDA as well as mitral valve repair,  and clipping of left atrial appendage on 3/5/20  Continue daily physician medical management:  Pneumonia prophylaxis- Incentive spirometer every hour while awake     Right pleural effusion s/p R thoracentesis; 800-1000ml yield. Exudative. monitor for recurrence. Wean O2 as tolerated     Post op anemia secondary to blood loss; did require post op transfusion; hgb stable at 9.   -3/15 hgb 10 improved     CAD s/p 3 V CABG; Vfib arrest s/p cardioversion with less than minute downtime. Cont ASA 81 and amio for afib prevention.  Cont Statin  -dec amio 3/15 due to hypotension     sCHF; Repeat limited echo on 3/12 showed EF 35-40%. Was 25-30 % pre op. Strict I/O and monitor wt     DVT risk / DVT Prophylaxis- Will require daily physician exam to assess for signs and symptoms as patient is at increased risk for of thromboembolism. Mobilization as tolerated. Intermittent pneumatic compression devices when in bed Thigh-high or knee-high thromboembolic deterrent hose when out of bed. Lovenox subq daily.      Pain Control: stable, mild-to-moderate joint symptoms intermittently, reasonably well controlled by PRN meds. Will require regular pain assessment and comprenhensive pain management,      Wound Care: Monitor wound status daily per staff and physician. At risk for failure. Will require 24/7 rehab nursing. Keep wound clean and dry and Ice to area for comfort     Hypertension/hypotension - BP uncontrolled, fluctuating, managed medically.   -He is not on BB or ACE-I/ARB due to borderline low BP. These can hopefully be resumed at [facility] or on outpatient basis when BP improved. needs for cardioprotective effects  -3/14 bp 101/53 this a.m., max   -3/15 asymptomatic hypotension. 95/62 this a.m. Max sbp was 109 in past 24 hrs. Needs better perfusion. Cardiac meds have been held since acute care post op. HR in the 50s; dec hytrin and amio     Diabetes mellitus - Uncontrolled. poor glycemic control. Will require daily, close FSG monitoring and medication adjustment to optimize glycemic control in setting of acute illness and hospitalization.   -glucophage restarted. Cont SSI and diabetic diet  -3/14 bs 132 this a.m., 163 at bedtime, 139 with supper, 131 with lunch; fairly well controlled  -3/15 controlled     Urinary retention/ neurogenic bladder - schedule voids q6-8 hrs. Check post-void residual as needed; In and Out catheterize if post-void residual is more than 400 cc.  -strict I/o     bowel program - miralax and prn program     GERD - resume PPI.  At times may need additional antacids, Maalox prn.        -will need f/u with Dr Kenya Mei of [address] [place] [address] [address] [facility] and Dr Elise Raphael of cardiac surgery.     Time spent was 25 minutes with over 1/2 in direct patient care/examination, consultation and coordination of care.      Signed By: Bishnu Dorantes MD     March 15, 2020

## 2020-03-15 NOTE — PROGRESS NOTES
Problem: Falls - Risk of  Goal: *Absence of Falls  Description: Document Patricia Winston Fall Risk and appropriate interventions in the flowsheet.   Outcome: Progressing Towards Goal  Note: Fall Risk Interventions:  Mobility Interventions: Communicate number of staff needed for ambulation/transfer, Patient to call before getting OOB, Utilize walker, cane, or other assistive device    Mentation Interventions: Adequate sleep, hydration, pain control, Door open when patient unattended, Evaluate medications/consider consulting pharmacy    Medication Interventions: Evaluate medications/consider consulting pharmacy, Patient to call before getting OOB, Teach patient to arise slowly    Elimination Interventions: Call light in reach, Patient to call for help with toileting needs, Urinal in reach    History of Falls Interventions: Door open when patient unattended, Evaluate medications/consider consulting pharmacy         Problem: Patient Education: Go to Patient Education Activity  Goal: Patient/Family Education  Outcome: Progressing Towards Goal

## 2020-03-16 ENCOUNTER — PATIENT OUTREACH (OUTPATIENT)
Dept: CASE MANAGEMENT | Age: 70
End: 2020-03-16

## 2020-03-16 LAB
GLUCOSE BLD STRIP.AUTO-MCNC: 105 MG/DL (ref 65–100)
GLUCOSE BLD STRIP.AUTO-MCNC: 128 MG/DL (ref 65–100)
GLUCOSE BLD STRIP.AUTO-MCNC: 163 MG/DL (ref 65–100)

## 2020-03-16 PROCEDURE — 97530 THERAPEUTIC ACTIVITIES: CPT

## 2020-03-16 PROCEDURE — 65310000000 HC RM PRIVATE REHAB

## 2020-03-16 PROCEDURE — 74011250637 HC RX REV CODE- 250/637: Performed by: PHYSICAL MEDICINE & REHABILITATION

## 2020-03-16 PROCEDURE — 97116 GAIT TRAINING THERAPY: CPT

## 2020-03-16 PROCEDURE — 99232 SBSQ HOSP IP/OBS MODERATE 35: CPT | Performed by: PHYSICAL MEDICINE & REHABILITATION

## 2020-03-16 PROCEDURE — 74011636637 HC RX REV CODE- 636/637: Performed by: PHYSICAL MEDICINE & REHABILITATION

## 2020-03-16 PROCEDURE — 97110 THERAPEUTIC EXERCISES: CPT

## 2020-03-16 PROCEDURE — 82962 GLUCOSE BLOOD TEST: CPT

## 2020-03-16 PROCEDURE — 97535 SELF CARE MNGMENT TRAINING: CPT

## 2020-03-16 RX ORDER — OXYCODONE HYDROCHLORIDE 5 MG/1
5 TABLET ORAL
Status: DISCONTINUED | OUTPATIENT
Start: 2020-03-16 | End: 2020-03-19 | Stop reason: HOSPADM

## 2020-03-16 RX ORDER — CARBOXYMETHYLCELLULOSE SODIUM 10 MG/ML
1 GEL OPHTHALMIC AS NEEDED
Status: DISCONTINUED | OUTPATIENT
Start: 2020-03-16 | End: 2020-03-17

## 2020-03-16 RX ADMIN — AMIODARONE HYDROCHLORIDE 100 MG: 200 TABLET ORAL at 08:36

## 2020-03-16 RX ADMIN — Medication 10 ML: at 06:41

## 2020-03-16 RX ADMIN — OXYCODONE HYDROCHLORIDE 5 MG: 5 TABLET ORAL at 21:06

## 2020-03-16 RX ADMIN — Medication 1 AMPULE: at 08:37

## 2020-03-16 RX ADMIN — METFORMIN HYDROCHLORIDE 500 MG: 500 TABLET ORAL at 08:36

## 2020-03-16 RX ADMIN — ATORVASTATIN CALCIUM 80 MG: 80 TABLET, FILM COATED ORAL at 21:49

## 2020-03-16 RX ADMIN — INSULIN LISPRO 2 UNITS: 100 INJECTION, SOLUTION INTRAVENOUS; SUBCUTANEOUS at 12:40

## 2020-03-16 RX ADMIN — FAMOTIDINE 20 MG: 10 TABLET ORAL at 17:09

## 2020-03-16 RX ADMIN — SENNOSIDES AND DOCUSATE SODIUM 2 TABLET: 8.6; 5 TABLET ORAL at 08:36

## 2020-03-16 RX ADMIN — FAMOTIDINE 20 MG: 10 TABLET ORAL at 08:36

## 2020-03-16 RX ADMIN — AMIODARONE HYDROCHLORIDE 100 MG: 200 TABLET ORAL at 21:02

## 2020-03-16 RX ADMIN — CITALOPRAM 10 MG: 10 TABLET ORAL at 08:37

## 2020-03-16 RX ADMIN — OXYCODONE HYDROCHLORIDE 5 MG: 5 TABLET ORAL at 12:44

## 2020-03-16 RX ADMIN — METFORMIN HYDROCHLORIDE 500 MG: 500 TABLET ORAL at 17:09

## 2020-03-16 RX ADMIN — ASPIRIN 81 MG: 81 TABLET ORAL at 08:36

## 2020-03-16 NOTE — PROGRESS NOTES
PHYSICAL THERAPY DAILY NOTE  Time In: 0919  Time Out: 1011  Patient Seen For: AM;Transfer training;Gait training; Other (see progress notes); Therapeutic exercise    Subjective: Patient had no complaints. Objective: No pain noted. Other (comment)(fall)  GROSS ASSESSMENT Daily Assessment            COGNITION Daily Assessment           BED/MAT MOBILITY Daily Assessment    Supine to Sit : 6 (Modified independent)  Sit to Supine : 5 (Supervision)       TRANSFERS Daily Assessment    Transfer Type: SPT without device  Transfer Assistance : 4 (Contact guard assistance)  Sit to Stand Assistance: Contact guard assistance  Car Transfers: Not tested       GAIT Daily Assessment    Amount of Assistance: 4 (Contact guard assistance)  Distance (ft): 120 Feet (ft)  Assistive Device: Walker, rolling       STEPS or STAIRS Daily Assessment    Level of Assist : 0 (Not tested)       BALANCE Daily Assessment            WHEELCHAIR MOBILITY Daily Assessment            LOWER EXTREMITY EXERCISES Daily Assessment    Extremity: Both  Exercise Type #1: Supine lower extremity strengthening  Sets Performed: 2  Reps Performed: 10  Level of Assist: Contact guard assistance          Assessment: Patient making good progress. Plan of Care: Continue with plan of care.     Sunil Marino, PTA  3/16/2020

## 2020-03-16 NOTE — PROGRESS NOTES
This note will not be viewable in [address] Opened chart for [facility] [place] outreach, patient is noted to be current inpatient. No further JOHN PAUL calls needed at this time. Patient to be reassigned pending discharge disposition.

## 2020-03-16 NOTE — PROGRESS NOTES
Time In 0746   Time Out 0915     Mobility     Score Comments   Sit to Stand 4: Supervision or touching A S   Transfer Assist 4: Supervision or touching A Transfer Type: SPT   Equipment: Rolling Walker   Comments: S   Pt walked to gym with S. Activities of Daily Living    Score Comments   Bathing 4: Supervision or touching A Type of Shower: Shower  Position: Supported sitting and Standing PRN   Adaptive  Equipment: Tub Transfer Bench and Grab Bars  Comments: S for safety   Upper Body  Dressing 6: Independent Items Applied: Pullover  Position: Unsupported Sitting  Comments: I   Lower Body Dressing 4: Supervision or touching A Items Applied: Underwear and Elastic pants  Position: Standing PRN and Unsupported Sitting  Adaptive Equipment: N/A  Comments: S for single limb stance; encouraged to don pants in sitting   Donning/[place] Footwear 6: Independent Items Applied: Socks and Shoes with fasteners   Adaptive Equipment: N/A  Comments: Increased time   Education  Pacing     Pt was in recliner and agreeable to tx. Pt's performance with ADL is reflected in above chart. Pt walked to the gym. Pt engaged with the Melinta 29 Test-D to improve activity tolerance to utilize during IADL. Pt demonstrated good quality. Pt completed the following exercises with 5 lb debra to promote UB strength, activity tolerance, and shoulder stabilization for integration into IADL:    Exercise Reps Comments   Protraction/Retraction 20    Abduction/Adduction 20    Circles 40 1/2 CW, 1/2 CCW   Vs 20      Pt demonstrated good quality during all exercises. Pt required rest breaks between exercises. Pt engaged with yellow theraputty with 10 nuts to improve activity tolerance. Pt demonstrated good quality during activity. Pt was fatigued by session. Pt was left awaiting PTA Shaye.        Sarthak Brain, OT   3/16/2020

## 2020-03-16 NOTE — PROGRESS NOTES
PHYSICAL THERAPY DAILY NOTE  Time In: 1300  Time Out: 7461  Patient Seen For: PM;Transfer training; Therapeutic exercise; Other (see progress notes)    Subjective: Patient complained of his legs being tired. Objective: No pain noted but he did require a little more assistance with sit to stand. Other (comment)(fall)  GROSS ASSESSMENT Daily Assessment            COGNITION Daily Assessment           BED/MAT MOBILITY Daily Assessment    Supine to Sit : 6 (Modified independent)  Sit to Supine : 6 (Modified independent)       TRANSFERS Daily Assessment    Transfer Type: SPT with walker  Transfer Assistance : 4 (Contact guard assistance)  Sit to Stand Assistance: Minimal assistance  Car Transfers: Not tested       GAIT Daily Assessment    Amount of Assistance: 4 (Contact guard assistance)  Distance (ft): 50 Feet (ft)  Assistive Device: Walker, rolling       STEPS or STAIRS Daily Assessment    Level of Assist : 0 (Not tested)       BALANCE Daily Assessment            WHEELCHAIR MOBILITY Daily Assessment            LOWER EXTREMITY EXERCISES Daily Assessment    Extremity: Both  Exercise Type #1: Supine lower extremity strengthening  Sets Performed: 2  Reps Performed: 10  Level of Assist: Stand-by assistance  Exercise Type #2: Other (comment)(nustep with just LEs x 10 minutes)  Sets Performed: 1  Reps Performed: 10  Level of Assist: Stand-by assistance          Assessment: Patient seems motivated with therapy. Plan of Care: Continue with plan of care.     Batsheva Leal, PTA  3/16/2020

## 2020-03-16 NOTE — PROGRESS NOTES
Gaurang Alberto MD,   Medical Director  [address] [facility], [address] [facility]  Tel: [phone]       D PROGRESS NOTE    WM Lotus Brandon III  Admit Date: 3/13/2020  Admit Diagnosis: Physical debility [R53.81]    Subjective     Doing well. Seems down but says he's ok. No cp, sob, n/v. Slept well.     Objective:     Current Facility-Administered Medications   Medication Dose Route Frequency    oxyCODONE IR (ROXICODONE) tablet 5 mg  5 mg Oral Q6H PRN    terazosin (HYTRIN) capsule 5 mg  5 mg Oral QHS    amiodarone (CORDARONE) tablet 100 mg  100 mg Oral Q12H    acetaminophen (TYLENOL) tablet 650 mg  650 mg Oral Q4H PRN    alum-mag hydroxide-simeth (MYLANTA) oral suspension 30 mL  30 mL Oral Q4H PRN    aspirin delayed-release tablet 81 mg  81 mg Oral DAILY    atorvastatin (LIPITOR) tablet 80 mg  80 mg Oral QHS    famotidine (PEPCID) tablet 20 mg  20 mg Oral BID    sodium chloride (NS) flush 5-40 mL  5-40 mL IntraVENous PRN    albuterol (PROVENTIL VENTOLIN) nebulizer solution 2.5 mg  2.5 mg Nebulization Q4H PRN    alcohol 62% (NOZIN) nasal  1 Ampule  1 Ampule Topical DAILY    bisacodyL (DULCOLAX) suppository 10 mg  10 mg Rectal DAILY PRN    carboxymethylcellulose sodium (REFRESH LIQUIGEL) 1 % ophthalmic solution 1 Drop  1 Drop Both Eyes PRN    citalopram (CELEXA) tablet 10 mg  10 mg Oral DAILY    insulin lispro (HUMALOG) injection   SubCUTAneous AC&HS    metFORMIN (GLUCOPHAGE) tablet 500 mg  500 mg Oral BID WITH MEALS    nitroglycerin (NITROSTAT) tablet 0.4 mg  0.4 mg SubLINGual PRN    ondansetron (ZOFRAN ODT) tablet 4 mg  4 mg Oral Q6H PRN    polyethylene glycol (MIRALAX) packet 17 g  17 g Oral DAILY PRN    senna-docusate (PERICOLACE) 8.6-50 mg per tablet 2 Tab  2 Tab Oral Q12H    traMADoL (ULTRAM) tablet 50 mg  50 mg Oral Q6H PRN     Review of Systems:Denies chest pain, shortness of breath, cough, headache, visual problems, abdominal pain, dysurea, calf pain. Pertinent positives are as noted in the medical records and unremarkable otherwise. Visit Vitals  /58   Pulse 61   Temp 99 °F (37.2 °C)   Resp 16   Wt 160 lb (72.6 kg)   SpO2 93%   BMI 23.63 kg/m²        Physical Exam:   General: Alert and age appropriately oriented. No acute cardio respiratory distress. HEENT: Normocephalic,no scleral icterus  Oral mucosa moist without cyanosis   Lungs: Clear to auscultation  bilaterally. Respiration even and unlabored   Heart: Regular rate and rhythm, S1, S2   No  murmurs, clicks, rub or gallops   Abdomen: Soft, non-tender, nondistended. Bowel sounds present. No organomegaly. Genitourinary: defer   Neuromuscular:      Grossly no focal motor deficits noted. Skin/extremity: No rashes, no erythema. No calf tenderness BLE  Incisions c/d/i, trace pedal edema on the right                                                                            Functional Assessment:          Balance  Sitting - Static: Good (unsupported) (03/14/20 1500)  Sitting - Dynamic: Good (unsupported) (03/14/20 1500)  Standing - Static: Fair (03/14/20 1500)                     Salsajan Taylor Fall Risk Assessment:  Sallyann Taylor Fall Risk  Mobility: Ambulates or transfers with assist devices or assistance (03/16/20 0720)  Mobility Interventions: Patient to call before getting OOB; Strengthening exercises (ROM-active/passive); Utilize walker, cane, or other assistive device (03/15/20 1920)  Mentation: Alert, oriented x 3 (03/16/20 0720)  Mentation Interventions: Adequate sleep, hydration, pain control; Increase mobility (03/15/20 1920)  Medication: Patient receiving anticonvulsants, sedatives(tranquilizers), psychotropics or hypnotics, hypoglycemics, narcotics, sleep aids, antihypertensives, laxatives, or diuretics (03/16/20 0720)  Medication Interventions: Patient to call before getting OOB; Teach patient to arise slowly (03/15/20 1920)  Elimination: Needs assistance with toileting (03/16/20 0720)  Elimination Interventions: Call light in reach; Patient to call for help with toileting needs; Toileting schedule/hourly rounds;Stay With Me (per policy); Urinal in reach (03/15/20 1920)  Prior Fall History: Before admission in past 12 months _home or previous inpatient care) (03/16/20 0720)  History of Falls Interventions: Door open when patient unattended;Evaluate medications/consider consulting pharmacy (03/14/20 0331)  Total Score: 4 (03/16/20 0720)  Standard Fall Precautions: Yes (03/15/20 0715)  High Fall Risk: Yes (03/16/20 0720)     Speech Assessment:         Ambulation:  Gait  Distance (ft): 120 Feet (ft) (03/16/20 1024)  Assistive Device: Walker, rolling (03/16/20 1024)  Rail Use: Both (03/13/20 1400)     Labs/Studies:  Recent Results (from the past 72 hour(s))   GLUCOSE, POC    Collection Time: 03/13/20  4:14 PM   Result Value Ref Range    Glucose (POC) 139 (H) 65 - 100 mg/dL   GLUCOSE, POC    Collection Time: 03/13/20  7:46 PM   Result Value Ref Range    Glucose (POC) 163 (H) 65 - 100 mg/dL   GLUCOSE, POC    Collection Time: 03/14/20  7:11 AM   Result Value Ref Range    Glucose (POC) 132 (H) 65 - 100 mg/dL   CBC W/O DIFF    Collection Time: 03/14/20 10:57 AM   Result Value Ref Range    WBC 11.7 (H) 4.3 - 11.1 K/uL    RBC 3.20 (L) 4.23 - 5.6 M/uL    HGB 10.0 (L) 13.6 - 17.2 g/dL    HCT 29.9 (L) 41.1 - 50.3 %    MCV 93.4 79.6 - 97.8 FL    MCH 31.3 26.1 - 32.9 PG    MCHC 33.4 31.4 - 35.0 g/dL    RDW 14.2 11.9 - 14.6 %    PLATELET 673 865 - 865 K/uL    MPV 9.1 (L) 9.4 - 12.3 FL    ABSOLUTE NRBC 0.02 0.0 - 0.2 K/uL   METABOLIC PANEL, BASIC    Collection Time: 03/14/20 10:57 AM   Result Value Ref Range    Sodium 138 136 - 145 mmol/L    Potassium 3.8 3.5 - 5.1 mmol/L    Chloride 104 98 - 107 mmol/L    CO2 33 (H) 21 - 32 mmol/L    Anion gap 1 (L) 7 - 16 mmol/L    Glucose 124 (H) 65 - 100 mg/dL    BUN 19 8 - 23 MG/DL    Creatinine 0.71 (L) 0.8 - 1.5 MG/DL    GFR est AA >60 >60 ml/min/1.73m2    GFR est non-AA >60 >60 ml/min/1.73m2    Calcium 9.1 8.3 - 10.4 MG/DL   GLUCOSE, POC    Collection Time: 03/14/20 11:35 AM   Result Value Ref Range    Glucose (POC) 120 (H) 65 - 100 mg/dL   GLUCOSE, POC    Collection Time: 03/14/20  4:57 PM   Result Value Ref Range    Glucose (POC) 131 (H) 65 - 100 mg/dL   GLUCOSE, POC    Collection Time: 03/14/20  8:05 PM   Result Value Ref Range    Glucose (POC) 128 (H) 65 - 100 mg/dL   GLUCOSE, POC    Collection Time: 03/15/20  7:05 AM   Result Value Ref Range    Glucose (POC) 130 (H) 65 - 100 mg/dL   GLUCOSE, POC    Collection Time: 03/15/20 11:04 AM   Result Value Ref Range    Glucose (POC) 126 (H) 65 - 100 mg/dL   GLUCOSE, POC    Collection Time: 03/15/20  4:16 PM   Result Value Ref Range    Glucose (POC) 154 (H) 65 - 100 mg/dL   GLUCOSE, POC    Collection Time: 03/15/20  7:22 PM   Result Value Ref Range    Glucose (POC) 113 (H) 65 - 100 mg/dL   GLUCOSE, POC    Collection Time: 03/16/20  7:07 AM   Result Value Ref Range    Glucose (POC) 128 (H) 65 - 100 mg/dL   GLUCOSE, POC    Collection Time: 03/16/20 11:17 AM   Result Value Ref Range    Glucose (POC) 163 (H) 65 - 100 mg/dL       Assessment:     Problem List as of 3/16/2020 Date Reviewed: 2/24/2020          Codes Class Noted - Resolved    * (Principal) Physical debility ICD-10-CM: R53.81  ICD-9-CM: 799.3  3/13/2020 - Present        S/P CABG x 3 ICD-10-CM: Z95.1  ICD-9-CM: V45.81  3/10/2020 - Present        Pleural effusion, right ICD-10-CM: J90  ICD-9-CM: 511.9  3/8/2020 - Present        Atelectasis ICD-10-CM: J98.11  ICD-9-CM: 518.0  3/6/2020 - Present        Acute pulmonary edema ([facility] ICD-10-CM: J81.0  ICD-9-CM: 518.4  3/6/2020 - Present        Ventricular fibrillation (HCC) ICD-10-CM: I49.01  ICD-9-CM: 427.41  3/6/2020 - Present        Patent foramen ovale ICD-10-CM: Q21.1  ICD-9-CM: 745.5  3/5/2020 - Present        Mitral valve regurgitation ICD-10-CM: I34.0  ICD-9-CM: 424.0  3/5/2020 - Present        CAD (coronary artery disease) (Chronic) ICD-10-CM: I25.10  ICD-9-CM: 414.00  3/5/2020 - Present        Mitral valve insufficiency ICD-10-CM: I34.0  ICD-9-CM: 424.0  3/5/2020 - Present        Atherosclerosis of coronary artery of native heart with unstable angina pectoris ([facility] ICD-10-CM: I25.110  ICD-9-CM: 414.01, 411.1  3/5/2020 - Present        Type II diabetes mellitus (HCC) (Chronic) ICD-10-CM: E11.9  ICD-9-CM: 250.00  3/5/2020 - Present        Respiratory failure, post-operative ([facility] ICD-10-CM: Y76.080  ICD-9-CM: 518.51  3/5/2020 - Present        Hypoxia ICD-10-CM: R09.02  ICD-9-CM: 799.02  3/5/2020 - Present        History of stroke (Chronic) ICD-10-CM: Z86.73  ICD-9-CM: V12.54  2/7/2020 - Present        Systolic CHF, chronic (HCC) (Chronic) ICD-10-CM: I50.22  ICD-9-CM: 428.22, 428.0  1/5/2020 - Present        CVA (cerebral vascular accident) ([facility] (Chronic) ICD-10-CM: I63.9  ICD-9-CM: 434.91  1/3/2020 - Present        RESOLVED: Hypotension ICD-10-CM: I95.9  ICD-9-CM: 458.9  1/5/2020 - 3/5/2020        RESOLVED: Elevated brain natriuretic peptide (BNP) level ICD-10-CM: R79.89  ICD-9-CM: 790.99  1/3/2020 - 3/5/2020        RESOLVED: Pulmonary edema ICD-10-CM: J81.1  ICD-9-CM: 551  1/3/2020 - 3/5/2020               Physical Debility s/p 3V CABG and PFO closure (LIMA to LAD, SVG to OM, SVG to PDA as well as mitral valve repair,  and clipping of left atrial appendage on 3/5/20  Continue daily physician medical management:  Pneumonia prophylaxis- Incentive spirometer every hour while awake     Right pleural effusion s/p R thoracentesis; 800-1000ml yield. Exudative. monitor for recurrence. Wean O2 as tolerated     Post op anemia secondary to blood loss; did require post op transfusion; hgb stable at 9.   -3/15 hgb 10 improved     CAD s/p 3 V CABG; Vfib arrest s/p cardioversion with less than minute downtime. Cont ASA 81 and amio for afib prevention.  Cont Statin  -dec amio 3/15 due to hypotension     sCHF; Repeat limited echo on 3/12 showed EF 35-40%. Was 25-30 % pre op. Strict I/O and monitor wt     DVT risk / DVT Prophylaxis- Will require daily physician exam to assess for signs and symptoms as patient is at increased risk for of thromboembolism. Mobilization as tolerated. Intermittent pneumatic compression devices when in bed Thigh-high or knee-high thromboembolic deterrent hose when out of bed. Lovenox subq daily.      Pain Control: stable, mild-to-moderate joint symptoms intermittently, reasonably well controlled by PRN meds. Will require regular pain assessment and comprenhensive pain management,      Wound Care: Monitor wound status daily per staff and physician. At risk for failure. Will require 24/7 rehab nursing. Keep wound clean and dry and Ice to area for comfort     Hypertension/hypotension - BP uncontrolled, fluctuating, managed medically.   -He is not on BB or ACE-I/ARB due to borderline low BP. These can hopefully be resumed at [facility] or on outpatient basis when BP improved. needs for cardioprotective effects  -3/14 bp 101/53 this a.m., max   -3/15 asymptomatic hypotension. 95/62 this a.m. Max sbp was 109 in past 24 hrs. Needs better perfusion. Cardiac meds have been held since acute care post op. HR in the 50s; dec hytrin and amio  -3/16 103/59 max. Will dc hytrin. Have not restarted recommended ACE I or ARB. Asymptomatic.     Diabetes mellitus - Uncontrolled. poor glycemic control. Will require daily, close FSG monitoring and medication adjustment to optimize glycemic control in setting of acute illness and hospitalization.   -glucophage restarted. Cont SSI and diabetic diet  -3/14 bs 132 this a.m., 163 at bedtime, 139 with supper, 131 with lunch; fairly well controlled  -3/15 controlled; change to bid bs     Urinary retention/ neurogenic bladder - schedule voids q6-8 hrs. Check post-void residual as needed;  In and Out catheterize if post-void residual is more than 400 cc.  -strict I/o     bowel program - miralax and prn program     GERD - resume PPI. At times may need additional antacids, Maalox prn.        -will need f/u with Dr Jo Ann Stephenson of [facility] and Dr Amanda Mccartney of cardiac surgery.          Time spent was 25 minutes with over 1/2 in direct patient care/examination, consultation and coordination of care.      Signed By: Irvin Phillip MD     March 16, 2020

## 2020-03-17 LAB
ANION GAP SERPL CALC-SCNC: 4 MMOL/L (ref 7–16)
BUN SERPL-MCNC: 11 MG/DL (ref 8–23)
CALCIUM SERPL-MCNC: 8.6 MG/DL (ref 8.3–10.4)
CHLORIDE SERPL-SCNC: 106 MMOL/L (ref 98–107)
CO2 SERPL-SCNC: 30 MMOL/L (ref 21–32)
CREAT SERPL-MCNC: 0.63 MG/DL (ref 0.8–1.5)
ERYTHROCYTE [DISTWIDTH] IN BLOOD BY AUTOMATED COUNT: 14.5 % (ref 11.9–14.6)
GLUCOSE BLD STRIP.AUTO-MCNC: 109 MG/DL (ref 65–100)
GLUCOSE BLD STRIP.AUTO-MCNC: 116 MG/DL (ref 65–100)
GLUCOSE BLD STRIP.AUTO-MCNC: 155 MG/DL (ref 65–100)
GLUCOSE SERPL-MCNC: 126 MG/DL (ref 65–100)
HCT VFR BLD AUTO: 30.4 % (ref 41.1–50.3)
HGB BLD-MCNC: 9.8 G/DL (ref 13.6–17.2)
MCH RBC QN AUTO: 31.6 PG (ref 26.1–32.9)
MCHC RBC AUTO-ENTMCNC: 32.2 G/DL (ref 31.4–35)
MCV RBC AUTO: 98.1 FL (ref 79.6–97.8)
NRBC # BLD: 0 K/UL (ref 0–0.2)
PLATELET # BLD AUTO: 240 K/UL (ref 150–450)
PMV BLD AUTO: 9.8 FL (ref 9.4–12.3)
POTASSIUM SERPL-SCNC: 3.9 MMOL/L (ref 3.5–5.1)
RBC # BLD AUTO: 3.1 M/UL (ref 4.23–5.6)
SODIUM SERPL-SCNC: 140 MMOL/L (ref 136–145)
WBC # BLD AUTO: 9.4 K/UL (ref 4.3–11.1)

## 2020-03-17 PROCEDURE — 97110 THERAPEUTIC EXERCISES: CPT

## 2020-03-17 PROCEDURE — 80048 BASIC METABOLIC PNL TOTAL CA: CPT

## 2020-03-17 PROCEDURE — 65310000000 HC RM PRIVATE REHAB

## 2020-03-17 PROCEDURE — 74011250637 HC RX REV CODE- 250/637: Performed by: PHYSICAL MEDICINE & REHABILITATION

## 2020-03-17 PROCEDURE — 97116 GAIT TRAINING THERAPY: CPT

## 2020-03-17 PROCEDURE — 97530 THERAPEUTIC ACTIVITIES: CPT

## 2020-03-17 PROCEDURE — 36415 COLL VENOUS BLD VENIPUNCTURE: CPT

## 2020-03-17 PROCEDURE — 99232 SBSQ HOSP IP/OBS MODERATE 35: CPT | Performed by: PHYSICAL MEDICINE & REHABILITATION

## 2020-03-17 PROCEDURE — 82962 GLUCOSE BLOOD TEST: CPT

## 2020-03-17 PROCEDURE — 85027 COMPLETE CBC AUTOMATED: CPT

## 2020-03-17 RX ORDER — CARBOXYMETHYLCELLULOSE SODIUM 10 MG/ML
2 GEL OPHTHALMIC AS NEEDED
Status: DISCONTINUED | OUTPATIENT
Start: 2020-03-17 | End: 2020-03-19 | Stop reason: HOSPADM

## 2020-03-17 RX ADMIN — CARBOXYMETHYLCELLULOSE SODIUM 2 DROP: 10 GEL OPHTHALMIC at 08:21

## 2020-03-17 RX ADMIN — METFORMIN HYDROCHLORIDE 500 MG: 500 TABLET ORAL at 17:18

## 2020-03-17 RX ADMIN — ASPIRIN 81 MG: 81 TABLET ORAL at 08:19

## 2020-03-17 RX ADMIN — FAMOTIDINE 20 MG: 10 TABLET ORAL at 17:19

## 2020-03-17 RX ADMIN — OXYCODONE HYDROCHLORIDE 5 MG: 5 TABLET ORAL at 21:40

## 2020-03-17 RX ADMIN — ATORVASTATIN CALCIUM 80 MG: 80 TABLET, FILM COATED ORAL at 21:39

## 2020-03-17 RX ADMIN — CARBOXYMETHYLCELLULOSE SODIUM 2 DROP: 10 GEL OPHTHALMIC at 21:40

## 2020-03-17 RX ADMIN — SENNOSIDES AND DOCUSATE SODIUM 2 TABLET: 8.6; 5 TABLET ORAL at 21:40

## 2020-03-17 RX ADMIN — CITALOPRAM 10 MG: 10 TABLET ORAL at 08:19

## 2020-03-17 RX ADMIN — METFORMIN HYDROCHLORIDE 500 MG: 500 TABLET ORAL at 08:18

## 2020-03-17 RX ADMIN — FAMOTIDINE 20 MG: 10 TABLET ORAL at 08:18

## 2020-03-17 RX ADMIN — Medication 1 AMPULE: at 08:15

## 2020-03-17 RX ADMIN — AMIODARONE HYDROCHLORIDE 100 MG: 200 TABLET ORAL at 21:40

## 2020-03-17 RX ADMIN — AMIODARONE HYDROCHLORIDE 100 MG: 200 TABLET ORAL at 08:16

## 2020-03-17 NOTE — PROGRESS NOTES
PHYSICAL THERAPY DAILY NOTE  Time In: 8912  Time Out: 1432  Patient Seen For: PM;Transfer training;Gait training; Therapeutic exercise; Other (see progress notes)    Subjective: Patient complained of fatigue in his UEs. Objective: No pain noted. Other (comment)(fall)  GROSS ASSESSMENT Daily Assessment            COGNITION Daily Assessment             BED/MAT MOBILITY Daily Assessment    Rolling Right : 6 (Modified independent)  Rolling Left : 6 (Modified independent)  Supine to Sit : 6 (Modified independent)  Sit to Supine : 6 (Modified independent)       TRANSFERS Daily Assessment    Transfer Type: SPT with walker  Transfer Assistance : 4 (Contact guard assistance)  Sit to Stand Assistance: Minimal assistance  Car Transfers: Not tested  Car Type: instructed in car transfer with rehab car       GAIT Daily Assessment    Amount of Assistance: 4 (Contact guard assistance)  Distance (ft): 120 Feet (ft)  Assistive Device: Orthotic device; Walker, rolling         STEPS or STAIRS Daily Assessment    Level of Assist : 0 (Not tested)       BALANCE Daily Assessment            WHEELCHAIR MOBILITY Daily Assessment              LOWER EXTREMITY EXERCISES Daily Assessment    Extremity: Both  Exercise Type #1: Supine lower extremity strengthening  Sets Performed: 2  Reps Performed: 10  Level of Assist: Stand-by assistance          Assessment: Patient making good progress. Plan of Care: Continue with plan of care.     Carlos Sanchez, PTA  3/17/2020

## 2020-03-17 NOTE — PROGRESS NOTES
Andres Dyer MD,   Medical Director  [address], [address] [facility]  Tel: [phone]       SFD PROGRESS NOTE    WM Adonis Covarrubias III  Admit Date: 3/13/2020  Admit Diagnosis: Physical debility [R53.81]    Subjective     Patient seen and examined. Vss. No acute complaints. PT, OT well tolerated. Steady gains made. No new barrier to progress noted. Per RN, wife told her that pt had \"worsening vision. \" pt said it was just a little blurry. Has not been wearing reading glasses\".  He thinks it his baseline left visual filed cut from prior CVA  Objective:     Current Facility-Administered Medications   Medication Dose Route Frequency    oxyCODONE IR (ROXICODONE) tablet 5 mg  5 mg Oral Q6H PRN    carboxymethylcellulose sodium (REFRESH LIQUIGEL) 1 % ophthalmic solution 1 Drop  1 Drop Both Eyes PRN    amiodarone (CORDARONE) tablet 100 mg  100 mg Oral Q12H    acetaminophen (TYLENOL) tablet 650 mg  650 mg Oral Q4H PRN    alum-mag hydroxide-simeth (MYLANTA) oral suspension 30 mL  30 mL Oral Q4H PRN    aspirin delayed-release tablet 81 mg  81 mg Oral DAILY    atorvastatin (LIPITOR) tablet 80 mg  80 mg Oral QHS    famotidine (PEPCID) tablet 20 mg  20 mg Oral BID    sodium chloride (NS) flush 5-40 mL  5-40 mL IntraVENous PRN    albuterol (PROVENTIL VENTOLIN) nebulizer solution 2.5 mg  2.5 mg Nebulization Q4H PRN    alcohol 62% (NOZIN) nasal  1 Ampule  1 Ampule Topical DAILY    bisacodyL (DULCOLAX) suppository 10 mg  10 mg Rectal DAILY PRN    carboxymethylcellulose sodium (REFRESH LIQUIGEL) 1 % ophthalmic solution 1 Drop  1 Drop Both Eyes PRN    citalopram (CELEXA) tablet 10 mg  10 mg Oral DAILY    metFORMIN (GLUCOPHAGE) tablet 500 mg  500 mg Oral BID WITH MEALS    nitroglycerin (NITROSTAT) tablet 0.4 mg  0.4 mg SubLINGual PRN    ondansetron (ZOFRAN ODT) tablet 4 mg  4 mg Oral Q6H PRN    polyethylene glycol (MIRALAX) packet 17 g  17 g Oral DAILY PRN  senna-docusate (PERICOLACE) 8.6-50 mg per tablet 2 Tab  2 Tab Oral Q12H    traMADoL (ULTRAM) tablet 50 mg  50 mg Oral Q6H PRN     Review of Systems:Denies chest pain, shortness of breath, cough, headache, abdominal pain, dysurea, calf pain. Pertinent positives are as noted in the medical records and unremarkable otherwise.   + sternal pain  Visit Vitals  /64   Pulse (!) 59   Temp 98.5 °F (36.9 °C)   Resp 20   Wt 160 lb (72.6 kg)   SpO2 93%   BMI 23.63 kg/m²        Physical Exam:   General: Alert and age appropriately oriented. No acute cardio respiratory distress. Affect blunted   HEENT: Normocephalic,no scleral icterus  Oral mucosa moist without cyanosis   Lungs: Clear to auscultation  bilaterally. Respiration even and unlabored   Heart: Regular rate and rhythm, S1, S2   No  murmurs, clicks, rub or gallops   Abdomen: Soft, non-tender, nondistended. Bowel sounds present. No organomegaly. Genitourinary: defer   Neuromuscular:      Doris equally, sternal pain limits shoulder flexion against resistance  L visual filed deficit; chronic   symm  PERRLA, EOMI   Skin/extremity: No rashes, no erythema.  No calf tenderness BLE  Incisions c/d/i                                                                            Functional Assessment:          Balance  Sitting - Static: Good (unsupported) (03/14/20 1500)  Sitting - Dynamic: Good (unsupported) (03/14/20 1500)  Standing - Static: Fair (03/14/20 1500)                     Catherene Bunting Fall Risk Assessment:  Catherene Bunting Fall Risk  Mobility: Ambulates or transfers with assist devices or assistance (03/16/20 1925)  Mobility Interventions: Patient to call before getting OOB (03/16/20 1925)  Mentation: Alert, oriented x 3 (03/16/20 1925)  Mentation Interventions: Adequate sleep, hydration, pain control (03/16/20 1925)  Medication: Patient receiving anticonvulsants, sedatives(tranquilizers), psychotropics or hypnotics, hypoglycemics, narcotics, sleep aids, antihypertensives, laxatives, or diuretics (03/16/20 1925)  Medication Interventions: Patient to call before getting OOB (03/16/20 1925)  Elimination: Needs assistance with toileting (03/16/20 1925)  Elimination Interventions: Call light in reach (03/16/20 1925)  Prior Fall History: Before admission in past 12 months _home or previous inpatient care) (03/16/20 1925)  History of Falls Interventions: Door open when patient unattended (03/16/20 1925)  Total Score: 4 (03/16/20 1925)  Standard Fall Precautions: Yes (03/15/20 0715)  High Fall Risk: Yes (03/16/20 1925)     Speech Assessment:         Ambulation:  Gait  Distance (ft): 50 Feet (ft) (03/16/20 1505)  Assistive Device: Walker, rolling (03/16/20 1505)  Rail Use: Both (03/13/20 1400)     Labs/Studies:  Recent Results (from the past 72 hour(s))   GLUCOSE, POC    Collection Time: 03/14/20  7:11 AM   Result Value Ref Range    Glucose (POC) 132 (H) 65 - 100 mg/dL   CBC W/O DIFF    Collection Time: 03/14/20 10:57 AM   Result Value Ref Range    WBC 11.7 (H) 4.3 - 11.1 K/uL    RBC 3.20 (L) 4.23 - 5.6 M/uL    HGB 10.0 (L) 13.6 - 17.2 g/dL    HCT 29.9 (L) 41.1 - 50.3 %    MCV 93.4 79.6 - 97.8 FL    MCH 31.3 26.1 - 32.9 PG    MCHC 33.4 31.4 - 35.0 g/dL    RDW 14.2 11.9 - 14.6 %    PLATELET 282 568 - 712 K/uL    MPV 9.1 (L) 9.4 - 12.3 FL    ABSOLUTE NRBC 0.02 0.0 - 0.2 K/uL   METABOLIC PANEL, BASIC    Collection Time: 03/14/20 10:57 AM   Result Value Ref Range    Sodium 138 136 - 145 mmol/L    Potassium 3.8 3.5 - 5.1 mmol/L    Chloride 104 98 - 107 mmol/L    CO2 33 (H) 21 - 32 mmol/L    Anion gap 1 (L) 7 - 16 mmol/L    Glucose 124 (H) 65 - 100 mg/dL    BUN 19 8 - 23 MG/DL    Creatinine 0.71 (L) 0.8 - 1.5 MG/DL    GFR est AA >60 >60 ml/min/1.73m2    GFR est non-AA >60 >60 ml/min/1.73m2    Calcium 9.1 8.3 - 10.4 MG/DL   GLUCOSE, POC    Collection Time: 03/14/20 11:35 AM   Result Value Ref Range    Glucose (POC) 120 (H) 65 - 100 mg/dL   GLUCOSE, POC    Collection Time: 03/14/20 4:57 PM   Result Value Ref Range    Glucose (POC) 131 (H) 65 - 100 mg/dL   GLUCOSE, POC    Collection Time: 03/14/20  8:05 PM   Result Value Ref Range    Glucose (POC) 128 (H) 65 - 100 mg/dL   GLUCOSE, POC    Collection Time: 03/15/20  7:05 AM   Result Value Ref Range    Glucose (POC) 130 (H) 65 - 100 mg/dL   GLUCOSE, POC    Collection Time: 03/15/20 11:04 AM   Result Value Ref Range    Glucose (POC) 126 (H) 65 - 100 mg/dL   GLUCOSE, POC    Collection Time: 03/15/20  4:16 PM   Result Value Ref Range    Glucose (POC) 154 (H) 65 - 100 mg/dL   GLUCOSE, POC    Collection Time: 03/15/20  7:22 PM   Result Value Ref Range    Glucose (POC) 113 (H) 65 - 100 mg/dL   GLUCOSE, POC    Collection Time: 03/16/20  7:07 AM   Result Value Ref Range    Glucose (POC) 128 (H) 65 - 100 mg/dL   GLUCOSE, POC    Collection Time: 03/16/20 11:17 AM   Result Value Ref Range    Glucose (POC) 163 (H) 65 - 100 mg/dL   GLUCOSE, POC    Collection Time: 03/16/20  4:22 PM   Result Value Ref Range    Glucose (POC) 105 (H) 65 - 100 mg/dL       Assessment:     Problem List as of 3/17/2020 Date Reviewed: 2/24/2020          Codes Class Noted - Resolved    * (Principal) Physical debility ICD-10-CM: R53.81  ICD-9-CM: 799.3  3/13/2020 - Present        S/P CABG x 3 ICD-10-CM: Z95.1  ICD-9-CM: V45.81  3/10/2020 - Present        Pleural effusion, right ICD-10-CM: J90  ICD-9-CM: 511.9  3/8/2020 - Present        Atelectasis ICD-10-CM: J98.11  ICD-9-CM: 518.0  3/6/2020 - Present        Acute pulmonary edema ([facility] [address] ICD-10-CM: J81.0  ICD-9-CM: 518.4  3/6/2020 - Present        Ventricular fibrillation (HCC) ICD-10-CM: I49.01  ICD-9-CM: 427.41  3/6/2020 - Present        Patent foramen ovale ICD-10-CM: Q21.1  ICD-9-CM: 745.5  3/5/2020 - Present        Mitral valve regurgitation ICD-10-CM: I34.0  ICD-9-CM: 424.0  3/5/2020 - Present        CAD (coronary artery disease) (Chronic) ICD-10-CM: I25.10  ICD-9-CM: 414.00  3/5/2020 - Present        Mitral valve insufficiency ICD-10-CM: I34.0  ICD-9-CM: 424.0  3/5/2020 - Present        Atherosclerosis of coronary artery of native heart with unstable angina pectoris (HCC) ICD-10-CM: I25.110  ICD-9-CM: 414.01, 411.1  3/5/2020 - Present        Type II diabetes mellitus (HCC) (Chronic) ICD-10-CM: E11.9  ICD-9-CM: 250.00  3/5/2020 - Present        Respiratory failure, [facility]-operative ([facility] ICD-10-CM: L41.622  ICD-9-CM: 518.51  3/5/2020 - Present        Hypoxia ICD-10-CM: R09.02  ICD-9-CM: 799.02  3/5/2020 - Present        History of stroke (Chronic) ICD-10-CM: Z86.73  ICD-9-CM: V12.54  2/7/2020 - Present        Systolic CHF, chronic (HCC) (Chronic) ICD-10-CM: I50.22  ICD-9-CM: 428.22, 428.0  1/5/2020 - Present        CVA (cerebral vascular accident) ([facility] (Chronic) ICD-10-CM: I63.9  ICD-9-CM: 434.91  1/3/2020 - Present        RESOLVED: Hypotension ICD-10-CM: I95.9  ICD-9-CM: 458.9  1/5/2020 - 3/5/2020        RESOLVED: Elevated brain natriuretic peptide (BNP) level ICD-10-CM: R79.89  ICD-9-CM: 790.99  1/3/2020 - 3/5/2020        RESOLVED: Pulmonary edema ICD-10-CM: J81.1  ICD-9-CM: 690  1/3/2020 - 3/5/2020            Physical Debility s/p 3V CABG and PFO closure (LIMA to LAD, SVG to OM, SVG to PDA as well as mitral valve repair,  and clipping of left atrial appendage on 3/5/20  Continue daily physician medical management:  Pneumonia prophylaxis- Incentive spirometer every hour while awake     Right pleural effusion s/p R thoracentesis; 800-1000ml yield. Exudative. monitor for recurrence. Wean O2 as tolerated  -3/16 sats 93-94% RA    Mild leukocytosis 3/14 at 11.7, recheck 3/16 pending; afebrile     Post op anemia secondary to blood loss; did require post op transfusion; hgb stable at 9.   -3/15 hgb 10 improved; f/u 3/16     CAD s/p 3 V CABG; Vfib arrest s/p cardioversion with less than minute downtime. Cont ASA 81 and amio for afib prevention.  Cont Statin  -decreased amio 3/15 due to hypotension     sCHF; Repeat limited echo on 3/12 showed EF 35-40%. Was 25-30 % pre op. Strict I/O and monitor wt     DVT risk / DVT Prophylaxis- Will require daily physician exam to assess for signs and symptoms as patient is at increased risk for of thromboembolism. Mobilization as tolerated. Intermittent pneumatic compression devices when in bed Thigh-high or knee-high thromboembolic deterrent hose when out of bed. Lovenox subq daily.      Pain Control: stable, mild-to-moderate joint symptoms intermittently, reasonably well controlled by PRN meds. Will require regular pain assessment and comprenhensive pain management,      Wound Care: Monitor wound status daily per staff and physician. At risk for failure. Will require 24/7 rehab nursing. Keep wound clean and dry and Ice to area for comfort     Hypertension/hypotension - BP uncontrolled, fluctuating, managed medically.   -He is not on BB or ACE-I/ARB due to borderline low BP. These can hopefully be resumed at [facility] or on outpatient basis when BP improved. needs for cardioprotective effects  -3/14 bp 101/53 this a.m., max   -3/15 asymptomatic hypotension. 95/62 this a.m. Max sbp was 109 in past 24 hrs. Needs better perfusion. Cardiac meds have been held since acute care post op. HR in the 50s; dec hytrin and amio  -3/16 103/59 max. Will dc hytrin. Have not restarted recommended ACE I or ARB. Asymptomatic. 3/16 105/64 ; on lower dose amio only. Have discontinue hytrin     Diabetes mellitus - Uncontrolled. poor glycemic control. Will require daily, close FSG monitoring and medication adjustment to optimize glycemic control in setting of acute illness and hospitalization.   -glucophage restarted. Cont SSI and diabetic diet  -3/14 bs 132 this a.m., 163 at bedtime, 139 with supper, 131 with lunch; fairly well controlled  -3/15 controlled; change to bid bs; 105, did have a 163 at lunch     Urinary retention/ neurogenic bladder - schedule voids q6-8 hrs. Check post-void residual as needed;  In and Out catheterize if post-void residual is more than 400 cc.  -strict I/o; wt stable  -only reported by RN 2 voids; pt recalls \"more than that, at least 4 or 5\"; creat 0.71 on 3/14; monitor . Stress strict I/O    Hx of cerebellar stroke; L HH. Stable. C/o blurred vision 3/15, started artificial tears, rec wearing his reading glasses with up close activity; no new neuro deficits       bowel program - miralax and prn program     GERD - resume PPI. At times may need additional antacids, Maalox prn.        -will need f/u with Dr Rachel Healy of [address] [place] [address] [address] [facility] and Dr Italia Garcia of cardiac surgery.           Time spent was 25 minutes with over 1/2 in direct patient care/examination, consultation and coordination of care.      Signed By: Yair Burroughs MD     March 17, 2020

## 2020-03-17 NOTE — PROGRESS NOTES
OT Daily Note  Time In 1300   Time Out 1345     Pain: Patient had no complaint of pain. Functional Mobility      Score Comments   Sit to Stand 6: Independent I   Transfer Assist 6: Independent Transfer Type: SPT   Equipment: Rolling Walker   Comments: I   Pt walked to the gym. Activity Tolerance   Pt completed 2 rounds of Leena demonstrating good problem solving while donning B 1 lb distal wrist weights. Pt demonstrated good activity tolerance. Education   Activity tolerance      Plan: Continue with POC. Pt was left awaiting PTA Shaye.      Ed Phillip OTR/L  3/17/2020

## 2020-03-17 NOTE — PROGRESS NOTES
Problem: Falls - Risk of  Goal: *Absence of Falls  Description: Document Duke Sharma Fall Risk and appropriate interventions in the flowsheet.   Outcome: Progressing Towards Goal  Note: Fall Risk Interventions:  Mobility Interventions: Patient to call before getting OOB    Mentation Interventions: Adequate sleep, hydration, pain control    Medication Interventions: Patient to call before getting OOB    Elimination Interventions: Call light in reach    History of Falls Interventions: Door open when patient unattended         Problem: Patient Education: Go to Patient Education Activity  Goal: Patient/Family Education  Outcome: Progressing Towards Goal

## 2020-03-17 NOTE — PROGRESS NOTES
Problem: Diabetes Self-Management  Goal: *Disease process and treatment process  Description: Define diabetes and identify own type of diabetes; list 3 options for treating diabetes. Outcome: Progressing Towards Goal  Goal: *Incorporating nutritional management into lifestyle  Description: Describe effect of type, amount and timing of food on blood glucose; list 3 methods for planning meals. Outcome: Progressing Towards Goal  Goal: *Incorporating physical activity into lifestyle  Description: State effect of exercise on blood glucose levels. Outcome: Progressing Towards Goal  Goal: *Developing strategies to promote health/change behavior  Description: Define the ABC's of diabetes; identify appropriate screenings, schedule and personal plan for screenings. Outcome: Progressing Towards Goal  Goal: *Using medications safely  Description: State effect of diabetes medications on diabetes; name diabetes medication taking, action and side effects. Outcome: Progressing Towards Goal  Goal: *Monitoring blood glucose, interpreting and using results  Description: Identify recommended blood glucose targets  and personal targets. Outcome: Progressing Towards Goal  Goal: *Prevention, detection, treatment of acute complications  Description: List symptoms of hyper- and hypoglycemia; describe how to treat low blood sugar and actions for lowering  high blood glucose level. Outcome: Progressing Towards Goal  Goal: *Prevention, detection and treatment of chronic complications  Description: Define the natural course of diabetes and describe the relationship of blood glucose levels to long term complications of diabetes.   Outcome: Progressing Towards Goal  Goal: *Developing strategies to address psychosocial issues  Description: Describe feelings about living with diabetes; identify support needed and support network  Outcome: Progressing Towards Goal  Goal: *Insulin pump training  Outcome: Progressing Towards Goal  Goal: *Sick day guidelines  Outcome: Progressing Towards Goal  Goal: *Patient Specific Goal (EDIT GOAL, INSERT TEXT)  Outcome: Progressing Towards Goal     Problem: Patient Education: Go to Patient Education Activity  Goal: Patient/Family Education  Outcome: Progressing Towards Goal     Problem: Falls - Risk of  Goal: *Absence of Falls  Description: Document [facility] and appropriate interventions in the flowsheet.   Outcome: Progressing Towards Goal  Note: Fall Risk Interventions:  Mobility Interventions: OT consult for ADLs, Patient to call before getting OOB, PT Consult for assist device competence, Utilize walker, cane, or other assistive device    Mentation Interventions: Adequate sleep, hydration, pain control, Evaluate medications/consider consulting pharmacy, Door open when patient unattended, Update white board    Medication Interventions: Patient to call before getting OOB, Evaluate medications/consider consulting pharmacy    Elimination Interventions: Bed/chair exit alarm, Patient to call for help with toileting needs    History of Falls Interventions: Evaluate medications/consider consulting pharmacy, Door open when patient unattended, Consult care management for discharge planning         Problem: Patient Education: Go to Patient Education Activity  Goal: Patient/Family Education  Outcome: Progressing Towards Goal     Problem: Patient Education: Go to Patient Education Activity  Goal: Patient/Family Education  Outcome: Progressing Towards Goal

## 2020-03-17 NOTE — PROGRESS NOTES
PHYSICAL THERAPY DAILY NOTE  Time In: 2271  Time Out: 5592  Patient Seen For: AM;Transfer training;Gait training; Therapeutic exercise; Other (see progress notes)    Subjective: Patient had no complaints. Objective: No pain noted. Other (comment)(fall)  GROSS ASSESSMENT Daily Assessment            COGNITION Daily Assessment           BED/MAT MOBILITY Daily Assessment    Rolling Right : 6 (Modified independent)  Rolling Left : 6 (Modified independent)  Supine to Sit : 6 (Modified independent)  Sit to Supine : 6 (Modified independent)       TRANSFERS Daily Assessment    Transfer Type: SPT with walker  Transfer Assistance : 4 (Contact guard assistance)  Sit to Stand Assistance: Minimal assistance  Car Transfers: Not tested       GAIT Daily Assessment    Amount of Assistance: 4 (Contact guard assistance)  Distance (ft): 100 Feet (ft)  Assistive Device: Walker, rolling       STEPS or STAIRS Daily Assessment    Level of Assist : 0 (Not tested)       BALANCE Daily Assessment            WHEELCHAIR MOBILITY Daily Assessment            LOWER EXTREMITY EXERCISES Daily Assessment    Extremity: Both  Exercise Type #1: Supine lower extremity strengthening  Sets Performed: 2  Reps Performed: 10  Level of Assist: Stand-by assistance          Assessment: Patient making good progress. Plan of Care: Continue with plan of care.     Yessenia Ferrell, PTA  3/17/2020

## 2020-03-17 NOTE — PROGRESS NOTES
OT Daily Note  Time In 1119   Time Out 1200     Pain: Patient had no complaint of pain. Functional Mobility      Score Comments   Sit to Stand 4: Supervision or touching A S   Transfer Assist 6: Independent Transfer Type: SPT   Equipment: Rolling Walker   Comments: I   Pt walked to gym with I. Activity Tolerance   Pt completed 5 minutes on the ergometer frontwards and backwards with light resistance to increase UB strength and activity tolerance for integration into simple IADL. Pt completed prolonged shoulder stabilization task to promote UB strength and activity tolerance for integration into simple meal prep. Pt demonstrated good pacing. Education   Educated pt on energy conservation and common recovery after heart sx. Pt given handouts and verbalized understanding. Plan: Continue with POC. Pt was left in recliner with all needs within reach.      Akin Lopez OTR/L  3/17/2020

## 2020-03-18 LAB
GLUCOSE BLD STRIP.AUTO-MCNC: 118 MG/DL (ref 65–100)
GLUCOSE BLD STRIP.AUTO-MCNC: 147 MG/DL (ref 65–100)

## 2020-03-18 PROCEDURE — 82962 GLUCOSE BLOOD TEST: CPT

## 2020-03-18 PROCEDURE — 97110 THERAPEUTIC EXERCISES: CPT

## 2020-03-18 PROCEDURE — 97530 THERAPEUTIC ACTIVITIES: CPT

## 2020-03-18 PROCEDURE — 97535 SELF CARE MNGMENT TRAINING: CPT

## 2020-03-18 PROCEDURE — 97116 GAIT TRAINING THERAPY: CPT

## 2020-03-18 PROCEDURE — 99232 SBSQ HOSP IP/OBS MODERATE 35: CPT | Performed by: PHYSICAL MEDICINE & REHABILITATION

## 2020-03-18 PROCEDURE — 65310000000 HC RM PRIVATE REHAB

## 2020-03-18 PROCEDURE — 74011250637 HC RX REV CODE- 250/637: Performed by: PHYSICAL MEDICINE & REHABILITATION

## 2020-03-18 RX ORDER — OXYCODONE HYDROCHLORIDE 5 MG/1
5 TABLET ORAL
Qty: 20 TAB | Refills: 0 | Status: SHIPPED | OUTPATIENT
Start: 2020-03-18 | End: 2020-03-24

## 2020-03-18 RX ORDER — ATORVASTATIN CALCIUM 80 MG/1
80 TABLET, FILM COATED ORAL
Qty: 90 TAB | Refills: 3 | Status: SHIPPED | OUTPATIENT
Start: 2020-03-18

## 2020-03-18 RX ORDER — METFORMIN HYDROCHLORIDE 500 MG/1
500 TABLET ORAL 2 TIMES DAILY WITH MEALS
Qty: 60 TAB | Refills: 2 | Status: SHIPPED | OUTPATIENT
Start: 2020-03-18

## 2020-03-18 RX ORDER — AMIODARONE HYDROCHLORIDE 100 MG/1
100 TABLET ORAL EVERY 12 HOURS
Qty: 60 TAB | Refills: 1 | Status: SHIPPED | OUTPATIENT
Start: 2020-03-18 | End: 2020-03-20 | Stop reason: SDUPTHER

## 2020-03-18 RX ORDER — ONDANSETRON 4 MG/1
4 TABLET, ORALLY DISINTEGRATING ORAL
Qty: 10 TAB | Refills: 0 | Status: SHIPPED | OUTPATIENT
Start: 2020-03-18 | End: 2020-04-24

## 2020-03-18 RX ORDER — CITALOPRAM 20 MG/1
20 TABLET, FILM COATED ORAL DAILY
Qty: 30 TAB | Refills: 3 | Status: SHIPPED | OUTPATIENT
Start: 2020-03-18

## 2020-03-18 RX ADMIN — FAMOTIDINE 20 MG: 10 TABLET ORAL at 17:11

## 2020-03-18 RX ADMIN — FAMOTIDINE 20 MG: 10 TABLET ORAL at 08:40

## 2020-03-18 RX ADMIN — AMIODARONE HYDROCHLORIDE 100 MG: 200 TABLET ORAL at 08:40

## 2020-03-18 RX ADMIN — METFORMIN HYDROCHLORIDE 500 MG: 500 TABLET ORAL at 16:31

## 2020-03-18 RX ADMIN — ATORVASTATIN CALCIUM 80 MG: 80 TABLET, FILM COATED ORAL at 21:12

## 2020-03-18 RX ADMIN — CITALOPRAM 10 MG: 10 TABLET ORAL at 09:00

## 2020-03-18 RX ADMIN — OXYCODONE HYDROCHLORIDE 5 MG: 5 TABLET ORAL at 21:13

## 2020-03-18 RX ADMIN — Medication 1 AMPULE: at 08:38

## 2020-03-18 RX ADMIN — AMIODARONE HYDROCHLORIDE 100 MG: 200 TABLET ORAL at 21:12

## 2020-03-18 RX ADMIN — SENNOSIDES AND DOCUSATE SODIUM 2 TABLET: 8.6; 5 TABLET ORAL at 21:13

## 2020-03-18 RX ADMIN — CARBOXYMETHYLCELLULOSE SODIUM 2 DROP: 10 GEL OPHTHALMIC at 21:12

## 2020-03-18 RX ADMIN — ASPIRIN 81 MG: 81 TABLET ORAL at 08:39

## 2020-03-18 RX ADMIN — SENNOSIDES AND DOCUSATE SODIUM 2 TABLET: 8.6; 5 TABLET ORAL at 08:41

## 2020-03-18 RX ADMIN — METFORMIN HYDROCHLORIDE 500 MG: 500 TABLET ORAL at 08:40

## 2020-03-18 NOTE — PROGRESS NOTES
Time In 0745   Time Out 0824     Occupational Therapy Daily Note  Mobility   Score Comments                  Transfer Assist 4: Supervision or touching A Transfer Type: SPT   Equipment: Rolling Walker   Comments: supervision for safety      Activities of Daily Living    Score Comments   Eating 6: Independent    Oral Hygiene 6: Independent    Bathing 6: Independent Type of Shower: Shower  Position: Standing PRN and Unsupported Sitting   Adaptive  Equipment: Tub Transfer Bench and Grab Bars  Comments:    Upper Body  Dressing 5: S/U or clean-up assist Items Applied: Pullover  Position: Unsupported Sitting  Comments:    Lower Body Dressing 5: S/U or clean-up assist Items Applied: Underwear and Elastic pants  Position: Standing PRN and Unsupported Sitting  Adaptive Equipment: N/A  Comments:    Donning/[place] Footwear 5: S/U or clean-up assist Items Applied: Socks and Shoes with fasteners   Adaptive Equipment: N/A  Comments: Toilet Transfer 4: Supervision or touching A Transfer Type: SPT   Equipment: Rolling Walker   Comments: supervision for safety    Toileting Hygiene 6: Independent Output:   Comments:    Education       Objective: Patient presented seated up in recliner. Patient completed ADL; see above for details. Assessment: Making great progress. On track. Plan: Continue OT POC with focus on ADL/IADL skills, functional transfers, energy conservation techniques, sternal precautions training, functional mobility, coordination, strength, static and dynamic balance, and activity tolerance to maximize safety and independence with ADLs and functional transfers. Patient tolerated session well with no complaint of pain and was left seated up in recliner with call light/all needs in reach and in no distress. BLE elevated for edema management.      Vesta Medellin MS, OTR/L  3/18/2020

## 2020-03-18 NOTE — PROGRESS NOTES
Lena Sullivan MD,   Medical Director  [address] [facility], [address] [facility]  Tel: [phone]       [facility] PROGRESS NOTE    WM Sherrie Winkler III  Admit Date: 3/13/2020  Admit Diagnosis: Physical debility [R53.81]    Subjective     Patient seen and examined. Vss. No acute complaints. PT, OT well tolerated. Steady gains made. No new barrier to progress noted. Sternal discomfort, fatigue after therapies. Missing home. B/b wfl.  No sob or EDGE, no cp. occas dry cough  Objective:     Current Facility-Administered Medications   Medication Dose Route Frequency    carboxymethylcellulose sodium (REFRESH LIQUIGEL) 1 % ophthalmic solution 2 Drop  2 Drop Both Eyes PRN    oxyCODONE IR (ROXICODONE) tablet 5 mg  5 mg Oral Q6H PRN    amiodarone (CORDARONE) tablet 100 mg  100 mg Oral Q12H    acetaminophen (TYLENOL) tablet 650 mg  650 mg Oral Q4H PRN    alum-mag hydroxide-simeth (MYLANTA) oral suspension 30 mL  30 mL Oral Q4H PRN    aspirin delayed-release tablet 81 mg  81 mg Oral DAILY    atorvastatin (LIPITOR) tablet 80 mg  80 mg Oral QHS    famotidine (PEPCID) tablet 20 mg  20 mg Oral BID    sodium chloride (NS) flush 5-40 mL  5-40 mL IntraVENous PRN    albuterol (PROVENTIL VENTOLIN) nebulizer solution 2.5 mg  2.5 mg Nebulization Q4H PRN    alcohol 62% (NOZIN) nasal  1 Ampule  1 Ampule Topical DAILY    bisacodyL (DULCOLAX) suppository 10 mg  10 mg Rectal DAILY PRN    citalopram (CELEXA) tablet 10 mg  10 mg Oral DAILY    metFORMIN (GLUCOPHAGE) tablet 500 mg  500 mg Oral BID WITH MEALS    nitroglycerin (NITROSTAT) tablet 0.4 mg  0.4 mg SubLINGual PRN    ondansetron (ZOFRAN ODT) tablet 4 mg  4 mg Oral Q6H PRN    polyethylene glycol (MIRALAX) packet 17 g  17 g Oral DAILY PRN    senna-docusate (PERICOLACE) 8.6-50 mg per tablet 2 Tab  2 Tab Oral Q12H    traMADoL (ULTRAM) tablet 50 mg  50 mg Oral Q6H PRN     Review of Systems:Denies chest pain, shortness of breath, cough, headache, , abdominal pain, dysurea, calf pain. Pertinent positives are as noted in the medical records and unremarkable otherwise. Chronic left visual field deficit/blurred vision ; reports has had since post op  Visit Vitals  /65 (BP 1 Location: Left arm, BP Patient Position: Sitting)   Pulse 63   Temp 98.7 °F (37.1 °C)   Resp 16   Wt 159 lb 12.8 oz (72.5 kg)   SpO2 95%   BMI 23.60 kg/m²        Physical Exam:   General: Alert and age appropriately oriented. No acute cardio respiratory distress. HEENT: Normocephalic,no scleral icterus  Oral mucosa moist without cyanosis   Lungs: Clear to auscultation  bilaterally. Respiration even and unlabored   Heart: Regular rate and rhythm, S1, S2   No  murmurs, clicks, rub or gallops   Abdomen: Soft, non-tender, nondistended. Bowel sounds present. No organomegaly. Genitourinary: defer   Neuromuscular:      Grossly no focal motor deficits noted. Moves ankles. Ankle dorsiflexion 5/5  Ankle plantarflexion 5/5  No sensory deficits distally. Left visual field deficit on periph confrontation  PERRLA, EOMI. Fundi sharp   Skin/extremity: No rashes, no erythema. No calf tenderness BLE  Incisions c/d/i; no edema                                                                            Functional Assessment:          Balance  Sitting - Static: Good (unsupported) (03/14/20 1500)  Sitting - Dynamic: Good (unsupported) (03/14/20 1500)  Standing - Static: Fair (03/14/20 1500)                     Dianna Montest Fall Risk Assessment:  Redanne Curet Fall Risk  Mobility: Ambulates or transfers with assist devices or assistance (03/17/20 2019)  Mobility Interventions: Patient to call before getting OOB; Strengthening exercises (ROM-active/passive); Utilize walker, cane, or other assistive device (03/17/20 2019)  Mentation: Alert, oriented x 3 (03/17/20 2019)  Mentation Interventions: Adequate sleep, hydration, pain control;Door open when patient unattended;Evaluate medications/consider consulting pharmacy; Eyeglasses and hearing aids; Familiar objects from home; Increase mobility (03/17/20 2019)  Medication: Patient receiving anticonvulsants, sedatives(tranquilizers), psychotropics or hypnotics, hypoglycemics, narcotics, sleep aids, antihypertensives, laxatives, or diuretics (03/17/20 2019)  Medication Interventions: Patient to call before getting OOB; Teach patient to arise slowly; Assess postural VS orthostatic hypotension (03/17/20 2019)  Elimination: Needs assistance with toileting (03/17/20 2019)  Elimination Interventions: Call light in reach; Patient to call for help with toileting needs; Toilet paper/wipes in reach; Toileting schedule/hourly rounds;Urinal in reach (03/17/20 2019)  Prior Fall History: Before admission in past 12 months _home or previous inpatient care) (03/17/20 2019)  History of Falls Interventions: Door open when patient unattended;Evaluate medications/consider consulting pharmacy (03/17/20 2019)  Total Score: 4 (03/17/20 2019)  Standard Fall Precautions: Yes (03/17/20 0705)  High Fall Risk: Yes (03/17/20 2019)     Speech Assessment:         Ambulation:  Gait  Distance (ft): 120 Feet (ft) (03/17/20 1519)  Assistive Device: Orthotic device; Walker, rolling (03/17/20 1519)  Rail Use: Both (03/13/20 1400)     Labs/Studies:  Recent Results (from the past 72 hour(s))   GLUCOSE, POC    Collection Time: 03/15/20  7:05 AM   Result Value Ref Range    Glucose (POC) 130 (H) 65 - 100 mg/dL   GLUCOSE, POC    Collection Time: 03/15/20 11:04 AM   Result Value Ref Range    Glucose (POC) 126 (H) 65 - 100 mg/dL   GLUCOSE, POC    Collection Time: 03/15/20  4:16 PM   Result Value Ref Range    Glucose (POC) 154 (H) 65 - 100 mg/dL   GLUCOSE, POC    Collection Time: 03/15/20  7:22 PM   Result Value Ref Range    Glucose (POC) 113 (H) 65 - 100 mg/dL   GLUCOSE, POC    Collection Time: 03/16/20  7:07 AM   Result Value Ref Range    Glucose (POC) 128 (H) 65 - 100 mg/dL   GLUCOSE, POC    Collection Time: 03/16/20 11:17 AM   Result Value Ref Range    Glucose (POC) 163 (H) 65 - 100 mg/dL   GLUCOSE, POC    Collection Time: 03/16/20  4:22 PM   Result Value Ref Range    Glucose (POC) 105 (H) 65 - 100 mg/dL   GLUCOSE, POC    Collection Time: 03/17/20  6:36 AM   Result Value Ref Range    Glucose (POC) 155 (H) 65 - 100 mg/dL   CBC W/O DIFF    Collection Time: 03/17/20  6:52 AM   Result Value Ref Range    WBC 9.4 4.3 - 11.1 K/uL    RBC 3.10 (L) 4.23 - 5.6 M/uL    HGB 9.8 (L) 13.6 - 17.2 g/dL    HCT 30.4 (L) 41.1 - 50.3 %    MCV 98.1 (H) 79.6 - 97.8 FL    MCH 31.6 26.1 - 32.9 PG    MCHC 32.2 31.4 - 35.0 g/dL    RDW 14.5 11.9 - 14.6 %    PLATELET 657 435 - 951 K/uL    MPV 9.8 9.4 - 12.3 FL    ABSOLUTE NRBC 0.00 0.0 - 0.2 K/uL   METABOLIC PANEL, BASIC    Collection Time: 03/17/20  6:52 AM   Result Value Ref Range    Sodium 140 136 - 145 mmol/L    Potassium 3.9 3.5 - 5.1 mmol/L    Chloride 106 98 - 107 mmol/L    CO2 30 21 - 32 mmol/L    Anion gap 4 (L) 7 - 16 mmol/L    Glucose 126 (H) 65 - 100 mg/dL    BUN 11 8 - 23 MG/DL    Creatinine 0.63 (L) 0.8 - 1.5 MG/DL    GFR est AA >60 >60 ml/min/1.73m2    GFR est non-AA >60 >60 ml/min/1.73m2    Calcium 8.6 8.3 - 10.4 MG/DL   GLUCOSE, POC    Collection Time: 03/17/20 11:11 AM   Result Value Ref Range    Glucose (POC) 109 (H) 65 - 100 mg/dL   GLUCOSE, POC    Collection Time: 03/17/20  4:23 PM   Result Value Ref Range    Glucose (POC) 116 (H) 65 - 100 mg/dL       Assessment:     Problem List as of 3/18/2020 Date Reviewed: 2/24/2020          Codes Class Noted - Resolved    * (Principal) Physical debility ICD-10-CM: R53.81  ICD-9-CM: 799.3  3/13/2020 - Present        S/P CABG x 3 ICD-10-CM: Z95.1  ICD-9-CM: V45.81  3/10/2020 - Present        Pleural effusion, right ICD-10-CM: J90  ICD-9-CM: 511.9  3/8/2020 - Present        Atelectasis ICD-10-CM: J98.11  ICD-9-CM: 518.0  3/6/2020 - Present        Acute pulmonary edema (HCC) ICD-10-CM: J81.0  ICD-9-CM: 518.4  3/6/2020 - Present Ventricular fibrillation (HCC) ICD-10-CM: I49.01  ICD-9-CM: 427.41  3/6/2020 - Present        Patent foramen ovale ICD-10-CM: Q21.1  ICD-9-CM: 745.5  3/5/2020 - Present        Mitral valve regurgitation ICD-10-CM: I34.0  ICD-9-CM: 424.0  3/5/2020 - Present        CAD (coronary artery disease) (Chronic) ICD-10-CM: I25.10  ICD-9-CM: 414.00  3/5/2020 - Present        Mitral valve insufficiency ICD-10-CM: I34.0  ICD-9-CM: 424.0  3/5/2020 - Present        Atherosclerosis of coronary artery of native heart with unstable angina pectoris (HCC) ICD-10-CM: I25.110  ICD-9-CM: 414.01, 411.1  3/5/2020 - Present        Type II diabetes mellitus ([facility] (Chronic) ICD-10-CM: E11.9  ICD-9-CM: 250.00  3/5/2020 - Present        Respiratory failure, post-operative ([facility] ICD-10-CM: J95.312  ICD-9-CM: 518.51  3/5/2020 - Present        Hypoxia ICD-10-CM: R09.02  ICD-9-CM: 799.02  3/5/2020 - Present        History of stroke (Chronic) ICD-10-CM: Z86.73  ICD-9-CM: V12.54  2/7/2020 - Present        Systolic CHF, chronic (HCC) (Chronic) ICD-10-CM: I50.22  ICD-9-CM: 428.22, 428.0  1/5/2020 - Present        CVA (cerebral vascular accident) ([facility] (Chronic) ICD-10-CM: I63.9  ICD-9-CM: 434.91  1/3/2020 - Present        RESOLVED: Hypotension ICD-10-CM: I95.9  ICD-9-CM: 458.9  1/5/2020 - 3/5/2020        RESOLVED: Elevated brain natriuretic peptide (BNP) level ICD-10-CM: R79.89  ICD-9-CM: 790.99  1/3/2020 - 3/5/2020        RESOLVED: Pulmonary edema ICD-10-CM: J81.1  ICD-9-CM: 109  1/3/2020 - 3/5/2020            Physical Debility s/p 3V CABG and PFO closure (LIMA to LAD, SVG to OM, SVG to PDA as well as mitral valve repair,  and clipping of left atrial appendage on 3/5/20  Continue daily physician medical management:  Pneumonia prophylaxis- Incentive spirometer every hour while awake     Right pleural effusion s/p R thoracentesis; 800-1000ml yield. Exudative. monitor for recurrence.  Wean O2 as tolerated  -3/16 sats 93-94% RA     Mild leukocytosis 3/14 at 11.7, recheck 3/17  afebrile; resolved 9.4     Post op anemia secondary to blood loss; did require post op transfusion; hgb stable at 9.   -3/15 hgb 10 improved; f/u 3/17  9.8 stable     CAD s/p 3 V CABG; Vfib arrest s/p cardioversion with less than minute downtime. Cont ASA 81 and amio for afib prevention. Cont Statin  -decreased amio 3/15 due to hypotension     sCHF; Repeat limited echo on 3/12 showed EF 35-40%. Was 25-30 % pre op. Strict I/O and monitor wt     DVT risk / DVT Prophylaxis- Will require daily physician exam to assess for signs and symptoms as patient is at increased risk for of thromboembolism. Mobilization as tolerated. Intermittent pneumatic compression devices when in bed Thigh-high or knee-high thromboembolic deterrent hose when out of bed. Lovenox subq daily.      Pain Control: stable, mild-to-moderate joint symptoms intermittently, reasonably well controlled by PRN meds. Will require regular pain assessment and comprenhensive pain management,      Wound Care: Monitor wound status daily per staff and physician. At risk for failure. Will require 24/7 rehab nursing. Keep wound clean and dry and Ice to area for comfort     Hypertension/hypotension - BP uncontrolled, fluctuating, managed medically.   -He is not on BB or ACE-I/ARB due to borderline low BP. These can hopefully be resumed at [facility] or on outpatient basis when BP improved. needs for cardioprotective effects  -3/14 bp 101/53 this a.m., max   -3/15 asymptomatic hypotension. 95/62 this a.m. Max sbp was 109 in past 24 hrs. Needs better perfusion. Cardiac meds have been held since acute care post op. HR in the 50s; dec hytrin and amio  -3/16 103/59 max. Will dc hytrin. Have not restarted recommended ACE I or ARB. Asymptomatic. 3/16 105/64 ; on lower dose amio only. Have discontinue hytrin  -2/18 SBP . Asympt. Off any medication that could be affecting.      Diabetes mellitus - Uncontrolled. poor glycemic control.  Will require daily, close FSG monitoring and medication adjustment to optimize glycemic control in setting of acute illness and hospitalization.   -glucophage restarted. Cont SSI and diabetic diet  -3/14 bs 132 this a.m., 163 at bedtime, 139 with supper, 131 with lunch; fairly well controlled  -3/15 controlled; change to bid bs; 105, did have a 163 at lunch; 3/18 bs very well controlled. Cont glucophage 500mg bid     Urinary retention/ neurogenic bladder - schedule voids q6-8 hrs. Check post-void residual as needed; In and Out catheterize if post-void residual is more than 400 cc.  -strict I/o; wt stable  -only reported by RN 2 voids; pt recalls \"more than that, at least 4 or 5\"; creat 0.71 on 3/14; monitor . Stress strict I/O; 3/18 UOP record improved     Hx of cerebellar stroke; L HH. Stable. C/o blurred vision 3/15, started artificial tears, rec wearing his reading glasses with up close activity; no new neuro deficits  3/18 still with blurred vision , no worse. Has had since post op but did not report. Fundoscopic exam nl. Needs Opthal f/u. Did not do after CVA in Jan 2020        bowel program - miralax and prn program     GERD - resume PPI. At times may need additional antacids, Maalox prn.        -will need f/u with Dr Viviana Leon of [facility] and Dr Bebeto Clark of cardiac surgery.          Time spent was 25 minutes with over 1/2 in direct patient care/examination, consultation and coordination of care.      Signed By: Aliyah Márquez MD     March 18, 2020

## 2020-03-18 NOTE — DISCHARGE SUMMARY
REHABILITATION DISCHARGE SUMMARY     Date of admission; 3/13/2020  Discharge Date: 3/19/2020  Cardiothoracic Surgeon: Dr Lashanda Claudio  Primary Care Physician: Dr London Ugalde  Primary Cardiologist; Dr Kathi Ugarte    Admission Condition: good  Discharged Condition: good    Hospital Course: The patient was admitted to [facility] on 3/13/2020 to [facility] with physical debility s/p 3V CABG and PFO closure    Chief Complaint : Gait dysfunction secondary to below. Admit Diagnosis: Physical debility [R53.81]  S/p CABG x 3 and PFO closure  sCHF  Respiratory failure/pleural effusion req thoracentesis  Prior CVA with mild deficits  Post op vfib arrest, hypotension, bradycardia     Acute Rehab Dx:  Gait impairment/ gait dysfunction  Debility    deconditioning  Mobility and ambulation deficits  Self Care/ADL deficits    HPI; Mr Emilie Brody is a pleasant 67yo gentleman known to our service from Jan 2020 s/p CVA with residual left peripheral visual field loss and mild left sided weakness. . At that time [facility] was too functional [facility]. He has a PMH of arthritis, TIA 4208, systolic CHF with EF 56-12% in Feb, type 2 DM with hyperglycemia, tobacco abuse and ROSENDO, HTN and HLD who was seen by cardiology as an outpt due to low EF and PFO found during CVA w/u. He underwent a nuclear stress test that showed a large area of rula-infarct ischemia in the inferolateral wall. He had noted some EDGE and has strong FH of CAD. He underwent SANDEEP and [facility] 2/26 .Caleb Sow showed mild to moderate MR with small PFO. [facility] showed severe multivessel disease. He was seen by Dr Verlin Koyanagi in consultation on 2/26. Preop he had a baseline spirometry done that was normal. Thus, he underwent a CABG x 3, clipping of left atrial appendage and PFO closure. Post op he had a vfib arrest while turning and responded in less than one minute with cardioversion x 1 and amio gtt. This occurred when Melvenia Blades cath pulled. Extubated day of surgery.  He remained on vasopressors post op for sev days and was requiring 31% FiO2. He has been followed by pulmonary medicine. He had significant post op leukocytosis. He had a noted small left apical PTX. Thrombocytopenia noted with plts of 118 on 3/7. CXR with effusion in the right base and atelectasis in left base. He required an epi gtt on 3/7 due to bradycardia and hypotension. He subsequently came of pressors on 3/9, POD 4. He required blood transfusion on this day. Tried diuresis for the right pleural effusion but eventually had thoracentesis 3/11. 1000ml yield. Leukocytosis resolved spontaneously. CRP elevated at 121, thought to be due to inflammatory process in left chest per Dr Antonella López. Rehabilitation Course:   Physical Debility s/p 3V CABG and PFO closure (LIMA to LAD, SVG to OM, SVG to PDA as well as mitral valve repair,  and clipping of left atrial appendage on 3/5/20  Continue daily physician medical management:    Pneumonia prophylaxis- Incentive spirometer every hour while awake     Right pleural effusion s/p R thoracentesis; 800-1000ml yield. Exudative. monitor for recurrence. Wean O2 as tolerated  -3/16 sats 93-94% RA     Mild leukocytosis 3/14 at 11.7, recheck 3/17  afebrile; resolved 9.4     Post op anemia secondary to blood loss; did require post op transfusion; hgb stable at 9.   -3/15 hgb 10 improved; f/u 3/17  9.8 stable     CAD s/p 3 V CABG; Vfib arrest s/p cardioversion with less than minute downtime. Cont ASA 81 and amio for afib prevention. Cont Statin  -decreased amio 3/15 due to hypotension     sCHF; Repeat limited echo on 3/12 showed EF 35-40%. Was 25-30 % pre op. Strict I/O and monitor wt     DVT risk / DVT Prophylaxis- Will require daily physician exam to assess for signs and symptoms as patient is at increased risk for of thromboembolism. Mobilization as tolerated. Intermittent pneumatic compression devices when in bed Thigh-high or knee-high thromboembolic deterrent hose when out of bed.  Lovenox subq daily.      Pain Control: stable, mild-to-moderate joint symptoms intermittently, reasonably well controlled by PRN meds. Will require regular pain assessment and comprenhensive pain management,      Wound Care: Monitor wound status daily per staff and physician. At risk for failure. Will require 24/7 rehab nursing. Keep wound clean and dry and Ice to area for comfort     Hypertension/hypotension - BP uncontrolled, fluctuating, managed medically.   -He is not on BB or ACE-I/ARB due to borderline low BP. These can hopefully be resumed at [facility] or on outpatient basis when BP improved. needs for cardioprotective effects  -3/14 bp 101/53 this a.m., max   -3/15 asymptomatic hypotension. 95/62 this a.m. Max sbp was 109 in past 24 hrs. Needs better perfusion. Cardiac meds have been held since acute care post op. HR in the 50s; dec hytrin and amio  -3/16 103/59 max. Will dc hytrin. Have not restarted recommended ACE I or ARB. Asymptomatic. 3/16 105/64 ; on lower dose amio only. Have discontinue hytrin  -2/18 SBP . Asympt. Off any medication that could be affecting.      Diabetes mellitus - Uncontrolled. poor glycemic control. Will require daily, close FSG monitoring and medication adjustment to optimize glycemic control in setting of acute illness and hospitalization.   -glucophage restarted. Cont SSI and diabetic diet  -3/14 bs 132 this a.m., 163 at bedtime, 139 with supper, 131 with lunch; fairly well controlled  -3/15 controlled; change to bid bs; 105, did have a 163 at lunch; 3/18 bs very well controlled. Cont glucophage 500mg bid     Urinary retention/ neurogenic bladder - schedule voids q6-8 hrs. Check post-void residual as needed; In and Out catheterize if post-void residual is more than 400 cc.  -strict I/o; wt stable  -only reported by RN 2 voids; pt recalls \"more than that, at least 4 or 5\"; creat 0.71 on 3/14; monitor . Stress strict I/O; 3/18 UOP record improved     Hx of cerebellar stroke; L HH. Stable. C/o blurred vision 3/15, started artificial tears, rec wearing his reading glasses with up close activity; no new neuro deficits  3/18 still with blurred vision , no worse. Has had since post op but did not report. Fundoscopic exam nl. Needs Opthal f/u. Did not do after CVA in Jan 2020        bowel program - miralax and prn program     GERD - resume PPI. At times may need additional antacids, Maalox prn.        -will need f/u with Dr Casanova [address] and Dr Medina Manlius of cardiac surgery.         Functional Level On Admission:   Functionally, min assist with transfers and ambul 200ft with RW. ADLs reported by OT at min assist for bathing/dressing/toileting; CGA grooming, indep feeding    Functional Level At Discharge:     Occupational Therapy Daily Note  Mobility    Score Comments                           Transfer Assist 4: Supervision or touching A Transfer Type: SPT   Equipment: Rolling Walker   Comments: supervision for safety       Activities of Daily Living     Score Comments   Eating 6: Independent     Oral Hygiene 6: Independent     Bathing 6: Independent Type of Shower: Shower  Position: Standing PRN and Unsupported Sitting   Adaptive  Equipment: Tub Transfer Bench and Grab Bars  Comments:    Upper Body  Dressing 5: S/U or clean-up assist Items Applied: Pullover  Position: Unsupported Sitting  Comments:    Lower Body Dressing 5: S/U or clean-up assist Items Applied: Underwear and Elastic pants  Position: Standing PRN and Unsupported Sitting  Adaptive Equipment: N/A  Comments:    Donning/[place] Footwear 5: S/U or clean-up assist Items Applied: Socks and Shoes with fasteners   Adaptive Equipment: N/A  Comments: Toilet Transfer 4: Supervision or touching A Transfer Type: SPT   Equipment: Rolling Walker   Comments: supervision for safety    Toileting Hygiene 6: Independent Output:   Comments:    Education          Objective: Patient presented seated up in recliner.  Patient completed ADL; see above for details. Assessment: Making great progress. On track. Plan: Continue OT POC with focus on ADL/IADL skills, functional transfers, energy conservation techniques, sternal precautions training, functional mobility, coordination, strength, static and dynamic balance, and activity tolerance to maximize safety and independence with ADLs and functional transfers.      Patient tolerated session well with no complaint of pain and was left seated up in recliner with call light/all needs in reach and in no distress. BLE elevated for edema management.      Chanelle Vincent MS, OTR/L  3/18/2020              PHYSICAL THERAPY DAILY NOTE  Time In: 9590  Time Out: 7720  Patient Seen For: PM;Gait training;Transfer training; Therapeutic exercise; Other (see progress notes)     Subjective: Patient had no complaints.            Objective: No pain noted. Ordered walker. Patient given HEP for home and reviewed each ex.   Other (comment)(fall)  GROSS ASSESSMENT Daily Assessment              COGNITION Daily Assessment               BED/MAT MOBILITY Daily Assessment     Supine to Sit : 6 (Modified independent)  Sit to Supine : 6 (Modified independent)         TRANSFERS Daily Assessment     Transfer Type: SPT with walker  Transfer Assistance : 4 (Contact guard assistance)  Sit to Stand Assistance: Minimal assistance  Car Transfers: Not tested         GAIT Daily Assessment     Amount of Assistance: 5 (Stand-by assistance)  Distance (ft): 120 Feet (ft)  Assistive Device: Walker, rolling         STEPS or STAIRS Daily Assessment     Steps/Stairs Ambulated (#): 8  Level of Assist : 0 (Not tested)  Rail Use: Both         BALANCE Daily Assessment              WHEELCHAIR MOBILITY Daily Assessment              LOWER EXTREMITY EXERCISES Daily Assessment     Extremity: Both  Exercise Type #1: Standing lower extremity strengthening  Sets Performed: 1  Reps Performed: 20  Level of Assist: Additional time             Assessment: Patient making good progress.            Plan of Care: Continue with plan of care.     Amanda Morrow, PTA  3/18/2020        OT Daily Note     Time In 1032   Time Out 1115      Subjective: Agreeable to therapy  Pain: Not expressed     Precautions: Other (comment)(fall)     Activity Tolerance   Patient completed BUE endurance and cognitive task seated in wheelchair at tabletop. For first task, patient prompted to locate and retrieve numbered discs from [place] board at midline on tabletop using alternating hands and insert into slanted target board forward on tabletop in chronological order (L to R, top to bottom). Patient completed with good accuracy, then prompted to retrieve and flip numbered discs from target board using alternating hands (completing in-hand manipulation), revealing new random number 1-60, and place back into corresponding space in [place] board at midline on tabletop. Patient again completed with 100% accuracy.     Patient completed standing trial at elevated tabletop for functional reaching task promoting dynamic standing balance and activity tolerance.   Patient transferred sit <> stand and maintained balance with supervision, tolerated standing x 10 minutes to participate in large jigsaw puzzle before time  this session.      Assessment: Patient tolerated well     Education: Purpose of therapy  Interdisciplinary Communication: Collaborated with OTRashawn regarding patient's performance and POC.      Plan: Continue to address ADL/IADL, functional mobility, activity tolerance, balance, strengthening, education.        Noreen Phillips, OTR/L                  Home Architecture:   Past Medical History:   Diagnosis Date    Arthritis     CAD (coronary artery disease)     heart cath 2020    [facility]cere[facility] vascular [facility]) [facility] 1/3/2020    Diabetes type 2, uncontrolled ([facility]     Type II on metformin last A1C 6.3 2020    Hypercholesteremia     Sleep apnea     no cpap    Systolic CHF, chronic ([facility] [address] 2020    echo 2020 EF 30-35%    TIA (transient ischemic attack)     Troponin I above reference range 1/3/2020      Past Surgical History:   Procedure Laterality Date    HX CYST REMOVAL      HX HERNIA REPAIR      HX KNEE ARTHROSCOPY      rt knee      Family History   Problem Relation Age of Onset    Heart Disease Father     Diabetes Father     Heart Disease Paternal Grandfather     Diabetes Sister     Diabetes Sister       Social History     Tobacco Use    Smoking status: Former Smoker     Packs/day: 1.00     Years: 20.00     Pack years: 20.00     Last attempt to quit: 10/20/1997     Years since quittin.4    Smokeless tobacco: Never Used   Substance Use Topics    Alcohol use: Not Currently     Alcohol/week: 0.0 standard drinks     Comment: rare     Prior to Admission medications    Medication Sig Start Date End Date Taking? Authorizing Provider   atorvastatin (LIPITOR) 80 mg tablet Take 1 Tab by mouth nightly. 3/18/20  Yes Kassidy Fishman MD   amiodarone (PACERONE) 100 mg tablet Take 1 Tab by mouth every twelve (12) hours. Indications: prevention of post cardio-thoracic surgery atrial fibrillation 3/18/20  Yes Kassidy Fishman MD   oxyCODONE IR (ROXICODONE) 5 mg immediate release tablet Take 1 Tab by mouth every six (6) hours as needed for Pain for up to 5 days. Max Daily Amount: 20 mg. 3/18/20 3/23/20 Yes Kassidy Fishman MD   ondansetron (ZOFRAN ODT) 4 mg disintegrating tablet Take 1 Tab by mouth every six (6) hours as needed for Nausea or Vomiting. 3/18/20  Yes Kassidy Fishman MD   metFORMIN (GLUCOPHAGE) 500 mg tablet Take 1 Tab by mouth two (2) times daily (with meals). 3/18/20  Yes Kassidy Fishman MD   citalopram (CELEXA) 20 mg tablet Take 1 Tab by mouth daily. 3/18/20  Yes Kassidy Fishman MD   traMADoL (ULTRAM) 50 mg tablet Take 1 Tab by mouth every six (6) hours as needed for Pain for up to 30 days.  Max Daily Amount: 200 mg. 3/13/20 4/12/20  Lucien Billy PA   nitroglycerin (NITROSTAT) 0.4 mg SL tablet 1 Tab by SubLINGual route as needed for Chest Pain. Up to 3 doses. 3/13/20   Lucien Billy PA   acetaminophen (TYLENOL) 500 mg tablet Take 500 mg by mouth every six (6) hours as needed for Pain. Provider, Historical   aspirin delayed-release 81 mg tablet Take 81 mg by mouth daily. Provider, Historical   terazosin (HYTRIN) 10 mg capsule Take 10 mg by mouth nightly. Provider, Historical   SITagliptin (JANUVIA) 100 mg tablet Take 100 mg by mouth daily. Provider, Historical   clopidogreL (PLAVIX) 75 mg tab Take 75 mg by mouth daily. Provider, Historical   metFORMIN (GLUCOPHAGE) 500 mg tablet Take 2 tablets by mouth every morning then Take 2 tablets by mouth every night  Patient taking differently: 500 mg two (2) times daily (with meals).  4/15/19   JAVI Morfin     No Known Allergies     Lab Review:   Recent Results (from the past 72 hour(s))   GLUCOSE, POC    Collection Time: 03/15/20  4:16 PM   Result Value Ref Range    Glucose (POC) 154 (H) 65 - 100 mg/dL   GLUCOSE, POC    Collection Time: 03/15/20  7:22 PM   Result Value Ref Range    Glucose (POC) 113 (H) 65 - 100 mg/dL   GLUCOSE, POC    Collection Time: 03/16/20  7:07 AM   Result Value Ref Range    Glucose (POC) 128 (H) 65 - 100 mg/dL   GLUCOSE, POC    Collection Time: 03/16/20 11:17 AM   Result Value Ref Range    Glucose (POC) 163 (H) 65 - 100 mg/dL   GLUCOSE, POC    Collection Time: 03/16/20  4:22 PM   Result Value Ref Range    Glucose (POC) 105 (H) 65 - 100 mg/dL   GLUCOSE, POC    Collection Time: 03/17/20  6:36 AM   Result Value Ref Range    Glucose (POC) 155 (H) 65 - 100 mg/dL   CBC W/O DIFF    Collection Time: 03/17/20  6:52 AM   Result Value Ref Range    WBC 9.4 4.3 - 11.1 K/uL    RBC 3.10 (L) 4.23 - 5.6 M/uL    HGB 9.8 (L) 13.6 - 17.2 g/dL    HCT 30.4 (L) 41.1 - 50.3 %    MCV 98.1 (H) 79.6 - 97.8 FL    MCH 31.6 26.1 - 32.9 PG    MCHC 32.2 31.4 - 35.0 g/dL    RDW 14.5 11.9 - 14.6 %    PLATELET 804 412 - 934 K/uL    MPV 9.8 9.4 - 12.3 FL    ABSOLUTE NRBC 0.00 0.0 - 0.2 K/uL   METABOLIC PANEL, BASIC    Collection Time: 03/17/20  6:52 AM   Result Value Ref Range    Sodium 140 136 - 145 mmol/L    Potassium 3.9 3.5 - 5.1 mmol/L    Chloride 106 98 - 107 mmol/L    CO2 30 21 - 32 mmol/L    Anion gap 4 (L) 7 - 16 mmol/L    Glucose 126 (H) 65 - 100 mg/dL    BUN 11 8 - 23 MG/DL    Creatinine 0.63 (L) 0.8 - 1.5 MG/DL    GFR est AA >60 >60 ml/min/1.73m2    GFR est non-AA >60 >60 ml/min/1.73m2    Calcium 8.6 8.3 - 10.4 MG/DL   GLUCOSE, POC    Collection Time: 03/17/20 11:11 AM   Result Value Ref Range    Glucose (POC) 109 (H) 65 - 100 mg/dL   GLUCOSE, POC    Collection Time: 03/17/20  4:23 PM   Result Value Ref Range    Glucose (POC) 116 (H) 65 - 100 mg/dL   GLUCOSE, POC    Collection Time: 03/18/20  7:18 AM   Result Value Ref Range    Glucose (POC) 147 (H) 65 - 100 mg/dL       Functional Assessment:          Balance  Sitting - Static: Good (unsupported) (03/14/20 1500)  Sitting - Dynamic: Good (unsupported) (03/14/20 1500)  Standing - Static: Fair (03/14/20 1500)                     Catherene Bunting Fall Risk Assessment:  Catherene Bunting Fall Risk  Mobility: Ambulates or transfers with assist devices or assistance (03/18/20 3742)  Mobility Interventions: OT consult for ADLs; Patient to call before getting OOB;PT Consult for mobility concerns;PT Consult for assist device competence;Utilize walker, cane, or other assistive device (03/18/20 0705)  Mentation: Alert, oriented x 3 (03/18/20 0705)  Mentation Interventions: Adequate sleep, hydration, pain control;Update white board; Door open when patient unattended;Evaluate medications/consider consulting pharmacy; Increase mobility (03/18/20 0705)  Medication: Patient receiving anticonvulsants, sedatives(tranquilizers), psychotropics or hypnotics, hypoglycemics, narcotics, sleep aids, antihypertensives, laxatives, or diuretics (03/18/20 0705)  Medication Interventions: Patient to call before getting OOB; Evaluate medications/consider consulting pharmacy (03/18/20 9208)  Elimination: Needs assistance with toileting (03/18/20 0705)  Elimination Interventions: Call light in reach; Patient to call for help with toileting needs (03/18/20 0705)  Prior Fall History: Before admission in past 12 months _home or previous inpatient care) (03/18/20 0705)  History of Falls Interventions: Door open when patient unattended;Consult care management for discharge planning;Evaluate medications/consider consulting pharmacy (03/18/20 0705)  Total Score: 4 (03/18/20 0705)  Standard Fall Precautions: Yes (03/18/20 0705)  High Fall Risk: Yes (03/17/20 2019)     Speech Assessment:         Ambulation:  Gait  Distance (ft): 120 Feet (ft) (03/18/20 1601)  Assistive Device: Walker, rolling (03/18/20 1601)  Rail Use: Both (03/18/20 1218)     Visit Vitals  BP 99/58   Pulse 91   Temp 98.8 °F (37.1 °C)   Resp 16   Wt 159 lb 12.8 oz (72.5 kg)   SpO2 97%   BMI 23.60 kg/m²      Intake and Output:    03/16 1901 - 03/18 0700  In: 720 [P.O.:720]  Out: [address] [Urine:875]    Discharge Exam:  General: Alert and age appropriately oriented. No acute cardio respiratory distress. HEENT: Normocephalic,no scleral icterus  Oral mucosa moist without cyanosis   Lungs: Clear to auscultation  bilaterally. Respiration even and unlabored   Heart: Regular rate and rhythm, S1, S2   No  murmurs, clicks, rub or gallops   Abdomen: Soft, non-tender, nondistended. Bowel sounds present. No organomegaly. Genitourinary: defer   Neuromuscular:        Grossly no focal motor deficits noted. Moves ankles. Ankle dorsiflexion 5/5  Ankle plantarflexion 5/5  No sensory deficits distally. Left visual field deficit on periph confrontation  PERRLA, EOMI. Fundi sharp   Skin/extremity: No rashes, no erythema.  No calf tenderness BLE  Incisions c/d/i; no edema         Problem List as of 3/18/2020 Date Reviewed: 2/24/2020          Codes Class Noted - Resolved    * (Principal) Physical debility ICD-10-CM: R53.81  ICD-9-CM: 799.3  3/13/2020 - Present        S/P CABG x 3 ICD-10-CM: Z95.1  ICD-9-CM: V45.81  3/10/2020 - Present        Pleural effusion, right ICD-10-CM: J90  ICD-9-CM: 511.9  3/8/2020 - Present        Atelectasis ICD-10-CM: J98.11  ICD-9-CM: 518.0  3/6/2020 - Present        Acute pulmonary edema ([facility] ICD-10-CM: J81.0  ICD-9-CM: 518.4  3/6/2020 - Present        Ventricular fibrillation (HCC) ICD-10-CM: I49.01  ICD-9-CM: 427.41  3/6/2020 - Present        Patent foramen ovale ICD-10-CM: Q21.1  ICD-9-CM: 745.5  3/5/2020 - Present        Mitral valve regurgitation ICD-10-CM: I34.0  ICD-9-CM: 424.0  3/5/2020 - Present        CAD (coronary artery disease) (Chronic) ICD-10-CM: I25.10  ICD-9-CM: 414.00  3/5/2020 - Present        Mitral valve insufficiency ICD-10-CM: I34.0  ICD-9-CM: 424.0  3/5/2020 - Present        Atherosclerosis of coronary artery of native heart with unstable angina pectoris (HCC) ICD-10-CM: I25.110  ICD-9-CM: 414.01, 411.1  3/5/2020 - Present        Type II diabetes mellitus (HCC) (Chronic) ICD-10-CM: E11.9  ICD-9-CM: 250.00  3/5/2020 - Present        Respiratory failure, post-operative ([facility] ICD-10-CM: R96.322  ICD-9-CM: 518.51  3/5/2020 - Present        Hypoxia ICD-10-CM: R09.02  ICD-9-CM: 799.02  3/5/2020 - Present        History of stroke (Chronic) ICD-10-CM: Z86.73  ICD-9-CM: V12.54  2/7/2020 - Present        Systolic CHF, chronic (HCC) (Chronic) ICD-10-CM: I50.22  ICD-9-CM: 428.22, 428.0  1/5/2020 - Present        CVA (cerebral vascular accident) ([facility] [address] (Chronic) ICD-10-CM: I63.9  ICD-9-CM: 434.91  1/3/2020 - Present        RESOLVED: Hypotension ICD-10-CM: I95.9  ICD-9-CM: 458.9  1/5/2020 - 3/5/2020        RESOLVED: Elevated brain natriuretic peptide (BNP) level ICD-10-CM: R79.89  ICD-9-CM: 790.99  1/3/2020 - 3/5/2020        RESOLVED: Pulmonary edema ICD-10-CM: J81.1  ICD-9-CM: 140 1/3/2020 - 3/5/2020              Discharge Instructions:   1. Diet: Cardiac Diet and Diabetic Diet  2. Activity: Activity as tolerated; no lifting/pushing or pulling > 5lbs. No driving  3. Wound Care: Keep wound clean and dry  Current Discharge Medication List      START taking these medications    Details   oxyCODONE IR (ROXICODONE) 5 mg immediate release tablet Take 1 Tab by mouth every six (6) hours as needed for Pain for up to 5 days. Max Daily Amount: 20 mg.  Qty: 20 Tab, Refills: 0    Associated Diagnoses: S/P CABG x 3      ondansetron (ZOFRAN ODT) 4 mg disintegrating tablet Take 1 Tab by mouth every six (6) hours as needed for Nausea or Vomiting. Qty: 10 Tab, Refills: 0         CONTINUE these medications which have CHANGED    Details   atorvastatin (LIPITOR) 80 mg tablet Take 1 Tab by mouth nightly. Qty: 90 Tab, Refills: 3      amiodarone (PACERONE) 100 mg tablet Take 1 Tab by mouth every twelve (12) hours. Indications: prevention of post cardio-thoracic surgery atrial fibrillation  Qty: 60 Tab, Refills: 1      metFORMIN (GLUCOPHAGE) 500 mg tablet Take 1 Tab by mouth two (2) times daily (with meals). Qty: 60 Tab, Refills: 2      citalopram (CELEXA) 20 mg tablet Take 1 Tab by mouth daily. Qty: 30 Tab, Refills: 3         CONTINUE these medications which have NOT CHANGED    Details   nitroglycerin (NITROSTAT) 0.4 mg SL tablet 1 Tab by SubLINGual route as needed for Chest Pain. Up to 3 doses. Qty: 2 Bottle, Refills: 4      aspirin delayed-release 81 mg tablet Take 81 mg by mouth daily. clopidogreL (PLAVIX) 75 mg tab Take 75 mg by mouth daily.          STOP taking these medications       traMADoL (ULTRAM) 50 mg tablet Comments:   Reason for Stopping:         acetaminophen (TYLENOL) 500 mg tablet Comments:   Reason for Stopping:         terazosin (HYTRIN) 10 mg capsule Comments:   Reason for Stopping:         SITagliptin (JANUVIA) 100 mg tablet Comments:   Reason for Stopping:         carvediloL (COREG) 6.25 mg tablet Comments:   Reason for Stopping:               Rehabilitation Management:   1. Devices: Walkers, Type: Rolling Walker thru [employer]  2.  Consult: PT /OT/CM  Disposition: home with HH and supervision    Follow-up with Dr Ashli Henrandez, Dr Cesia Warner (cardiology)  Earlene Sheldon MD

## 2020-03-18 NOTE — PROGRESS NOTES
PHYSICAL THERAPY DAILY NOTE  Time In: 0227  Time Out: 1003  Patient Seen For: AM;Transfer training;Gait training; Therapeutic exercise; Other (see progress notes)    Subjective: Patient had no complaints. Objective: No pain noted. Other (comment)(fall)  GROSS ASSESSMENT Daily Assessment            COGNITION Daily Assessment           BED/MAT MOBILITY Daily Assessment    Supine to Sit : 6 (Modified independent)  Sit to Supine : 6 (Modified independent)       TRANSFERS Daily Assessment    Transfer Type: SPT with walker  Transfer Assistance : 4 (Contact guard assistance)  Sit to Stand Assistance: Minimal assistance  Car Transfers: Not tested       GAIT Daily Assessment   Worked on no walker vs rolling walker. Patient s knees gave away on steps and he was able to catch himself . Discussed with patient and he agrees he is safer with rolling walker. Amount of Assistance: 5 (Stand-by assistance)  Distance (ft): 120 Feet (ft)  Assistive Device: Walker, rolling       STEPS or STAIRS Daily Assessment    Steps/Stairs Ambulated (#): 8  Level of Assist : 4 (Contact guard assistance)  Rail Use: Both       BALANCE Daily Assessment            WHEELCHAIR MOBILITY Daily Assessment            LOWER EXTREMITY EXERCISES Daily Assessment    Extremity: Both  Exercise Type #1: Seated lower extremity strengthening  Sets Performed: 1  Reps Performed: 10  Level of Assist: Stand-by assistance          Assessment: Patient making good progress. Plan of Care: Continue with plan of care.     Henry Kraus, PTA  3/18/2020

## 2020-03-18 NOTE — PROGRESS NOTES
PHYSICAL THERAPY DAILY NOTE  Time In: 0786  Time Out: 7212  Patient Seen For: PM;Gait training;Transfer training; Therapeutic exercise; Other (see progress notes)    Subjective: Patient had no complaints. Objective: No pain noted. Ordered walker. Patient given HEP for home and reviewed each ex. Other (comment)(fall)  GROSS ASSESSMENT Daily Assessment            COGNITION Daily Assessment           BED/MAT MOBILITY Daily Assessment    Supine to Sit : 6 (Modified independent)  Sit to Supine : 6 (Modified independent)       TRANSFERS Daily Assessment    Transfer Type: SPT with walker  Transfer Assistance : 4 (Contact guard assistance)  Sit to Stand Assistance: Minimal assistance  Car Transfers: Not tested       GAIT Daily Assessment    Amount of Assistance: 5 (Stand-by assistance)  Distance (ft): 120 Feet (ft)  Assistive Device: Walker, rolling       STEPS or STAIRS Daily Assessment    Steps/Stairs Ambulated (#): 8  Level of Assist : 0 (Not tested)  Rail Use: Both       BALANCE Daily Assessment            WHEELCHAIR MOBILITY Daily Assessment            LOWER EXTREMITY EXERCISES Daily Assessment    Extremity: Both  Exercise Type #1: Standing lower extremity strengthening  Sets Performed: 1  Reps Performed: 20  Level of Assist: Additional time          Assessment: Patient making good progress. Plan of Care: Continue with plan of care.     Wilver Sexton, PTA  3/18/2020

## 2020-03-18 NOTE — PROGRESS NOTES
Team conference held today. Patient will discharge on  3/19/2020. Home Health PT will be set up with [address], orders for walker faxed to [address] [place] [address]. Pablo Iverson will be delivered to patient's home. CM will continue to follow.

## 2020-03-18 NOTE — PROGRESS NOTES
Problem: Diabetes Self-Management  Goal: *Disease process and treatment process  Description: Define diabetes and identify own type of diabetes; list 3 options for treating diabetes. Outcome: Progressing Towards Goal  Goal: *Incorporating nutritional management into lifestyle  Description: Describe effect of type, amount and timing of food on blood glucose; list 3 methods for planning meals. Outcome: Progressing Towards Goal  Goal: *Incorporating physical activity into lifestyle  Description: State effect of exercise on blood glucose levels. Outcome: Progressing Towards Goal  Goal: *Developing strategies to promote health/change behavior  Description: Define the ABC's of diabetes; identify appropriate screenings, schedule and personal plan for screenings. Outcome: Progressing Towards Goal  Goal: *Using medications safely  Description: State effect of diabetes medications on diabetes; name diabetes medication taking, action and side effects. Outcome: Progressing Towards Goal  Goal: *Monitoring blood glucose, interpreting and using results  Description: Identify recommended blood glucose targets  and personal targets. Outcome: Progressing Towards Goal  Goal: *Prevention, detection, treatment of acute complications  Description: List symptoms of hyper- and hypoglycemia; describe how to treat low blood sugar and actions for lowering  high blood glucose level. Outcome: Progressing Towards Goal  Goal: *Prevention, detection and treatment of chronic complications  Description: Define the natural course of diabetes and describe the relationship of blood glucose levels to long term complications of diabetes.   Outcome: Progressing Towards Goal  Goal: *Developing strategies to address psychosocial issues  Description: Describe feelings about living with diabetes; identify support needed and support network  Outcome: Progressing Towards Goal  Goal: *Insulin pump training  Outcome: Progressing Towards Goal  Goal: *Sick day guidelines  Outcome: Progressing Towards Goal  Goal: *Patient Specific Goal (EDIT GOAL, INSERT TEXT)  Outcome: Progressing Towards Goal     Problem: Patient Education: Go to Patient Education Activity  Goal: Patient/Family Education  Outcome: Progressing Towards Goal     Problem: Falls - Risk of  Goal: *Absence of Falls  Description: Document Jung Mason Fall Risk and appropriate interventions in the flowsheet.   Outcome: Progressing Towards Goal  Note: Fall Risk Interventions:  Mobility Interventions: OT consult for ADLs, Patient to call before getting OOB, PT Consult for mobility concerns, PT Consult for assist device competence, Utilize walker, cane, or other assistive device    Mentation Interventions: Adequate sleep, hydration, pain control, Update white board, Door open when patient unattended, Evaluate medications/consider consulting pharmacy, Increase mobility    Medication Interventions: Patient to call before getting OOB, Evaluate medications/consider consulting pharmacy    Elimination Interventions: Call light in reach, Patient to call for help with toileting needs    History of Falls Interventions: Door open when patient unattended, Consult care management for discharge planning, Evaluate medications/consider consulting pharmacy         Problem: Patient Education: Go to Patient Education Activity  Goal: Patient/Family Education  Outcome: Progressing Towards Goal     Problem: Patient Education: Go to Patient Education Activity  Goal: Patient/Family Education  Outcome: Progressing Towards Goal

## 2020-03-19 VITALS
RESPIRATION RATE: 12 BRPM | DIASTOLIC BLOOD PRESSURE: 66 MMHG | WEIGHT: 149.8 LBS | OXYGEN SATURATION: 96 % | SYSTOLIC BLOOD PRESSURE: 114 MMHG | BODY MASS INDEX: 22.12 KG/M2 | TEMPERATURE: 98 F | HEART RATE: 61 BPM

## 2020-03-19 LAB — GLUCOSE BLD STRIP.AUTO-MCNC: 152 MG/DL (ref 65–100)

## 2020-03-19 PROCEDURE — 82962 GLUCOSE BLOOD TEST: CPT

## 2020-03-19 PROCEDURE — 97116 GAIT TRAINING THERAPY: CPT

## 2020-03-19 PROCEDURE — 99239 HOSP IP/OBS DSCHRG MGMT >30: CPT | Performed by: PHYSICAL MEDICINE & REHABILITATION

## 2020-03-19 PROCEDURE — 97110 THERAPEUTIC EXERCISES: CPT

## 2020-03-19 PROCEDURE — 97535 SELF CARE MNGMENT TRAINING: CPT

## 2020-03-19 PROCEDURE — 74011250637 HC RX REV CODE- 250/637: Performed by: PHYSICAL MEDICINE & REHABILITATION

## 2020-03-19 PROCEDURE — 97530 THERAPEUTIC ACTIVITIES: CPT

## 2020-03-19 RX ADMIN — CITALOPRAM 10 MG: 10 TABLET ORAL at 08:25

## 2020-03-19 RX ADMIN — AMIODARONE HYDROCHLORIDE 100 MG: 200 TABLET ORAL at 08:25

## 2020-03-19 RX ADMIN — Medication 1 AMPULE: at 08:23

## 2020-03-19 RX ADMIN — METFORMIN HYDROCHLORIDE 500 MG: 500 TABLET ORAL at 08:24

## 2020-03-19 RX ADMIN — FAMOTIDINE 20 MG: 10 TABLET ORAL at 08:25

## 2020-03-19 RX ADMIN — ASPIRIN 81 MG: 81 TABLET ORAL at 08:24

## 2020-03-19 NOTE — PROGRESS NOTES
CASE MANAGEMENT DISCHARGE NOTE      Discharge Destination:  Home with family assistance    Discharge Date:   3/19/2020    DME: walker    Home Health Agency: [facility]:  none    STR: none    Care Management Interventions  PCP Verified by CM: Yes(Dr. Rosalee Curtis, unsure of last visit)  Discharge Durable Medical Equipment: Yes(walker)  Current Support Network: Own Home(lives with sister)  The Plan for Transition of Care is Related to the Following Treatment Goals : DC home with Home related Healthcare PT and to regain strength and to return to baseline functioning.    The Patient and/or Patient Representative was Provided with a Choice of Provider and Agrees with the Discharge Plan?: Yes  Freedom of Choice List was Provided with Basic Dialogue that Supports the Patient's Individualized Plan of Care/Goals, Treatment Preferences and Shares the Quality Data Associated with the Providers?: Yes  Discharge Location  Discharge Placement: Home with home health(home Health PT)

## 2020-03-19 NOTE — PROGRESS NOTES
Patient sitting up in recliner, all discharge instructions given, questions answered, prescriptions already called into pharmacy by Dr. Jabier Sutton. Room items packed with patient instructions, cell phone and  with patient. Eye glasses in case in bag along with all clothing, blanket and books. Paperwork signed and now waiting for ride.

## 2020-03-19 NOTE — PROGRESS NOTES
OT DISCHARGE REPORT    Time In: 8380  Time Out: 0822  Mobility   Usual Performance Score Comments   Sit to Stand 6: Independent I   Transfer Assist 6: Independent Transfer Type: SPT   Equipment: N/A   Comments: I     Activities of Daily Living    Usual Performance Score Comments   Oral Hyigene 6: Independent I   Bathing 6: Independent Type of Shower: Shower  Position: Supported sitting and Standing PRN   Adaptive  Equipment: Tub Transfer Bench and Grab Bars  Comments: I   Upper Body  Dressing 6: Independent Items Applied: Pullover  Position: Unsupported Sitting  Comments: I   Lower Body Dressing 6: Independent Items Applied: Underwear and Elastic pants  Position: Supported sitting and Standing PRN  Adaptive Equipment: N/A  Comments: I   Donning/[place] Footwear 6: Independent Items Applied: Socks and Shoes with fasteners   Adaptive Equipment: N/A  Comments: I   Toilet Transfer 6: Independent Transfer Type: SPT   Equipment: Rolling Walker   Comments: I   Toileting Hygiene 6: Independent Output: BM and urine  Comments: I   Education  Pacing      Plan of Care: Please see Care Plan for progress towards goals during stay  Discharge Location: Home with sister  Safety/Supervision Recommendations/Functional Level: Pt is independent with self-care at this time. Pt may need assistance with IADL until fully healed. Family Training: Not indicated secondary to pt being independent    Recommended Continuing Therapy: Not indicated at this time  Residual Deficits: Activity tolerance, balance  Progress over LOS: Pt has made improvements with bathing, dressing, and transfers allowing pt to return home safely with family. Summary of Session: Pt was in bed and agreeable to tx. Pt's performance with ADL is reflected in above chart. Pt is excited about going home. Pt left in room with all needs within reach. Discussed green socks with PT [place].      Cortez Brunner OT   3/19/2020

## 2020-03-19 NOTE — PROGRESS NOTES
PHYSICAL THERAPY DISCHARGE SUMMARY  [id number]     Precautions at discharge: Other (comment)(sternal, falls)    Problem List:    Decreased strength B LE  [x]     Decreased strength trunk/core  [x]     Decreased AROM   []     Decreased PROM  []     Decreased balance sitting  []     Decreased balance standing  [x]     Decreased endurance  [x]     Pain  []       Functional Limitations:   Decreased independence with bed mobility  []     Decreased independence with functional transfers  []     Decreased independence with ambulation  [x]     Decreased independence with stair negotiation  [x]            Outcome Measures: Vital Signs: /66   Pulse 61   Temp 98 °F (36.7 °C)   Resp 12   Wt 67.9 kg (149 lb 12.8 oz)   SpO2 96%   BMI 22.12 kg/m²      Pain level: no c/o pain    Patient education: Pt. Educated on how proximal LE weakness can increase falls risk along w/ recommendation for using RW for longer distances and/or outdoors. Pt. Andreea Garcia understanding. Pt. Also educated on use of reacher & demonstrated w/o difficulty. Interdisciplinary Communication: Discussed pt's d/c plan w/ OT      Cognition: Pt. Alert & follows commands consistently. Participates well w/ therapy. Good insight into deficits.      MMT Initial Asssessment   Right Lower Extremity Left Lower Extremity   Hip Flexion 5 4   Knee Extension 5 4+   Knee Flexion 5 4+   Ankle Dorsiflexion 5 4+      MMT Discharge Assessment   Right Lower Extremity Left Lower Extremity   Hip Flexion 3+ 3+   Knee Extension 5 5   Knee Flexion 5 5   Ankle Dorsiflexion 5 5   0/5 No palpable muscle contraction  1/5 Palpable muscle contraction, no joint movement  2-/5 Less than full range of motion in gravity eliminated position  2/5 Able to complete full range of motion in gravity eliminated position  2+/5 Able to initiate movement against gravity  3-/5 More than half but not full range of motion against gravity  3/5 Able to complete full range of motion against gravity  3+/5 Completes full range of motion against gravity with minimal resistance  4-/5 Completes full range of motion against gravity with minimal-moderate resistance  4/5 Completes full range of motion against gravity with moderate resistance  4+/5 Completes full range of motion against gravity with moderate-maximum resistance  5/5 Completes full range of motion against gravity with maximum resistance     AROM: WFL    PRIMARY MODE OF LOCOMOTION: ambulation  Please see [facility] Interdisciplinary Eval: Coordination/Balance Section for details regarding FIM score description. BED/CHAIR/WHEELCHAIR TRANSFERS Initial Assessment Discharge Assessment   Rolling Right 6 (Modified independent) 6 (Modified independent)   Rolling Left 6 (Modified independent) 6 (Modified independent)   Supine to Sit 6 (Modified independent) 6 (Modified independent)   Sit to Stand Contact guard assistance Modified independent   Sit to Supine 5 (Supervision) 6 (Modified independent)   Transfer Type SPT without device SPT with walker   Comments Patient tends to push r/walker aside and transitions without support Pt.  Benefits from UE support for balance   Car Transfer Not tested Supervision   Car Type instructed in car transfer with rehab car rehab car       [facility] MOBILITY/MANAGEMENT Initial Assessment Discharge Assessment   Able to Propel 0 feet 0 feet   Functional Level   NT   Curbs/ramps assistance required 0 (Not tested) 0 (Not tested)   Wheelchair set up assistance required 0 (Not tested) 0 (Not tested)   Wheelchair management   NT       [facility] INDEPENDENCE Initial Assessment Discharge Assessment   Assistive device Orthotic device Walker, rolling   Ambulation assistance - level surface 4 (Contact guard assistance) 5 (Supervision)   Distance 200 Feet (ft)(100' 2nd walk) 150 Feet (ft)(x1, 50'x1)   Comments Verbal cues needed to not drag his feet during gait and to stay cloer to the r/walker when using it flexed posture, decreased gianfranco, decreased foot clearance in swing   Ambulation assistance - unlevel surface 0 (Not tested) 5 (Supervision/setup)       STEPS/STAIRS Initial Assessment Discharge Assessment   Steps/Stairs ambulated 8 4   Rail Use Both Both   Functional Level   supervision   Comments Step over step gait pattern used. Encouraged 1 step at a time but patient prefers step over step step to gait pattern, cues for safety w/ foot placement   Curbs/Ramps 0 (Not tested) 5 (Supervision/setup)(using RW)       QUALITY INDICATOR ASSIST COMMENTS   Roll right (&return to back) 6: Independent    Roll left (& return to back) 6: Independent    Supine to sit 6: Independent    Sit to stand 6: Independent    Chair/bed-to-chair transfer 6: Independent    Walk 10 feet 6: Independent    Walk 50 feet with 2 turns 4: Supervision or touching A    Walk 150 feet 4: Supervision or touching A    Walk 10 feet on uneven  4: Supervision or touching A    1 step/curb 4: Supervision or touching A    4 steps 4: Supervision or touching A    12 steps Not Tested: Not attempted due to safety concerns     object 4: Supervision or touching A Using RW & reacher   Wheel 48' w/2 turns Not Tested: Not attempted due to medical concerns Sternal precautions   Wheel 150' Not Tested: Not attempted due to medical concerns Sternal precautions   Car Transfer 4: Supervision or touching A        Therapeutic Exercise:  Pt. Performed Motomed @ resistance level 3 x10 minutes to warm up prior to therapy. Pt. Left up in recliner in NAD, call bell in reach. PHYSICAL THERAPY PLAN OF CARE    LTGs: Pt. Met 2/4 LTGs    Pt would benefit from continued skilled physical therapy in order to improve independent functional mobility within the home with use of least restrictive device.  Interventions may include range of motion (AROM, PROM B LE/trunk), motor function (B LE/trunk strengthening/coordination), activity tolerance (vitals, oxygen saturation levels), bed mobility training, balance activities, gait training (progressive ambulation program), and functional transfer training. HEP handout:  Issued 3/18/2020    Pt to be discharged to sister's home with supervision provided by family. Therapy Recommendations upon discharge: [address] needs at discharge: Bernadette KNIGHT    Please see [facility]; Interdisciplinary Eval, Care Plan, and Patient Education for further information regarding physical therapy discharge summary and plan of care.      Tyree Grace, PT  3/19/2020

## 2020-03-19 NOTE — DISCHARGE INSTRUCTIONS
Patient Education        Coronary Artery Bypass Graft: What to Expect at Home  Your Recovery    Coronary artery bypass graft (CABG) is surgery to treat coronary artery disease. The surgery helps blood make a detour, or bypass, around one or more narrowed or blocked coronary arteries. Coronary arteries are the blood vessels that bring blood to the heart. Your doctor did the surgery through a cut, called an incision, in your chest.  You will feel tired and sore for the first few weeks after surgery. You may have some brief, sharp pains on either side of your chest. Your chest, shoulders, and upper back may ache. The incision in your chest and the area where the healthy vein was taken may be sore or swollen. These symptoms usually get better after 4 to 6 weeks. You will probably be able to do many of your usual activities after 4 to 6 weeks. But for 2 to 3 months you will not be able to lift heavy objects or do activities that strain your chest or upper arm muscles. At first you may notice that you get tired easily and need to rest often. It may take 1 to 2 months to get your energy back. Some people find that they are more emotional after this surgery. You may cry easily or show emotion in ways that are unusual for you. This is common and may last for up to a year. Some people get depressed after CABG surgery. Talk with your doctor if you have sadness that continues or you are concerned about how you are feeling. Treatment and other support can help you feel better. Even though the surgery may improve your symptoms, you will still need to make changes in your lifestyle to lower your risk of a heart attack or stroke. It will be important to eat a heart-healthy diet, get regular exercise, not smoke, take your heart medicines, and reduce stress.   You will likely start a cardiac rehabilitation (rehab) program in the hospital. You will continue with this rehab program after you go home to help you recover and prevent problems with your heart. Talk to your doctor about whether rehab is right for you. This care sheet gives you a general idea about how long it will take for you to recover. But each person recovers at a different pace. Follow the steps below to get better as quickly as possible. How can you care for yourself at home? Activity    · Rest when you feel tired. Getting enough sleep will help you recover. Try to sleep on your back for 4 to 6 weeks while your breastbone (sternum) heals. This usually takes about 4 to 6 weeks.     · Try to walk each day. Start by walking a little more than you did the day before. Bit by bit, increase the amount you walk. Walking boosts blood flow and helps prevent pneumonia and constipation.     · Avoid strenuous activities, such as bicycle riding, jogging, weight lifting, or heavy aerobic exercise, until your doctor says it is okay.     · For 3 months, avoid activities that strain your chest or upper arm muscles. This includes pushing a  or vacuum, mopping floors, or swinging a golf club or tennis racquet.     · For 2 to 3 months, avoid lifting anything that would make you strain. This may include a child, heavy grocery bags and milk containers, a heavy briefcase or backpack, or cat litter or dog food bags.     · Hold a pillow firmly over your chest incision when you cough or take deep breaths. This will support your chest and reduce your pain.     · Do breathing exercises at home as instructed by your doctor. This will help prevent pneumonia.     · Ask your doctor when you can drive again.     · You will probably need to take 4 to 12 weeks off from work. It depends on the type of work you do and how you feel.     · You may shower as usual. Pat the incision dry.  Do not take a bath for the first 3 weeks, or until your doctor tells you it is okay.     · Do not swim or use a hot tub for at least 1 month, or until your doctor says it is okay.     · Ask your doctor when it is okay for you to have sex. Diet    · Eat a heart-healthy diet. If you have not been eating this way, talk to your doctor. You also may want to talk to a dietitian. A dietitian can help you learn about healthy foods.     · Drink plenty of fluids (unless your doctor tells you not to).     · You may notice that your bowel movements are not regular right after your surgery. This is common. Try to avoid constipation and straining with bowel movements. You may want to take a fiber supplement every day. If you have not had a bowel movement after a couple of days, ask your doctor about taking a mild laxative. Medicines    · Your doctor will tell you if and when you can restart your medicines. He or she will also give you instructions about taking any new medicines.     · If you take aspirin or some other blood thinner, ask your doctor if and when to start taking it again. Make sure that you understand exactly what your doctor wants you to do.     · Your doctor may give you medicines to prevent blood clots, keep your heartbeat steady, and lower your blood pressure and cholesterol. Take your medicines exactly as prescribed. Call your doctor if you think you are having a problem with your medicine.     · Be safe with medicines. Take pain medicines exactly as directed. ? If the doctor gave you a prescription medicine for pain, take it as prescribed. ? If you are not taking a prescription pain medicine, ask your doctor if you can take an over-the-counter medicine. ? Do not take aspirin, ibuprofen (Advil, Motrin), naproxen (Aleve), or other nonsteroidal anti-inflammatory drugs (NSAIDs) unless your doctor says it is okay.     · If you think your pain medicine is making you sick to your stomach:  ? Take your medicine after meals (unless your doctor has told you not to). ? Ask your doctor for a different pain medicine.     · If your doctor prescribed antibiotics, take them as directed.  Do not stop taking them just because you feel better. You need to take the full course of antibiotics. Incision care    · If you have strips of tape on the incisions the doctor made, leave the tape on for a week or until it falls off.     · Wash the area daily with warm, soapy water, and pat it dry. Don't use hydrogen peroxide or alcohol, which can slow healing. You may cover the area with a gauze bandage if it weeps or rubs against clothing. Change the bandage every day.     · Keep the area clean and dry.     · Do not use any creams, lotions, powders, ointments, or oils unless your doctor tells you it is okay.     · If you have an incision in your leg:  ? Wear support stockings on your legs during the day for the first 2 weeks. You can take the stockings off at night while you sleep. ? Raise your legs above the level of your heart whenever you lay down for the first 4 to 6 weeks. Other instructions    · Keep track of your weight. Weigh yourself every day at the same time of day, on the same scale, in the same amount of clothing. A sudden increase in weight can be a sign of a problem with your heart. Tell your doctor if you suddenly gain weight, such as 3 pounds or more in 2 to 3 days.     · Do not smoke. Smoking can make it harder for you to recover and it will raise the chances of your arteries narrowing again. If you need help quitting, talk to your doctor about stop-smoking programs and medicines. These can increase your chances of quitting for good. Follow-up care is a key part of your treatment and safety. Be sure to make and go to all appointments, and call your doctor if you are having problems. It's also a good idea to know your test results and keep a list of the medicines you take. When should you call for help? Call 911 anytime you think you may need emergency care.  For example, call if:    · You passed out (lost consciousness).     · You have severe trouble breathing.     · You have sudden chest pain and shortness of breath, or you cough up blood.     · You have severe pain in your chest.     · You have symptoms of a heart attack. These may include:  ? Chest pain or pressure, or a strange feeling in the chest.  ? Sweating. ? Shortness of breath. ? Nausea or vomiting. ? Pain, pressure, or a strange feeling in the back, neck, jaw, or upper belly or in one or both shoulders or arms. ? Lightheadedness or sudden weakness. ? A fast or irregular heartbeat. After you call  911, the  may tell you to chew 1 adult-strength or 2 to 4 low-dose aspirin. Wait for an ambulance. Do not try to drive yourself.     · You have angina symptoms (such as chest pain or pressure) that do not go away with rest or are not getting better within 5 minutes after you take a dose of nitroglycerin.    Call your doctor now or seek immediate medical care if:    · You have pain that does not get better after you take pain medicine.     · You have a fever over 100°F.     · You have loose stitches, or your incision comes open.     · Bright red blood has soaked through the bandage over your incision.     · You have signs of infection, such as:  ? Increased pain, swelling, warmth, or redness. ? Red streaks leading from the incision. ? Pus draining from the incision. ? Swollen lymph nodes in your neck, armpits, or groin. ? A fever.     · You have signs of a blood clot in a leg. If you had a vein removed from your leg, you may have tenderness and swelling while your leg heals. But signs of a blood clot may be in a different part of your leg and may include:  ? Pain in your calf, back of the knee, thigh, or groin. ?  Redness and swelling in your leg or groin.     · Your heartbeat feels very fast or slow, skips beats, or flutters.     · You are dizzy or lightheaded, or you feel like you may faint.     · You have new or increased shortness of breath.    Watch closely for changes in your health, and be sure to contact your doctor if:    · You gain weight suddenly, such as 3 pounds or more in 2 to 3 days.     · You have increased swelling in your legs, ankles, or feet.     · You have any concerns about your incision.     · You feel very sad or have other signs of depression, such as trouble sleeping or eating.     · You have questions about diet, exercise, quitting smoking, or stress reduction after surgery. Where can you learn more? Go to http://zulma-lissette.info/  Enter F759 in the search box to learn more about \"Coronary Artery Bypass Graft: What to Expect at Home. \"  Current as of: December 15, 2019Content Version: 12.4  © 9374-3578 [employer], [employer]. Care instructions adapted under license by [employer] (which disclaims liability or warranty for this information). If you have questions about a medical condition or this instruction, always ask your healthcare professional. [address] any warranty or liability for your use of this information. DISCHARGE SUMMARY from Nurse    PATIENT INSTRUCTIONS:    After general anesthesia or intravenous sedation, for 24 hours or while taking prescription Narcotics:  · Limit your activities  · Do not drive and operate hazardous machinery  · Do not make important personal or business decisions  · Do  not drink alcoholic beverages  · If you have not urinated within 8 hours after discharge, please contact your surgeon on call. Report the following to your surgeon:  · Excessive pain, swelling, redness or odor of or around the surgical area  · Temperature over 100.5  · Nausea and vomiting lasting longer than 4 hours or if unable to take medications  · Any signs of decreased circulation or nerve impairment to extremity: change in color, persistent  numbness, tingling, coldness or increase pain  · Any questions    What to do at Home:  Recommended activity: He is independent with self care. *  Please give a list of your current medications to your Primary Care Provider.     * Please update this list whenever your medications are discontinued, doses are      changed, or new medications (including over-the-counter products) are added. *  Please carry medication information at all times in case of emergency situations. These are general instructions for a healthy lifestyle:    No smoking/ No tobacco products/ Avoid exposure to second hand smoke  Surgeon General's Warning:  Quitting smoking now greatly reduces serious risk to your health. Obesity, smoking, and sedentary lifestyle greatly increases your risk for illness    A healthy diet, regular physical exercise & weight monitoring are important for maintaining a healthy lifestyle    You may be retaining fluid if you have a history of heart failure or if you experience any of the following symptoms:  Weight gain of 3 pounds or more overnight or 5 pounds in a week, increased swelling in our hands or feet or shortness of breath while lying flat in bed. Please call your doctor as soon as you notice any of these symptoms; do not wait until your next office visit. The discharge information has been reviewed with the {PATIENT PARENT GUARDIAN:41452}. The {PATIENT PARENT GUARDIAN:07752} verbalized understanding. Discharge medications reviewed with the {Dishcar meds reviewed JGET:20234} and appropriate educational materials and side effects teaching were provided.   ___________________________________________________________________________________________________________________________________

## 2020-03-19 NOTE — PROGRESS NOTES
Problem: Mobility Impaired (Adult and Pediatric)  Goal: *Therapy Goal (Edit Goal, Insert Text)  Description: LTG 3. Patient ambulate 300+ feet with LRAD and Supervision/modified indepedent assist in 7-10 days   Outcome: Resolved/Not Met  Variance Patient slowly responding  Note: Pt. Unable to progress to this distance secondary to fatigue. Goal: *Therapy Goal (Edit Goal, Insert Text)  Description: LTG 4. Patient ambulate up/down 12 steps with bilateral rails and Supervision assist in 7-10 days. Outcome: Resolved/Not Met  Variance Patient slowly responding  Note: Pt. Currently lacks endurance to safely manage 12 steps & will not have to manage @ home     Problem: Mobility Impaired (Adult and Pediatric)  Goal: *Therapy Goal (Edit Goal, Insert Text)  Description: LTG 1. Patient transfer supine<>sit with [place] in 7-10 days   Outcome: Resolved/Met  Goal: *Therapy Goal (Edit Goal, Insert Text)  Description: LTG 2.  Patient transfer sit<>stand and perform stand pivot transfer with LRAD and Modified independent assist within 7-10 days   Outcome: Resolved/Met

## 2020-03-20 ENCOUNTER — PATIENT OUTREACH (OUTPATIENT)
Dept: CASE MANAGEMENT | Age: 70
End: 2020-03-20

## 2020-03-20 NOTE — PROGRESS NOTES
This note will not be viewable in [address] Patient identified for [place] call. Patient was not available at the time of call and VM left on phone with contact number.   CTN to attempt again at a later date

## 2020-03-31 NOTE — OP NOTES
[facility]  OPERATIVE REPORT    Name:  Richard Wood  MR#:  [id number]  :  1950  ACCOUNT #:  [de-identified]  DATE OF SERVICE:  2020    PREOPERATIVE DIAGNOSES:  1. Ischemic cardiomyopathy. 2.  Severe multivessel atherosclerotic coronary artery disease. 3.  Moderate mitral regurgitation. 4.  Patent foramen ovale. POSTOPERATIVE DIAGNOSES:  1. Ischemic cardiomyopathy. 2.  Severe multivessel atherosclerotic coronary artery disease. 3.  Moderate mitral regurgitation. 4.  Patent foramen ovale. PROCEDURES PERFORMED:  1. Lysis of myocardial adhesions. 2.  Mitral valve repair with a 28 mm Physio II annuloplasty ring. 3.  Closure of patent foramen ovale. 4.  Coronary artery bypass grafting x3. Grafts consisting of:  i. Left internal mammary artery to the LAD. ii.  Reverse saphenous vein graft to OM. iii. Reverse saphenous vein graft to the PDA. SURGEON:  Vivi Spicer MD    ASSISTANT:      ANESTHESIA:  General endotracheal with SANDEEP. COMPLICATIONS:  None. SPECIMENS REMOVED:  .    IMPLANTS:      ESTIMATED BLOOD LOSS:  Minimal.    OPERATIVE FINDINGS:  Saphenous vein 3-4 mm, LIMA 3 mm. Aorta was soft. No palpable plaque. PFO was approximately 1-1.5 cm in size. Mitral valve showed a dilated annulus. Both anterior and posterior leaflets appeared to be normal.  LAD is 1.5 mm, severe distal disease. OM is 1.4 mm, severe distal disease. PDA is 1.4 mm, severe distal disease. INDICATION:  The patient is a pleasant 80-year-old gentleman who presented with profound exertional dyspnea and heart failure. Preoperative echo showed quite severe ischemic cardiomyopathy, EF of 20% with a patent foramen ovale. Left heart cath showed severe multivessel disease with, again, depressed left ventricular function.     DESCRIPTION OF PROCEDURE:  After informed consent was obtained from the patient, he was brought to the operating room suite and placed in the supine position. General endotracheal anesthesia was induced without difficulty. Appropriate monitoring lines were placed by Anesthesia as well as transesophageal echo. He was prepped and draped in the usual sterile fashion. Two segments of greater saphenous vein were then taken from his left thigh. These incisions were closed in a two-layered fashion with 3-0 and 4-0 Vicryl. Next, a standard median sternotomy incision was then made. The sternum was carefully divided, Rultract placed and the left internal mammary artery was taken out in pedicled fashion. This was injected with papaverine solution and systemic heparinization was then given and the mammary was disconnected from the chest wall and noted to bleed briskly. At this point, the Duke retractor was placed, sternum opened. Pericardium opened and tacked into a pericardial well. Aorta palpated and noted to be soft. The patient was noted to have marked left ventricular dysfunction with cardiomegaly. Pursestrings were then placed into the aorta, superior vena cava, inferior vena cava, and the right atrium. When adequate ACT was obtained, he was cannulated through these pursestrings and connected to cardiopulmonary bypass. A retrograde cannula was placed in the coronary sinus. Cardiopulmonary bypass was then instituted without difficulty. Good decompression of the heart. Target vessels were marked. Aorta crossclamped and he received both antegrade and retrograde cold blood cardioplegia solution. He did receive intermittent doses of cold blood cardioplegia throughout the remainder of the case. There were noted to be significant amounts of myocardial adhesions, which were taken down sharply. First, the heart was positioned for grafting of the PDA. This was a 1.4-mm vessel. Using reverse saphenous vein graft, an end-to-side anastomosis was completed with running 7-0 Prolene.   This measured back to the aorta and proximal anastomosis was completed with running 6-0 Prolene. Next, heart was positioned for grafting of the OM. This too was a 1.4 mm vessel. Using running 7-0 Prolene and reverse saphenous vein graft, an end-to-side anastomosis was completed. This measured back to the aorta and proximal was completed with running 6-0 Prolene. Next, the heart was positioned for grafting of the LAD. This was a 1.5 mm vessel. Using a running 8-0 Prolene, an end-to-side anastomosis was completed with the left internal mammary artery. It was tacked to the epicardium with 6-0 Prolenes. At this point, caval snares were placed and both cavas underwent Aurelia snare. The right atrium was opened. The foramen ovale was easily identified. An incision was made through this structure and extended up to the dome of the left atrium. Retraction sutures were placed giving excellent visualization of the mitral valve. This was thoroughly inspected with iced saline. The anterior and posterior leaflets appeared to be completely normal.  No evidence of any ruptured cords or elongation. It did appear to be solely based on a dilated annulus, it measured to a 28 mm Physio II. Sutures were placed around the annulus. The ring was brought up in the field. Suture passed through the ring and it seated without difficulty. It saline tested to be completely competent. At this point, the left arteriotomy was closed, including in that suture line the closure of the patent foramen ovale with a 4-0 Prolene. The right arteriotomy was closed with a 4-0 Prolene. He was then placed in steep Trendelenburg position and given a hotshot dose of cardioplegia, was warmed to 37 degrees centigrade. Meticulous de-airing maneuvers were then performed and noted to be adequate by transesophageal echo. Crossclamp was removed. We placed both atrial and ventricular pacing wires. These were brought out through separate stab and affixed to the  skin. He was DDD paced for the remainder of the case. He was weaned from cardiopulmonary bypass without difficulty and decannulated. All pursestrings were tied and he was hemostatic. Protamine sulfate was given. Meticulous hemostasis was achieved. The pericardium was copiously irrigated with warm saline. A single 32-Slovenian mediastinal tube was placed through a separate stab and affixed to the skin. The sternum was reapproximated with stainless steel wire. Fascia was closed with #1 PDS. Subcutaneous was closed with 3-0 Vicryl and the skin was closed with 4-0 Vicryl subcuticular. All instrument and sponge count was correct at the end of the case.       Genevieve Carrizales MD      [id number]  D:  03/30/2020 12:57  T:  03/30/2020 20:09  JOB #:  [id number]

## 2020-04-03 ENCOUNTER — PATIENT OUTREACH (OUTPATIENT)
Dept: CASE MANAGEMENT | Age: 70
End: 2020-04-03

## 2020-04-03 NOTE — PROGRESS NOTES
CNT unable to contact patient after d/c. Per cardiology notes has [facility].   CTN to resolve JOHN PAUL episode at this time and remove self from care team

## 2020-04-09 LAB
FUNGUS CULTURE, RFCO2T: NEGATIVE
FUNGUS SMEAR, RFCO1T: NORMAL
FUNGUS SPEC CULT: NORMAL
FUNGUS STAIN, 188244: NORMAL
REFLEX TO ID, RFCO3T: NORMAL
SPECIMEN SOURCE: NORMAL
SPECIMEN SOURCE: NORMAL

## 2020-04-15 PROBLEM — Z98.890 S/P MITRAL VALVE REPAIR: Status: ACTIVE | Noted: 2020-03-05

## 2020-04-24 LAB
ACID FAST STN SPEC: NEGATIVE
MYCOBACTERIUM SPEC QL CULT: NEGATIVE
SPECIMEN PREPARATION: NORMAL
SPECIMEN SOURCE: NORMAL

## 2020-04-29 ENCOUNTER — TELEPHONE (OUTPATIENT)
Dept: CARDIAC REHAB | Age: 70
End: 2020-04-29

## 2021-02-12 ENCOUNTER — TRANSCRIBE ORDER (OUTPATIENT)
Dept: SCHEDULING | Age: 71
End: 2021-02-12

## 2021-02-12 DIAGNOSIS — N63.0 BREAST MASS IN MALE: Primary | ICD-10-CM

## 2021-03-19 ENCOUNTER — TRANSCRIBE ORDER (OUTPATIENT)
Dept: SCHEDULING | Age: 71
End: 2021-03-19

## 2021-03-19 DIAGNOSIS — N63.0 BREAST MASS IN MALE: Primary | ICD-10-CM

## 2021-03-29 ENCOUNTER — HOSPITAL ENCOUNTER (OUTPATIENT)
Dept: MAMMOGRAPHY | Age: 71
Discharge: HOME | End: 2021-03-29
Attending: FAMILY MEDICINE
Payer: MEDICARE

## 2021-03-29 DIAGNOSIS — N63.0 BREAST MASS IN MALE: ICD-10-CM

## 2021-03-29 PROCEDURE — 76642 ULTRASOUND BREAST LIMITED: CPT

## 2021-03-29 PROCEDURE — 77066 DX MAMMO INCL CAD BI: CPT

## 2021-04-21 ENCOUNTER — APPOINTMENT (OUTPATIENT)
Dept: PHYSICAL THERAPY | Age: 71
End: 2021-04-21

## 2021-06-10 ENCOUNTER — HOSPITAL ENCOUNTER (OUTPATIENT)
Dept: PHYSICAL THERAPY | Age: 71
Discharge: HOME | End: 2021-06-10
Payer: MEDICARE

## 2021-06-10 PROCEDURE — 97162 PT EVAL MOD COMPLEX 30 MIN: CPT

## 2021-06-10 NOTE — THERAPY EVALUATION
WM Obdulio Hester III  : 1950      Payor: [facility] [place] MEDICARE / Plan: [facility] MEDICARE HMO/PPO / Product Type: Managed Care Medicare /    [address] [place] [address] at [address] [place] [address]. [address] [place] [address], [address] [place] [address]  [phone]   [phone]              OUTPATIENT PHYSICAL THERAPY:Initial Assessment: 6/10/2021    ICD-10: Treatment Diagnosis:   Pain in Right Shoulder (M25.511)  Stiffness of Right Shoulder not elsewhere specififed (M25.611)  Impingement syndrome of Right Shoulder (M75.41)              Precautions/Allergies:   Patient has no known allergies. Fall Risk Score: 2 (? 5 = High Risk)  MD Orders: Eval and Treat  MEDICAL/REFERRING DIAGNOSIS:  Pain in right shoulder [M25.511]   DATE OF ONSET: Approximately 2 months ago   REFERRING PHYSICIAN: Shell Wiley., *  RETURN PHYSICIAN APPOINTMENT: TBD by patient      INITIAL ASSESSMENT:   Mr. Durga Farr presents to physical therapy with decreased strength, ROM, joint mobility, functional mobility. These S/S are consistent with right shoulder pain. Patient will benefit from skilled physical therapy for manual therapeutic techniques (as appropriate), therapeutic exercises and activities, neuromuscular re-education, and comprehensive home exercises program to address current impairments and functional limitations. WM Obdulio Hester III will benefit from skilled PT (medically necessary) in order to address above deficits affecting participation in basic ADLs and overall functional tolerance. PROBLEM LIST (Impacting functional limitations):  · Decreased Strength  · Decreased ADL/Functional Activities  · Increased Pain  · Decreased Activity Tolerance  · Increased Shortness of Breath  · Decreased Flexibility/Joint Mobility  · Decreased Cole with Home Exercise Program INTERVENTIONS PLANNED:  1. Cold  2. Family Education  3. Home Exercise Program (HEP)  4. Manual Therapy  5.  Neuromuscular Re-education/Strengthening  6. Range of Motion (ROM)  7. Therapeutic Activites  8. Therapeutic Exercise/Strengthening  9. Transfer Training  10. Ultrasound   TREATMENT PLAN:  Effective Dates: 6/10/2021 TO 8/10/2021 (60 days). Frequency/Duration: 2 times a week for 60 Days  GOALS: (Goals have been discussed and agreed upon with patient.)     Short-Term Goals~4 weeks  Goal Met   1. WM Trung Bhatti III will report <=6/10 pain with don/doffing clothing as well as minimal/no difficulty. 1.  [] Date:   2. WM Trung Bhatti III will demonstrate improvement in active shoulder Flexion to >150 degrees to increase UE function and participation in ADLs. 2.  [] Date:   3. WM Trung Bhatti III will demonstrate demonstrate improvement in active shoulder Abduction to >140 degrees to increase UE function and participation in ADLs. 3.  [] Date:   4. WM Trung Bhatti III will show a greater than 8 point decrease on the DASH in order to show an increase in upper extremity function. 4.  [] Date:   5. WM Trung Bhatti III will be independent in all HEP 5.  [] Date:   6.  6.  [] Date:         Long Term Goals~8 weeks Goal Met   1. WM Trung Bhatti III will show full ROM of the UE in order to return to full functional mobility  1. [] Date:   2. WM Trung Bhatti III will show a greater than 15 point decrease on the DASH in order to show an increase in upper extremity function 2. [] Date:   3. WM Trung Bhatti III will report doing hair/bathing without difficulty and <=2/10 pain in order to be independent with ADL's 3.  [] Date:   4. WM Trung Bhatti III will be independent in all advanced HEP 4.  [] Date:            Outcome Measure: Tool Used: Disabilities of the Arm, Shoulder and Hand (DASH) Questionnaire - Quick Version  Score:  Initial: 27/55  Most Recent: X/55 (Date: -- )   Interpretation of Score: The DASH is designed to measure the activities of daily living in person's with upper extremity dysfunction or pain.   Each section is scored on a 1-5 scale, 5 representing the greatest disability. The scores of each section are added together for a total score of 55. Medical Necessity:   · Skilled intervention continues to be required due to deficits and impairments seen upon initial evaluation affecting patient's participation in ADLs and functional tasks. Reason for Services/Other Comments:  · Patient continues to require skilled intervention due to deficits and impairments seen upon initial evaluation affecting patient's participation in ADLs and functional tasks. Total Treatment Duration:  PT Patient Time In/Time Out  Time In: 1435  Time Out: 1520    Rehabilitation Potential For Stated Goals: good  Regarding [facility]'s therapy, I certify that the treatment plan above will be carried out by a therapist or under their direction. Thank you for this referral,  Gregory Campos, PT     Referring Physician Signature: Aramlamarcarley Gomez, * _______________________________ Date _____________            HISTORY:   History of Present Injury/Illness (Reason for Referral):  Patient reports a gradual increase of right shoulder pain over the last 2 months with no know cause or change in activity.    -Present symptoms/complaints (on day of evaluation)  Pain Scale:  · Current: 4/10  · Best: 4/10  · Worst: 8/10    · Aggravating factors: Carrying, Overhead activities, washing hair and Self Care  · Relieving factors: rest by side  · Irritability: Medium (Onset of pain is equal to alleviation)    Dominant Side:  right  Past Medical History/Comorbidities:   Mr. Temi Bah  has a past medical history of Arthritis, CAD (coronary artery disease), CVA (cerebral vascular accident) ([address] (1/3/2020), Diabetes type 2, uncontrolled ([address] Hypercholesteremia, Sleep apnea, Systolic CHF, chronic ([address] (1/5/2020), TIA (transient ischemic attack) (2009), and Troponin I above reference range (1/3/2020).   Mr. Temi Bah  has a past surgical history that includes hx hernia repair; hx knee arthroscopy; hx cyst removal; and hx cholecystectomy. Social History/Living Environment:    , lives with family  Prior Level of Function/Work/Activity:  Normal  Other Clinical Tests:         none  Current Medications:    Current Outpatient Medications:     tamsulosin (FLOMAX) 0.4 mg capsule, Take 0.4 mg by mouth daily. , Disp: , Rfl:     clopidogreL (Plavix) 75 mg tab, Take 1 Tab by mouth daily. , Disp: 30 Tab, Rfl: 11    atorvastatin (LIPITOR) 80 mg tablet, Take 1 Tab by mouth nightly., Disp: 90 Tab, Rfl: 3    metFORMIN (GLUCOPHAGE) 500 mg tablet, Take 1 Tab by mouth two (2) times daily (with meals). , Disp: 60 Tab, Rfl: 2    citalopram (CELEXA) 20 mg tablet, Take 1 Tab by mouth daily. , Disp: 30 Tab, Rfl: 3    nitroglycerin (NITROSTAT) 0.4 mg SL tablet, 1 Tab by SubLINGual route as needed for Chest Pain. Up to 3 doses. , Disp: 2 Bottle, Rfl: 4    aspirin delayed-release 81 mg tablet, Take 81 mg by mouth daily. , Disp: , Rfl:         Ambulatory/Rehab Services H2 Model Falls Risk Assessment    Risk Factors:       (1)  Gender [Male] Ability to Rise from Chair:       (1)  Pushes up, successful in one attempt    Falls Prevention Plan:       No modifications necessary   Total: (5 or greater = High Risk): 2    ©2010 [facility] [employer]. All Rights Reserved. [facility] Patent #[id number]. Federal Law prohibits the replication, distribution or use without written permission from [facility] Only-apartments       Date Last Reviewed:  6/11/2021   Number of Personal Factors/Comorbidities that affect the Plan of Care: 1-2: MODERATE COMPLEXITY   EXAMINATION:   Observation/Orthostatic Postural Assessment:     Forward Head and Rounded Shoulders  Palpation:          Tenderness anterior right shoulder at biceps tendon  ROM:    AROM/PROM         Joint: Eval Date: 6/10/2021  Re-Assess Date:  Re-Assess Date:    ACTIVE ROM (standing) RIGHT LEFT RIGHT LEFT RIGHT LEFT   Shoulder Flexion 123 145        Shoulder Abduction 92 118           Shoulder Internal Rotation (Apley's) L5 T12           Shoulder External Rotation (Apley's) C5 T2           Elbow ROM WNL WNL           PASSIVE ROM (supine)             Shoulder Flexion 138 155           Shoulder Abduction 105 130           Shoulder Internal Rotation 41 62           Shoulder External Rotation [address]  Yudelka Garland                   Strength:    Joint: Eval Date: 6/10/2021  Re-Assess Date:  Re-Assess Date:     RIGHT LEFT RIGHT LEFT RIGHT LEFT   Shoulder Flexion 4+/5 4+/5           Shoulder Abduction  (C5) 4/5 4+/5           Shoulder Internal Rotation 4+/5 5/5           Shoulder External Rotation 4/5 4+/5           Elbow Flexion  (C6) 4+/5 4+/5           Elbow Extension (C7) 4+/5 4+/5           Scapula 4-/5 4/5                            Manual:  Joint Directon Grade Treatment Effect   GH Right AP, Distraction and Inferior Glide III Reproduced Symptoms   GH Left AP, Distraction and Inferior Glide III Unremarkable             Special Tests:     Curry-Garret: Positive for pain right   Neer's: Positive for pain right   Speed:Negative   External Rotation <50% compared to uninvovled, Spontaneous Progressive Pain , Loss of Motion in multiple planes and Pain at End Range of Motion    Neurological Screen: Assessed @ Initial Visit    Radiating symptoms? Yes/No=poorly localized  Functional Mobility:  Assessed @ Initial Visit         Body Structures Involved:  1. Bones  2. Joints  3. Muscles  4. Ligaments Body Functions Affected:  1. Sensory/Pain  2. Neuromusculoskeletal  3. Movement Related Activities and Participation Affected:  1. Mobility  2.  Self Care   Number of elements that affect the Plan of Care: 3: MODERATE COMPLEXITY   CLINICAL PRESENTATION:   Presentation: Evolving clinical presentation with changing clinical characteristics: MODERATE COMPLEXITY   CLINICAL DECISION MAKING:      Use of outcome tool(s) and clinical judgement create a POC that gives a: Questionable prediction of patient's progress: MODERATE COMPLEXITY     See associated treatment note for treatment provided today.     Future Appointments   Date Time Provider Meryl Morales   6/15/2021  9:00 AM Minus Sham, PT [facility]   6/22/2021  9:00 AM Minus Sham, PT [facility]   6/29/2021  9:00 AM Minus Sham, PT [facility]   7/13/2021  9:00 AM Minus Sham, PT [facility]   7/20/2021  9:00 AM Minus Sham, PT [facility]   7/27/2021  9:00 AM Minus Sham, PT [facility]   8/24/2021 10:40 AM Laura Moody MD BS[facility] BS[facility]   11/30/2021  9:00 AM Rob Rogers DO [address] [facility]         Sona Clinton, PT

## 2021-06-10 NOTE — PROGRESS NOTES
WM Cheryle Humphrey III  : 1950  [place]or[place] / Plan[place] Stack [place] / Product Type: Managed Care Medicare /  [address] at [address] [facility]. [facility], [address] [place] [address], [address] [place] [address]  [phone]   [phone]                                                          Anola Dates., *      OUTPATIENT PHYSICAL THERAPY: Daily Treatment Note 6/10/2021 Visit Count:  1    Tx Diagnosis:  Pain in Right Shoulder (M25.511)  Stiffness of Right Shoulder not elsewhere specififed (M25.611)  Impingement syndrome of Right Shoulder (M75.41)      Pre-treatment Symptoms/Complaints: See Initial Eval Dated 6/10/2021 for more details. Pain: Initial:4/10  Medications Last Reviewed:  6/10/2021     Post Session: 4/10   Updated Objective Findings: See Initial Eval for more details. TREATMENT:   THERAPEUTIC EXERCISE: (0 minutes):  Exercises per grid below to improve mobility, strength and balance. Required minimal visual, verbal and manual cues to promote proper body alignment and promote proper body posture. Progressed resistance and complexity of movement as indicated. Date:  6/10/2021 Date:   Date:     Activity/Exercise Parameters Parameters Parameters   Education HEP, POC, PT goals, anatomy/pathology                                               THERAPEUTIC ACTIVITY: ( 0 minutes): Activities per gid below to improve functional movement related mobility, strength and balance to improve neuro-muscular carryover to daily functional activities for improving patient's quality of life. Required visual, verbal and manual cues to promote proper body alignment and promote proper body posture/mechanics. Progressed resistance and complexity of movement as indicated.      Date:  6/10/2021 Date:   Date:     Activity/Exercise Parameters Parameters Parameters     [address]           MANUAL THERAPY: (0 minutes): Joint mobilization, Soft tissue mobilization was utilized and necessary because of the patient's restricted joint motion and restricted motion of soft tissue mobility. Date  6/10/2021    Technique Used Grade  Level # Time(s) Effect while being performed                                                                 HEP Log Date 1.    6/10/2021   2.  6/10/2021   3. 6/10/2021   4.    5.           [employer] Portal  Treatment/Session Summary:    Response to Treatment: Pt demonstrated understanding of POC and initial HEP. No increase in pain or adverse reactions. Communication/Consultation:  POC, HEP, PT goals, Faxed initial evaluation to MD.   Equipment provided today: HEP Handout   Recommendations/Intent for next treatment session:   Next visit will focus on Manual Therapy Core Stability Pain Science Education RTC strengthening soft tissue mobilization. Treatment Plan of Care Effective Dates: 6/10/2021 TO 8/10/2021 (60 days).   Frequency/Duration: 2 times a week for 60 Days             Total Treatment Billable Duration:   0  Rx plus Eval only     Pavithra Deal PT    Future Appointments   Date Time Provider Meryl Carmen   6/15/2021  9:00 AM Rubbie Asters, PT [facility]   6/22/2021  9:00 AM Rubbie Asters, PT [facility]   6/29/2021  9:00 AM Rubbie Asters, PT [facility]   7/13/2021  9:00 AM Rubbie Asters, PT [facility]   7/20/2021  9:00 AM Rubbie Asters, PT [facility]   7/27/2021  9:00 AM Rubbie Asters, PT [facility]   8/24/2021 10:40 AM Jonnathan Pitt MD BSND BSND   11/30/2021  9:00 AM DO ELEAZAR Starr [facility]

## 2021-06-15 ENCOUNTER — HOSPITAL ENCOUNTER (OUTPATIENT)
Dept: PHYSICAL THERAPY | Age: 71
Discharge: HOME | End: 2021-06-15
Payer: MEDICARE

## 2021-06-15 PROCEDURE — 97140 MANUAL THERAPY 1/> REGIONS: CPT

## 2021-06-15 PROCEDURE — 97110 THERAPEUTIC EXERCISES: CPT

## 2021-06-15 NOTE — PROGRESS NOTES
Amy Duransantiago GRANADOS  : 1950  [place]or[place] / Plan[place] / Product Type: Managed Care Medicare /  Padmini Tyson at [address] [place] [address]. [facility], [address] [place] [address], [address] [place] [address]  [phone]   [phone]                                                          Estelle Montalvo., *      OUTPATIENT PHYSICAL THERAPY: Daily Treatment Note 6/15/2021 Visit Count:  2    Tx Diagnosis:  Pain in Right Shoulder (M25.511)  Stiffness of Right Shoulder not elsewhere specififed (M25.611)  Impingement syndrome of Right Shoulder (M75.41)      Pre-treatment Symptoms/Complaints: Patient still soreness, but ROM better   Pain: Initial:4/10 right 6/10 left  Medications Last Reviewed:  6/15/2021     Post Session: 4/10   Updated Objective Findings: See Initial Eval for more details. TREATMENT:   THERAPEUTIC EXERCISE: (32 minutes):  Exercises per grid below to improve mobility, strength and balance. Required minimal visual, verbal and manual cues to promote proper body alignment and promote proper body posture. Progressed resistance and complexity of movement as indicated. Date:  6/15/2021 Date:   Date:     Activity/Exercise Parameters Parameters Parameters   Education HEP, POC, PT goals, anatomy/pathology     UBE 4/4 level 2     Rings [id number]     Posterior capsule stretch [id number]     IR stretch [id number]     Row 2x10 red     Low Row 2x10 red     Bilateral ER 2x10 orange wall     Bilateral horizontal Abduction 2x10 orange wall     SB wall walk                  THERAPEUTIC ACTIVITY: ( 0 minutes): Activities per gid below to improve functional movement related mobility, strength and balance to improve neuro-muscular carryover to daily functional activities for improving patient's quality of life. Required visual, verbal and manual cues to promote proper body alignment and promote proper body posture/mechanics.  Progressed resistance and complexity of movement as indicated. Date:  6/15/2021 Date:   Date:     Activity/Exercise Parameters Parameters Parameters                                                                               MANUAL THERAPY: (8 minutes): Joint mobilization, Soft tissue mobilization was utilized and necessary because of the patient's restricted joint motion and restricted motion of soft tissue mobility. Date  6/15/2021    Technique Used Grade  Level # Time(s) Effect while being performed          Jt mobilization multi directional II III Right/Left GH 4min Improved mobility          PROM   R/L UE 4min Increased ROM                                     HEP Log Date 1. Rows SR 6/15/2021   2.  6/15/2021   3. 6/15/2021   4.    5.           [employer] Portal  Treatment/Session Summary:    Response to Treatment: Patient was progressed with scapular strengthening with fair tolerance. Communication/Consultation:  Reviewed posture   Equipment provided today: HEP Handout   Recommendations/Intent for next treatment session:   Next visit will focus on Manual Therapy Core Stability Pain Science Education RTC strengthening soft tissue mobilization. Treatment Plan of Care Effective Dates: 6/10/2021 TO 8/10/2021 (60 days).   Frequency/Duration: 2 times a week for 60 Days             Total Treatment Billable Duration:   40  Rx   PT Patient Time In/Time Out  Time In: 0900  Time Out: [address], PT    Future Appointments   Date Time Provider Meryl Morales   6/22/2021  9:00 AM [facility]as [facility]   6/29/2021  9:00 AM Nishant Navas, PT [facility]   7/13/2021  9:00 AM Nishant Navas, PT [facility]   7/20/2021  9:00 AM Nishant Navas PT [facility]   7/27/2021  9:00 AM Nishant Navas, PT [facility]   8/24/2021 10:40 AM Maria Teresa Nascimento MD BSND BSND   11/30/2021  9:00 AM DO ELEAZAR Mujica [facility]

## 2021-06-22 ENCOUNTER — HOSPITAL ENCOUNTER (OUTPATIENT)
Dept: PHYSICAL THERAPY | Age: 71
Discharge: HOME | End: 2021-06-22
Payer: MEDICARE

## 2021-06-22 PROCEDURE — 97110 THERAPEUTIC EXERCISES: CPT

## 2021-06-22 PROCEDURE — 97140 MANUAL THERAPY 1/> REGIONS: CPT

## 2021-06-22 NOTE — PROGRESS NOTES
WM Garciaylynn Chamber III  : 1950  [place] Bean Carrera [place] / Plan[place] Bandar R[place] / Product Type: Managed Care Medicare /  [address] [place] [address] at [address] [place] [address]. [facility], [address] [place] [address], [facility], [address] [place] [address]  [phone]   [phone]                                                          Zigmund Mar., *      OUTPATIENT PHYSICAL THERAPY: Daily Treatment Note 2021 Visit Count:  3    Tx Diagnosis:  Pain in Right Shoulder (M25.511)  Stiffness of Right Shoulder not elsewhere specififed (M25.611)  Impingement syndrome of Right Shoulder (M75.41)      Pre-treatment Symptoms/Complaints: Patient reports doing better less soreness   Pain: Initial:2/10 right 3/10 left  Medications Last Reviewed:  2021     Post Session: 2/10   Updated Objective Findings: ROM FE Right   148   Left   146        TREATMENT:   THERAPEUTIC EXERCISE: (35 minutes):  Exercises per grid below to improve mobility, strength and balance. Required minimal visual, verbal and manual cues to promote proper body alignment and promote proper body posture. Progressed resistance and complexity of movement as indicated. Date:  6/15/2021 Date:  2021 Date:     Activity/Exercise Parameters Parameters Parameters   Education HEP, POC, PT goals, anatomy/pathology     UBE 4/4 level 2 4/4 level 3    Rings [id number]    Posterior capsule stretch [id number]    IR stretch [id number]    Row 2x10 red 2x10 red    Low Row 2x10 red 2x10 red    Bilateral ER 2x10 orange wall 2x10 orange supine    Bilateral horizontal Abduction 2x10 orange wall 2x10 orange supine    D2 flexion  2x10 orange supine    Wall slide  FE with lift off 2x10                THERAPEUTIC ACTIVITY: ( 0 minutes): Activities per gid below to improve functional movement related mobility, strength and balance to improve neuro-muscular carryover to daily functional activities for improving patient's quality of life. Required visual, verbal and manual cues to promote proper body alignment and promote proper body posture/mechanics. Progressed resistance and complexity of movement as indicated. Date:  6/22/2021 Date:   Date:     Activity/Exercise Parameters Parameters Parameters                                                                               MANUAL THERAPY: (8 minutes): Joint mobilization, Soft tissue mobilization was utilized and necessary because of the patient's restricted joint motion and restricted motion of soft tissue mobility. Date  6/22/2021    Technique Used Grade  Level # Time(s) Effect while being performed          Jt mobilization multi directional II III Right/Left GH 4min Improved mobility          PROM   R/L UE 4min Increased ROM                                     HEP Log Date 1. Rows SR 6/15/2021   2.  6/22/2021   3. 6/22/2021   4.    5.           [facility] Portal  Treatment/Session Summary:    Response to Treatment: Patient had improved tolerance to increased overhead ROM. Communication/Consultation:  Reviewed posture   Equipment provided today: Not today   Recommendations/Intent for next treatment session:   Next visit will focus on Manual Therapy Core Stability Pain Science Education RTC strengthening soft tissue mobilization. Treatment Plan of Care Effective Dates: 6/10/2021 TO 8/10/2021 (60 days).   Frequency/Duration: 2 times a week for 60 Days             Total Treatment Billable Duration:   43  Rx   PT Patient Time In/Time Out  Time In: 0900  Time Out: [address], PT    Future Appointments   Date Time Provider Meryl Morales   6/29/2021  9:00 AM [place] Citrin, PT [facility]   7/13/2021  9:00 AM Gabriel Citrin, PT [facility]   7/20/2021  9:00 AM Gabriel Citrin, PT [facility]   7/27/2021  9:00 AM [place] Citrin, PT [facility]   8/24/2021 10:40 AM McBurney, Artemisa Bread, MD BSND BSND 11/30/2021  9:00 AM Nanette Dorado DO [address]

## 2021-06-29 ENCOUNTER — HOSPITAL ENCOUNTER (OUTPATIENT)
Dept: PHYSICAL THERAPY | Age: 71
Discharge: HOME | End: 2021-06-29
Payer: MEDICARE

## 2021-06-29 PROCEDURE — 97530 THERAPEUTIC ACTIVITIES: CPT

## 2021-06-29 PROCEDURE — 97110 THERAPEUTIC EXERCISES: CPT

## 2021-06-29 NOTE — PROGRESS NOTES
WM Farida Kate III  : 1950  Payor: Genevieve Simons [place] / Plan: [facility] MEDIC[facility] HMO/PPO / Product Type: Managed Care Medicare /  Indra Zelaya at [address] [place] [address]. [facility], [address] [place] [address], [address] [place] [address]  [phone]   [phone]                                                          Elida Heath., *      OUTPATIENT PHYSICAL THERAPY: Daily Treatment Note 2021 Visit Count:  4    Tx Diagnosis:  Pain in Right Shoulder (M25.511)  Stiffness of Right Shoulder not elsewhere specififed (M25.611)  Impingement syndrome of Right Shoulder (M75.41)      Pre-treatment Symptoms/Complaints: Patient reports doing better less soreness   Pain: Initial:0/10 right 1/10 left  Medications Last Reviewed:  2021     Post Session: 0/10   Updated Objective Findings: ROM FE Right   150   Left   148        TREATMENT:   THERAPEUTIC EXERCISE: (28 minutes):  Exercises per grid below to improve mobility, strength and balance. Required minimal visual, verbal and manual cues to promote proper body alignment and promote proper body posture. Progressed resistance and complexity of movement as indicated. Date:  6/15/2021 Date:  2021 Date:  2021   Activity/Exercise Parameters Parameters Parameters   Education HEP, POC, PT goals, anatomy/pathology     UBE 4/4 level 2 4/4 level 3 4/4 level 4   Rings [id number] 28e40tvc 87u28hug   Posterior capsule stretch 0s67wwk 8n82rqb 0g80ayf   IR stretch 9k77pnh 7w11hca 8k00iim   Row 2x10 red 2x10 red 3x10 20#   Low Row 2x10 red 2x10 red 3x10 red   ER walkout   41o0itv R/L   Bilateral ER 2x10 orange wall 2x10 orange supine    Bilateral horizontal Abduction 2x10 orange wall 2x10 orange supine    D2 flexion  2x10 orange supine    Wall slide  FE with lift off 2x10 FE with lift off 2x10 yellow               THERAPEUTIC ACTIVITY: ( 15 minutes):  Activities per gid below to improve functional movement related mobility, strength and balance to improve neuro-muscular carryover to daily functional activities for improving patient's quality of life. Required visual, verbal and manual cues to promote proper body alignment and promote proper body posture/mechanics. Progressed resistance and complexity of movement as indicated. Date:  6/29/2021 Date:   Date:     Activity/Exercise Parameters Parameters Parameters   Cephus Latosha Carry 3 laps 2 10#       Supine Roll FE, Fredo, Swim 20x each       Supine ER Abd FE w/ roll 20x each red                                                     MANUAL THERAPY: (0 minutes): Joint mobilization, Soft tissue mobilization was utilized and necessary because of the patient's restricted joint motion and restricted motion of soft tissue mobility. Date  6/29/2021    Technique Used Grade  Level # Time(s) Effect while being performed          Jt mobilization multi directional II III Right/Left GH 0min Improved mobility          PROM   R/L UE 0min Increased ROM                                     HEP Log Date 1. Rows SR 6/15/2021   2.  6/29/2021   3. 6/29/2021   4.    5.           [employer] Portal  Treatment/Session Summary:    Response to Treatment: Patient had improved tolerance to increased overhead ROM and without pain at end ROM. Communication/Consultation:  Reviewed posture   Equipment provided today: Not today   Recommendations/Intent for next treatment session:   Next visit will focus on Manual Therapy Core Stability Pain Science Education RTC strengthening soft tissue mobilization. Treatment Plan of Care Effective Dates: 6/10/2021 TO 8/10/2021 (60 days).   Frequency/Duration: 2 times a week for 60 Days             Total Treatment Billable Duration:   43  Rx   PT Patient Time In/Time Out  Time In: 5432  Time Out: Burton Alamo PT    Future Appointments   Date Time Provider Meryl Morales   7/20/2021  9:00 AM Glenda Kevin PT [facility]   7/27/2021  9:00 AM Amy Shoemaker, PT [facility]   8/24/2021 10:40 AM Samina Stuart MD BSND BSND   11/30/2021  9:00 AM DO ELEAZAR Lopez [facility] U[facility]

## 2021-07-12 ENCOUNTER — HOSPITAL ENCOUNTER (OUTPATIENT)
Dept: PHYSICAL THERAPY | Age: 71
Discharge: HOME | End: 2021-07-12
Payer: MEDICARE

## 2021-07-12 PROCEDURE — 97530 THERAPEUTIC ACTIVITIES: CPT

## 2021-07-12 PROCEDURE — 97110 THERAPEUTIC EXERCISES: CPT

## 2021-07-12 NOTE — PROGRESS NOTES
Gilberto Arguetadmont III  : 1950  Payor: Juan Cassidy [place] / Plan[place] Maria Elena Larry [place] / Product Type: Managed Care Medicare /  [address] [place] [address] Road,[address] at [address] [facility]. [address] [place] [address], [facility], [address] [place] [address]  [phone]   [phone]                                                          Jun Cano., *      OUTPATIENT PHYSICAL THERAPY: Daily Treatment Note 2021 Visit Count:  5    Tx Diagnosis:  Pain in Right Shoulder (M25.511)  Stiffness of Right Shoulder not elsewhere specififed (M25.611)  Impingement syndrome of Right Shoulder (M75.41)      Pre-treatment Symptoms/Complaints: Patient reports left arm doing better, right shoulder was sore last night   Pain: Initial:310 right 4/10 left  Medications Last Reviewed:  2021     Post Session: 0/10   Updated Objective Findings: ROM FE Right   155  Left   154        TREATMENT:   THERAPEUTIC EXERCISE: (30 minutes):  Exercises per grid below to improve mobility, strength and balance. Required minimal visual, verbal and manual cues to promote proper body alignment and promote proper body posture. Progressed resistance and complexity of movement as indicated.      Date:  6/15/2021 Date:  2021 Date:  2021 Date:  2021   Activity/Exercise Parameters Parameters Parameters    Education HEP, POC, PT goals, anatomy/pathology      UBE 4/4 level 2 4/4 level 3 4/4 level 4 4/4 level 5   Rings 08z11hgm 31j34zpi 92z32pnb 76i86fpn   Posterior capsule stretch 6h93flr 8e11atj 9f86kxl 2h24bon   IR stretch 4r19svw 6h62jjp 1o51qha    Row 2x10 red 2x10 red 3x10 20# 3x8 23#   Low Row 2x10 red 2x10 red 3x10 red    Pull down    3x8 23#   ER walkout   85y7tll R/L 61j4jmv R/L   Bilateral ER 2x10 orange wall 2x10 orange supine     Bilateral horizontal Abduction 2x10 orange wall 2x10 orange supine     D2 flexion  2x10 orange supine     Wall slide  FE with lift off 2x10 FE with lift off 2x10 yellow FE with lift off 2x10 yellow                THERAPEUTIC ACTIVITY: ( 13 minutes): Activities per gid below to improve functional movement related mobility, strength and balance to improve neuro-muscular carryover to daily functional activities for improving patient's quality of life. Required visual, verbal and manual cues to promote proper body alignment and promote proper body posture/mechanics. Progressed resistance and complexity of movement as indicated. Date:  6/29/2021 Date:   7/12/2021 Date:     Activity/Exercise Parameters Parameters Parameters   Ash Rm Carry 3 laps 2 10# 4 laps 2 10#     Supine Roll FE, Fredo, Swim 20x each 20x each full roll     Supine ER Abd FE w/ roll 20x each red 20x each blue                                                   MANUAL THERAPY: (0 minutes): Joint mobilization, Soft tissue mobilization was utilized and necessary because of the patient's restricted joint motion and restricted motion of soft tissue mobility. Date  7/12/2021    Technique Used Grade  Level # Time(s) Effect while being performed          Jt mobilization multi directional II III Right/Left GH 0min Improved mobility          PROM   R/L UE 0min Increased ROM                                     HEP Log Date 1. Rows SR 6/15/2021   2.  7/12/2021   3. 7/12/2021   4.    5.           [employer] Portal  Treatment/Session Summary:    Response to Treatment: Patient was  Challenged with foam roll exercises, reported less pain after activity. Communication/Consultation:  Reviewed posture   Equipment provided today: Not today   Recommendations/Intent for next treatment session:   Next visit will focus on Manual Therapy Core Stability Pain Science Education RTC strengthening soft tissue mobilization. Treatment Plan of Care Effective Dates: 6/10/2021 TO 8/10/2021 (60 days).   Frequency/Duration: 2 times a week for 60 Days             Total Treatment Billable Duration:   43  Rx   PT Patient Time In/Time Out  Time In: 3002  Time Out: Austin Andersen, PT    Future Appointments   Date Time Provider Meryl Carmen   7/19/2021  2:30 PM Betty Thomas, PT [facility] AND [facility]   7/27/2021  9:00 AM Betty Thomas, PT [facility] AND [facility]   8/24/2021 10:40 AM Yves Cooper MD BSND BSND   11/30/2021  9:00 AM DO ELEAZAR Hancock UCD[facility] UCD

## 2021-07-13 ENCOUNTER — APPOINTMENT (OUTPATIENT)
Dept: PHYSICAL THERAPY | Age: 71
End: 2021-07-13
Payer: MEDICARE

## 2021-07-19 ENCOUNTER — APPOINTMENT (OUTPATIENT)
Dept: PHYSICAL THERAPY | Age: 71
End: 2021-07-19
Payer: MEDICARE

## 2021-07-20 ENCOUNTER — APPOINTMENT (OUTPATIENT)
Dept: PHYSICAL THERAPY | Age: 71
End: 2021-07-20
Payer: MEDICARE

## 2021-07-27 ENCOUNTER — HOSPITAL ENCOUNTER (OUTPATIENT)
Dept: PHYSICAL THERAPY | Age: 71
Discharge: HOME | End: 2021-07-27
Payer: MEDICARE

## 2021-07-27 PROCEDURE — 97530 THERAPEUTIC ACTIVITIES: CPT

## 2021-07-27 PROCEDURE — 97110 THERAPEUTIC EXERCISES: CPT

## 2021-07-27 NOTE — PROGRESS NOTES
Bob David III  : 1950  [place] Jorge [place]ainez [place] / Plan[place] Fred [place] / Product Type: Managed Care Medicare /  Jairon Palmer at [address] [place] [address]. [facility], [address] [place] [address], [address] [place] [address]  [phone]   [phone]                                                          Rina Steven., *      OUTPATIENT PHYSICAL THERAPY: Daily Treatment Note 2021 Visit Count:  6    Tx Diagnosis:  Pain in Right Shoulder (M25.511)  Stiffness of Right Shoulder not elsewhere specififed (M25.611)  Impingement syndrome of Right Shoulder (M75.41)      Pre-treatment Symptoms/Complaints: Patient reports on off soreness with reaching left worse than right. Pain: Initial:3/10 right 4/10 left  Medications Last Reviewed:  2021     Post Session: 010   Updated Objective Findings: ROM FE Right   155  Left   154        TREATMENT:   THERAPEUTIC EXERCISE: (33 minutes):  Exercises per grid below to improve mobility, strength and balance. Required minimal visual, verbal and manual cues to promote proper body alignment and promote proper body posture. Progressed resistance and complexity of movement as indicated.      Date:  6/15/2021 Date:  2021 Date:  2021 Date:  2021 Date:  2021   Activity/Exercise Parameters Parameters Parameters Parameters Parameters   Education HEP, POC, PT goals, anatomy/pathology       UBE 4/4 level 2 4/4 level 3 4/4 level 4 4/4 level 5 4/4 level 5   Rings 67t56zke 16y90pmc 71v34amn 71t22bex 49y69nca   Posterior capsule stretch 9c03yrq 5o19kds 2z54fui 1o94bjq 1f17ntd   IR stretch 9b05zaa 4i29ywm 4u42jmn     Row 2x10 red 2x10 red 3x10 20# 3x8 23# 3x10 27#   Low Row 2x10 red 2x10 red 3x10 red     Pull down    3x8 23# 3x10 27#   ER walkout   38l4qlq R/L 09v2kuc R/L    Bilateral ER 2x10 orange wall 2x10 orange supine   3x10 blue wall   Bilateral horizontal Abduction 2x10 orange wall 2x10 orange supine      D2 flexion 2x10 orange supine      Wall slide  FE with lift off 2x10 FE with lift off 2x10 yellow FE with lift off 2x10 yellow    Prone UE lift 3 way     2x10 each         THERAPEUTIC ACTIVITY: ( 10 minutes): Activities per gid below to improve functional movement related mobility, strength and balance to improve neuro-muscular carryover to daily functional activities for improving patient's quality of life. Required visual, verbal and manual cues to promote proper body alignment and promote proper body posture/mechanics. Progressed resistance and complexity of movement as indicated. Date:  6/29/2021 Date:   7/12/2021 Date:  7/27/2021   Activity/Exercise Parameters Parameters Parameters   Anthony Lecher Carry 3 laps 2 10# 4 laps 2 10# 4 laps 2 15#   Supine Roll FE, Fredo, Swim 20x each 20x each full roll 20x each full roll   Supine ER Abd FE w/ roll 20x each red 20x each blue                                                  MANUAL THERAPY: (0 minutes): Joint mobilization, Soft tissue mobilization was utilized and necessary because of the patient's restricted joint motion and restricted motion of soft tissue mobility. Date  7/27/2021    Technique Used Grade  Level # Time(s) Effect while being performed          Jt mobilization multi directional II III Right/Left GH 0min Improved mobility          PROM   R/L UE 0min Increased ROM                                     HEP Log Date 1. Rows SR 6/15/2021   2.  7/27/2021   3. 7/27/2021   4.    5.           [employer] Portal  Treatment/Session Summary:    Response to Treatment: Patient had improved tolerance to resistive exercises without pain at end ROM. Communication/Consultation:  Reviewed posture before reaching   Equipment provided today: Not today   Recommendations/Intent for next treatment session:   Next visit will focus on Manual Therapy Core Stability Pain Science Education RTC strengthening soft tissue mobilization.          Treatment Plan of Care Effective Dates: 6/10/2021 TO 8/10/2021 (60 days).   Frequency/Duration: 2 times a week for 60 Days             Total Treatment Billable Duration:   43  Rx   PT Patient Time In/Time Out  Time In: 3240  Time Out: [address], PT    Future Appointments   Date Time Provider Meryl Morales   8/3/2021  8:15 AM Caprice Riggins, PT [facility]   8/24/2021 10:40 AM Julita Mckeon MD BSND BSND   11/30/2021  9:00 AM DO ELEAZAR Melgar UCD[facility] UCD

## 2021-08-03 ENCOUNTER — HOSPITAL ENCOUNTER (OUTPATIENT)
Dept: PHYSICAL THERAPY | Age: 71
Discharge: HOME | End: 2021-08-03
Payer: MEDICARE

## 2021-08-03 PROCEDURE — 97530 THERAPEUTIC ACTIVITIES: CPT

## 2021-08-03 PROCEDURE — 97110 THERAPEUTIC EXERCISES: CPT

## 2021-08-03 NOTE — PROGRESS NOTES
Soniya Memo III  : 1950  Pay[place] Stephenie Irwin [place] / Plan[place] Renard Estevez [place] / Product Type: Managed Care Medicare /  [address] [place] [address] at [address] [place] [address]. [facility], [address] [place] [address], [address] [place] [address]  [phone]   [phone]                                                          Thong Farnswortht., *      OUTPATIENT PHYSICAL THERAPY: Daily Treatment Note 8/3/2021 Visit Count:  7    Tx Diagnosis:  Pain in Right Shoulder (M25.511)  Stiffness of Right Shoulder not elsewhere specififed (M25.611)  Impingement syndrome of Right Shoulder (M75.41)      Pre-treatment Symptoms/Complaints: Patient reports on off soreness with reaching left worse than right. Pain: Initial:310 right 4/10 left  Medications Last Reviewed:  8/3/2021     Post Session: 010   Updated Objective Findings: ROM FE Right   158  Left   157        TREATMENT:   THERAPEUTIC EXERCISE: (35 minutes):  Exercises per grid below to improve mobility, strength and balance. Required minimal visual, verbal and manual cues to promote proper body alignment and promote proper body posture. Progressed resistance and complexity of movement as indicated.      Date:  2021 Date:  2021 Date:  2021 Date:  2021 Date:  8/3/2021   Activity/Exercise Parameters Parameters Parameters Parameters Parameters   Education        UBE 4/4 level 3 4/4 level 4 4/4 level 5 4/4 level 5 4/4 level 5   Rings 11t92sbx 24f41uwj 90l00lnr 34g85del 47t60dfu   Posterior capsule stretch 0i20fkg 2k71ayc 3w56yfc 4o82yln 9j85waj   IR stretch 0n28exd 1x70hqw      Row 2x10 red 3x10 20# 3x8 23# 3x10 27# 3x8 30#   Low Row 2x10 red 3x10 red      Pull down   3x8 23# 3x10 27# 3x8 30#   ER walkout  71w3xif R/L 01j7mbn R/L     Bilateral ER 2x10 orange supine   3x10 blue wall 2x10 Green   Bilateral horizontal Abduction 2x10 orange supine       D2 flexion 2x10 orange supine       Wall slide FE with lift off 2x10 FE with lift off 2x10 yellow FE with lift off 2x10 yellow     Prone UE lift 3 way    2x10 each    Overhead press     Rings 12#   DB raise     2x10 2# FE         THERAPEUTIC ACTIVITY: ( 8 minutes): Activities per gid below to improve functional movement related mobility, strength and balance to improve neuro-muscular carryover to daily functional activities for improving patient's quality of life. Required visual, verbal and manual cues to promote proper body alignment and promote proper body posture/mechanics. Progressed resistance and complexity of movement as indicated. Date:  6/29/2021 Date:   7/12/2021 Date:  7/27/2021 Date:  8/3/2021   Activity/Exercise Parameters Parameters Parameters    Eddy Hector Carry 3 laps 2 10# 4 laps 2 10# 4 laps 2 15# 4 laps 2 15#   Supine Roll FE, Fredo, Swim 20x each 20x each full roll 20x each full roll    Supine ER Abd FE w/ roll 20x each red 20x each blue     Y T  90/90       8x each yellow                                             MANUAL THERAPY: (0 minutes): Joint mobilization, Soft tissue mobilization was utilized and necessary because of the patient's restricted joint motion and restricted motion of soft tissue mobility. Date  8/3/2021    Technique Used Grade  Level # Time(s) Effect while being performed          Jt mobilization multi directional II III Right/Left GH 0min Improved mobility          PROM   R/L UE 0min Increased ROM                                     HEP Log Date 1.    Rows SR 6/15/2021   2.  8/3/2021   3. 8/3/2021   4.    5.           [employer] Portal  Treatment/Session Summary:    Response to Treatment: Patient was challenged with increase, but had improved FE ROM bilateral.   Communication/Consultation:  Reviewed posture before reaching   Equipment provided today: Not today   Recommendations/Intent for next treatment session:   Next visit will focus on Manual Therapy Core Stability Pain Science Education RTC strengthening soft tissue mobilization. WB, Press         Treatment Plan of Care Effective Dates: 6/10/2021 TO 8/10/2021 (60 days).   Frequency/Duration: 2 times a week for 60 Days             Total Treatment Billable Duration:   43  Rx   PT Patient Time In/Time Out  Time In: 0815  Time Out: 0900  Ann Li PT    Future Appointments   Date Time Provider Meryl Morales   8/10/2021  9:00 AM Betty Thomas PT [facility]   8/24/2021 10:40 AM Yves Cooper MD BSND BSND   11/30/2021  9:00 AM Leti Castillo DO [facility] U[facility]

## 2021-08-10 ENCOUNTER — HOSPITAL ENCOUNTER (OUTPATIENT)
Dept: PHYSICAL THERAPY | Age: 71
Discharge: HOME | End: 2021-08-10
Payer: MEDICARE

## 2021-08-10 PROCEDURE — 97110 THERAPEUTIC EXERCISES: CPT

## 2021-08-10 PROCEDURE — 97530 THERAPEUTIC ACTIVITIES: CPT

## 2021-08-10 NOTE — PROGRESS NOTES
Riddhi Hernandez III  : 1950  Payor: Zaheer Villasenor [place] / Plan[place] Bipin Shabazz [place] / Product Type: Managed Care Medicare /  [address] [place] [address] at [address] [place] [address]. [facility], [address] [place] [address]  [phone]   [phone]                                                          Watson Morales., *      OUTPATIENT PHYSICAL THERAPY: Daily Treatment Note 8/10/2021 Visit Count:  8    Tx Diagnosis:  Pain in Right Shoulder (M25.511)  Stiffness of Right Shoulder not elsewhere specififed (M25.611)  Impingement syndrome of Right Shoulder (M75.41)      Pre-treatment Symptoms/Complaints: Please See Discharge Summary dated 8/10/2021 for more details. Pain: Initial:0/10 right 0/10 left  Medications Last Reviewed:  8/10/2021     Post Session: 0/10   Updated Objective Findings: Please See Discharge Summary dated 8/10/2021 for more details        TREATMENT:   THERAPEUTIC EXERCISE: (32 minutes):  Exercises per grid below to improve mobility, strength and balance. Required minimal visual, verbal and manual cues to promote proper body alignment and promote proper body posture. Progressed resistance and complexity of movement as indicated.      Date:  2021 Date:  2021 Date:  2021 Date:  8/3/2021 Date:  8/10/2021   Activity/Exercise Parameters Parameters Parameters Parameters    Education        UBE 4/4 level 4 4/4 level 5 4/4 level 5 4/4 level 5 4/4 level 5   Rings 06z69koy 62i01nbj 92v12muo 65t67qlh 54x05ofi   Posterior capsule stretch 6l59nco 2f28ytw 6s12wmt 0v81mco 0y21wkx   IR stretch 8p23ntw       Row 3x10 20# 3x8 23# 3x10 27# 3x8 30# 3x8 30#   Low Row 3x10 red       Pull down  3x8 23# 3x10 27# 3x8 30# 3x8 30#   ER walkout 95b1brt R/L 54r3hye R/L      Bilateral ER   3x10 blue wall 2x10 Green 2x10 Green   Bilateral horizontal Abduction        D2 flexion        Wall slide FE with lift off 2x10 yellow FE with lift off 2x10 yellow      Prone UE lift 3 way   2x10 each     Overhead press    Rings 12#    DB raise    2x10 2# FE 2x10 2# FE         THERAPEUTIC ACTIVITY: ( 10 minutes): Activities per gid below to improve functional movement related mobility, strength and balance to improve neuro-muscular carryover to daily functional activities for improving patient's quality of life. Required visual, verbal and manual cues to promote proper body alignment and promote proper body posture/mechanics. Progressed resistance and complexity of movement as indicated. Date:  6/29/2021 Date:   7/12/2021 Date:  7/27/2021 Date:  8/3/2021 Date:  8/10/2021   Activity/Exercise Parameters Parameters Parameters     Ash Rm Carry 3 laps 2 10# 4 laps 2 10# 4 laps 2 15# 4 laps 2 15# 4 laps 2 15#   Supine Roll FE, Fredo, Swim 20x each 20x each full roll 20x each full roll     Supine ER Abd FE w/ roll 20x each red 20x each blue      Y T  90/90       8x each yellow 2x8 each yellow                                                                 MANUAL THERAPY: (0 minutes): Joint mobilization, Soft tissue mobilization was utilized and necessary because of the patient's restricted joint motion and restricted motion of soft tissue mobility. Date  8/10/2021    Technique Used Grade  Level # Time(s) Effect while being performed          Jt mobilization multi directional II III Right/Left GH 0min Improved mobility          PROM   R/L UE 0min Increased ROM                                     HEP Log Date 1. Rows SR 6/15/2021   2.  8/10/2021   3. 8/10/2021   4.    5.           [employer] Portal  Treatment/Session Summary:    Response to Treatment: Please see Discharge Summary dated 8/10/2021 for more details.    Communication/Consultation:  Reviewed posture before reaching   Equipment provided today: HEP Handout   Recommendations/Intent for next treatment session:   Discharge to [facility]         Treatment Plan of Care Effective Dates: 6/10/2021 TO 8/10/2021 (60 days).   Frequency/Duration: 2 times a week for 60 Days             Total Treatment Billable Duration:   42  Rx   PT Patient Time In/Time Out  Time In: 0900  Time Out: [address], PT    Future Appointments   Date Time Provider Meryl Carmen   8/24/2021 10:40 AM Meghan Maher MD BSND BSND   11/30/2021  9:00 AM DO ELEAZAR Guaman UCDG UCD

## 2021-08-10 NOTE — THERAPY DISCHARGE
WM Alejandro Cedillo III  : 1950      Payor: [facility] MED[facility]ARE / Plan: [employer] [place] / Product Type: Managed Care Medicare /    [address] at 4 [facility]. [address] [place] [address], [facility], [address] [place] [address]  [phone]   [phone]              OUTPATIENT PHYSICAL THERAPY:Discharge: 8/10/2021    ICD-10: Treatment Diagnosis:   Pain in Right Shoulder (M25.511)  Stiffness of Right Shoulder not elsewhere specififed (M25.611)  Impingement syndrome of Right Shoulder (M75.41)              Precautions/Allergies:   Patient has no known allergies. Fall Risk Score: 2 (? 5 = High Risk)  MD Orders: Eval and Treat  MEDICAL/REFERRING DIAGNOSIS:  Pain in right shoulder [M25.511]   DATE OF ONSET: Approximately 2 months ago   REFERRING PHYSICIAN: Wendi Tapia., *  RETURN PHYSICIAN APPOINTMENT: TBD by patient      INITIAL ASSESSMENT:   Mr. Adeel Daly presents to physical therapy with decreased strength, ROM, joint mobility, functional mobility. These S/S are consistent with right shoulder pain. Patient will benefit from skilled physical therapy for manual therapeutic techniques (as appropriate), therapeutic exercises and activities, neuromuscular re-education, and comprehensive home exercises program to address current impairments and functional limitations. WM Alejandro Cedillo III will benefit from skilled PT (medically necessary) in order to address above deficits affecting participation in basic ADLs and overall functional tolerance. DISCHARGE NOTE (8/10/2021):  Patient has been seen for 8 sessions of physical therapy from 6/10/2021 to 8/10/2021. Patient reports feeling 75% better with daily activity and reaching. He has currently met most goals for Physical Therapy not meeting the DASH functional outcome measure depite feeling 75% better. Patient wants to progress to [facility] only for not having a ride to continue Therapy. Patient is now discharged to [facility]. PROBLEM LIST (Impacting functional limitations):  · Decreased Strength  · Decreased ADL/Functional Activities  · Increased Pain  · Decreased Activity Tolerance  · Increased Shortness of Breath  · Decreased Flexibility/Joint Mobility  · Decreased [place] with Home Exercise Program INTERVENTIONS PLANNED:  1. Cold  2. Family Education  3. Home Exercise Program (HEP)  4. Manual Therapy  5. Neuromuscular Re-education/Strengthening  6. Range of Motion (ROM)  7. Therapeutic Activites  8. Therapeutic Exercise/Strengthening  9. Transfer Training  10. Ultrasound   TREATMENT PLAN:  Effective Dates: 6/10/2021 TO 8/10/2021 (60 days). Frequency/Duration: 2 times a week for 60 Days  GOALS: (Goals have been discussed and agreed upon with patient.)     Short-Term Goals~4 weeks  Goal Met   1. WM Edna Borja III will report <=6/10 pain with don/doffing clothing as well as minimal/no difficulty. 1.  [x] Date:  8/10/2021   2. WM Edna Borja III will demonstrate improvement in active shoulder Flexion to >150 degrees to increase UE function and participation in ADLs. 2.  [x] Date: 8/10/2021   3. WM Edna Borja III will demonstrate demonstrate improvement in active shoulder Abduction to >140 degrees to increase UE function and participation in ADLs. 3.  [x] Date: 8/10/2021   4. WM Edna Borja III will show a greater than 8 point decrease on the DASH in order to show an increase in upper extremity function. 4.  [] Date:   5. WM Edna Borja III will be independent in all HEP 5. [x] Date: 8/10/2021         Long Term Goals~8 weeks Goal Met   1. WM Edna Borja III will show full ROM of the UE in order to return to full functional mobility  1. [x] Date: 8/10/2021   2. WM Edna Borja III will show a greater than 15 point decrease on the DASH in order to show an increase in upper extremity function 2. [] Date:   3.  WM Edna Borja III will report doing hair/bathing without difficulty and <=2/10 pain in order to be independent with ADL's 3. [x] Date: 8/10/2021   4. WM Ai Lozada III will be independent in all advanced HEP 4. [x] Date: 8/10/2021            Outcome Measure: Tool Used: Disabilities of the Arm, Shoulder and Hand (DASH) Questionnaire - Quick Version  Score:  Initial: 27/55  Most Recent: 29/55 (Date: 8/10/2021 )   Interpretation of Score: The DASH is designed to measure the activities of daily living in person's with upper extremity dysfunction or pain. Each section is scored on a 1-5 scale, 5 representing the greatest disability. The scores of each section are added together for a total score of 55. Observation/Orthostatic Postural Assessment:     Forward Head and Rounded Shoulders  Palpation:          Tenderness anterior right shoulder at biceps tendon  ROM:    AROM/PROM       Joint: Eval Date: 6/10/2021  Re-Assess Date: 8/10/2021    ACTIVE ROM (standing) RIGHT LEFT RIGHT LEFT   Shoulder Flexion 123 145 158 157   Shoulder Abduction 92 118 143 140   Shoulder Internal Rotation (Apley's) L5 T12 L1 T11   Shoulder External Rotation (Apley's) C5 T2 T2 T3   Elbow ROM WNL WNL WNL WNL   PASSIVE ROM (supine)       Shoulder Flexion 138 155 165 162   Shoulder Abduction 105 130 145 143   Shoulder Internal Rotation 41 62 57 65   Shoulder External Rotation 78 85 88 90            Strength:    Joint: Eval Date: 6/10/2021  Re-Assess Date: 8/10/2021     RIGHT LEFT RIGHT LEFT   Shoulder Flexion 4+/5 4+/5 5/5 5/5   Shoulder Abduction  (C5) 4/5 4+/5 4+/5 5/5   Shoulder Internal Rotation 4+/5 5/5 5/5 5/5   Shoulder External Rotation 4/5 4+/5 4+/5 5/5   Elbow Flexion  (C6) 4+/5 4+/5 5/5 5/5   Elbow Extension (C7) 4+/5 4+/5 5/5 5/5   Scapula 4-/5 4/5 4+/5 4+/5              Manual:  Joint Directon Grade Treatment Effect   GH Right AP, Distraction and Inferior Glide III Unremarkable   GH Left AP, Distraction and Inferior Glide III Unremarkable             Special Tests:     Patricio-Garret: Negative   Neer's: Negative   Speed:Negative    Neurological Screen:   Radiating symptoms? No  Functional Mobility:  Able to complete most tasks independently           Reason for Services/Other Comments   Patient has been seen for 8 sessions of physical therapy from 6/10/2021 to 8/10/2021. Patient reports feeling 75% better with daily activity and reaching. He has currently met most goals for Physical Therapy not meeting the DASH functional outcome measure depite feeling 75% better. Patient wants to progress to [facility] only for not having a ride to continue Therapy. Patient is now discharged to [facility]. Regarding [facility]'s therapy, I certify that the treatment plan above will be carried out by a therapist or under their direction. Thank you for this referral,  Phuc Pike, PT     Referring Physician Signature: Magan Dougherty., * No Signature is Required for this note.

## 2022-03-18 PROBLEM — I34.0 MITRAL VALVE REGURGITATION: Status: ACTIVE | Noted: 2020-03-05

## 2022-03-18 PROBLEM — J98.11 ATELECTASIS: Status: ACTIVE | Noted: 2020-03-06

## 2022-03-18 PROBLEM — Z95.1 S/P CABG X 3: Status: ACTIVE | Noted: 2020-03-10

## 2022-03-19 PROBLEM — R09.02 HYPOXIA: Status: ACTIVE | Noted: 2020-03-05

## 2022-03-19 PROBLEM — Z98.890 S/P MITRAL VALVE REPAIR: Status: ACTIVE | Noted: 2020-03-05

## 2022-03-19 PROBLEM — J81.0 ACUTE PULMONARY EDEMA (HCC): Status: ACTIVE | Noted: 2020-03-06

## 2022-03-20 PROBLEM — I25.110 ATHEROSCLEROSIS OF CORONARY ARTERY OF NATIVE HEART WITH UNSTABLE ANGINA PECTORIS (HCC): Status: ACTIVE | Noted: 2020-03-05

## 2022-03-20 PROBLEM — E11.9 TYPE II DIABETES MELLITUS (HCC): Status: ACTIVE | Noted: 2020-03-05

## 2022-05-31 ENCOUNTER — OFFICE VISIT (OUTPATIENT)
Dept: CARDIOLOGY CLINIC | Age: 72
End: 2022-05-31
Payer: MEDICARE

## 2022-05-31 VITALS
WEIGHT: 169.8 LBS | HEART RATE: 68 BPM | BODY MASS INDEX: 25.15 KG/M2 | DIASTOLIC BLOOD PRESSURE: 50 MMHG | SYSTOLIC BLOOD PRESSURE: 90 MMHG | HEIGHT: 69 IN

## 2022-05-31 DIAGNOSIS — I34.0 NONRHEUMATIC MITRAL VALVE REGURGITATION: ICD-10-CM

## 2022-05-31 DIAGNOSIS — I50.22 SYSTOLIC CHF, CHRONIC (HCC): Primary | ICD-10-CM

## 2022-05-31 DIAGNOSIS — I25.10 CORONARY ARTERY DISEASE INVOLVING NATIVE CORONARY ARTERY OF NATIVE HEART WITHOUT ANGINA PECTORIS: ICD-10-CM

## 2022-05-31 PROCEDURE — 99214 OFFICE O/P EST MOD 30 MIN: CPT | Performed by: INTERNAL MEDICINE

## 2022-05-31 PROCEDURE — 1123F ACP DISCUSS/DSCN MKR DOCD: CPT | Performed by: INTERNAL MEDICINE

## 2022-05-31 RX ORDER — ORAL SEMAGLUTIDE 7 MG/1
7 TABLET ORAL
COMMUNITY

## 2022-05-31 RX ORDER — TAMSULOSIN HYDROCHLORIDE 0.4 MG/1
0.4 CAPSULE ORAL DAILY
COMMUNITY
Start: 2022-04-01 | End: 2023-04-01

## 2022-05-31 RX ORDER — PANTOPRAZOLE SODIUM 40 MG/1
40 TABLET, DELAYED RELEASE ORAL DAILY
COMMUNITY
Start: 2021-07-07 | End: 2022-07-07

## 2022-05-31 NOTE — PROGRESS NOTES
[address], [address] [facility], [address] [place] [address]  PHONE: [phone]     22    NAME:  Gabby Dowd III  : 1950  MRN: [id number]       SUBJECTIVE:   Gabby Dowd III is a 81 y.o. male seen for a follow up visit regarding the following:     Chief Complaint   Patient presents with    Congestive Heart Failure       HPI:    TIA 1/3/2020 admission and prior in    Echo 2020: EF 25%, mod/Severe MR.    3/5/2020:  CABG X 3 with LIMA to LAD, SVG to OM, SVG to PDA as well as mitral valve repair, closure of PFO, and clipping of left atrial appendage. Echo 3/12/2020 and 2020 and 2021: EF 35-40%     Some GI issues. No CP, pressure. NO new CUBA, SOB. Walking more now. No new angina, CP, CUBA. Patient denies recent history of orthopnea, PND, excessive dizziness and/or syncope. Past Medical History, Past Surgical History, Family history, Social History, and Medications were all reviewed with the patient today and updated as necessary. Current Outpatient Medications   Medication Sig Dispense Refill    tamsulosin (FLOMAX) 0.4 mg capsule Take 0.4 mg by mouth daily      pantoprazole (PROTONIX) 40 MG tablet Take 40 mg by mouth daily      Semaglutide (RYBELSUS) 7 MG TABS Take 7 mg by mouth      aspirin 81 MG EC tablet Take 81 mg by mouth daily      atorvastatin (LIPITOR) 80 MG tablet Take 80 mg by mouth      citalopram (CELEXA) 20 MG tablet Take 20 mg by mouth daily      clopidogrel (PLAVIX) 75 MG tablet Take 75 mg by mouth daily      metFORMIN (GLUCOPHAGE) 500 MG tablet Take 500 mg by mouth 2 times daily (with meals)      nitroGLYCERIN (NITROSTAT) 0.4 MG SL tablet Place 0.4 mg under the tongue as needed       No current facility-administered medications for this visit.         No Known Allergies  Patient Active Problem List    Diagnosis Date Noted    Physical debility 2020    S/P CABG x 3 03/10/2020    Pleural effusion, right 2020    Atelectasis 2020  Acute pulmonary edema (HCC) 2020    [facility]    Patent foramen ovale 2020    Respiratory failure, post-operative ([facility] 2020    Mitral valve regurgitation 2020    CAD (coronary artery disease) 2020    Hypoxia 2020    Mitral valve insufficiency 2020    S/P mitral valve repair 2020    Atherosclerosis of coronary artery of native heart with unstable angina pectoris ([facility] 2020    Type II diabetes mellitus ([facility] 2020    History of stroke     Systolic CHF, chronic ([facility] 2020    CVA (cerebral vascular accident) ([facility] 2020      Past Surgical History:   Procedure Laterality Date    CHOLECYSTECTOMY      CYST REMOVAL      HERNIA REPAIR      KNEE ARTHROSCOPY      rt knee     Family History   Problem Relation Age of Onset    Heart Disease Paternal Grandfather     Diabetes Sister     Diabetes Father     Diabetes Sister     Heart Disease Father      Social History     Tobacco Use    Smoking status: Former Smoker     Packs/day: 1.00     Quit date: 10/20/1997     Years since quittin.6    Smokeless tobacco: Never Used   Substance Use Topics    Alcohol use: Not Currently     Alcohol/week: 0.0 standard drinks         ROS:    No obvious pertinent positives on review of systems except for what was outlined in the HPI today.       PHYSICAL EXAM:     BP (!) 90/50   Pulse 68   Ht 5' 9\" (1.753 m)   Wt 169 lb 12.8 oz (77 kg) Comment: with shoes  BMI 25.08 kg/m²    General/Constitutional:   Alert and oriented x 3, no acute distress  HEENT:   normocephalic, atraumatic, moist mucous membranes  Neck:   No JVD or carotid bruits bilaterally  Cardiovascular:   regular rate and rhythm, no murmur/rub/gallop appreciated  Pulmonary:   clear to auscultation bilaterally, no respiratory distress  Abdomen:   soft, non-tender, non-distended  Ext:   No sig LE edema bilaterally  Skin:  warm and dry, no obvious rashes seen  Neuro:   no obvious sensory or motor deficits  Psychiatric:   normal mood and affect      Lab Results   Component Value Date     04/24/2020    K 4.7 04/24/2020    CL 99 04/24/2020    CO2 24 04/24/2020    BUN 13 04/24/2020    CREATININE 0.68 04/24/2020    GLUCOSE 131 04/24/2020    CALCIUM 10.5 04/24/2020        Lab Results   Component Value Date    WBC 7.3 04/24/2020    HGB 13.3 04/24/2020    HCT 38.5 04/24/2020    MCV 91 04/24/2020     04/24/2020       Lab Results   Component Value Date    TSH 3.760 04/24/2020       Lab Results   Component Value Date    LABA1C 6.8 (H) 03/03/2020     Lab Results   Component Value Date     03/03/2020       Lab Results   Component Value Date    CHOL 155 01/04/2020     Lab Results   Component Value Date    TRIG 145 01/04/2020     Lab Results   Component Value Date    HDL 26 (L) 01/04/2020     Lab Results   Component Value Date    LDLCALC 100 (H) 01/04/2020     Lab Results   Component Value Date    LABVLDL 29 (H) 01/04/2020     Lab Results   Component Value Date    CHOLHDLRATIO 6.0 01/04/2020           I have Independently reviewed prior care notes, any ER records available, cardiac testing, labs and results with the patient and before seeing the patient today. Also independently reviewed outside records when available. ASSESSMENT:    Wm was seen today for congestive heart failure. Diagnoses and all orders for this visit:    Systolic CHF, chronic (HCC)    Nonrheumatic mitral valve regurgitation    Coronary artery disease involving native coronary artery of native heart without angina pectoris          PLAN:      1. CAD: remain on ASA, statin. Exercising more now. Follow for angina. The patient has been instructed to call with any angina or equivalent as reviewed today. All questions were answered with the patient voicing complete understanding. 2.  CVA:   remain on DAPT, follow. No Carotid dz on CTA. PFO closed.        3. SHF/ICM:  Off BP meds. Follow. EF 35-40%. No ICD now. 4. AO:  Mild to mod. Follow in the future. LA size noted, follow for PAF in the future. Labs in a few weeks. Patient has been instructed and agrees to call our office with any issues or other concerns related to their cardiac condition(s) and/or complaint(s). Return in about 6 months (around 11/30/2022).        KELSEY AGUILAR,   5/31/2022

## 2022-11-30 ENCOUNTER — OFFICE VISIT (OUTPATIENT)
Dept: CARDIOLOGY CLINIC | Age: 72
End: 2022-11-30
Payer: MEDICARE

## 2022-11-30 VITALS
SYSTOLIC BLOOD PRESSURE: 122 MMHG | HEIGHT: 69 IN | HEART RATE: 67 BPM | DIASTOLIC BLOOD PRESSURE: 74 MMHG | BODY MASS INDEX: 24.88 KG/M2 | WEIGHT: 168 LBS

## 2022-11-30 DIAGNOSIS — Z95.1 S/P CABG X 3: ICD-10-CM

## 2022-11-30 DIAGNOSIS — Z86.73 HISTORY OF STROKE: ICD-10-CM

## 2022-11-30 DIAGNOSIS — I50.22 SYSTOLIC CHF, CHRONIC (HCC): Primary | ICD-10-CM

## 2022-11-30 DIAGNOSIS — I25.10 CORONARY ARTERY DISEASE INVOLVING NATIVE CORONARY ARTERY OF NATIVE HEART WITHOUT ANGINA PECTORIS: ICD-10-CM

## 2022-11-30 DIAGNOSIS — I34.0 NONRHEUMATIC MITRAL VALVE REGURGITATION: ICD-10-CM

## 2022-11-30 DIAGNOSIS — Z98.890 S/P MITRAL VALVE REPAIR: ICD-10-CM

## 2022-11-30 PROCEDURE — 93000 ELECTROCARDIOGRAM COMPLETE: CPT | Performed by: INTERNAL MEDICINE

## 2022-11-30 PROCEDURE — 99214 OFFICE O/P EST MOD 30 MIN: CPT | Performed by: INTERNAL MEDICINE

## 2022-11-30 PROCEDURE — 1123F ACP DISCUSS/DSCN MKR DOCD: CPT | Performed by: INTERNAL MEDICINE

## 2022-11-30 RX ORDER — PIOGLITAZONEHYDROCHLORIDE 30 MG/1
30 TABLET ORAL DAILY
COMMUNITY
Start: 2022-10-27 | End: 2023-10-27

## 2022-11-30 NOTE — PROGRESS NOTES
[address], [address] [place] [address], [address] [place] [address]  PHONE: [phone]     22    NAME:  Celina Kyle III  : 1950  MRN: [id number]       SUBJECTIVE:   Celina Kyle III is a 81 y.o. male seen for a follow up visit regarding the following:     Chief Complaint   Patient presents with    Congestive Heart Failure    Coronary Artery Disease       HPI:    TIA 1/3/2020 admission and prior in    Echo 2020: EF 25%, mod/Severe MR.    3/5/2020:  CABG X 3 with LIMA to LAD, SVG to OM, SVG to PDA as well as mitral valve repair, closure of PFO, and clipping of left atrial appendage. Echo 3/12/2020 and 2020 and 2021: EF 35-40%     Some GI issues, getting EGD soon. No CP, pressure. NO new CUBA, SOB. Walking more now. No new angina, CP, CUBA. No angina. Patient denies recent history of orthopnea, PND, excessive dizziness and/or syncope. Past Medical History, Past Surgical History, Family history, Social History, and Medications were all reviewed with the patient today and updated as necessary. Current Outpatient Medications   Medication Sig Dispense Refill    pioglitazone (ACTOS) 30 MG tablet Take 30 mg by mouth daily      tamsulosin (FLOMAX) 0.4 mg capsule Take 0.4 mg by mouth daily      pantoprazole (PROTONIX) 40 MG tablet Take 40 mg by mouth daily      aspirin 81 MG EC tablet Take 81 mg by mouth daily      atorvastatin (LIPITOR) 80 MG tablet Take 80 mg by mouth      citalopram (CELEXA) 20 MG tablet Take 20 mg by mouth daily      clopidogrel (PLAVIX) 75 MG tablet Take 75 mg by mouth daily      metFORMIN (GLUCOPHAGE) 500 MG tablet Take 500 mg by mouth 2 times daily (with meals)      nitroGLYCERIN (NITROSTAT) 0.4 MG SL tablet Place 0.4 mg under the tongue as needed       No current facility-administered medications for this visit.         No Known Allergies  Patient Active Problem List    Diagnosis Date Noted    Physical debility 2020    S/P CABG x 3 03/10/2020    Pleural effusion, right 2020    Atelectasis 2020    [facility] 2020    Ventricular fibrillation ([facility] 2020    Patent foramen ovale 2020    Respiratory failure, post-operative ([facility] 2020    Mitral valve regurgitation 2020    CAD (coronary artery disease) 2020    Hypoxia 2020    Mitral valve insufficiency 2020    S/P mitral valve repair 2020    Atherosclerosis of coronary artery of native heart with unstable angina pectoris ([facility] 2020    Type II diabetes mellitus ([facility] 2020    History of stroke     Systolic CHF, chronic ([facility] 2020    CVA (cerebral vascular accident) ([facility] 2020      Past Surgical History:   Procedure Laterality Date    CHOLECYSTECTOMY      CYST REMOVAL      HERNIA REPAIR      KNEE ARTHROSCOPY      rt knee     Family History   Problem Relation Age of Onset    Heart Disease Paternal Grandfather     Diabetes Sister     Diabetes Father     Diabetes Sister     Heart Disease Father      Social History     Tobacco Use    Smoking status: Former     Packs/day: 1.00     Types: Cigarettes     Quit date: 10/20/1997     Years since quittin.1    Smokeless tobacco: Never   Substance Use Topics    Alcohol use: Not Currently     Alcohol/week: 0.0 standard drinks         ROS:    No obvious pertinent positives on review of systems except for what was outlined in the HPI today.       PHYSICAL EXAM:     /74   Pulse 67   Ht 5' 9\" (1.753 m)   Wt 168 lb (76.2 kg)   BMI 24.81 kg/m²    General/Constitutional:   Alert and oriented x 3, no acute distress  HEENT:   normocephalic, atraumatic, moist mucous membranes  Neck:   No JVD or carotid bruits bilaterally  Cardiovascular:   regular rate and rhythm, no murmur/rub/gallop appreciated  Pulmonary:   clear to auscultation bilaterally, no respiratory distress  Abdomen:   soft, non-tender, non-distended  Ext:   No sig LE edema bilaterally  Skin:  warm and dry, no obvious rashes seen  Neuro:   no obvious sensory or motor deficits  Psychiatric:   normal mood and affect      EKG Today and independently reviewed by me: sinus rhythm, normal intervals and non-specific ST/T wave changes. Lab Results   Component Value Date/Time     04/24/2020 03:15 PM    K 4.7 04/24/2020 03:15 PM    CL 99 04/24/2020 03:15 PM    CO2 24 04/24/2020 03:15 PM    BUN 13 04/24/2020 03:15 PM    CREATININE 0.68 04/24/2020 03:15 PM    GLUCOSE 131 04/24/2020 03:15 PM    CALCIUM 10.5 04/24/2020 03:15 PM        Lab Results   Component Value Date    WBC 7.3 04/24/2020    HGB 13.3 04/24/2020    HCT 38.5 04/24/2020    MCV 91 04/24/2020     04/24/2020       Lab Results   Component Value Date    TSH 3.760 04/24/2020       Lab Results   Component Value Date    LABA1C 6.8 (H) 03/03/2020     Lab Results   Component Value Date     03/03/2020       Lab Results   Component Value Date    CHOL 155 01/04/2020     Lab Results   Component Value Date    TRIG 145 01/04/2020     Lab Results   Component Value Date    HDL 26 (L) 01/04/2020     Lab Results   Component Value Date    LDLCALC 100 (H) 01/04/2020     Lab Results   Component Value Date    LABVLDL 29 (H) 01/04/2020     Lab Results   Component Value Date    CHOLHDLRATIO 6.0 01/04/2020           I have Independently reviewed prior care notes, any ER records available, cardiac testing, labs and results with the patient and before seeing the patient today. Also independently reviewed outside records when available. ASSESSMENT:    Wm was seen today for congestive heart failure and coronary artery disease.     Diagnoses and all orders for this visit:    Systolic CHF, chronic ([address]  -     EKG 12 Lead    Coronary artery disease involving native coronary artery of native heart without angina pectoris    Nonrheumatic mitral valve regurgitation    S/P CABG x 3    S/P mitral valve repair    History of stroke        PLAN:    1. CAD: remain on ASA, statin. Exercising more now. Follow for angina. Call PRN. Follow BS and DM. The patient has been instructed to call with any angina or equivalent as reviewed today. All questions were answered with the patient voicing complete understanding. 2.  CVA:   remain on DAPT, follow. No Carotid dz on CTA. PFO closed. 3. SHF/ICM:  Off BP meds. Follow. EF 35-40%. No ICD now. Follow for more HF. Labs upcoming, every 6 mos. 4. AO:  Mild to mod. Follow in the future. LA size noted, follow for PAF in the future. Patient has been instructed and agrees to call our office with any issues or other concerns related to their cardiac condition(s) and/or complaint(s). Return in about 6 months (around 5/30/2023).        KELSEY AGUILAR, DO  11/30/2022

## 2023-02-25 NOTE — PROGRESS NOTES
Shay Combs III  Admission Date: 3/5/2020             Daily Progress Note: 3/10/2020       The patient's chart is reviewed and the patient is discussed with the staff. 82-year-old gentleman with a 20-pack-year smoking history (quit 1997), normal PFTs, ROSENDO not on CPAP, diabetes, left-sided weakness, BPH with mitral regurgitation who underwent CABG, MVR and PFO closure, left atrial appendage clipping. he has an EF of 25 to 30%. Required post op pressor - now weaned.       V. fib at 230 on 3/4 requiring defibrillation, on amio. Transfused 1 unit PRBCs on 3/9. Subjective: Ate a half turkey sandwich for lunch. Best intake since surgery. Hard to take a deep breath in - some left sided chest pain. No congestion. Volumes on IS only about 750 mls. On RA with sat of 94%.      Current Facility-Administered Medications   Medication Dose Route Frequency    carboxymethylcellulose sodium (REFRESH LIQUIGEL) 1 % ophthalmic solution 1 Drop  1 Drop Both Eyes PRN    polyethylene glycol (MIRALAX) packet 17 g  17 g Oral DAILY PRN    citalopram (CELEXA) tablet 10 mg  10 mg Oral DAILY    metFORMIN (GLUCOPHAGE) tablet 500 mg  500 mg Oral BID WITH MEALS    terazosin (HYTRIN) capsule 10 mg  10 mg Oral QHS    sodium chloride (NS) flush 5-40 mL  5-40 mL IntraVENous Q8H    sodium chloride (NS) flush 5-40 mL  5-40 mL IntraVENous PRN    furosemide (LASIX) tablet 40 mg  40 mg Oral DAILY    alum-mag hydroxide-simeth (MYLANTA) oral suspension 30 mL  30 mL Oral Q4H PRN    famotidine (PEPCID) tablet 20 mg  20 mg Oral BID    ondansetron (ZOFRAN) injection 4 mg  4 mg IntraVENous Q6H PRN    acetaminophen (TYLENOL) tablet 650 mg  650 mg Oral Q4H PRN    atorvastatin (LIPITOR) tablet 80 mg  80 mg Oral QHS    amiodarone (CORDARONE) tablet 200 mg  200 mg Oral Q12H    aspirin delayed-release tablet 81 mg  81 mg Oral DAILY    magnesium oxide (MAG-OX) tablet 400 mg  400 mg Oral TID PRN    magnesium oxide (MAG-OX) tablet 400 mg  400 mg Oral QID PRN    potassium chloride (KLOR-CON) tablet 10 mEq  10 mEq Oral DAILY    potassium chloride (K-DUR, KLOR-CON) SR tablet 20 mEq  20 mEq Oral BID PRN    potassium chloride (K-DUR, KLOR-CON) SR tablet 40 mEq  40 mEq Oral BID PRN    [Held by provider] carvediloL (COREG) tablet 3.125 mg  3.125 mg Oral Q12H    [Held by provider] lisinopriL (PRINIVIL, ZESTRIL) tablet 2.5 mg  2.5 mg Oral DAILY    0.9% sodium chloride infusion 250 mL  250 mL IntraVENous PRN    senna-docusate (PERICOLACE) 8.6-50 mg per tablet 2 Tab  2 Tab Oral Q12H    insulin lispro (HUMALOG) injection   SubCUTAneous AC&HS    alcohol 62% (NOZIN) nasal  1 Ampule  1 Ampule Topical DAILY    nitroglycerin (NITROSTAT) tablet 0.4 mg  0.4 mg SubLINGual PRN    influenza vaccine 2019-20 (6 mos+)(PF) (FLUARIX/FLULAVAL/FLUZONE QUAD) injection 0.5 mL  0.5 mL IntraMUSCular PRIOR TO DISCHARGE    albumin human 25% (BUMINATE) solution 25 g  25 g IntraVENous BID    traMADoL (ULTRAM) tablet 50 mg  50 mg Oral Q6H PRN         Objective:     Vitals:    03/10/20 0717 03/10/20 0748 03/10/20 0855 03/10/20 1107   BP: 90/53   99/56   Pulse: (!) 53  (!) 59 (!) 58   Resp: 18   18   Temp: 97.9 °F (36.6 °C)   98.2 °F (36.8 °C)   SpO2: 95% 96%  94%   Weight:       Height:         Intake and Output:   03/08 1901 - 03/10 0700  In: 1190 [P.O.:880]  Out: 1510 [Urine:1460]  03/10 0701 - 03/10 1900  In: 840 [P.O.:840]  Out: -     Physical Exam:   Constitutional:  the patient is well developed and in no acute distress  HEENT:  Sclera clear, pupils equal, oral mucosa moist  Lungs: clear but decreased from the posterior - dullness on the right. On room air  Cardiovascular:  RRR without M,G,R. Sinus sonam per telemetry  Abd/GI: soft and non-tender; with positive bowel sounds. Ext: warm without cyanosis. There is no lower leg edema.   Musculoskeletal: moves all four extremities with equal strength  Skin:  no jaundice or rashes, chest and leg wounds   Neuro: no gross neuro deficits   Musculoskeletal: can ambulate. No deformity  Psychiatric: Calm. Review of Systems - General ROS: positive for  - fatigue  Respiratory ROS: positive for - none  Cardiovascular ROS: positive for - chest pain related to cardiac surgery   Gastrointestinal ROS: positive for - appetite loss and constipation  Musculoskeletal ROS: positive for - muscular weakness   Lines: peripheral IV, pacing wires    CHEST XRAY:     3/8        LAB  Recent Labs     03/10/20  0343 03/09/20  0400 03/08/20  0347   WBC 8.0 8.6 16.9*   HGB 8.6* 7.8* 8.3*   HCT 25.5* 23.5* 25.0*   * 113* 108*     Recent Labs     03/10/20  0343 03/09/20  0400 03/08/20  0347    138 138   K 3.5 3.9 4.1    109* 109*   CO2 26 22 22   * 124* 196*   BUN 20 12 16   CREA 0.61* 0.55* 0.59*   MG 2.1 2.1 2.1   ALB  --   --  3.0*       Assessment:  (Medical Decision Making)     Hospital Problems  Date Reviewed: 2/24/2020          Codes Class Noted POA    * (Principal) S/P CABG x 3 ICD-10-CM: Z95.1  ICD-9-CM: V45.81  3/10/2020 No    Sinus sonam per telemetry.  Transfused yesterday for anemia    Pleural effusion, right ICD-10-CM: J90  ICD-9-CM: 511.9  3/8/2020 No    On exam, Sounds like fair amount of fluid    Atelectasis ICD-10-CM: J98.11  ICD-9-CM: 518.0  3/6/2020 No    Secondary to recent chest surgery    Acute pulmonary edema (HCC) ICD-10-CM: J81.0  ICD-9-CM: 518.4  3/6/2020 No    On lasix - fluid balance + and blood pressure marginal    Ventricular fibrillation (HCC) ICD-10-CM: I49.01  ICD-9-CM: 427.41  3/6/2020 No    EF 25 to 30%     Patent foramen ovale ICD-10-CM: Q21.1  ICD-9-CM: 745.5  3/5/2020 Yes    S/p closure    Mitral valve regurgitation ICD-10-CM: I34.0  ICD-9-CM: 424.0  3/5/2020 Yes    S/p MVR    CAD (coronary artery disease) (Chronic) ICD-10-CM: I25.10  ICD-9-CM: 414.00  3/5/2020 Yes    S/p CABG    Mitral valve insufficiency ICD-10-CM: I34.0  ICD-9-CM: 424.0  3/5/2020 Yes Atherosclerosis of coronary artery of native heart with unstable angina pectoris ([facility] ICD-10-CM: I25.110  ICD-9-CM: 414.01, 411.1  3/5/2020 Yes        Type II diabetes mellitus ([facility] (Chronic) ICD-10-CM: E11.9  ICD-9-CM: 250.00  3/5/2020 Yes    controlled    Respiratory failure, post-operative ([facility] ICD-10-CM: C26.841  ICD-9-CM: 518.51  3/5/2020 No    Tolerated extubation    Hypoxia ICD-10-CM: R09.02  ICD-9-CM: 799.02  3/5/2020 No    Now on room air          Plan:  (Medical Decision Making)   1. Follow up CXR tomorrow. US/tap if needed  2. Daily lasix but + balance and marginal blood pressure. Doubt he will diurese much  3. Sinus bradycardia - on low dose Coreg and Amiodarone. CV surgery following  4. Off oxygen. Volumes on IS around 750 mls. Will review CXR tomorrow - ? Effusion which may be limiting inspiratory effort  5. ? Need life vest at discharge with cardiomyopathy and recent arrest?    Marga Lazcano NP     Lungs:  Some decreased BS at bases  Heart:  RRR with no Murmur/Rubs/Gallops    Additional Comments:  May have small effusions. Check CXR tomorrow. Add on a CRP to assess for inflammatory process. Add Alubterol with EZ PAP to improve aeration. I have spoken with and examined the patient. I agree with the above assessment and plan as documented. Nessa Morrow MD      More than 50% of time documented was spent face-to-face contact with the patient and in the care of the patient on the floor/unit where the patient is located. 61

## 2023-06-01 ENCOUNTER — OFFICE VISIT (OUTPATIENT)
Age: 73
End: 2023-06-01
Payer: MEDICARE

## 2023-06-01 VITALS
WEIGHT: 174 LBS | SYSTOLIC BLOOD PRESSURE: 110 MMHG | BODY MASS INDEX: 25.77 KG/M2 | HEIGHT: 69 IN | DIASTOLIC BLOOD PRESSURE: 64 MMHG | HEART RATE: 68 BPM

## 2023-06-01 DIAGNOSIS — I50.22 SYSTOLIC CHF, CHRONIC (HCC): ICD-10-CM

## 2023-06-01 DIAGNOSIS — Z98.890 S/P MITRAL VALVE REPAIR: ICD-10-CM

## 2023-06-01 DIAGNOSIS — I49.01 VENTRICULAR FIBRILLATION (HCC): Primary | ICD-10-CM

## 2023-06-01 DIAGNOSIS — I34.0 NONRHEUMATIC MITRAL VALVE REGURGITATION: ICD-10-CM

## 2023-06-01 DIAGNOSIS — Z95.1 S/P CABG X 3: ICD-10-CM

## 2023-06-01 DIAGNOSIS — I25.10 CORONARY ARTERY DISEASE INVOLVING NATIVE CORONARY ARTERY OF NATIVE HEART WITHOUT ANGINA PECTORIS: ICD-10-CM

## 2023-06-01 PROBLEM — I25.110 ATHEROSCLEROSIS OF CORONARY ARTERY OF NATIVE HEART WITH UNSTABLE ANGINA PECTORIS (HCC): Status: RESOLVED | Noted: 2020-03-05 | Resolved: 2023-06-01

## 2023-06-01 PROCEDURE — 1123F ACP DISCUSS/DSCN MKR DOCD: CPT | Performed by: INTERNAL MEDICINE

## 2023-06-01 PROCEDURE — 99214 OFFICE O/P EST MOD 30 MIN: CPT | Performed by: INTERNAL MEDICINE

## 2023-06-01 RX ORDER — MONTELUKAST SODIUM 10 MG/1
10 TABLET ORAL NIGHTLY
COMMUNITY
Start: 2023-04-30

## 2023-06-01 NOTE — PROGRESS NOTES
[address], [address] [employer] [facility], [address] [place] [address]  PHONE: [phone]     23    NAME:  Leopoldo Peacemaker III  : 1950  MRN: [id number]       SUBJECTIVE:   Marquis Parker is a 72 y.o. male seen for a follow up visit regarding the following:     Chief Complaint   Patient presents with    6 Month Follow-Up    Congestive Heart Failure       HPI:    TIA 1/3/2020 admission and prior in    Echo 2020: EF 25%, mod/Severe MR.    3/5/2020:  CABG X 3 with LIMA to LAD, SVG to OM, SVG to PDA as well as mitral valve repair, closure of PFO, and clipping of left atrial appendage. Echo 3/12/2020 and 2020 and 2021: EF 35-40%     NYHA Class II sx now, stable, not worsening. No CP, pressure. NO new CUBA, SOB. Walking more now. No new angina, CP, CUBA. No angina. Patient denies recent history of orthopnea, PND, excessive dizziness and/or syncope. Past Medical History, Past Surgical History, Family history, Social History, and Medications were all reviewed with the patient today and updated as necessary. Current Outpatient Medications   Medication Sig Dispense Refill    montelukast (SINGULAIR) 10 MG tablet Take 1 tablet by mouth nightly      pioglitazone (ACTOS) 30 MG tablet Take 1 tablet by mouth daily      tamsulosin (FLOMAX) 0.4 mg capsule Take 1 capsule by mouth daily      pantoprazole (PROTONIX) 40 MG tablet Take 1 tablet by mouth daily      aspirin 81 MG EC tablet Take 1 tablet by mouth daily      atorvastatin (LIPITOR) 80 MG tablet Take 1 tablet by mouth      citalopram (CELEXA) 20 MG tablet Take 1 tablet by mouth daily      clopidogrel (PLAVIX) 75 MG tablet Take 1 tablet by mouth daily      metFORMIN (GLUCOPHAGE) 500 MG tablet Take 1 tablet by mouth 2 times daily (with meals)      nitroGLYCERIN (NITROSTAT) 0.4 MG SL tablet Place 1 tablet under the tongue as needed       No current facility-administered medications for this visit.         No Known Allergies  Patient Active

## (undated) DEVICE — CANNULA INJ L2.5IN BLNT TIP 3MM CLR BODY W/ 1 W VLV DLP

## (undated) DEVICE — 1/4 FORCE SURGICAL SPRING CLIP: Brand: STEALTH® SPRING CLIP

## (undated) DEVICE — SUTURE PROL SZ 6-0 L30IN NONABSORBABLE BLU L13MM CC-1 3/8 M8707

## (undated) DEVICE — SUTURE S STL SZ 6 L18IN NONABSORBABLE SIL L48MM CCS 1/2 CIR M654G

## (undated) DEVICE — AMD ANTIMICROBIAL NON-ADHERENT PAD,0.2% POLYHEXAMETHYLENE BIGUANIDE HCI (PHMB): Brand: TELFA

## (undated) DEVICE — MEDI-TRACE CADENCE ADULT, DEFIBRILLATION ELECTRODE -RTS  (10 PR/PK) - ZOLL: Brand: MEDI-TRACE CADENCE

## (undated) DEVICE — CATHETER URETH 18FR RED RUB INTMIT ALL PURP

## (undated) DEVICE — 3000CC GUARDIAN II: Brand: GUARDIAN

## (undated) DEVICE — BLADE OPHTH MINI BEAV SHRP --

## (undated) DEVICE — Device: Brand: JELCO

## (undated) DEVICE — BLADE SCALP SURG BARD-PARK 11 --

## (undated) DEVICE — GLOVE SURG SZ 7 L11.2IN THK9.8MIL STRW LTX POLYMER BEAD CUF

## (undated) DEVICE — AORTIC PUNCHES ARE USED TO CREATE A UNIFORM OPENING IN BLOOD VESSELS DURING CARDIOVASCULAR SURGERY. THE VESSEL GRAFT IS INSERTED INTO THE CREATED OPENING AND SUTURED TO THE VESSEL WALL. AORTIC LANCETS ARE USED TO MAKE A SMALL UNIFORM CUT IN A BLOOD VESSEL TO FACILITATE INSERTION OF AN AORTIC PUNCH.  PUNCHES COME IN VARIOUS LENGTHS, DIAMETERS AND TIP CONFIGURATIONS.: Brand: CLEANCUT ROTATING AORTIC PUNCH

## (undated) DEVICE — Device

## (undated) DEVICE — SUTURE SILK PERMAHAND PRECUT 6 X 30 IN SZ 1 BLK BRAID A307H

## (undated) DEVICE — CANNULA PERF L1.8MM TIP L1MM S STL SHFT BLB SHP TIP FEM

## (undated) DEVICE — SUTURE ETHBND EXCEL 2-0 L30IN NONABSORBABLE GRN L26MM SH MX563

## (undated) DEVICE — PLEDGET VASC W3/16XL3/8IN THK1/16IN PTFE SFT

## (undated) DEVICE — SOLUTION IV 1000ML 0.9% SOD CHL

## (undated) DEVICE — AMD ANTIMICROBIAL GAUZE SPONGES,12 PLY USP TYPE VII, 0.2% POLYHEXAMETHYLENE BIGUANIDE HCI (PHMB): Brand: CURITY

## (undated) DEVICE — CATHETER ETER TY TEMP SENS F MBO W URIN M FOLLOWS CDC GUIDELINES TO

## (undated) DEVICE — CANNULA PERF 32FR CONN SITE 3/8IN VEN SGL STG KINK RESIST

## (undated) DEVICE — KIT THORCENT 8FR L5IN POLYUR W/ 18/22/25GA NDL 3 W STPCOCK

## (undated) DEVICE — SUTURE NONABSORBABLE L24IN SZ 7-0 M0-5 BV175-8 EP 24 BLU M8745

## (undated) DEVICE — DRAPE SLUSH DISC W44XL66IN ST FOR RND BSIN HUSH SLUSH SYS

## (undated) DEVICE — STERILE POLYISOPRENE POWDER-FREE SURGICAL GLOVES: Brand: PROTEXIS

## (undated) DEVICE — CATH URETH INTMIT ROB 14FR FUN -- USE ITEM 179521

## (undated) DEVICE — CONNECTOR IV 3/8X3/8X3/8 Y

## (undated) DEVICE — BLADE SAW W10XL54MM FOR PRI REPEAT STRNOTMY

## (undated) DEVICE — CATHETER THOR 24FR L22IN PVC 5 EYELET STR ATRAUM

## (undated) DEVICE — SUTURE VCRL SZ 3-0 L36IN ABSRB UD L36MM CT-1 1/2 CIR J944H

## (undated) DEVICE — REM POLYHESIVE ADULT PATIENT RETURN ELECTRODE: Brand: VALLEYLAB

## (undated) DEVICE — CLAMP INSERT: Brand: STEALTH® CLAMP INSERT

## (undated) DEVICE — GEL MEDC ULTRASOUND 5L -- REPLACED BY 326862

## (undated) DEVICE — SUTURE TEMP PACE WIRE SZ 2-0 L24IN NONABSORBABLE LIGHT/DARK

## (undated) DEVICE — BLADE SCALP SURG BARD-PARK 10 --

## (undated) DEVICE — CATHETER THOR 32FR L23IN PVC 6 EYELET STR ATRAUM

## (undated) DEVICE — SS SUTURE, 3 PER SLEEVE: Brand: MYO/WIRE II

## (undated) DEVICE — BUTTON SWITCH PENCIL BLADE ELECTRODE, HOLSTER: Brand: EDGE

## (undated) DEVICE — SUTURE NONABSORBABLE BRAID SILK BLK RB-1 3-0 24IN K572H

## (undated) DEVICE — 6 FOOT DISPOSABLE EXTENSION CABLE WITH SAFE CONNECT / SCREW-DOWN

## (undated) DEVICE — SOLUTION IRRIG 3000ML 0.9% SOD CHL FLX CONT 0797208] ICU MEDICAL INC]

## (undated) DEVICE — SURGIFOAM SPNG SZ 100

## (undated) DEVICE — CATH RECT FUN END OPN TIP 22FR --

## (undated) DEVICE — SUTURE VCRL SZ 4-0 L27IN ABSRB UD L19MM PS-2 3/8 CIR PRIM J426H

## (undated) DEVICE — SUTURE ETHBND EXCEL SZ 0 L18IN NONABSORBABLE GRN L36MM CT-1 CX21D

## (undated) DEVICE — SUTURE PDS II SZ 1 L36IN ABSRB VLT L48MM CTX 1/2 CIR Z371T

## (undated) DEVICE — COR-KNOT MINI® COMBO KITBASE PACKAGE TYPE - KITEACH STERILE PACKAGE KIT CONTAINS (2) SINGLE PATIENT USE COR-KNOT MINI® DEVICES AND (12) COR-KNOT® QUICK LOADS®.: Brand: COR-KNOT MINI®

## (undated) DEVICE — CLIP INT SM TI EZ LD LIG SYS WECK HORZ

## (undated) DEVICE — SUTURE PROL DBL ARMED 8 0 BV130 5 24IN N ABSRB MFIL BLU M8739

## (undated) DEVICE — COR-KNOT® QUICK LOAD® SINGLES: Brand: COR-KNOT® QUICK LOAD®

## (undated) DEVICE — AMD ANTIMICROBIAL BANDAGE ROLL,6 PLY: Brand: KERLIX

## (undated) DEVICE — BASIC SINGLE BASIN-LF: Brand: MEDLINE INDUSTRIES, INC.

## (undated) DEVICE — BLADE SURG NO15 S STL STR DISP GLASSVAN

## (undated) DEVICE — SUT PROL 3-0 36IN SH DA BLU --

## (undated) DEVICE — SUTURE PERMAHAND SZ 0 L30IN NONABSORBABLE BLK L30MM PSL 3/8 590H

## (undated) DEVICE — SUT PROL 4-0 30IN SH1 DA BLU --

## (undated) DEVICE — SUTURE PERMAHAND SZ 2-0 L12X18IN NONABSORBABLE BLK SILK A185H

## (undated) DEVICE — SPONGE LAP 18X18IN STRL -- 5/PK

## (undated) DEVICE — TAPE UMB 1/8X30IN MP COT WHT --

## (undated) DEVICE — CABG-MVR DR WILLIAMS: Brand: MEDLINE INDUSTRIES, INC.

## (undated) DEVICE — (D)STRIP SKN CLSR 0.5X4IN WHT --

## (undated) DEVICE — (D)PREP SKN CHLRAPRP APPL 26ML -- CONVERT TO ITEM 371833

## (undated) DEVICE — BLADE SURG NO20 S STL STR DISP GLASSVAN

## (undated) DEVICE — 48" PROBE COVER W/GEL, ULTRASOUND, STERILE: Brand: SITE-RITE